# Patient Record
Sex: MALE | Race: WHITE | NOT HISPANIC OR LATINO | Employment: OTHER | URBAN - METROPOLITAN AREA
[De-identification: names, ages, dates, MRNs, and addresses within clinical notes are randomized per-mention and may not be internally consistent; named-entity substitution may affect disease eponyms.]

---

## 2021-10-14 ENCOUNTER — APPOINTMENT (OUTPATIENT)
Dept: GENERAL RADIOLOGY | Facility: HOSPITAL | Age: 61
End: 2021-10-14

## 2021-10-14 ENCOUNTER — HOSPITAL ENCOUNTER (EMERGENCY)
Facility: HOSPITAL | Age: 61
Discharge: HOME | End: 2021-10-14
Attending: EMERGENCY MEDICINE | Admitting: EMERGENCY MEDICINE

## 2021-10-14 VITALS
DIASTOLIC BLOOD PRESSURE: 81 MMHG | HEIGHT: 66 IN | RESPIRATION RATE: 16 BRPM | OXYGEN SATURATION: 97 % | SYSTOLIC BLOOD PRESSURE: 166 MMHG | HEART RATE: 74 BPM | BODY MASS INDEX: 38.19 KG/M2 | TEMPERATURE: 98.5 F | WEIGHT: 237.66 LBS

## 2021-10-14 DIAGNOSIS — I95.9 TRANSIENT HYPOTENSION: Primary | ICD-10-CM

## 2021-10-14 LAB
ALBUMIN SERPL-MCNC: 3.8 G/DL (ref 3.5–5.2)
ALBUMIN/GLOB SERPL: 1.3 G/DL
ALP SERPL-CCNC: 66 U/L (ref 39–117)
ALT SERPL W P-5'-P-CCNC: 34 U/L (ref 1–41)
ANION GAP SERPL CALCULATED.3IONS-SCNC: 11.2 MMOL/L (ref 5–15)
AST SERPL-CCNC: 21 U/L (ref 1–40)
BASOPHILS # BLD AUTO: 0.12 10*3/MM3 (ref 0–0.2)
BASOPHILS NFR BLD AUTO: 1.4 % (ref 0–1.5)
BILIRUB SERPL-MCNC: 0.3 MG/DL (ref 0–1.2)
BUN SERPL-MCNC: 24 MG/DL (ref 8–23)
BUN/CREAT SERPL: 11.8 (ref 7–25)
CALCIUM SPEC-SCNC: 9.3 MG/DL (ref 8.6–10.5)
CHLORIDE SERPL-SCNC: 105 MMOL/L (ref 98–107)
CO2 SERPL-SCNC: 23.8 MMOL/L (ref 22–29)
CREAT SERPL-MCNC: 2.04 MG/DL (ref 0.76–1.27)
DEPRECATED RDW RBC AUTO: 45 FL (ref 37–54)
EOSINOPHIL # BLD AUTO: 0.55 10*3/MM3 (ref 0–0.4)
EOSINOPHIL NFR BLD AUTO: 6.3 % (ref 0.3–6.2)
ERYTHROCYTE [DISTWIDTH] IN BLOOD BY AUTOMATED COUNT: 13.6 % (ref 12.3–15.4)
GFR SERPL CREATININE-BSD FRML MDRD: 33 ML/MIN/1.73
GLOBULIN UR ELPH-MCNC: 3 GM/DL
GLUCOSE SERPL-MCNC: 110 MG/DL (ref 65–99)
HCT VFR BLD AUTO: 38.7 % (ref 37.5–51)
HGB BLD-MCNC: 12.6 G/DL (ref 13–17.7)
IMM GRANULOCYTES # BLD AUTO: 0.02 10*3/MM3 (ref 0–0.05)
IMM GRANULOCYTES NFR BLD AUTO: 0.2 % (ref 0–0.5)
LYMPHOCYTES # BLD AUTO: 2.94 10*3/MM3 (ref 0.7–3.1)
LYMPHOCYTES NFR BLD AUTO: 33.5 % (ref 19.6–45.3)
MCH RBC QN AUTO: 29.4 PG (ref 26.6–33)
MCHC RBC AUTO-ENTMCNC: 32.6 G/DL (ref 31.5–35.7)
MCV RBC AUTO: 90.4 FL (ref 79–97)
MONOCYTES # BLD AUTO: 0.86 10*3/MM3 (ref 0.1–0.9)
MONOCYTES NFR BLD AUTO: 9.8 % (ref 5–12)
NEUTROPHILS NFR BLD AUTO: 4.28 10*3/MM3 (ref 1.7–7)
NEUTROPHILS NFR BLD AUTO: 48.8 % (ref 42.7–76)
NRBC BLD AUTO-RTO: 0 /100 WBC (ref 0–0.2)
PLATELET # BLD AUTO: 252 10*3/MM3 (ref 140–450)
PMV BLD AUTO: 10.5 FL (ref 6–12)
POTASSIUM SERPL-SCNC: 4.4 MMOL/L (ref 3.5–5.2)
PROT SERPL-MCNC: 6.8 G/DL (ref 6–8.5)
RBC # BLD AUTO: 4.28 10*6/MM3 (ref 4.14–5.8)
SODIUM SERPL-SCNC: 140 MMOL/L (ref 136–145)
TROPONIN T SERPL-MCNC: 0.03 NG/ML (ref 0–0.03)
TROPONIN T SERPL-MCNC: 0.05 NG/ML (ref 0–0.03)
WBC # BLD AUTO: 8.77 10*3/MM3 (ref 3.4–10.8)

## 2021-10-14 PROCEDURE — 93005 ELECTROCARDIOGRAM TRACING: CPT | Performed by: EMERGENCY MEDICINE

## 2021-10-14 PROCEDURE — 93010 ELECTROCARDIOGRAM REPORT: CPT | Performed by: INTERNAL MEDICINE

## 2021-10-14 PROCEDURE — 84484 ASSAY OF TROPONIN QUANT: CPT | Performed by: EMERGENCY MEDICINE

## 2021-10-14 PROCEDURE — 99283 EMERGENCY DEPT VISIT LOW MDM: CPT

## 2021-10-14 PROCEDURE — 99284 EMERGENCY DEPT VISIT MOD MDM: CPT

## 2021-10-14 PROCEDURE — 85025 COMPLETE CBC W/AUTO DIFF WBC: CPT | Performed by: EMERGENCY MEDICINE

## 2021-10-14 PROCEDURE — 36415 COLL VENOUS BLD VENIPUNCTURE: CPT

## 2021-10-14 PROCEDURE — 71045 X-RAY EXAM CHEST 1 VIEW: CPT

## 2021-10-14 PROCEDURE — 80053 COMPREHEN METABOLIC PANEL: CPT | Performed by: EMERGENCY MEDICINE

## 2021-10-14 RX ORDER — HUMAN INSULIN 100 [IU]/ML
62 INJECTION, SUSPENSION SUBCUTANEOUS
COMMUNITY

## 2021-10-14 RX ORDER — LOSARTAN POTASSIUM 50 MG/1
100 TABLET ORAL DAILY
COMMUNITY

## 2021-10-14 RX ORDER — SODIUM CHLORIDE 0.9 % (FLUSH) 0.9 %
10 SYRINGE (ML) INJECTION AS NEEDED
Status: DISCONTINUED | OUTPATIENT
Start: 2021-10-14 | End: 2021-10-15 | Stop reason: HOSPADM

## 2021-10-14 RX ORDER — NIFEDIPINE 30 MG/1
30 TABLET, EXTENDED RELEASE ORAL DAILY
COMMUNITY
End: 2022-08-12

## 2021-10-14 RX ORDER — GLIPIZIDE 10 MG/1
10 TABLET ORAL
COMMUNITY

## 2021-10-14 RX ORDER — OMEPRAZOLE 20 MG/1
20 CAPSULE, DELAYED RELEASE ORAL DAILY
COMMUNITY

## 2021-10-14 RX ORDER — GABAPENTIN 600 MG/1
1200 TABLET ORAL 2 TIMES DAILY
COMMUNITY

## 2021-10-14 RX ORDER — ERGOCALCIFEROL 1.25 MG/1
50000 CAPSULE ORAL WEEKLY
COMMUNITY
End: 2023-01-06

## 2021-10-14 RX ORDER — CHLORAL HYDRATE 500 MG
CAPSULE ORAL
COMMUNITY

## 2021-10-14 RX ORDER — MONTELUKAST SODIUM 10 MG/1
10 TABLET ORAL NIGHTLY
COMMUNITY

## 2021-10-14 RX ORDER — ATORVASTATIN CALCIUM 80 MG/1
80 TABLET, FILM COATED ORAL NIGHTLY
COMMUNITY

## 2021-10-14 RX ADMIN — SODIUM CHLORIDE 500 ML: 9 INJECTION, SOLUTION INTRAVENOUS at 18:32

## 2021-10-15 LAB — QT INTERVAL: 406 MS

## 2021-10-15 NOTE — DISCHARGE INSTRUCTIONS
Discontinue your nifedipine prescription.  Take your other blood pressure medications as prescribed.  Monitor your blood pressure twice a day at roughly the same time each day.  If you notice your blood pressures are becoming elevated again then you may restart your nifedipine.  Otherwise follow-up with your family doctor and discuss your medication regiment with them.  Return to the ER for any chest pain, shortness of breath or any other concerns that may arise.

## 2021-10-15 NOTE — ED PROVIDER NOTES
Subjective   History of Present Illness  The patient is a 61-year-old male who presents to the ER for evaluation of low blood pressure.  Patient is a he was at home he began to dizzy and lightheaded.  He checked his blood pressure and found to be 71/33.  He took several readings over.  About half hour with very similar readings each time.  Subsequently patient presents to the ER for evaluation.  Upon arrival here patient is a symptoms are pretty much resolved.  Patient reports he has had a recent change in his blood pressure regiment.  Patient had a recent carotid endarterectomy was followed by a postsurgical wound infection requiring repeat hospitalization.  He reports that while he was hospitalized his blood pressure was found to be elevated and he was started on nifedipine in addition to his other home medications of losartan.    Patient denies headache, chest pain, shortness of breath, fever, nausea, vomiting, or diarrhea.  Review of Systems   Constitutional: Negative.    HENT: Negative.    Eyes: Negative.    Respiratory: Negative.    Cardiovascular: Negative.    Gastrointestinal: Negative.    Endocrine: Negative.    Genitourinary: Negative.    Musculoskeletal: Negative.    Skin: Negative.    Allergic/Immunologic: Negative.    Neurological: Positive for light-headedness.   Hematological: Negative.    Psychiatric/Behavioral: Negative.    All other systems reviewed and are negative.      Past Medical History:   Diagnosis Date   • Diabetes mellitus (HCC)    • Hyperlipidemia    • Hypertension        No Known Allergies    Past Surgical History:   Procedure Laterality Date   • CHOLECYSTECTOMY     • HERNIA REPAIR         History reviewed. No pertinent family history.    Social History     Socioeconomic History   • Marital status:    Tobacco Use   • Smoking status: Former Smoker     Years: 25.00     Types: Cigarettes   Substance and Sexual Activity   • Alcohol use: Never   • Drug use: Never           Objective    Physical Exam  Vital signs were reviewed.  General appearance - Patient appears well-developed and well-nourished.  Patient is in no acute distress.  Head - Normocephalic, atraumatic.  Pupils - Equal, round, reactive to light.  Extraocular muscles are intact.  Conjunctive is clear  Tympanic membranes - Gray, intact without erythema or retractions.  Oral mucosa - Pink and moist without lesions or erythema.  Uvula is midline.  Neck - Supple.  Trachea was midline.  There is no palpable lymphadenopathy or thyromegaly.  There are no meningeal signs  Lungs - Clear to auscultation and percussion bilaterally.  Heart - Regular rate and rhythm without any murmurs, clicks, or gallops.  Abdomen - Soft.  Bowel sounds are present.  There is no rebound, guarding, or rigidity.  There are no palpable masses.  There are no pulsatile masses.  Back - Spine is straight and midline.  There is no CVA tenderness.  Extremities - Intact x4 with full range of motion.  There is no palpable edema.  Neurologic - Patient is awake, alert, and oriented x3.  Cranial nerves II through XII are grossly intact.  Motor and sensory functions grossly intact.  Cerebellar function was normal.  Integument - There are no rashes.  There are no petechia or purpura lesions noted.  There are no vesicular lesions noted.    Procedures     EKG performed at 1818 was interpreted by me showed normal sinus rhythm with a ventricular rate of 63 beats minute.  The intervals are 60 ms.  P waves are normal.  QRS interval is normal.  Axis is leftward -26 degrees.  There are some nonspecific ST-T wave changes identified.  QT corrected is normal at 415 ms.      ED Course         The patient was seen and evaluated.  The above history and physical examination was performed as stated.  Diagnostic data obtained.  Results reviewed.  Discussed with the patient.  Patient does report history of renal disease.  Patient on repeat questioning continues to deny any chest pain today.  I  believe this troponin is related to his renal failure.  I also believe that his hypotension is likely related to the newly prescribed nifedipine.  Patient was given 500 mL fluid and his blood pressure has returned back to normal.  Patient is asymptomatic.  The patient stable for discharge home.  I will recommend that he discontinue his nifedipine and go solely back on his losartan prescription.  The patient also agreed to call PCP tomorrow and make a short-term follow-up.  The patient also continue to monitor his blood pressures at home.                                   MDM    Final diagnoses:   Transient hypotension       ED Disposition  ED Disposition     ED Disposition Condition Comment    Discharge Stable             Follow-up with your primary doctor in 1 week.  Call for next available appointment             Medication List      No changes were made to your prescriptions during this visit.          Jun Bauman,   10/14/21 [id number]

## 2021-10-15 NOTE — ED NOTES
Bedside report received from SHLOMO Doherty. Care assumed. Patient denies pain at this time. Requests food/drink. Will check w/ MD.     Rena Callahan RN  10/14/21 [id number]

## 2021-10-15 NOTE — ED NOTES
Patient and wife given bag lunches and drinks. No further needs voiced at this time. Will continue to monitor.     Rena Callahan RN  10/14/21 [id number]

## 2022-03-24 ENCOUNTER — HOSPITAL ENCOUNTER (EMERGENCY)
Facility: HOSPITAL | Age: 62
Discharge: HOME | End: 2022-03-24
Attending: EMERGENCY MEDICINE | Admitting: EMERGENCY MEDICINE

## 2022-03-24 ENCOUNTER — APPOINTMENT (OUTPATIENT)
Dept: GENERAL RADIOLOGY | Facility: HOSPITAL | Age: 62
End: 2022-03-24

## 2022-03-24 ENCOUNTER — APPOINTMENT (OUTPATIENT)
Dept: CT IMAGING | Facility: HOSPITAL | Age: 62
End: 2022-03-24

## 2022-03-24 VITALS
TEMPERATURE: 97.6 F | WEIGHT: 230 LBS | RESPIRATION RATE: 24 BRPM | HEART RATE: 71 BPM | OXYGEN SATURATION: 99 % | BODY MASS INDEX: 36.1 KG/M2 | SYSTOLIC BLOOD PRESSURE: 161 MMHG | HEIGHT: 67 IN | DIASTOLIC BLOOD PRESSURE: 68 MMHG

## 2022-03-24 DIAGNOSIS — S20.212A CONTUSION OF LEFT CHEST WALL, INITIAL ENCOUNTER: ICD-10-CM

## 2022-03-24 DIAGNOSIS — S30.1XXA CONTUSION OF ABDOMINAL WALL, INITIAL ENCOUNTER: ICD-10-CM

## 2022-03-24 DIAGNOSIS — S22.43XA CLOSED FRACTURE OF MULTIPLE RIBS OF BOTH SIDES, INITIAL ENCOUNTER: Primary | ICD-10-CM

## 2022-03-24 LAB
ALBUMIN SERPL-MCNC: 3.9 G/DL (ref 3.5–5.2)
ALBUMIN/GLOB SERPL: 1.3 G/DL
ALP SERPL-CCNC: 73 U/L (ref 39–117)
ALT SERPL W P-5'-P-CCNC: 46 U/L (ref 1–41)
ANION GAP SERPL CALCULATED.3IONS-SCNC: 13.2 MMOL/L (ref 5–15)
AST SERPL-CCNC: 35 U/L (ref 1–40)
BASOPHILS # BLD AUTO: 0.1 10*3/MM3 (ref 0–0.2)
BASOPHILS NFR BLD AUTO: 1.4 % (ref 0–1.5)
BILIRUB SERPL-MCNC: 0.2 MG/DL (ref 0–1.2)
BUN SERPL-MCNC: 28 MG/DL (ref 8–23)
BUN/CREAT SERPL: 21.9 (ref 7–25)
CALCIUM SPEC-SCNC: 9.4 MG/DL (ref 8.6–10.5)
CHLORIDE SERPL-SCNC: 105 MMOL/L (ref 98–107)
CO2 SERPL-SCNC: 20.8 MMOL/L (ref 22–29)
CREAT SERPL-MCNC: 1.28 MG/DL (ref 0.76–1.27)
DEPRECATED RDW RBC AUTO: 45 FL (ref 37–54)
EGFRCR SERPLBLD CKD-EPI 2021: 63.3 ML/MIN/1.73
EOSINOPHIL # BLD AUTO: 0.35 10*3/MM3 (ref 0–0.4)
EOSINOPHIL NFR BLD AUTO: 4.9 % (ref 0.3–6.2)
ERYTHROCYTE [DISTWIDTH] IN BLOOD BY AUTOMATED COUNT: 14 % (ref 12.3–15.4)
GLOBULIN UR ELPH-MCNC: 3.1 GM/DL
GLUCOSE SERPL-MCNC: 250 MG/DL (ref 65–99)
HCT VFR BLD AUTO: 40.1 % (ref 37.5–51)
HGB BLD-MCNC: 13.3 G/DL (ref 13–17.7)
IMM GRANULOCYTES # BLD AUTO: 0.01 10*3/MM3 (ref 0–0.05)
IMM GRANULOCYTES NFR BLD AUTO: 0.1 % (ref 0–0.5)
LYMPHOCYTES # BLD AUTO: 2.28 10*3/MM3 (ref 0.7–3.1)
LYMPHOCYTES NFR BLD AUTO: 31.8 % (ref 19.6–45.3)
MCH RBC QN AUTO: 29.2 PG (ref 26.6–33)
MCHC RBC AUTO-ENTMCNC: 33.2 G/DL (ref 31.5–35.7)
MCV RBC AUTO: 88.1 FL (ref 79–97)
MONOCYTES # BLD AUTO: 0.56 10*3/MM3 (ref 0.1–0.9)
MONOCYTES NFR BLD AUTO: 7.8 % (ref 5–12)
NEUTROPHILS NFR BLD AUTO: 3.86 10*3/MM3 (ref 1.7–7)
NEUTROPHILS NFR BLD AUTO: 54 % (ref 42.7–76)
NRBC BLD AUTO-RTO: 0 /100 WBC (ref 0–0.2)
PLATELET # BLD AUTO: 274 10*3/MM3 (ref 140–450)
PMV BLD AUTO: 10.4 FL (ref 6–12)
POTASSIUM SERPL-SCNC: 4.5 MMOL/L (ref 3.5–5.2)
PROT SERPL-MCNC: 7 G/DL (ref 6–8.5)
RBC # BLD AUTO: 4.55 10*6/MM3 (ref 4.14–5.8)
SODIUM SERPL-SCNC: 139 MMOL/L (ref 136–145)
WBC NRBC COR # BLD: 7.16 10*3/MM3 (ref 3.4–10.8)

## 2022-03-24 PROCEDURE — 96375 TX/PRO/DX INJ NEW DRUG ADDON: CPT

## 2022-03-24 PROCEDURE — 25010000002 ONDANSETRON PER 1 MG: Performed by: EMERGENCY MEDICINE

## 2022-03-24 PROCEDURE — 74177 CT ABD & PELVIS W/CONTRAST: CPT

## 2022-03-24 PROCEDURE — 99283 EMERGENCY DEPT VISIT LOW MDM: CPT

## 2022-03-24 PROCEDURE — 80053 COMPREHEN METABOLIC PANEL: CPT | Performed by: NURSE PRACTITIONER

## 2022-03-24 PROCEDURE — 25010000002 MORPHINE PER 10 MG: Performed by: EMERGENCY MEDICINE

## 2022-03-24 PROCEDURE — 71260 CT THORAX DX C+: CPT

## 2022-03-24 PROCEDURE — 96374 THER/PROPH/DIAG INJ IV PUSH: CPT

## 2022-03-24 PROCEDURE — 85025 COMPLETE CBC W/AUTO DIFF WBC: CPT | Performed by: NURSE PRACTITIONER

## 2022-03-24 PROCEDURE — 0 IOPAMIDOL PER 1 ML: Performed by: EMERGENCY MEDICINE

## 2022-03-24 PROCEDURE — 96361 HYDRATE IV INFUSION ADD-ON: CPT

## 2022-03-24 RX ORDER — CEPHALEXIN 500 MG/1
500 CAPSULE ORAL 4 TIMES DAILY
Qty: 28 CAPSULE | Refills: 0 | Status: SHIPPED | OUTPATIENT
Start: 2022-03-24 | End: 2022-03-24 | Stop reason: ALTCHOICE

## 2022-03-24 RX ORDER — ONDANSETRON 2 MG/ML
4 INJECTION INTRAMUSCULAR; INTRAVENOUS ONCE
Status: COMPLETED | OUTPATIENT
Start: 2022-03-24 | End: 2022-03-24

## 2022-03-24 RX ORDER — OXYCODONE HYDROCHLORIDE AND ACETAMINOPHEN 5; 325 MG/1; MG/1
1 TABLET ORAL EVERY 6 HOURS PRN
Qty: 12 TABLET | Refills: 0 | Status: SHIPPED | OUTPATIENT
Start: 2022-03-24 | End: 2023-03-31

## 2022-03-24 RX ADMIN — SODIUM CHLORIDE 1000 ML: 9 INJECTION, SOLUTION INTRAVENOUS at 22:00

## 2022-03-24 RX ADMIN — ONDANSETRON 4 MG: 2 INJECTION INTRAMUSCULAR; INTRAVENOUS at 20:38

## 2022-03-24 RX ADMIN — MORPHINE SULFATE 4 MG: 4 INJECTION, SOLUTION INTRAMUSCULAR; INTRAVENOUS at 20:39

## 2022-03-24 RX ADMIN — IOPAMIDOL 100 ML: 755 INJECTION, SOLUTION INTRAVENOUS at 21:43

## 2022-03-25 NOTE — ED PROVIDER NOTES
Time: 22:38 EDT  Arrived by: private vehicle   Chief Complaint: fall  History provided by: patient  History is limited by: N/A    History of Present Illness:  Patient is a 61 y.o. year old male that presents to the emergency department with fall off porch      History provided by:  Patient  Trauma  Mechanism of injury: fall  Injury location: torso  Injury location detail: L chest  Incident location: home  Time since incident: 2 hours  Arrived directly from scene: yes     Fall:       Fall occurred: tripped (Patient was on his porch and he tripped falling into the railing and both him and the railing fell about 3 feet onto the ground)       Height of fall: 3       Impact surface: dirt       Point of impact: Left side.       Entrapped after fall: no    Protective equipment:        None       Suspicion of alcohol use: no       Suspicion of drug use: no    EMS/PTA data:       Bystander interventions: none       Ambulatory at scene: yes       Blood loss: none       Responsiveness: alert       Oriented to: person, place, situation and time       Loss of consciousness: no       Amnesic to event: no       Airway interventions: none       Breathing interventions: none       Airway condition since incident: stable       Breathing condition since incident: stable       Circulation condition since incident: stable       Mental status condition since incident: stable       Disability condition since incident: stable    Current symptoms:       Pain scale: 9/10       Pain quality: aching, throbbing and sharp       Pain timing: constant       Associated symptoms:             Reports chest pain ( Left ribs) and difficulty breathing ( Hurts to take a deep breath).             Denies abdominal pain, back pain, blindness, headache, hearing loss, loss of consciousness, nausea, neck pain, seizures and vomiting.   Fall  Associated symptoms: chest pain ( Left ribs) and difficulty breathing ( Hurts to take a deep breath)    Associated  symptoms: no abdominal pain, no back pain, no blindness, no headaches, no hearing loss, no loss of consciousness, no nausea, no neck pain, no seizures and no vomiting        Similar Symptoms Previously: no    Recently seen: no      Patient Care Team  Primary Care Provider: unknown    Past Medical History:     No Known Allergies  Past Medical History:   Diagnosis Date   • Diabetes mellitus (HCC)    • Hyperlipidemia    • Hypertension      Past Surgical History:   Procedure Laterality Date   • CHOLECYSTECTOMY     • HERNIA REPAIR       History reviewed. No pertinent family history.    Home Medications:  Prior to Admission medications    Medication Sig Start Date End Date Taking? Authorizing Provider   atorvastatin (LIPITOR) 80 MG tablet Take 80 mg by mouth Daily.    Tanvir Boateng MD   gabapentin (NEURONTIN) 600 MG tablet Take 1,200 mg by mouth 2 (Two) Times a Day.    Tanvir Boateng MD   glipizide (GLUCOTROL) 10 MG tablet Take 10 mg by mouth 2 (Two) Times a Day Before Meals.    Tanvir Boateng MD   insulin NPH (NovoLIN N ReliOn) 100 UNIT/ML injection Inject 52 Units under the skin into the appropriate area as directed 2 (Two) Times a Day Before Meals.    Tanvir Boateng MD   losartan (COZAAR) 50 MG tablet Take 100 mg by mouth Daily.    Tanvir Boateng MD   metFORMIN (GLUCOPHAGE) 1000 MG tablet Take 1,000 mg by mouth 2 (Two) Times a Day With Meals.    Tanvir Boateng MD   montelukast (SINGULAIR) 10 MG tablet Take 10 mg by mouth Every Night.    Tanvir Boateng MD   NIFEdipine XL (PROCARDIA XL) 30 MG 24 hr tablet Take 30 mg by mouth Daily.    Tanvir Boateng MD   Omega-3 Fatty Acids (fish oil) 1000 MG capsule capsule Take  by mouth Daily With Breakfast.    Tanvir Boateng MD   omeprazole (priLOSEC) 20 MG capsule Take 20 mg by mouth Daily.    Tanvir Boateng MD   vitamin D (ERGOCALCIFEROL) 1.25 MG (86662 UT) capsule capsule Take 50,000 Units by mouth 1 (One) Time  "Per Week.    Provider, Tanvir, MD        Social History:   PT  reports that he has quit smoking. His smoking use included cigarettes. He quit after 25.00 years of use. He does not have any smokeless tobacco history on file. He reports that he does not drink alcohol and does not use drugs.    Record Review:  I have reviewed the patient's records in [facility].     Review of Systems  Review of Systems   Constitutional: Negative for chills and fever.   HENT: Negative for congestion, ear pain, hearing loss and sore throat.    Eyes: Negative for blindness and pain.   Respiratory: Negative for cough, chest tightness and shortness of breath.    Cardiovascular: Positive for chest pain ( Left ribs).   Gastrointestinal: Negative for abdominal pain, diarrhea, nausea and vomiting.   Genitourinary: Negative for flank pain and hematuria.   Musculoskeletal: Negative for back pain, joint swelling and neck pain.   Skin: Negative for pallor.   Neurological: Negative for seizures, loss of consciousness and headaches.   Hematological: Negative.    Psychiatric/Behavioral: Negative.    All other systems reviewed and are negative.       Physical Exam  /68   Pulse 75   Temp 97.6 °F (36.4 °C)   Resp 24   Ht 170.2 cm (67\")   Wt 104 kg (230 lb)   SpO2 99%   BMI 36.02 kg/m²     Physical Exam  Vitals and nursing note reviewed.   Constitutional:       General: He is not in acute distress.     Appearance: Normal appearance. He is not toxic-appearing.   HENT:      Head: Normocephalic and atraumatic.      Mouth/Throat:      Mouth: Mucous membranes are moist.   Eyes:      General: No scleral icterus.  Cardiovascular:      Rate and Rhythm: Normal rate and regular rhythm.      Pulses: Normal pulses.      Heart sounds: Normal heart sounds.   Pulmonary:      Effort: Pulmonary effort is normal. No respiratory distress.      Breath sounds: Normal breath sounds.      Comments: No obvious sign of trauma to chest wall with inspection  Chest:      " "Chest wall: Tenderness ( Left anterior lower ribs) present.   Abdominal:      Palpations: Abdomen is soft.      Tenderness: There is no abdominal tenderness ( Left upper and mid abdomen mildly tender.). There is no right CVA tenderness or left CVA tenderness.      Comments: No physical sign of trauma   Musculoskeletal:         General: Normal range of motion.      Cervical back: Normal range of motion and neck supple.   Skin:     General: Skin is warm and dry.      Findings: No bruising.   Neurological:      Mental Status: He is alert and oriented to person, place, and time.   Psychiatric:         Mood and Affect: Mood normal.         Behavior: Behavior normal.          ED Course  /68   Pulse 75   Temp 97.6 °F (36.4 °C)   Resp 24   Ht 170.2 cm (67\")   Wt 104 kg (230 lb)   SpO2 99%   BMI 36.02 kg/m²   Results for orders placed or performed during the hospital encounter of 03/24/22   Comprehensive Metabolic Panel    Specimen: Blood   Result Value Ref Range    Glucose 250 (H) 65 - 99 mg/dL    BUN 28 (H) 8 - 23 mg/dL    Creatinine 1.28 (H) 0.76 - 1.27 mg/dL    Sodium 139 136 - 145 mmol/L    Potassium 4.5 3.5 - 5.2 mmol/L    Chloride 105 98 - 107 mmol/L    CO2 20.8 (L) 22.0 - 29.0 mmol/L    Calcium 9.4 8.6 - 10.5 mg/dL    Total Protein 7.0 6.0 - 8.5 g/dL    Albumin 3.90 3.50 - 5.20 g/dL    ALT (SGPT) 46 (H) 1 - 41 U/L    AST (SGOT) 35 1 - 40 U/L    Alkaline Phosphatase 73 39 - 117 U/L    Total Bilirubin 0.2 0.0 - 1.2 mg/dL    Globulin 3.1 gm/dL    A/G Ratio 1.3 g/dL    BUN/Creatinine Ratio 21.9 7.0 - 25.0    Anion Gap 13.2 5.0 - 15.0 mmol/L    eGFR 63.3 >60.0 mL/min/1.73   CBC Auto Differential    Specimen: Blood   Result Value Ref Range    WBC 7.16 3.40 - 10.80 10*3/mm3    RBC 4.55 4.14 - 5.80 10*6/mm3    Hemoglobin 13.3 13.0 - 17.7 g/dL    Hematocrit 40.1 37.5 - 51.0 %    MCV 88.1 79.0 - 97.0 fL    MCH 29.2 26.6 - 33.0 pg    MCHC 33.2 31.5 - 35.7 g/dL    RDW 14.0 12.3 - 15.4 %    RDW-SD 45.0 37.0 - 54.0 fl "    MPV 10.4 6.0 - 12.0 fL    Platelets 274 140 - 450 10*3/mm3    Neutrophil % 54.0 42.7 - 76.0 %    Lymphocyte % 31.8 19.6 - 45.3 %    Monocyte % 7.8 5.0 - 12.0 %    Eosinophil % 4.9 0.3 - 6.2 %    Basophil % 1.4 0.0 - 1.5 %    Immature Grans % 0.1 0.0 - 0.5 %    Neutrophils, Absolute 3.86 1.70 - 7.00 10*3/mm3    Lymphocytes, Absolute 2.28 0.70 - 3.10 10*3/mm3    Monocytes, Absolute 0.56 0.10 - 0.90 10*3/mm3    Eosinophils, Absolute 0.35 0.00 - 0.40 10*3/mm3    Basophils, Absolute 0.10 0.00 - 0.20 10*3/mm3    Immature Grans, Absolute 0.01 0.00 - 0.05 10*3/mm3    nRBC 0.0 0.0 - 0.2 /100 WBC     Medications   sodium chloride 0.9 % bolus 1,000 mL (1,000 mL Intravenous New Bag 3/24/22 2200)   morphine injection 4 mg (4 mg Intravenous Given 3/24/22 2039)   ondansetron (ZOFRAN) injection 4 mg (4 mg Intravenous Given 3/24/22 2038)   iopamidol (ISOVUE-370) 76 % injection 100 mL (100 mL Intravenous Given 3/24/22 2143)     CT Chest With Contrast Diagnostic    Result Date: 3/24/2022  Narrative: PROCEDURE: CT CHEST W CONTRAST DIAGNOSTIC  COMPARISON: [facility], CT, CT ABDOMEN PELVIS W CONTRAST, 3/24/2022, 21:48.  INDICATIONS: fall/injury  TECHNIQUE: After obtaining the patient's consent, 1,340 CT images were obtained with non-ionic intravenous contrast material.   PROTOCOL:   Standard CT imaging protocol performed.    RADIATION:   DLP: 3,771 mGy*cm   Automated exposure control was utilized to minimize radiation dose. CONTRAST: 100 mL Isovue 370 I.V. LABS:   eGFR: >60 mL/min/1.73m^2  FINDINGS:   CHEST CT:  The chest CT reveals acute left-sided lateral 7th, 8th, and possibly 9th rib fractures.  There may also be acute right-sided anterolateral 8th and 9th rib fractures.  These fractures are nondisplaced, extra-articular, and closed.  No other definite acute fractures are seen.  No acute abnormality of the aorta or great arteries.  Atherosclerotic changes are present, especially involving the thoracic aorta, great arteries, and  coronary arteries.  No pneumothorax is seen. No focal pulmonary contusion or infiltrate. No pleural or pericardial effusion. No mediastinal or chest wall hematoma seen. No hemothorax is identified.  There may be borderline cardiac enlargement.   ABDOMEN CT:  The abdominal CT reveals no acute abnormality of the liver, spleen, or kidneys. No pneumoperitoneum. No hemoperitoneum. No acute retroperitoneal hemorrhage is seen. No acute abnormality of the abdominal aorta.  There is fusiform aneurysmal dilatation of the abdominal aorta, measuring about 2.8 cm in maximum diameter.  Abdominal aortic atherosclerotic change is seen.  No acute fracture is seen. No sizable abdominal wall contusion is identified.  There is diffuse hepatic steatosis without hepatomegaly.  A tiny hiatal hernia is seen.  The patient has undergone cholecystectomy and ventral hernia repair.  There is diffuse colonic diverticulosis.  No acute appendicitis.  PELVIS CT:  The pelvis CT reveals no acute fracture. No intrapelvic hematoma or fluid collection. No pelvic wall contusion is identified. There are incidental pelvic phleboliths.  There are calcified seminal vesicles.  No definite acute abnormality of the urinary bladder is seen.  There may be mild prostatomegaly.  OTHER FINDINGS:  On the reformatted images, no compression fractures involving the vertebrae of the thoracolumbar spine are noted.  Degenerative changes are seen throughout the imaged spine.  In particular, there is severe facet osteoarthritis at L5-S1 with chronic anterolisthesis, estimated at about 8 mm. There is mild lateral curvature of the cervicothoracic spine with the convexity to the right, which may be fixed or positional.  Also, mild lateral curvature involves the lumbar spine with the convexity to the left, which may be fixed or positional.  Degenerative changes also involve the bilateral sacroiliac joints.  There are old healed right-sided pelvic fractures.      Impression:  There are acute bilateral anterior rib fractures, which are nondisplaced, extra-articular, and closed.  No other significant acute findings are seen in the chest, abdomen, or pelvis by enhanced CT examinations, as discussed.  There is an incidental fusiform aneurysm of the infrarenal abdominal aorta, measuring 2.8 cm in maximum diameter.  Consider close interval clinical and imaging follow-up of this finding.    LYNSEY DE LOS SANTOS JR, MD       Electronically Signed and Approved By: LYNSEY DE LOS SANTOS JR, MD on 3/24/2022 at 22:21             CT Abdomen Pelvis With Contrast    Result Date: 3/24/2022  Narrative: PROCEDURE: CT ABDOMEN PELVIS W CONTRAST  COMPARISON: [facility], CT, CT CHEST W CONTRAST DIAGNOSTIC, 3/24/2022, 21:48.  INDICATIONS: fall/injury  TECHNIQUE: After obtaining the patient's consent, 1,340 CT images were obtained with non-ionic intravenous contrast material.   PROTOCOL:   Standard CT imaging protocol performed.    RADIATION:   DLP: 3,771 mGy*cm   Automated exposure control was utilized to minimize radiation dose. CONTRAST: 100 mL Isovue 370 I.V. LABS:   eGFR: >60 mL/min/1.73m^2  FINDINGS:   CHEST CT:  The chest CT reveals acute left-sided lateral 7th, 8th, and possibly 9th rib fractures.  There may also be acute right-sided anterolateral 8th and 9th rib fractures.  These fractures are nondisplaced, extra-articular, and closed.  No other definite acute fractures are seen.  No acute abnormality of the aorta or great arteries.  Atherosclerotic changes are present, especially involving the thoracic aorta, great arteries, and coronary arteries.  No pneumothorax is seen. No focal pulmonary contusion or infiltrate. No pleural or pericardial effusion. No mediastinal or chest wall hematoma seen. No hemothorax is identified.  There may be borderline cardiac enlargement.   ABDOMEN CT:  The abdominal CT reveals no acute abnormality of the liver, spleen, or kidneys. No pneumoperitoneum. No hemoperitoneum.  No acute retroperitoneal hemorrhage is seen. No acute abnormality of the abdominal aorta.  There is fusiform aneurysmal dilatation of the abdominal aorta, measuring about 2.8 cm in maximum diameter.  Abdominal aortic atherosclerotic change is seen.  No acute fracture is seen. No sizable abdominal wall contusion is identified.  There is diffuse hepatic steatosis without hepatomegaly.  A tiny hiatal hernia is seen.  The patient has undergone cholecystectomy and ventral hernia repair.  There is diffuse colonic diverticulosis.  No acute appendicitis.  PELVIS CT:  The pelvis CT reveals no acute fracture. No intrapelvic hematoma or fluid collection. No pelvic wall contusion is identified. There are incidental pelvic phleboliths.  There are calcified seminal vesicles.  No definite acute abnormality of the urinary bladder is seen.  There may be mild prostatomegaly.  OTHER FINDINGS:  On the reformatted images, no compression fractures involving the vertebrae of the thoracolumbar spine are noted.  Degenerative changes are seen throughout the imaged spine.  In particular, there is severe facet osteoarthritis at L5-S1 with chronic anterolisthesis, estimated at about 8 mm. There is mild lateral curvature of the cervicothoracic spine with the convexity to the right, which may be fixed or positional.  Also, mild lateral curvature involves the lumbar spine with the convexity to the left, which may be fixed or positional.  Degenerative changes also involve the bilateral sacroiliac joints.  There are old healed right-sided pelvic fractures.      Impression: There are acute bilateral anterior rib fractures, which are nondisplaced, extra-articular, and closed.  No other significant acute findings are seen in the chest, abdomen, or pelvis by enhanced CT examinations, as discussed.  There is an incidental fusiform aneurysm of the infrarenal abdominal aorta, measuring 2.8 cm in maximum diameter.  No acute rupture is suggested.  Consider  close interval clinical and imaging follow-up of this finding.    LYNSEY DE LOS SANTOS JR, MD       Electronically Signed and Approved By: LYNSEY DE LOS SANTOS JR, MD on 3/24/2022 at 22:24               Medical Decision Making:                     MDM  Number of Diagnoses or Management Options  Closed fracture of multiple ribs of both sides, initial encounter  Diagnosis management comments: I have spoken with the patient. I have explained the patient´s condition, diagnoses and treatment plan based on the information available to me at this time. I have answered the patient's questions and addressed any concerns. The patient has a good  understanding of the patient´s diagnosis, condition, and treatment plan as can be expected at this point. The vital signs have been stable. The patient´s condition is stable and appropriate for discharge from the emergency department.      The patient will pursue further outpatient evaluation with the primary care physician or other designated or consulting physician as outlined in the discharge instructions. They are agreeable to this plan of care and follow-up instructions have been explained in detail. The patient has received these instructions in written format and have expressed an understanding of the discharge instructions. The patient is aware that any significant change in condition or worsening of symptoms should prompt an immediate return to this or the closest emergency department or call to 911.         Amount and/or Complexity of Data Reviewed  Clinical lab tests: reviewed and ordered  Tests in the radiology section of CPT®: reviewed and ordered  Tests in the medicine section of CPT®: ordered and reviewed  Discuss the patient with other providers: yes (Discussed this patient with Dr. Forbes who is seen and evaluated the patient as well)    Risk of Complications, Morbidity, and/or Mortality  Presenting problems: moderate  Diagnostic procedures: moderate  Management options:  low    Patient Progress  Patient progress: stable       Final diagnoses:   Closed fracture of multiple ribs of both sides, initial encounter   Contusion of abdominal wall, initial encounter   Contusion of left chest wall, initial encounter        Disposition:  ED Disposition     ED Disposition   Discharge    Condition   Stable    Comment   --              Brielle Wells, APRN  03/24/22 [id number]

## 2022-03-25 NOTE — ED PROVIDER NOTES
"Patient is 61 y.o. year old male that presents to the ED for evaluation of fall and rib pain.     Physical Exam    ED Course:    /68   Pulse 75   Temp 97.6 °F (36.4 °C)   Resp 24   Ht 170.2 cm (67\")   Wt 104 kg (230 lb)   SpO2 99%   BMI 36.02 kg/m²   Results for orders placed or performed during the hospital encounter of 03/24/22   Comprehensive Metabolic Panel    Specimen: Blood   Result Value Ref Range    Glucose 250 (H) 65 - 99 mg/dL    BUN 28 (H) 8 - 23 mg/dL    Creatinine 1.28 (H) 0.76 - 1.27 mg/dL    Sodium 139 136 - 145 mmol/L    Potassium 4.5 3.5 - 5.2 mmol/L    Chloride 105 98 - 107 mmol/L    CO2 20.8 (L) 22.0 - 29.0 mmol/L    Calcium 9.4 8.6 - 10.5 mg/dL    Total Protein 7.0 6.0 - 8.5 g/dL    Albumin 3.90 3.50 - 5.20 g/dL    ALT (SGPT) 46 (H) 1 - 41 U/L    AST (SGOT) 35 1 - 40 U/L    Alkaline Phosphatase 73 39 - 117 U/L    Total Bilirubin 0.2 0.0 - 1.2 mg/dL    Globulin 3.1 gm/dL    A/G Ratio 1.3 g/dL    BUN/Creatinine Ratio 21.9 7.0 - 25.0    Anion Gap 13.2 5.0 - 15.0 mmol/L    eGFR 63.3 >60.0 mL/min/1.73   CBC Auto Differential    Specimen: Blood   Result Value Ref Range    WBC 7.16 3.40 - 10.80 10*3/mm3    RBC 4.55 4.14 - 5.80 10*6/mm3    Hemoglobin 13.3 13.0 - 17.7 g/dL    Hematocrit 40.1 37.5 - 51.0 %    MCV 88.1 79.0 - 97.0 fL    MCH 29.2 26.6 - 33.0 pg    MCHC 33.2 31.5 - 35.7 g/dL    RDW 14.0 12.3 - 15.4 %    RDW-SD 45.0 37.0 - 54.0 fl    MPV 10.4 6.0 - 12.0 fL    Platelets 274 140 - 450 10*3/mm3    Neutrophil % 54.0 42.7 - 76.0 %    Lymphocyte % 31.8 19.6 - 45.3 %    Monocyte % 7.8 5.0 - 12.0 %    Eosinophil % 4.9 0.3 - 6.2 %    Basophil % 1.4 0.0 - 1.5 %    Immature Grans % 0.1 0.0 - 0.5 %    Neutrophils, Absolute 3.86 1.70 - 7.00 10*3/mm3    Lymphocytes, Absolute 2.28 0.70 - 3.10 10*3/mm3    Monocytes, Absolute 0.56 0.10 - 0.90 10*3/mm3    Eosinophils, Absolute 0.35 0.00 - 0.40 10*3/mm3    Basophils, Absolute 0.10 0.00 - 0.20 10*3/mm3    Immature Grans, Absolute 0.01 0.00 - 0.05 " 10*3/mm3    nRBC 0.0 0.0 - 0.2 /100 WBC     Medications   sodium chloride 0.9 % bolus 1,000 mL (1,000 mL Intravenous New Bag 3/24/22 2200)   morphine injection 4 mg (4 mg Intravenous Given 3/24/22 2039)   ondansetron (ZOFRAN) injection 4 mg (4 mg Intravenous Given 3/24/22 2038)   iopamidol (ISOVUE-370) 76 % injection 100 mL (100 mL Intravenous Given 3/24/22 2143)     CT Chest With Contrast Diagnostic    Result Date: 3/24/2022  Narrative: PROCEDURE: CT CHEST W CONTRAST DIAGNOSTIC  COMPARISON: H/[facility], CT, CT ABDOMEN PELVIS W CONTRAST, 3/24/2022, 21:48.  INDICATIONS: fall/injury  TECHNIQUE: After obtaining the patient's consent, 1,340 CT images were obtained with non-ionic intravenous contrast material.   PROTOCOL:   Standard CT imaging protocol performed.    RADIATION:   DLP: 3,771 mGy*cm   Automated exposure control was utilized to minimize radiation dose. CONTRAST: 100 mL Isovue 370 I.V. LABS:   eGFR: >60 mL/min/1.73m^2  FINDINGS:   CHEST CT:  The chest CT reveals acute left-sided lateral 7th, 8th, and possibly 9th rib fractures.  There may also be acute right-sided anterolateral 8th and 9th rib fractures.  These fractures are nondisplaced, extra-articular, and closed.  No other definite acute fractures are seen.  No acute abnormality of the aorta or great arteries.  Atherosclerotic changes are present, especially involving the thoracic aorta, great arteries, and coronary arteries.  No pneumothorax is seen. No focal pulmonary contusion or infiltrate. No pleural or pericardial effusion. No mediastinal or chest wall hematoma seen. No hemothorax is identified.  There may be borderline cardiac enlargement.   ABDOMEN CT:  The abdominal CT reveals no acute abnormality of the liver, spleen, or kidneys. No pneumoperitoneum. No hemoperitoneum. No acute retroperitoneal hemorrhage is seen. No acute abnormality of the abdominal aorta.  There is fusiform aneurysmal dilatation of the abdominal aorta, measuring about 2.8 cm in  maximum diameter.  Abdominal aortic atherosclerotic change is seen.  No acute fracture is seen. No sizable abdominal wall contusion is identified.  There is diffuse hepatic steatosis without hepatomegaly.  A tiny hiatal hernia is seen.  The patient has undergone cholecystectomy and ventral hernia repair.  There is diffuse colonic diverticulosis.  No acute appendicitis.  PELVIS CT:  The pelvis CT reveals no acute fracture. No intrapelvic hematoma or fluid collection. No pelvic wall contusion is identified. There are incidental pelvic phleboliths.  There are calcified seminal vesicles.  No definite acute abnormality of the urinary bladder is seen.  There may be mild prostatomegaly.  OTHER FINDINGS:  On the reformatted images, no compression fractures involving the vertebrae of the thoracolumbar spine are noted.  Degenerative changes are seen throughout the imaged spine.  In particular, there is severe facet osteoarthritis at L5-S1 with chronic anterolisthesis, estimated at about 8 mm. There is mild lateral curvature of the cervicothoracic spine with the convexity to the right, which may be fixed or positional.  Also, mild lateral curvature involves the lumbar spine with the convexity to the left, which may be fixed or positional.  Degenerative changes also involve the bilateral sacroiliac joints.  There are old healed right-sided pelvic fractures.      Impression: There are acute bilateral anterior rib fractures, which are nondisplaced, extra-articular, and closed.  No other significant acute findings are seen in the chest, abdomen, or pelvis by enhanced CT examinations, as discussed.  There is an incidental fusiform aneurysm of the infrarenal abdominal aorta, measuring 2.8 cm in maximum diameter.  Consider close interval clinical and imaging follow-up of this finding.    LYNSEY DE LOS SANTOS JR, MD       Electronically Signed and Approved By: LYNSEY DE LOS SANTOS JR, MD on 3/24/2022 at 22:21             CT Abdomen Pelvis With  Contrast    Result Date: 3/24/2022  Narrative: PROCEDURE: CT ABDOMEN PELVIS W CONTRAST  COMPARISON: [facility], CT, CT CHEST W CONTRAST DIAGNOSTIC, 3/24/2022, 21:48.  INDICATIONS: fall/injury  TECHNIQUE: After obtaining the patient's consent, 1,340 CT images were obtained with non-ionic intravenous contrast material.   PROTOCOL:   Standard CT imaging protocol performed.    RADIATION:   DLP: 3,771 mGy*cm   Automated exposure control was utilized to minimize radiation dose. CONTRAST: 100 mL Isovue 370 I.V. LABS:   eGFR: >60 mL/min/1.73m^2  FINDINGS:   CHEST CT:  The chest CT reveals acute left-sided lateral 7th, 8th, and possibly 9th rib fractures.  There may also be acute right-sided anterolateral 8th and 9th rib fractures.  These fractures are nondisplaced, extra-articular, and closed.  No other definite acute fractures are seen.  No acute abnormality of the aorta or great arteries.  Atherosclerotic changes are present, especially involving the thoracic aorta, great arteries, and coronary arteries.  No pneumothorax is seen. No focal pulmonary contusion or infiltrate. No pleural or pericardial effusion. No mediastinal or chest wall hematoma seen. No hemothorax is identified.  There may be borderline cardiac enlargement.   ABDOMEN CT:  The abdominal CT reveals no acute abnormality of the liver, spleen, or kidneys. No pneumoperitoneum. No hemoperitoneum. No acute retroperitoneal hemorrhage is seen. No acute abnormality of the abdominal aorta.  There is fusiform aneurysmal dilatation of the abdominal aorta, measuring about 2.8 cm in maximum diameter.  Abdominal aortic atherosclerotic change is seen.  No acute fracture is seen. No sizable abdominal wall contusion is identified.  There is diffuse hepatic steatosis without hepatomegaly.  A tiny hiatal hernia is seen.  The patient has undergone cholecystectomy and ventral hernia repair.  There is diffuse colonic diverticulosis.  No acute appendicitis.  PELVIS  CT:  The pelvis CT reveals no acute fracture. No intrapelvic hematoma or fluid collection. No pelvic wall contusion is identified. There are incidental pelvic phleboliths.  There are calcified seminal vesicles.  No definite acute abnormality of the urinary bladder is seen.  There may be mild prostatomegaly.  OTHER FINDINGS:  On the reformatted images, no compression fractures involving the vertebrae of the thoracolumbar spine are noted.  Degenerative changes are seen throughout the imaged spine.  In particular, there is severe facet osteoarthritis at L5-S1 with chronic anterolisthesis, estimated at about 8 mm. There is mild lateral curvature of the cervicothoracic spine with the convexity to the right, which may be fixed or positional.  Also, mild lateral curvature involves the lumbar spine with the convexity to the left, which may be fixed or positional.  Degenerative changes also involve the bilateral sacroiliac joints.  There are old healed right-sided pelvic fractures.      Impression: There are acute bilateral anterior rib fractures, which are nondisplaced, extra-articular, and closed.  No other significant acute findings are seen in the chest, abdomen, or pelvis by enhanced CT examinations, as discussed.  There is an incidental fusiform aneurysm of the infrarenal abdominal aorta, measuring 2.8 cm in maximum diameter.  No acute rupture is suggested.  Consider close interval clinical and imaging follow-up of this finding.    LYNSEY DE LOS SANTOS JR, MD       Electronically Signed and Approved By: LYNSEY DE LOS SANTOS JR, MD on 3/24/2022 at 22:24               MDM:    Procedures      The case was discussed between the RAD[facility] and myself. Patient  care including, but not limited to ordered imaging, medications, and lab results were reviewed. I then performed the substantive portion of the visit including all aspects of the medical decision making.        Daniel Forbes DO  22:28 EDT  03/24/22       Daniel Forbes DO  03/24/22  [id number]

## 2022-03-25 NOTE — DISCHARGE INSTRUCTIONS
Use incentive spirometer every hour while awake.    Ibuprofen for mild pain and take Norco for severe pain    Ice to affected area as desired    Follow-up with PCP for further pain management or evaluation.    Return for any new or worsening symptoms including increased chest pain, trouble breathing, or development of fever

## 2022-06-16 ENCOUNTER — OFFICE VISIT (OUTPATIENT)
Dept: ORTHOPEDIC SURGERY | Facility: CLINIC | Age: 62
End: 2022-06-16

## 2022-06-16 VITALS — OXYGEN SATURATION: 97 % | WEIGHT: 240 LBS | HEART RATE: 84 BPM | HEIGHT: 67 IN | BODY MASS INDEX: 37.67 KG/M2

## 2022-06-16 DIAGNOSIS — M25.512 LEFT SHOULDER PAIN, UNSPECIFIED CHRONICITY: Primary | ICD-10-CM

## 2022-06-16 DIAGNOSIS — M75.82 ROTATOR CUFF TENDONITIS, LEFT: ICD-10-CM

## 2022-06-16 DIAGNOSIS — S73.192A TEAR OF LEFT ACETABULAR LABRUM, INITIAL ENCOUNTER: ICD-10-CM

## 2022-06-16 DIAGNOSIS — M19.012 PRIMARY OSTEOARTHRITIS OF LEFT SHOULDER: ICD-10-CM

## 2022-06-16 PROCEDURE — 99203 OFFICE O/P NEW LOW 30 MIN: CPT | Performed by: ORTHOPAEDIC SURGERY

## 2022-06-16 NOTE — PROGRESS NOTES
"Chief Complaint  Initial Evaluation of the Left Shoulder     Subjective      Kevin Ordonez presents to [facility] for evaluation of the left shoulder. The patient reports about 1 month ago he fell off a porch. He has decreased ROM and pain to the shoulder. He has had an MRI that I reviewed with him today. He has no other complaints. He denies previous pain prior to the injury. He describes the pain as a sharp pain. He reports pain at night.     No Known Allergies     Social History     Socioeconomic History   • Marital status:    Tobacco Use   • Smoking status: Former Smoker     Years: 25.00     Types: Cigarettes   • Smokeless tobacco: Former User   Substance and Sexual Activity   • Alcohol use: Never   • Drug use: Never        Review of Systems     Objective   Vital Signs:   Pulse 84   Ht 170.2 cm (67\")   Wt 109 kg (240 lb)   SpO2 97%   BMI 37.59 kg/m²       Physical Exam  Constitutional:       Appearance: Normal appearance. The patient is well-developed and normal weight.   HENT:      Head: Normocephalic.      Right Ear: Hearing and external ear normal.      Left Ear: Hearing and external ear normal.      Nose: Nose normal.   Eyes:      Conjunctiva/sclera: Conjunctivae normal.   Cardiovascular:      Rate and Rhythm: Normal rate.   Pulmonary:      Effort: Pulmonary effort is normal.      Breath sounds: No wheezing or rales.   Abdominal:      Palpations: Abdomen is soft.      Tenderness: There is no abdominal tenderness.   Musculoskeletal:      Cervical back: Normal range of motion.   Skin:     Findings: No rash.   Neurological:      Mental Status: The patient is alert and oriented to person, place, and time.   Psychiatric:         Mood and Affect: Mood and affect normal.         Judgment: Judgment normal.       Ortho Exam      Left shoulder- Forward elevation active 90, passive 125. Abduction 90. External Rotation 40. Internal rotation 55. 4+ supraspinatus strength. 5/5 " infraspinatus  And subscapularis. Internal rotation to buttocks. Pain with cross arm adduction. Positive impingement signs.     Procedures    X-Ray Report:  Left shoulder(s) X-Ray  Indication: Evaluation of left shoulder pain  AP and Lateral view(s)  Findings: no acute fracture.  Moderate degenerative changes of the acromioclavicular joint, mild to moderate degenerative changes of the shoulder. Chronic fracture deformity to proximal humerus. 7x4 mm calcium deposit in subacromial space.   Prior studies available for comparison: no         Imaging Results (Most Recent)     Procedure Component Value Units Date/Time    XR Scapula Left [[id number]] Resulted: 06/16/22 1603     Updated: 06/16/22 1604    Narrative:      X-Ray Report:  Left shoulder(s) X-Ray  Indication: Evaluation of left shoulder pain  AP and Lateral view(s)  Findings: no acute fracture.  Moderate degenerative changes of the   acromioclavicular joint, mild to moderate degenerative changes of the   shoulder. Chronic fracture deformity to proximal humerus. 7x4 mm calcium   deposit in subacromial space.   Prior studies available for comparison: no              Result Review :       XR Scapula Left    Result Date: 6/16/2022  Narrative: X-Ray Report: Left shoulder(s) X-Ray Indication: Evaluation of left shoulder pain AP and Lateral view(s) Findings: no acute fracture.  Moderate degenerative changes of the acromioclavicular joint, mild to moderate degenerative changes of the shoulder. Chronic fracture deformity to proximal humerus. 7x4 mm calcium deposit in subacromial space. Prior studies available for comparison: no              Assessment and Plan     Diagnoses and all orders for this visit:    1. Left shoulder pain, unspecified chronicity (Primary)  -     XR Scapula Left    2. Primary osteoarthritis of left shoulder    3. Tear of left acetabular labrum, initial encounter    4. Rotator cuff tendonitis, left        Discussed the treatment plan with the patient.   Discussed the risks and benefits of a left shoulder steroid injection. The patient expressed understanding and wished to proceed. He tolerated the injection well. Order for physical therapy given today.     Call or return if worsening symptoms.    Follow Up     6 weeks      Patient was given instructions and counseling regarding his condition or for health maintenance advice. Please see specific information pulled into the AVS if appropriate.     Scribed for Chas Patterson MD by Deb Estevez.  06/16/22   15:45 EDT    I have personally performed the services described in this document as scribed by the above individual and it is both accurate and complete. Chas Patterson MD 06/19/22

## 2022-08-02 ENCOUNTER — TELEPHONE (OUTPATIENT)
Dept: ORTHOPEDIC SURGERY | Facility: CLINIC | Age: 62
End: 2022-08-02

## 2022-08-02 NOTE — TELEPHONE ENCOUNTER
Discussion/Summary   I left you a message today that we want you to have consultantions with our hematolgy/oncology doctor and our pulmonary doctor to look into changes on your CT scan.  Janneth will call.   I also spoke with your daughter (our number for you was wrong) who gave me your phone number.   Dr. JORGENSEN        Verified Results  CT CHEST WO CON [id number] 11:15AM MARGO ESCOBAR   Ordering Provider: MARGO ESCOBAR.    Reason For Study: ABNORMAL CXR/COPD,ABNORMAL CXR/COPD.     Test Name Result Flag Reference   CT CHEST WO CON (Report)     Accession #    [id number]    PROCEDURE: CT CHEST NON CONTRAST - CHEST PROTOCOL, 4/11/2018 11:13 AM    CLINICAL INDICATION: Abnormal chest x-ray. COPD.    COMPARISON: Chest x-ray on 03/19/2018. MRI abdomen on 04/19/2017. CT chest on 03/29/2017.    TECHNIQUE: Helical CT was performed of the chest (lung apices to bases) without administration   of intravenous contrast. Sagittal and coronal reconstructed images were acquired.    Automated exposure control was utilized for radiation dose reduction.    FINDINGS:    Lower neck/thyroid/chest wall/axilla: Left-sided pacemaker with leads projecting into the right   heart..    Airway: Small amount of secretions/debris in the proximal right mainstem bronchus.    Thoracic aorta and great vessels/Pulmonary arteries: Thoracic aortic atherosclerosis..    Heart and pericardium: Multivessel coronary arterial atherosclerotic calcification. The heart is   not enlarged.    Mediastinum/hilum: Evidence of left hilar/mediastinal calcified lymph nodes nodes.    Lungs/Pleura: Emphysematous changes throughout the lungs. Calcified granulomas in the left upper   lobe.    Interval development of spiculated mass in the anterior and medial right upper lobe. This   measures 2.0 x 1.9 x 1.4 cm maximal axial by craniocaudal dimension.    Osseous structures: Scattered degenerative endplate changes/spurring throughout the visualized   spine.    VISUALIZED UPPER  No sooner appts available. Patient aware.    ABDOMEN: Interval enlargement of large hypodense mass involving the left hepatic   lobe (now measuring up to 6.6 cm). Previously this measured up to 3.7 cm on the prior MRI   abdomen on 04/19/2017. Cholelithiasis. Few hypodense lesions are again seen in the right   kidney most compatible with cysts. Small hernia of [place] on the left. Splenic calcified   granulomas.    _______________________________    IMPRESSION:    1. Findings highly concerning for a metastatic or primary lung neoplasm in the anterior and   medial right upper lobe (measuring up to 2.0 cm).  2. Interval enlargement of hypodense mass in the liver concerning for an large and neoplastic or   metastatic mass (previously this measured up to 3.7 cm on the prior MRI abdomen on 04/19/2017).   Recommend contrast-enhanced MRI abdomen examination for better characterization.  3. Please note the aforementioned findings can also be better characterized by dedicated PET/CT   examination.  4. Small amount of secretions/debris in the proximal right mainstem bronchus.  5. Cholelithiasis.  6. Atherosclerosis including coronary artery atherosclerosis.  7. Evidence of remote granulomatous disease.  8. Emphysematous changes in the lungs.  9. Right renal cyst.      Accession #    [id number]    FOR PHYSICIAN USE ONLY - Please note that this report was generated using voice recognition   software. If you require clarification or feel that there has been an error in this report   please contact the [facility] Help Desk at [phone]. Thank you very much   for allowing me to participate in the care of your patient.  **** F I N A L ****    Transcribed By: RAISA   04/11/18 12:03 pm    Dictated By:      DURAN, AN MOSS    Electronically Reviewed and Approved By:      AN GARZON MD 04/11/18 12:19 pm

## 2022-08-02 NOTE — TELEPHONE ENCOUNTER
Caller: Kevin Ordonez    Relationship to patient: Self    Best call back number: [phone]    Patient is needing: AN EARLIER TIME FOR 8.4.22 APPT; PATIENT HAS APPT IN [place] AND NEEDS MORE TIME BETWEEN  PATIENT DOES NOT WANT TO CANCEL APPT IF UNABLE TO GET EARLIER TIME        UNABLE TO WARM TRANSFER

## 2022-08-04 ENCOUNTER — OFFICE VISIT (OUTPATIENT)
Dept: ORTHOPEDIC SURGERY | Facility: CLINIC | Age: 62
End: 2022-08-04

## 2022-08-04 VITALS — HEIGHT: 67 IN | HEART RATE: 56 BPM | BODY MASS INDEX: 37.67 KG/M2 | WEIGHT: 240 LBS | OXYGEN SATURATION: 93 %

## 2022-08-04 DIAGNOSIS — S73.192A TEAR OF LEFT ACETABULAR LABRUM, INITIAL ENCOUNTER: ICD-10-CM

## 2022-08-04 DIAGNOSIS — M25.512 LEFT SHOULDER PAIN, UNSPECIFIED CHRONICITY: Primary | ICD-10-CM

## 2022-08-04 DIAGNOSIS — M19.012 PRIMARY OSTEOARTHRITIS OF LEFT SHOULDER: ICD-10-CM

## 2022-08-04 DIAGNOSIS — M75.82 ROTATOR CUFF TENDONITIS, LEFT: ICD-10-CM

## 2022-08-04 PROCEDURE — 99213 OFFICE O/P EST LOW 20 MIN: CPT | Performed by: PHYSICIAN ASSISTANT

## 2022-08-04 NOTE — PROGRESS NOTES
"Chief Complaint  Follow-up and Pain of the Left Shoulder    Subjective          Kevin Ordonez is a 62 y.o. male  presents to [facility] for   History of Present Illness      Patient presents for follow-up evaluation of left shoulder pain, left shoulder AC arthritis, osteoarthritis, left shoulder tendinitis.  Patient saw Dr. Patterson on 6/16/2022 he received an injection in his shoulder which he states \"helped a lot \".  He was doing physical therapy twice a week but states that they reduced him to 1 week and he would like to continue with more visits at least twice a week because it was helping so much.  He admits to twinges of pain but states his shoulder is doing better than it was before.  [facility]  Patient recently had a lesion removed from behind his ear and is recovering from this.  This was about a week ago.      No Known Allergies     Social History     Socioeconomic History   • Marital status:    Tobacco Use   • Smoking status: Former Smoker     Years: 25.00     Types: Cigarettes   • Smokeless tobacco: Former User   Vaping Use   • Vaping Use: Never used   Substance and Sexual Activity   • Alcohol use: Never   • Drug use: Never        REVIEW OF SYSTEMS    Constitutional: Denies fevers, chills, weight loss  Cardiovascular: Denies chest pain, shortness of breath  Skin: Denies rashes, acute skin changes  Neurologic: Denies headache, loss of consciousness  MSK: Left shoulder pain      Objective   Vital Signs:   Pulse 56   Ht 170.2 cm (67\")   Wt 109 kg (240 lb)   SpO2 93%   BMI 37.59 kg/m²     Body mass index is 37.59 kg/m².    Physical Exam    Left shoulder: Nontender to palpation, no pain with range of motion, active forward elevation 150, active abduction 90, external rotation with abduction 40, internal rotation 55, 4-5 supraspinatus, 5/5 infraspinatus and subscapularis.    Procedures    Imaging Results (Most Recent)     " None           Result Review :   The following data was reviewed by: TREVOR Prater on 08/04/2022:               Assessment and Plan    Diagnoses and all orders for this visit:    1. Left shoulder pain, unspecified chronicity (Primary)  -     Ambulatory Referral to Physical Therapy Evaluate and treat (2-3x/week for 6-8 weeks)    2. Primary osteoarthritis of left shoulder  -     Ambulatory Referral to Physical Therapy Evaluate and treat (2-3x/week for 6-8 weeks)    3. Tear of left acetabular labrum, initial encounter  -     Ambulatory Referral to Physical Therapy Evaluate and treat (2-3x/week for 6-8 weeks)    4. Rotator cuff tendonitis, left  -     Ambulatory Referral to Physical Therapy Evaluate and treat (2-3x/week for 6-8 weeks)        Discussed diagnosis and treatment options with the patient he was given new order to continue therapy, we discussed starting therapy going twice a week to 3 times a week, follow-up in 6 weeks for possible injection.    Call or return if worsening symptoms.    Follow Up   Return in about 6 weeks (around 9/15/2022) for Recheck.  Patient was given instructions and counseling regarding his condition or for health maintenance advice. Please see specific information pulled into the AVS if appropriate.

## 2022-08-12 ENCOUNTER — CONSULT (OUTPATIENT)
Dept: RADIATION ONCOLOGY | Facility: HOSPITAL | Age: 62
End: 2022-08-12

## 2022-08-12 VITALS
BODY MASS INDEX: 36.98 KG/M2 | TEMPERATURE: 97.8 F | OXYGEN SATURATION: 98 % | HEART RATE: 67 BPM | WEIGHT: 236.11 LBS | RESPIRATION RATE: 16 BRPM | SYSTOLIC BLOOD PRESSURE: 135 MMHG | DIASTOLIC BLOOD PRESSURE: 64 MMHG

## 2022-08-12 DIAGNOSIS — C07 ADENOID CYSTIC CARCINOMA OF PAROTID GLAND: Primary | ICD-10-CM

## 2022-08-12 PROBLEM — E11.9 DIABETES MELLITUS: Status: ACTIVE | Noted: 2022-08-12

## 2022-08-12 PROBLEM — J45.909 ASTHMA: Status: ACTIVE | Noted: 2022-08-12

## 2022-08-12 PROBLEM — G47.33 OBSTRUCTIVE SLEEP APNEA SYNDROME: Status: ACTIVE | Noted: 2022-08-12

## 2022-08-12 PROBLEM — G62.9 NEUROPATHY: Status: ACTIVE | Noted: 2022-08-12

## 2022-08-12 PROBLEM — I10 HYPERTENSION: Status: ACTIVE | Noted: 2022-08-12

## 2022-08-12 PROBLEM — S62.356A CLOSED NONDISPLACED FRACTURE OF SHAFT OF FIFTH METACARPAL BONE OF RIGHT HAND: Status: ACTIVE | Noted: 2017-09-01

## 2022-08-12 PROBLEM — E78.5 HYPERLIPIDEMIA: Status: ACTIVE | Noted: 2022-08-12

## 2022-08-12 PROCEDURE — 99204 OFFICE O/P NEW MOD 45 MIN: CPT | Performed by: RADIOLOGY

## 2022-08-12 RX ORDER — MELATONIN
1000 DAILY
COMMUNITY

## 2022-08-12 RX ORDER — DOCUSATE SODIUM 250 MG
250 CAPSULE ORAL DAILY
COMMUNITY
End: 2023-03-31

## 2022-08-12 RX ORDER — ALBUTEROL SULFATE 2.5 MG/3ML
2.5 SOLUTION RESPIRATORY (INHALATION) EVERY 4 HOURS PRN
COMMUNITY

## 2022-08-12 RX ORDER — LORAZEPAM 0.5 MG/1
0.5 TABLET ORAL EVERY 8 HOURS PRN
Qty: 2 TABLET | Refills: 0 | Status: SHIPPED | OUTPATIENT
Start: 2022-08-12

## 2022-08-12 RX ORDER — LISINOPRIL 5 MG/1
5 TABLET ORAL DAILY
COMMUNITY
End: 2022-08-12

## 2022-08-12 RX ORDER — CHLORTHALIDONE 25 MG/1
25 TABLET ORAL DAILY
COMMUNITY
End: 2023-01-06

## 2022-08-12 NOTE — PROGRESS NOTES
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  Mr. Ordonez reports experiencing a growth at the left angle of the mandible over the course of months.  He ultimately underwent imaging on 5/20/2022 after suffering a fall during which time a left parotid mass was seen, measuring up to 1.8 cm in diameter.  He underwent FNA of this revealing a low-grade salivary neoplasm.  He was evaluated by Dr. López and ultimately underwent superficial parotidectomy on 7/22/2022.  Pathology from this procedure revealed adenoid cystic carcinoma, grade 1, completely excised with no perineural or lymphatic space invasion.  A total of 7 lymph nodes were retrieved and all were benign.  The tumor size measured 2.3 cm in greatest dimension and no extraparenchymal extension was identified.  All margins were negative for carcinoma with the closest margin being 0.4 cm.  Mr. Ordonez reports feeling well overall with no complaints other than some mild low-volume drainage from his incision site.  He denies facial weakness or taste changes.  He does have some changes in hearing from the left ear.      Subjective          Review of Systems: Review of Systems   Constitutional: Positive for fatigue. Negative for appetite change and unexpected weight change. Diaphoresis: 7/10.   HENT: Positive for ear pain (Left ear), facial swelling (Left cheek/jaw) and hearing loss ([place], ongoing). Negative for sore throat, tinnitus, trouble swallowing and voice change.         Left ear numb, painful behind ear, noted after  procedure         Eyes: Positive for visual disturbance (Wears glasses).   Respiratory: Negative for cough and shortness of breath.    Cardiovascular: Positive for palpitations (With exertion, ongoing). Negative for chest pain and leg swelling.   Gastrointestinal: Negative for constipation, diarrhea and nausea.   Genitourinary: Positive for frequency (Ongoing) and urgency (Ongoing). Negative for difficulty urinating and dysuria.   Musculoskeletal: Positive for arthralgias (Right shoulder, ongoing) and back pain (Lower back, ongoing). Negative for gait problem.   Skin: Positive for wound (To left ear, from incision.  Still having some drainage). Negative for color change and rash.   Neurological: Positive for dizziness (Often, ongoing.  Mainly with position changes). Negative for weakness and headaches.   Psychiatric/Behavioral: Positive for decreased concentration (New, not thinking clearly). Negative for confusion, sleep disturbance and suicidal ideas.       Past Medical History:   Past Medical History:   Diagnosis Date   • Cancer of parotid gland (HCC)    • Diabetes mellitus (HCC)    • Hyperlipidemia    • Hypertension        Past Surgical History:   Past Surgical History:   Procedure Laterality Date   • CAROTID ENDARTERECTOMY Left    • CHOLECYSTECTOMY     • FOOT SURGERY     • HERNIA REPAIR     • TUMOR REMOVAL Left        Family History:   Family History   Problem Relation Age of Onset   • Lung cancer Father        Social History:   Social History     Socioeconomic History   • Marital status:    Tobacco Use   • Smoking status: Former Smoker     Years: 25.00     Types: Cigarettes     Start date:      Quit date:      Years since quittin.6   • Smokeless tobacco: Former User   Vaping Use   • Vaping Use: Never used   Substance and Sexual Activity   • Alcohol use: Never   • Drug use: Never   • Sexual activity: Defer       Medications:     Current Outpatient Medications:   •  albuterol (PROVENTIL) (2.5  MG/3ML) 0.083% nebulizer solution, Take 2.5 mg by nebulization Every 4 (Four) Hours As Needed for Wheezing., Disp: , Rfl:   •  atorvastatin (LIPITOR) 80 MG tablet, Take 80 mg by mouth Daily., Disp: , Rfl:   •  chlorthalidone (HYGROTON) 25 MG tablet, Take 25 mg by mouth Daily., Disp: , Rfl:   •  cholecalciferol (VITAMIN D3) 25 MCG (1000 UT) tablet, Take 1,000 Units by mouth Daily., Disp: , Rfl:   •  docusate sodium (COLACE) 250 MG capsule, Take 250 mg by mouth Daily., Disp: , Rfl:   •  Dulaglutide 1.5 MG/0.5ML solution pen-injector, Inject  under the skin into the appropriate area as directed., Disp: , Rfl:   •  fluticasone-salmeterol (ADVAIR HFA) 230-21 MCG/ACT inhaler, Inhale 2 puffs 2 (Two) Times a Day., Disp: , Rfl:   •  gabapentin (NEURONTIN) 600 MG tablet, Take 1,200 mg by mouth 2 (Two) Times a Day., Disp: , Rfl:   •  glipizide (GLUCOTROL) 10 MG tablet, Take 10 mg by mouth 2 (Two) Times a Day Before Meals., Disp: , Rfl:   •  insulin NPH (NovoLIN N ReliOn) 100 UNIT/ML injection, Inject 52 Units under the skin into the appropriate area as directed 2 (Two) Times a Day Before Meals., Disp: , Rfl:   •  losartan (COZAAR) 50 MG tablet, Take 100 mg by mouth Daily., Disp: , Rfl:   •  metFORMIN (GLUCOPHAGE) 1000 MG tablet, Take 1,000 mg by mouth 2 (Two) Times a Day With Meals., Disp: , Rfl:   •  montelukast (SINGULAIR) 10 MG tablet, Take 10 mg by mouth Every Night., Disp: , Rfl:   •  Omega-3 Fatty Acids (fish oil) 1000 MG capsule capsule, Take  by mouth Daily With Breakfast., Disp: , Rfl:   •  omeprazole (priLOSEC) 20 MG capsule, Take 20 mg by mouth Daily., Disp: , Rfl:   •  LORazepam (Ativan) 0.5 MG tablet, Take 1 tablet by mouth Every 8 (Eight) Hours As Needed for Anxiety. Take 1 pill 30 minutes before scan, Disp: 2 tablet, Rfl: 0  •  oxyCODONE-acetaminophen (PERCOCET) 5-325 MG per tablet, Take 1 tablet by mouth Every 6 (Six) Hours As Needed for Severe Pain ., Disp: 12 tablet, Rfl: 0  •  vitamin D (ERGOCALCIFEROL) 1.25  MG (14123 UT) capsule capsule, Take 50,000 Units by mouth 1 (One) Time Per Week., Disp: , Rfl:     Allergies:   No Known Allergies    Pain: (on a scale of 0-10)   Pain Score    08/12/22 1401   PainSc:   2       Advanced Care Plan: N   Quality of Life: 100 - Full Activity    Objective     Physical Exam:   Vital Signs:   Vitals:    08/12/22 1401   BP: 135/64   Pulse: 67   Resp: 16   Temp: 97.8 °F (36.6 °C)   TempSrc: Temporal   SpO2: 98%   Weight: 107 kg (236 lb 1.8 oz)   PainSc:   2     Body mass index is 36.98 kg/m².     Physical Exam  Constitutional:       General: He is not in acute distress.     Appearance: Normal appearance. He is normal weight. He is not ill-appearing, toxic-appearing or diaphoretic.   HENT:      Head: Normocephalic.      Comments: Well-healed left preauricular incision and left neck incision with no suspicious masses or skin changes     Mouth/Throat:      Mouth: Mucous membranes are moist.      Pharynx: Oropharynx is clear.      Comments: Partially edentulous  Abdominal:      General: There is no distension.      Palpations: Abdomen is soft.   Skin:     General: Skin is warm and dry.      Coloration: Skin is not jaundiced.      Findings: No lesion.   Neurological:      General: No focal deficit present.      Mental Status: He is alert and oriented to person, place, and time. Mental status is at baseline.      Cranial Nerves: No cranial nerve deficit.      Gait: Gait normal.   Psychiatric:         Mood and Affect: Mood normal.         Behavior: Behavior normal.         Thought Content: Thought content normal.         Judgment: Judgment normal.           Pathology: I personally reviewed the pathology report from the procedure performed on 7/22/2022.  The pertinent findings are as above in HPI.  I personally reviewed the pathology report from the fine-needle aspiration of the left parotid gland.  The pertinent findings are as above in HPI.    Labs:   WBC   Date Value Ref Range Status   03/24/2022  7.16 3.40 - 10.80 10*3/mm3 Final     Hemoglobin   Date Value Ref Range Status   03/24/2022 13.3 13.0 - 17.7 g/dL Final     Hematocrit   Date Value Ref Range Status   03/24/2022 40.1 37.5 - 51.0 % Final     Platelets   Date Value Ref Range Status   03/24/2022 274 140 - 450 10*3/mm3 Final       Assessment / Plan          Assessment/Plan:   Kevin Ordonez is a 62-year-old gentleman with pT2 pN0 cM0 adenoid cystic carcinoma of the left parotid gland.  He is status post superficial parotidectomy with facial nerve dissection.  He has no clinical evidence of residual or metastatic disease.  ECOG 0    I discussed the clinical, radiographic and pathologic findings to date with Mr. Ordonez.  I explained the role of radiotherapy in the adjuvant management of adenoid cystic carcinoma status post resection.  I recommended external beam radiotherapy to the left parotid gland/postoperative bed, outlining the rationale for this approach.  Understanding the risks, benefits and alternatives to radiation, Mr. Ordonez is agreeable to my recommendation and is scheduled for CT simulation.  I have ordered a PET/CT for staging.  Mr. Ordonez will continue to follow-up with his ENT surgeon as scheduled.      Reji Fall MD  Radiation Oncology  [facility]    This document has been signed by Reji Fall MD on August 12, 2022 16:12 EDT

## 2022-08-15 ENCOUNTER — HOSPITAL ENCOUNTER (OUTPATIENT)
Dept: RADIATION ONCOLOGY | Facility: HOSPITAL | Age: 62
Setting detail: RADIATION/ONCOLOGY SERIES
End: 2022-08-15

## 2022-08-19 DIAGNOSIS — R13.10 DYSPHAGIA, UNSPECIFIED TYPE: ICD-10-CM

## 2022-08-19 DIAGNOSIS — C07 ADENOID CYSTIC CARCINOMA OF PAROTID GLAND: Primary | ICD-10-CM

## 2022-08-26 ENCOUNTER — HOSPITAL ENCOUNTER (OUTPATIENT)
Dept: RADIATION ONCOLOGY | Facility: HOSPITAL | Age: 62
Discharge: HOME | End: 2022-08-26

## 2022-08-26 DIAGNOSIS — C76.0 HEAD AND NECK CANCER: ICD-10-CM

## 2022-08-26 DIAGNOSIS — C07 MALIGNANT NEOPLASM OF PAROTID GLAND: ICD-10-CM

## 2022-08-26 DIAGNOSIS — C76.0 HEAD AND NECK CANCER: Primary | ICD-10-CM

## 2022-08-26 LAB
ALBUMIN SERPL-MCNC: 4.3 G/DL (ref 3.5–5.2)
ALBUMIN/GLOB SERPL: 1.3 G/DL
ALP SERPL-CCNC: 85 U/L (ref 39–117)
ALT SERPL W P-5'-P-CCNC: 34 U/L (ref 1–41)
ANION GAP SERPL CALCULATED.3IONS-SCNC: 9.8 MMOL/L (ref 5–15)
AST SERPL-CCNC: 30 U/L (ref 1–40)
BILIRUB SERPL-MCNC: 0.3 MG/DL (ref 0–1.2)
BUN SERPL-MCNC: 29 MG/DL (ref 8–23)
BUN/CREAT SERPL: 22.3 (ref 7–25)
CALCIUM SPEC-SCNC: 9.6 MG/DL (ref 8.6–10.5)
CHLORIDE SERPL-SCNC: 98 MMOL/L (ref 98–107)
CO2 SERPL-SCNC: 29.2 MMOL/L (ref 22–29)
CREAT SERPL-MCNC: 1.3 MG/DL (ref 0.76–1.27)
EGFRCR SERPLBLD CKD-EPI 2021: 61.7 ML/MIN/1.73
GLOBULIN UR ELPH-MCNC: 3.4 GM/DL
GLUCOSE SERPL-MCNC: 186 MG/DL (ref 65–99)
POTASSIUM SERPL-SCNC: 4.4 MMOL/L (ref 3.5–5.2)
PROT SERPL-MCNC: 7.7 G/DL (ref 6–8.5)
SODIUM SERPL-SCNC: 137 MMOL/L (ref 136–145)

## 2022-08-26 PROCEDURE — 80053 COMPREHEN METABOLIC PANEL: CPT | Performed by: RADIOLOGY

## 2022-08-26 PROCEDURE — 77263 THER RADIOLOGY TX PLNG CPLX: CPT | Performed by: RADIOLOGY

## 2022-08-26 PROCEDURE — 77334 RADIATION TREATMENT AID(S): CPT | Performed by: RADIOLOGY

## 2022-08-26 PROCEDURE — 0 IOPAMIDOL PER 1 ML: Performed by: RADIOLOGY

## 2022-08-26 RX ADMIN — IOPAMIDOL 100 ML: 755 INJECTION, SOLUTION INTRAVENOUS at 09:30

## 2022-08-30 ENCOUNTER — HOSPITAL ENCOUNTER (OUTPATIENT)
Dept: RADIATION ONCOLOGY | Facility: HOSPITAL | Age: 62
Discharge: HOME | End: 2022-08-30

## 2022-09-01 ENCOUNTER — APPOINTMENT (OUTPATIENT)
Dept: RADIATION ONCOLOGY | Facility: HOSPITAL | Age: 62
End: 2022-09-01

## 2022-09-01 ENCOUNTER — HOSPITAL ENCOUNTER (OUTPATIENT)
Dept: RADIATION ONCOLOGY | Facility: HOSPITAL | Age: 62
Setting detail: RADIATION/ONCOLOGY SERIES
End: 2022-09-01

## 2022-09-05 PROCEDURE — 77338 DESIGN MLC DEVICE FOR IMRT: CPT | Performed by: RADIOLOGY

## 2022-09-05 PROCEDURE — 77300 RADIATION THERAPY DOSE PLAN: CPT | Performed by: RADIOLOGY

## 2022-09-05 PROCEDURE — 77301 RADIOTHERAPY DOSE PLAN IMRT: CPT | Performed by: RADIOLOGY

## 2022-09-06 ENCOUNTER — HOSPITAL ENCOUNTER (OUTPATIENT)
Dept: RADIATION ONCOLOGY | Facility: HOSPITAL | Age: 62
Discharge: HOME | End: 2022-09-06

## 2022-09-06 VITALS
SYSTOLIC BLOOD PRESSURE: 156 MMHG | OXYGEN SATURATION: 99 % | TEMPERATURE: 97.5 F | RESPIRATION RATE: 16 BRPM | HEART RATE: 73 BPM | WEIGHT: 227.51 LBS | DIASTOLIC BLOOD PRESSURE: 64 MMHG | BODY MASS INDEX: 35.63 KG/M2

## 2022-09-06 DIAGNOSIS — C07 MALIGNANT NEOPLASM OF PAROTID GLAND: Primary | ICD-10-CM

## 2022-09-06 LAB
RAD ONC ARIA COURSE ID: NORMAL
RAD ONC ARIA COURSE INTENT: NORMAL
RAD ONC ARIA COURSE LAST TREATMENT DATE: NORMAL
RAD ONC ARIA COURSE START DATE: NORMAL
RAD ONC ARIA COURSE TREATMENT ELAPSED DAYS: 0
RAD ONC ARIA FIRST TREATMENT DATE: NORMAL
RAD ONC ARIA PLAN FRACTIONS TREATED TO DATE: 1
RAD ONC ARIA PLAN ID: NORMAL
RAD ONC ARIA PLAN NAME: NORMAL
RAD ONC ARIA PLAN PRESCRIBED DOSE PER FRACTION: 2 GY
RAD ONC ARIA PLAN PRIMARY REFERENCE POINT: NORMAL
RAD ONC ARIA PLAN TOTAL FRACTIONS PRESCRIBED: 30
RAD ONC ARIA PLAN TOTAL PRESCRIBED DOSE: 6000 CGY
RAD ONC ARIA REFERENCE POINT DOSAGE GIVEN TO DATE: 2 GY
RAD ONC ARIA REFERENCE POINT ID: NORMAL
RAD ONC ARIA REFERENCE POINT SESSION DOSAGE GIVEN: 2 GY

## 2022-09-06 PROCEDURE — 77014 CHG CT GUIDANCE RADIATION THERAPY FLDS PLACEMENT: CPT | Performed by: RADIOLOGY

## 2022-09-06 PROCEDURE — 77386: CPT | Performed by: RADIOLOGY

## 2022-09-06 PROCEDURE — 77427 RADIATION TX MANAGEMENT X5: CPT | Performed by: RADIOLOGY

## 2022-09-06 NOTE — PROGRESS NOTES
On Treatment Visit       Patient: Kevin Ordonez   YOB: 1960   Medical Record Number: [id number]     Date of Visit  September 6, 2022   Primary Diagnosis:Malignant neoplasm of parotid gland (HCC) [C07]  Cancer Staging: Cancer Staging  No matching staging information was found for the patient.       was seen today for an on treatment visit.  He is receiving radiation therapy to the parotid. He  has received 200 cGy in 1 fractions out of a planned dose of 6000 cGy in 30 fractions.    Today on exam the patient is tolerating radiation therapy well and has no new disease or treatment-related complaints.  Does report a small bump posterior to the left ear.                                             Review of Systems:   Review of Systems   Constitutional: Positive for fatigue. Negative for appetite change.   Respiratory: Negative for cough and shortness of breath.    Gastrointestinal: Negative for constipation, diarrhea and nausea.   Genitourinary: Negative for difficulty urinating, dysuria, frequency and urgency.   Musculoskeletal: Positive for arthralgias and back pain.   Skin: Negative for color change and rash.   Neurological: Positive for dizziness. Negative for headaches.   Psychiatric/Behavioral: Positive for sleep disturbance (wakes throughout the night).       Vitals:     Vitals:    09/06/22 1053   BP: 156/64   Pulse: 73   Resp: 16   Temp: 97.5 °F (36.4 °C)   SpO2: 99%       Weight:   Wt Readings from Last 3 Encounters:   09/06/22 103 kg (227 lb 8.2 oz)   08/12/22 107 kg (236 lb 1.8 oz)   08/04/22 109 kg (240 lb)      Pain:    Pain Score    09/06/22 1053   PainSc: 0-No pain         Physical Exam:  Gen: WD/WN; NAD  HEENT: MMM; incisions healing well; no sign of recurrence or poor wound healing  Trachea: midline  Chest: symmetric  Resp: normal respiratory effort  Extr: warm, well-perfused  Neuro: awake and alert; no aphasia or neglect    Plan: I have reviewed treatment setup notes, checked  and approved the daily guidance images.  I reviewed dose delivery, treatment parameters and deemed them appropriate. We plan to continue radiation therapy as prescribed.          Radiation Oncology    Electronically signed by Reji Fall MD 9/6/2022  11:18 EDT

## 2022-09-07 ENCOUNTER — HOSPITAL ENCOUNTER (OUTPATIENT)
Dept: RADIATION ONCOLOGY | Facility: HOSPITAL | Age: 62
Discharge: HOME | End: 2022-09-07

## 2022-09-07 LAB
RAD ONC ARIA COURSE ID: NORMAL
RAD ONC ARIA COURSE INTENT: NORMAL
RAD ONC ARIA COURSE LAST TREATMENT DATE: NORMAL
RAD ONC ARIA COURSE START DATE: NORMAL
RAD ONC ARIA COURSE TREATMENT ELAPSED DAYS: 1
RAD ONC ARIA FIRST TREATMENT DATE: NORMAL
RAD ONC ARIA PLAN FRACTIONS TREATED TO DATE: 2
RAD ONC ARIA PLAN ID: NORMAL
RAD ONC ARIA PLAN NAME: NORMAL
RAD ONC ARIA PLAN PRESCRIBED DOSE PER FRACTION: 2 GY
RAD ONC ARIA PLAN PRIMARY REFERENCE POINT: NORMAL
RAD ONC ARIA PLAN TOTAL FRACTIONS PRESCRIBED: 30
RAD ONC ARIA PLAN TOTAL PRESCRIBED DOSE: 6000 CGY
RAD ONC ARIA REFERENCE POINT DOSAGE GIVEN TO DATE: 4 GY
RAD ONC ARIA REFERENCE POINT ID: NORMAL
RAD ONC ARIA REFERENCE POINT SESSION DOSAGE GIVEN: 2 GY

## 2022-09-07 PROCEDURE — 77014 CHG CT GUIDANCE RADIATION THERAPY FLDS PLACEMENT: CPT | Performed by: RADIOLOGY

## 2022-09-07 PROCEDURE — 77386: CPT | Performed by: RADIOLOGY

## 2022-09-08 ENCOUNTER — HOSPITAL ENCOUNTER (OUTPATIENT)
Dept: RADIATION ONCOLOGY | Facility: HOSPITAL | Age: 62
Discharge: HOME | End: 2022-09-08

## 2022-09-08 ENCOUNTER — HOSPITAL ENCOUNTER (OUTPATIENT)
Dept: RADIATION ONCOLOGY | Facility: HOSPITAL | Age: 62
Setting detail: RADIATION/ONCOLOGY SERIES
Discharge: HOME | End: 2022-09-08

## 2022-09-08 VITALS — BODY MASS INDEX: 35.56 KG/M2 | WEIGHT: 227.07 LBS

## 2022-09-08 DIAGNOSIS — R13.12 OROPHARYNGEAL DYSPHAGIA: Primary | ICD-10-CM

## 2022-09-08 LAB
RAD ONC ARIA COURSE ID: NORMAL
RAD ONC ARIA COURSE INTENT: NORMAL
RAD ONC ARIA COURSE LAST TREATMENT DATE: NORMAL
RAD ONC ARIA COURSE START DATE: NORMAL
RAD ONC ARIA COURSE TREATMENT ELAPSED DAYS: 2
RAD ONC ARIA FIRST TREATMENT DATE: NORMAL
RAD ONC ARIA PLAN FRACTIONS TREATED TO DATE: 3
RAD ONC ARIA PLAN ID: NORMAL
RAD ONC ARIA PLAN NAME: NORMAL
RAD ONC ARIA PLAN PRESCRIBED DOSE PER FRACTION: 2 GY
RAD ONC ARIA PLAN PRIMARY REFERENCE POINT: NORMAL
RAD ONC ARIA PLAN TOTAL FRACTIONS PRESCRIBED: 30
RAD ONC ARIA PLAN TOTAL PRESCRIBED DOSE: 6000 CGY
RAD ONC ARIA REFERENCE POINT DOSAGE GIVEN TO DATE: 6 GY
RAD ONC ARIA REFERENCE POINT ID: NORMAL
RAD ONC ARIA REFERENCE POINT SESSION DOSAGE GIVEN: 2 GY

## 2022-09-08 PROCEDURE — 77014 CHG CT GUIDANCE RADIATION THERAPY FLDS PLACEMENT: CPT | Performed by: RADIOLOGY

## 2022-09-08 PROCEDURE — 92610 EVALUATE SWALLOWING FUNCTION: CPT | Performed by: SPEECH-LANGUAGE PATHOLOGIST

## 2022-09-08 PROCEDURE — 77386: CPT | Performed by: RADIOLOGY

## 2022-09-08 PROCEDURE — 92526 ORAL FUNCTION THERAPY: CPT | Performed by: SPEECH-LANGUAGE PATHOLOGIST

## 2022-09-08 NOTE — PROGRESS NOTES
Outpatient Nutrition Oncology Assessment    Patient Name: Kevin Ordonez  YOB: 1960  MRN: [id number]  Assessment Date: 9/8/2022    CLINICAL NUTRITION ASSESSMENT    Dx:  L parotid Ca      Type of Cancer Treatment XRT to L parotid region          Reason for Assessment  Assessment       Current Problems:   Patient Active Problem List   Diagnosis Code   • Rotator cuff tendonitis, left M75.82   • Tear of left acetabular labrum S73.192A   • Primary osteoarthritis of left shoulder M19.012   • Left shoulder pain M25.512   • Asthma J45.909   • Closed nondisplaced fracture of shaft of fifth metacarpal bone of right hand S62.356A   • Diabetes mellitus (HCC) E11.9   • Hyperlipidemia E78.5   • Hypertension I10   • Neuropathy G62.9   • Obstructive sleep apnea syndrome G47.33   • Malignant neoplasm of parotid gland (HCC) C07         Anthropometrics     Row Name 09/08/22 0817 09/08/22 0700       Anthropometrics    Weight -- 103 kg (227 lb 1.2 oz)    Weight for Calculation 103 kg (227 lb 1.2 oz) --                BMI kg/m2   Body mass index is 35.56 kg/m².    Weight Hx  Wt Readings from Last 30 Encounters:   09/08/22 0700 103 kg (227 lb 1.2 oz)   09/06/22 1053 103 kg (227 lb 8.2 oz)   08/12/22 1401 107 kg (236 lb 1.8 oz)   08/04/22 1005 109 kg (240 lb)   06/16/22 1518 109 kg (240 lb)   03/24/22 2005 104 kg (230 lb)   10/14/21 1522 108 kg (237 lb 10.5 oz)         Estimated/Assessed Needs - Anthropometrics     Row Name 09/08/22 0817 09/08/22 0700       Anthropometrics    Weight -- 103 kg (227 lb 1.2 oz)    Weight for Calculation 103 kg (227 lb 1.2 oz) --       Estimated/Assessed Needs    Additional Documentation KCAL/KG (Group);Protein Requirements (Group);Fluid Requirements (Group) --       KCAL/KG    KCAL/KG 25 Kcal/Kg (kcal) --    25 Kcal/Kg (kcal) 2575 --       Protein Requirements    Weight Used For Protein Calculations 61.4 kg (135 lb 5.8 oz) --    Est Protein Requirement Amount (gms/kg) 1.3 gm protein --     Estimated Protein Requirements (gms/day) 79.82 --       Fluid Requirements    Fluid Requirements (mL/day) 1841 --    RDA Method (mL) 1841 --                 Labs/Medications        Pertinent Labs Reviewed.       Pertinent Medications Dulaglutide, LORazepam, albuterol, atorvastatin, chlorthalidone, cholecalciferol, docusate sodium, fish oil, fluticasone-salmeterol, gabapentin, glipizide, insulin NPH, losartan, metFORMIN, montelukast, omeprazole, oxyCODONE-acetaminophen, and vitamin D     Physical Findings        Malnutrition Severity Assessment     Row Name 09/08/22 0836          Malnutrition Severity Assessment    Malnutrition Type Chronic Disease - Related Malnutrition            Insufficient Energy Intake     Insufficient Energy Intake Findings Moderate  reports possibly eating less due to increased stress     Insufficient Energy Intake  <75% of est. energy requirement for > or equal to 1 month            Unintentional Weight Loss     Unintentional Weight Loss Findings Severe  5.4% loss X 1 month     Unintentional Weight Loss  Weight loss of 5% in one month            Criteria Met (Must meet criteria for severity in at least 2 of these categories: M Wasting, Fat Loss, Fluid, Secondary Signs, Wt. Status, Intake)    Patient meets criteria for  Moderate (non-severe) Malnutrition                  Current Nutrition Orders & Evaluation of Intake       Oral Nutrition     Current PO Diet Diabetic/carb consistent diet, recommended adequate proteins, excellent fluid consumption (drinks 64 oz/day of water flavored with SF flavor packets)   Supplement      Nutrition Diagnosis        Nutrition Dx Problem 1 Moderate malnutrition related to increased nutrient needs due to catabolic disease as evidenced by physiological causes increasing nutrient needs., hypermetabolic state., patient report. and decreased PO intake.       Nutrition Intervention       RD Action Nutrition assessment  Consult provided  Written materials  given:  Nutrition During Treatment for Patients with Head, Neck, or Lung Cancer     Monitor/Evaluation       Monitor Per oncology nutrition protocol.     Comments:    Initial week of tx.  Significant wt decline noted over the past month, likely related to stress.  Pt reports to have good prior knowledge of nutrition, including following his diabetic diet and macronutrients.  Worked in the food industry for several years.  Pt does not eat red meats, however encouraged lean animal proteins and plant-based proteins.  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  Reviewed more ideas for protein foods to help with consumption.  Suggested to add healthy fats to his foods to help add kcals (avocado, peanut butter, vegetable oils).  Denies dysphagia/odynophagia, dry mouth or other nutrition-related concerns.  Can eat regular food consistency without concern.  Seeing speech therapist today.  Written materials provided (includes soft/moist food ideas, pureed food ideas, recipes, dealing with tx side effects, and high kcal/protein foods).  Most recent  (2 weeks ago).    Electronically signed by:  Katerina Fsiher RD  09/08/22 08:38 EDT

## 2022-09-08 NOTE — THERAPY EVALUATION
Outpatient Speech Language Pathology   Adult Swallow Initial Evaluation   Sadie     Patient Name: Kevin Ordonez  : 1960  MRN: [id number]  Today's Date: 2022         Visit Date: 2022   on    History  Intake  Date of Service: 22  Physician: Reji Fall MD  Next Physician Visit: ongoing  Diagnosis: Adenoid cystic carcinoma of parotid gland  Treatment Diagnosis: oropharyngeal dysphagia  Hx of Hospitalization no  Previous Function: swallow within functional limits prior to radiation therapy  Previous Therapy Services: no  Current Home Health Admission: no  Visit number: 1  HPI  Onset Date: ongoing  Age: 62  Gender: male  History of diagnosis:  Mr. 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 López.  Patient has some hearing changes in his left ear.    Patient is here today to receive a swallow evaluation to determine safety.  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 Today he's completed a total of  600cGy in 3 fractions.  Precautions: [facility]  Patient's Goals/Expectations: Determine safety of swallow.    Current Diet Level: regular solids and thin liquids    Comments: Patient has a history of smoking for 25 years and quit in .  He denies use of alcohol.  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     Patient has received UES dialation status post 15 years.        Psychosocial History    Usual Living Arrangement: live with his wife in [place], [place] on a farm.  Psychosocial History (depression/anxiety) no  Nutritional Problems: Patient is losing weight.  He started at 240 pounds when at first diagnosed and is now at 228 pounds.      Past Medical History    Pertinent Past Medical History:   Past Medical History:    Diagnosis Date   • Cancer of parotid gland (HCC)    • Diabetes mellitus (HCC)    • Hyperlipidemia    • Hypertension            History of Seizures: no      Abuse    Patient being Hurt, Hit, Scared or Neglected?  no  Caregiver Neglect: no      Pain  Pain Intensity: 0  Pain Location: n/a  Pain Scale Used: Numerical  Pain Management Techniques: n/a    Orientation    Level of Consciousness: alert and oriented times 3                CLINICAL SWALLOW EVALUATION    Objective    Current Diet Level: regular solids and thin liquids  Oral Motor Exam: Patient partially  edentulous during evaluation.  He has a partial on upper and lower edentulous. Left buccal cavity has decreased sensation.    Soft Palate: WFL  Laryngeal Function and Elevation: WFL  Vocal Quality: clear  Swallow Reflex: intact and timely  Positioning: upright, 90 degree hipflexion for all p.o. trials      P.O. Trials   Patient was clinically assessed for dysphagia with the following consistencies and results:   Patient was trialed with consistencies of nectar thickened apple juice, thin liquid of water, purée of applesauce, soft solid of Nutrigrain bar, and solid of Arik Cracker.    Nectar thick liquid by spoon: WFL  Nectar thick by cup: WFL  Thin Liquid by spoon: WFL  Thin Liquid by cup: WFL  Thin liquid by straw: WFL  Puree solid: WFL  Soft Solid: WFL  Solid: WFL  Additional Trials: No additional trials  Oral Preparatory Phase: WFL  Oral Phase: WFL  Pharyngeal Phase: WFL  Laryngeal Elevation/Coordination: WFL    Comments: Patient demonstrates a swallow that was within functional limits with no clinical signs and symptoms of aspiration during assessment.  Patient does demonstrate mild xerostomia.    Dysphagia Criteria    Patient Demonstrates: Risk of aspiration secondary to radiation to the head and neck.    Swallow Function: Patient demonstrated no overt, clinical signs and symptoms of aspiration.  Patient will benefit from 1 therapy treatment to develop  and learn a prophylactic HEP program to decrease risk of oral pharyngeal dysphagia leading to aspiration during and after radiation to the head neck.  Recommendations    Diet:   Regular solids and thin liquids  Position:  Upright 90 degree hip flexion for all p.o. intake, upright 30 minutes after p.o. intake    Environment:  Quiet environment with ample time to feed.    Compensatory Techniques:  Alternate small bites of solids with small sips of liquids as needed, use of gravies to address xerostomia as needed    Medical Intervention:  Follow-up swallow evaluation recommended at 3 to 4 weeks of radiation, and 2 weeks status post radiation.    ST Functional Communication Testing    Patient is rated on the Functional Communication Measures (FCM) for swallow at a level 7/7 with a 0% limitation.  With swallow therapy, patient is expected to maintain a Functional Communication Measure level of 7/7 with a 0% swallow limitation or highest levels of functioning during and after radiation therapy.  Goals    Problems  1. Swallowing limitation 0% FCM 7/7  LTG 1 week: 1 session : Patient will complete exercises to demonstrate understanding of HEP to promote maintaining a swallowing limitation of 0% and a swallow Functional Communication Measure of 7/7 or highest levels of functioning during radiation treatment.    STATUS: Met   STG 1a: 1 session: Patient will be educated on  proper diet, signs and symptoms of aspiration.    STATUS: Met   STG 1b: 1 session:  Patient will demonstrate understanding of a prophylactic HEP to reduce toxicities of radiation to the swallow and maintain highest levels of functioning with mod assist.    STATUS: Met  Treatment: Swallow therapy to provide a prophylactic home exercise program to maintain a swallow at highest levels of functioning during radiation therapy.    PLAN:   Frequency (times/week): 1 time  Duration: 1 day    Thank you for the referral,   Elina Dickson MS, CCC-SLP      SIGNATURE: BLANCHE Johnson    Electronically signed 2022    [place] License:  [id number]      Outpatient Speech Language Pathology   Adult Swallow Treatment Note  TOM Noel     Patient Name: Kevin Ordonez  : 1960  MRN: [id number]  Today's Date: 2022         Visit Date: 2022   Patient Active Problem List   Diagnosis   • Rotator cuff tendonitis, left   • Tear of left acetabular labrum   • Primary osteoarthritis of left shoulder   • Left shoulder pain   • Asthma   • Closed nondisplaced fracture of shaft of fifth metacarpal bone of right hand   • Diabetes mellitus (HCC)   • Hyperlipidemia   • Hypertension   • Neuropathy   • Obstructive sleep apnea syndrome   • Malignant neoplasm of parotid gland (HCC)        Visit Dx:    ICD-10-CM ICD-9-CM   1. Oropharyngeal dysphagia  R13.12 787.22       Dysphagia Treatment      SUBJECTIVE  Patient Comments:    Patient indicates mild xerostomia    Education:  POC, HEP, signs and symptoms of aspiration,   OBJECTIVE    Today's Visit Number Swallow:  2    Dysphagia Therapy    GOALS ACTIVITY PERFORMED TRIALS COMPLETED LEVEL OF CUEING REQUIRED   LTG 1 week: 1 session : Patient will complete exercises to demonstrate understanding of HEP to promote maintaining a swallowing limitation of 0% and a swallow Functional Communication Measure of 7/7 or highest levels of functioning during radiation treatment.         Goal met   STG 1a: 1 session: Patient will be educated on  proper diet, signs and symptoms of aspiration.    STATUS: Met   Education -proper diet  -Plan of care  -HEP  -Signs and symptoms of aspiration and dysphagia  -The swallow during and after radiation therapy  Moderate assist with patient given a teaching handout of all topics addressed.  Patient given ample time to ask questions.   STG 1b: 1 session:  Patient will demonstrate understanding of a prophylactic HEP to reduce toxicities of radiation to the swallow and maintain highest levels of functioning with  mod assist.       HEP  patient demonstrated understanding of the following exercises:  -Jaw opening and hold  -Lingual range of motion  -Gerri  -Yawn  -Effortful swallow  Patient to complete 5-10 repetitions 3-5 times per day  Moderate assist to demonstrate HEP with patient returning demonstration to ensure understanding.  A a handout of all topics and exercises were given to patient.             ASSESSMENT/PLAN  Need for skilled care:  Patient requires the skills of a therapist to provide moderate assist to educate and teach HEP to decrease risk of radiation acquired dysphagia reducing risk of aspiration that may lead to pneumonia.    Progress Towards Goals: Patient has met all goals and understands HEP and education topics.  Discharge at this time with follow-up evaluations 3 to 4 weeks and 2 weeks status post radiation to the head and neck.      Elina Dickson MS, CCC-SLP     SIGNATURE: BLANCHE Johnson    Electronically signed 9/8/2022    [place] License: - [id number]

## 2022-09-09 ENCOUNTER — HOSPITAL ENCOUNTER (OUTPATIENT)
Dept: RADIATION ONCOLOGY | Facility: HOSPITAL | Age: 62
Discharge: HOME | End: 2022-09-09

## 2022-09-09 LAB
RAD ONC ARIA COURSE ID: NORMAL
RAD ONC ARIA COURSE INTENT: NORMAL
RAD ONC ARIA COURSE LAST TREATMENT DATE: NORMAL
RAD ONC ARIA COURSE START DATE: NORMAL
RAD ONC ARIA COURSE TREATMENT ELAPSED DAYS: 3
RAD ONC ARIA FIRST TREATMENT DATE: NORMAL
RAD ONC ARIA PLAN FRACTIONS TREATED TO DATE: 4
RAD ONC ARIA PLAN ID: NORMAL
RAD ONC ARIA PLAN NAME: NORMAL
RAD ONC ARIA PLAN PRESCRIBED DOSE PER FRACTION: 2 GY
RAD ONC ARIA PLAN PRIMARY REFERENCE POINT: NORMAL
RAD ONC ARIA PLAN TOTAL FRACTIONS PRESCRIBED: 30
RAD ONC ARIA PLAN TOTAL PRESCRIBED DOSE: 6000 CGY
RAD ONC ARIA REFERENCE POINT DOSAGE GIVEN TO DATE: 8 GY
RAD ONC ARIA REFERENCE POINT ID: NORMAL
RAD ONC ARIA REFERENCE POINT SESSION DOSAGE GIVEN: 2 GY

## 2022-09-09 PROCEDURE — 77386: CPT | Performed by: RADIOLOGY

## 2022-09-12 ENCOUNTER — HOSPITAL ENCOUNTER (OUTPATIENT)
Dept: RADIATION ONCOLOGY | Facility: HOSPITAL | Age: 62
Discharge: HOME | End: 2022-09-12

## 2022-09-12 ENCOUNTER — DOCUMENTATION (OUTPATIENT)
Dept: RADIATION ONCOLOGY | Facility: HOSPITAL | Age: 62
End: 2022-09-12

## 2022-09-12 LAB
RAD ONC ARIA COURSE ID: NORMAL
RAD ONC ARIA COURSE INTENT: NORMAL
RAD ONC ARIA COURSE LAST TREATMENT DATE: NORMAL
RAD ONC ARIA COURSE START DATE: NORMAL
RAD ONC ARIA COURSE TREATMENT ELAPSED DAYS: 6
RAD ONC ARIA FIRST TREATMENT DATE: NORMAL
RAD ONC ARIA PLAN FRACTIONS TREATED TO DATE: 5
RAD ONC ARIA PLAN ID: NORMAL
RAD ONC ARIA PLAN NAME: NORMAL
RAD ONC ARIA PLAN PRESCRIBED DOSE PER FRACTION: 2 GY
RAD ONC ARIA PLAN PRIMARY REFERENCE POINT: NORMAL
RAD ONC ARIA PLAN TOTAL FRACTIONS PRESCRIBED: 30
RAD ONC ARIA PLAN TOTAL PRESCRIBED DOSE: 6000 CGY
RAD ONC ARIA REFERENCE POINT DOSAGE GIVEN TO DATE: 10 GY
RAD ONC ARIA REFERENCE POINT ID: NORMAL
RAD ONC ARIA REFERENCE POINT SESSION DOSAGE GIVEN: 2 GY

## 2022-09-12 PROCEDURE — 77386: CPT | Performed by: RADIOLOGY

## 2022-09-12 PROCEDURE — 77014 CHG CT GUIDANCE RADIATION THERAPY FLDS PLACEMENT: CPT | Performed by: RADIOLOGY

## 2022-09-12 PROCEDURE — 77336 RADIATION PHYSICS CONSULT: CPT | Performed by: RADIOLOGY

## 2022-09-12 NOTE — PROGRESS NOTES
Diagnosis: Malignant neoplasm of parotid gland    Reason for Referral: New radiation therapy tx start    Current Tx Plan: Underwent surgery, currently receiving adjuvant radiation therapy    Most Recent Distress Level: 3    Most Recent PHQ -2/PHQ-9 Score: 10     C-SSRS: Denies SI and screened negative     Mood: Patient presented in a pleasant mood today, however, became tearful as he expressed ambivalence regarding pursuing cancer tx and worries related to anticipated side effects from tx, possibility of needing a feeding tube, etc. Provided emotional support throughout, utilizing empathy and validating and normalizing identified emotions. Discussed opportunity for mental health services (counseling,psych) and/or cancer support services ([id number] support, support groups). Inquired if pt has been taking ativan prescription prescribed by radiation oncologist. Pt was unaware this was prescribed and plans to stop by [facility] pharmacy today to begin.     Residential status/Who lives in the home: Patient resides in home with his spouse and 15 year old daughter. Pt reports his adult daughter is currently in the process of getting a divorce and is also living with them at this time, along with their 7 and 9 year old grandchildren. Patient resides on a farm in [place] with goats, cows, etc.     Support System: Patient reports he has great support from his spouse and family.    Hobbies: Patient enjoys spending time with his young grandchildren, cooking and baking, bottle feeding the animals on their farm, has a Koi pond, etc. 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     Home Care Needs: Pt is utilizing [facility] wheelchair today. Pt reports he has a walker and wheelchair at home that he uses, however, [facility] is working on getting him a motorized scooter. No other needs identified at this time.    Insurance: VA coverage    Finances: Patient identifies no financial concerns at this time.     Transportation: Patient's spouse  provides transportation to txs. Pt is eligible for VA travel reimbursement. Provided pt with a letter today confirming his attended appointments since consult. Will provide pt with a letter each week throughout tx.    Resources/Referrals: Transportation - Letter for VA travel reimbursement; emotional support    Additional Comments: OSWs met with pt in rad onc during tx/rounding to introduce OSW role and assess for psychosocial needs. Provided pt with a letter today confirming attended radiation therapy appointments to receive VA travel reimbursement. Will plan to provide pt with an updated letter each week throughout tx. Provided pt with my business card, encouraging OSW support remains available.

## 2022-09-13 ENCOUNTER — HOSPITAL ENCOUNTER (OUTPATIENT)
Dept: RADIATION ONCOLOGY | Facility: HOSPITAL | Age: 62
Discharge: HOME | End: 2022-09-13

## 2022-09-13 VITALS
HEART RATE: 73 BPM | RESPIRATION RATE: 16 BRPM | BODY MASS INDEX: 36.15 KG/M2 | TEMPERATURE: 97.3 F | OXYGEN SATURATION: 98 % | DIASTOLIC BLOOD PRESSURE: 68 MMHG | WEIGHT: 230.82 LBS | SYSTOLIC BLOOD PRESSURE: 147 MMHG

## 2022-09-13 DIAGNOSIS — C07 MALIGNANT NEOPLASM OF PAROTID GLAND: Primary | ICD-10-CM

## 2022-09-13 LAB
RAD ONC ARIA COURSE ID: NORMAL
RAD ONC ARIA COURSE INTENT: NORMAL
RAD ONC ARIA COURSE LAST TREATMENT DATE: NORMAL
RAD ONC ARIA COURSE START DATE: NORMAL
RAD ONC ARIA COURSE TREATMENT ELAPSED DAYS: 7
RAD ONC ARIA FIRST TREATMENT DATE: NORMAL
RAD ONC ARIA PLAN FRACTIONS TREATED TO DATE: 6
RAD ONC ARIA PLAN ID: NORMAL
RAD ONC ARIA PLAN NAME: NORMAL
RAD ONC ARIA PLAN PRESCRIBED DOSE PER FRACTION: 2 GY
RAD ONC ARIA PLAN PRIMARY REFERENCE POINT: NORMAL
RAD ONC ARIA PLAN TOTAL FRACTIONS PRESCRIBED: 30
RAD ONC ARIA PLAN TOTAL PRESCRIBED DOSE: 6000 CGY
RAD ONC ARIA REFERENCE POINT DOSAGE GIVEN TO DATE: 12 GY
RAD ONC ARIA REFERENCE POINT ID: NORMAL
RAD ONC ARIA REFERENCE POINT SESSION DOSAGE GIVEN: 2 GY

## 2022-09-13 PROCEDURE — 77386: CPT | Performed by: RADIOLOGY

## 2022-09-13 PROCEDURE — 77427 RADIATION TX MANAGEMENT X5: CPT | Performed by: RADIOLOGY

## 2022-09-13 PROCEDURE — 77014 CHG CT GUIDANCE RADIATION THERAPY FLDS PLACEMENT: CPT | Performed by: RADIOLOGY

## 2022-09-13 NOTE — PROGRESS NOTES
On Treatment Visit       Patient: Kevin Ordonez   YOB: 1960   Medical Record Number: [id number]     Date of Visit  September 13, 2022   Primary Diagnosis:Malignant neoplasm of parotid gland (HCC) [C07]  Cancer Staging: Cancer Staging  No matching staging information was found for the patient.       was seen today for an on treatment visit.  He is receiving radiation therapy to the parotid. He  has received 1200 cGy in 6 fractions out of a planned dose of 6000 cGy in 30 fractions.    He reports feeling well overall but has some sensation of warmth over his left ear.  Has some new onset taste changes.                                            Review of Systems:   Review of Systems   Constitutional: Negative for appetite change and fatigue.   HENT: Negative for ear pain, hearing loss, sore throat, tinnitus and trouble swallowing.    Respiratory: Negative for cough and shortness of breath.    Gastrointestinal: Negative for constipation, diarrhea and nausea.   Genitourinary: Negative for difficulty urinating, dysuria, frequency and urgency.   Musculoskeletal: Positive for arthralgias and back pain.   Skin: Positive for color change (mild erythemia). Negative for rash.   Neurological: Positive for dizziness. Negative for headaches.   Hematological: Bruises/bleeds easily.   Psychiatric/Behavioral: Positive for sleep disturbance (wakes throughout the night).       Vitals:     Vitals:    09/13/22 0823   BP: 147/68   Pulse: 73   Resp: 16   Temp: 97.3 °F (36.3 °C)   SpO2: 98%       Weight:   Wt Readings from Last 3 Encounters:   09/13/22 105 kg (230 lb 13.2 oz)   09/08/22 103 kg (227 lb 1.2 oz)   09/06/22 103 kg (227 lb 8.2 oz)      Pain:    Pain Score    09/13/22 0823   PainSc:   1         Physical Exam:  Gen: WD/WN; NAD  HEENT: MMM; incisions healing well; no sign of recurrence or poor wound healing  Trachea: midline  Chest: symmetric  Resp: normal respiratory effort  Extr: warm,  well-perfused  Neuro: awake and alert; no aphasia or neglect    Plan: I have reviewed treatment setup notes, checked and approved the daily guidance images.  I reviewed dose delivery, treatment parameters and deemed them appropriate. We plan to continue radiation therapy as prescribed.          Radiation Oncology    Electronically signed by Reji Fall MD 9/13/2022  08:54 EDT

## 2022-09-14 ENCOUNTER — HOSPITAL ENCOUNTER (OUTPATIENT)
Dept: RADIATION ONCOLOGY | Facility: HOSPITAL | Age: 62
Discharge: HOME | End: 2022-09-14

## 2022-09-14 LAB
RAD ONC ARIA COURSE ID: NORMAL
RAD ONC ARIA COURSE INTENT: NORMAL
RAD ONC ARIA COURSE LAST TREATMENT DATE: NORMAL
RAD ONC ARIA COURSE START DATE: NORMAL
RAD ONC ARIA COURSE TREATMENT ELAPSED DAYS: 8
RAD ONC ARIA FIRST TREATMENT DATE: NORMAL
RAD ONC ARIA PLAN FRACTIONS TREATED TO DATE: 7
RAD ONC ARIA PLAN ID: NORMAL
RAD ONC ARIA PLAN NAME: NORMAL
RAD ONC ARIA PLAN PRESCRIBED DOSE PER FRACTION: 2 GY
RAD ONC ARIA PLAN PRIMARY REFERENCE POINT: NORMAL
RAD ONC ARIA PLAN TOTAL FRACTIONS PRESCRIBED: 30
RAD ONC ARIA PLAN TOTAL PRESCRIBED DOSE: 6000 CGY
RAD ONC ARIA REFERENCE POINT DOSAGE GIVEN TO DATE: 14 GY
RAD ONC ARIA REFERENCE POINT ID: NORMAL
RAD ONC ARIA REFERENCE POINT SESSION DOSAGE GIVEN: 2 GY

## 2022-09-14 PROCEDURE — 77014 CHG CT GUIDANCE RADIATION THERAPY FLDS PLACEMENT: CPT | Performed by: RADIOLOGY

## 2022-09-14 PROCEDURE — 77386: CPT | Performed by: RADIOLOGY

## 2022-09-15 ENCOUNTER — HOSPITAL ENCOUNTER (OUTPATIENT)
Dept: RADIATION ONCOLOGY | Facility: HOSPITAL | Age: 62
Discharge: HOME | End: 2022-09-15

## 2022-09-15 ENCOUNTER — OFFICE VISIT (OUTPATIENT)
Dept: ORTHOPEDIC SURGERY | Facility: CLINIC | Age: 62
End: 2022-09-15

## 2022-09-15 VITALS — HEIGHT: 67 IN | BODY MASS INDEX: 36.57 KG/M2 | HEART RATE: 64 BPM | WEIGHT: 233 LBS | OXYGEN SATURATION: 95 %

## 2022-09-15 VITALS — BODY MASS INDEX: 36.57 KG/M2 | WEIGHT: 233.47 LBS

## 2022-09-15 DIAGNOSIS — M19.012 PRIMARY OSTEOARTHRITIS OF LEFT SHOULDER: ICD-10-CM

## 2022-09-15 DIAGNOSIS — M25.512 LEFT SHOULDER PAIN, UNSPECIFIED CHRONICITY: Primary | ICD-10-CM

## 2022-09-15 LAB
RAD ONC ARIA COURSE ID: NORMAL
RAD ONC ARIA COURSE INTENT: NORMAL
RAD ONC ARIA COURSE LAST TREATMENT DATE: NORMAL
RAD ONC ARIA COURSE START DATE: NORMAL
RAD ONC ARIA COURSE TREATMENT ELAPSED DAYS: 9
RAD ONC ARIA FIRST TREATMENT DATE: NORMAL
RAD ONC ARIA PLAN FRACTIONS TREATED TO DATE: 8
RAD ONC ARIA PLAN ID: NORMAL
RAD ONC ARIA PLAN NAME: NORMAL
RAD ONC ARIA PLAN PRESCRIBED DOSE PER FRACTION: 2 GY
RAD ONC ARIA PLAN PRIMARY REFERENCE POINT: NORMAL
RAD ONC ARIA PLAN TOTAL FRACTIONS PRESCRIBED: 30
RAD ONC ARIA PLAN TOTAL PRESCRIBED DOSE: 6000 CGY
RAD ONC ARIA REFERENCE POINT DOSAGE GIVEN TO DATE: 16 GY
RAD ONC ARIA REFERENCE POINT ID: NORMAL
RAD ONC ARIA REFERENCE POINT SESSION DOSAGE GIVEN: 2 GY

## 2022-09-15 PROCEDURE — 77014 CHG CT GUIDANCE RADIATION THERAPY FLDS PLACEMENT: CPT | Performed by: RADIOLOGY

## 2022-09-15 PROCEDURE — 77386: CPT | Performed by: RADIOLOGY

## 2022-09-15 PROCEDURE — 20610 DRAIN/INJ JOINT/BURSA W/O US: CPT | Performed by: PHYSICIAN ASSISTANT

## 2022-09-15 RX ORDER — LIDOCAINE HYDROCHLORIDE 10 MG/ML
5 INJECTION, SOLUTION INFILTRATION; PERINEURAL
Status: COMPLETED | OUTPATIENT
Start: 2022-09-15 | End: 2022-09-15

## 2022-09-15 RX ORDER — TRIAMCINOLONE ACETONIDE 40 MG/ML
40 INJECTION, SUSPENSION INTRA-ARTICULAR; INTRAMUSCULAR
Status: COMPLETED | OUTPATIENT
Start: 2022-09-15 | End: 2022-09-15

## 2022-09-15 RX ADMIN — TRIAMCINOLONE ACETONIDE 40 MG: 40 INJECTION, SUSPENSION INTRA-ARTICULAR; INTRAMUSCULAR at 10:27

## 2022-09-15 RX ADMIN — LIDOCAINE HYDROCHLORIDE 5 ML: 10 INJECTION, SOLUTION INFILTRATION; PERINEURAL at 10:27

## 2022-09-15 NOTE — PROGRESS NOTES
Outpatient Nutrition Oncology Assessment    Patient Name: Kevin Ordonez  YOB: 1960  MRN: [id number]  Assessment Date: 9/15/2022    CLINICAL NUTRITION ASSESSMENT    Dx:  L parotid Ca      Type of Cancer Treatment XRT to L head/neck region          Reason for Assessment  Follow-up protocol       Current Problems:   Patient Active Problem List   Diagnosis Code   • Rotator cuff tendonitis, left M75.82   • Tear of left acetabular labrum S73.192A   • Primary osteoarthritis of left shoulder M19.012   • Left shoulder pain M25.512   • Asthma J45.909   • Closed nondisplaced fracture of shaft of fifth metacarpal bone of right hand S62.356A   • Diabetes mellitus (HCC) E11.9   • Hyperlipidemia E78.5   • Hypertension I10   • Neuropathy G62.9   • Obstructive sleep apnea syndrome G47.33   • Malignant neoplasm of parotid gland (HCC) C07         Anthropometrics     Row Name 09/15/22 0820 09/15/22 0700       Anthropometrics    Weight -- 106 kg (233 lb 7.5 oz)    Weight for Calculation 106 kg (233 lb 11 oz) --                BMI kg/m2   Body mass index is 36.57 kg/m².    Weight Hx  Wt Readings from Last 30 Encounters:   09/15/22 0700 106 kg (233 lb 7.5 oz)   09/13/22 0823 105 kg (230 lb 13.2 oz)   09/08/22 0700 103 kg (227 lb 1.2 oz)   09/06/22 1053 103 kg (227 lb 8.2 oz)   08/12/22 1401 107 kg (236 lb 1.8 oz)   08/04/22 1005 109 kg (240 lb)   06/16/22 1518 109 kg (240 lb)   03/24/22 2005 104 kg (230 lb)   10/14/21 1522 108 kg (237 lb 10.5 oz)         Estimated/Assessed Needs - Anthropometrics     Row Name 09/15/22 0820 09/15/22 0700       Anthropometrics    Weight -- 106 kg (233 lb 7.5 oz)    Weight for Calculation 106 kg (233 lb 11 oz) --       Estimated/Assessed Needs    Additional Documentation Fluid Requirements (Group);KCAL/KG (Group);Protein Requirements (Group) --       KCAL/KG    KCAL/KG 25 Kcal/Kg (kcal) --    25 Kcal/Kg (kcal) 2650 --       Protein Requirements    Weight Used For Protein Calculations 67.3 kg  "(148 lb 5.9 oz)  IBW --    Est Protein Requirement Amount (gms/kg) 1.5 gm protein --    Estimated Protein Requirements (gms/day) 100.95 --       Fluid Requirements    Fluid Requirements (mL/day) 2018 --    RDA Method (mL) 2018 --                 Labs/Medications        Pertinent Labs Reviewed.       Pertinent Medications Dulaglutide, LORazepam, albuterol, atorvastatin, chlorthalidone, cholecalciferol, docusate sodium, fish oil, fluticasone-salmeterol, gabapentin, glipizide, insulin NPH, losartan, metFORMIN, montelukast, omeprazole, oxyCODONE-acetaminophen, and vitamin D     Current Nutrition Orders & Evaluation of Intake       Oral Nutrition     Current PO Diet Diabetic/carb consistent diet (high protein, increased fluid intake), soft solids still tolerated well   Supplement Boost supplements once daily (unsure of type)     Nutrition Diagnosis        Nutrition Dx Problem 1 Increased nutrient needs related to increased nutrient needs due to catabolic disease as evidenced by physiological causes increasing nutrient needs.       Nutrition Intervention       RD Action Nutrition F/U     Monitor/Evaluation       Monitor Per oncology nutrition protocol.     Comments:    Wt gain of 4# in the past week due to drinking Boost supplements once daily.  Pt stated he started drinking these 4 days ago and usually just 1/day.  He described the type of Boost as a container w/ more volume than 8 oz, but unsure of specific name.  Encouraged pt to consume \"Plus\" versions of Boost.  Pt asked about insurance coverage for oral nutrition shakes.  Since pt is [facility], explained he has to have a community care clinic visit with a VA dietitian who will then obtain an order for these and set up deliveries for him.  For now, pt would benefit from consuming 2-3 Boost Plus or Ensure plus daily, however may only need 1 daily if eating other foods well- explained to pt.  Pt noted the Boost seemed \"gritty\" and \"sweet\", therefore provided a sample of " Ensure Complete to try, along w/ Ensure coupons (no Boost brand coupons available).  Provided pt with note reminding him to call [facility] P[facility] at the [facility] to inquire about supplement coverage & to set up the appointment as they indicate he needs to do.  Provided pt with Oncology RD business card for his future reference or if needs to provide to the VA for any reason.     Electronically signed by:  Katerina Fisher RD  09/15/22 08:22 EDT

## 2022-09-15 NOTE — PROGRESS NOTES
"Chief Complaint  Follow-up and Pain of the Left Shoulder    Subjective          Kevin Ordonez is a 62 y.o. male  presents to [facility] for   History of Present Illness      Patient presents for follow-up evaluation of left shoulder pain, left shoulder AC arthritis, tendinitis.,  He was last seen on 2022 he was having improvement of shoulder pain but at that visit he was stating the pain was returning.  At that visit he had just recently had a lesion removed from behind his left ear and was still in recovery/healing phase of this.  He states his lesion was found to be cancerous he is now receiving laser treatments.  He states that he has been attending physical therapy and it helps the day of but states that he has pain and stiffness with activities he states the injection wore off several weeks ago.  He is requesting a left shoulder steroid injection today.  He cannot have surgery discussion at this time but may consider this in the future.      No Known Allergies     Social History     Socioeconomic History   • Marital status:    Tobacco Use   • Smoking status: Former Smoker     Years: 25.00     Types: Cigarettes     Start date:      Quit date:      Years since quittin.   • Smokeless tobacco: Former User   Vaping Use   • Vaping Use: Never used   Substance and Sexual Activity   • Alcohol use: Never   • Drug use: Never   • Sexual activity: Defer        REVIEW OF SYSTEMS    Constitutional: Denies fevers, chills, weight loss  Cardiovascular: Denies chest pain, shortness of breath  Skin: Denies rashes, acute skin changes  Neurologic: Denies headache, loss of consciousness  MSK: Left shoulder pain      Objective   Vital Signs:   Pulse 64   Ht 170.2 cm (67\")   Wt 106 kg (233 lb)   SpO2 95%   BMI 36.49 kg/m²     Body mass index is 36.49 kg/m².    Physical Exam    Left shoulder: Tender to palpation of the anterior lateral shoulder, mild pain with range of motion, " active forward elevation 150, active abduction with pain 45, external rotation with abduction 40, internal rotation 55, 4 out of 5 strength.    Large Joint Arthrocentesis  Date/Time: 9/15/2022 10:27 AM  Consent given by: patient  Site marked: site marked  Timeout: Immediately prior to procedure a time out was called to verify the correct patient, procedure, equipment, support staff and site/side marked as required   Supporting Documentation  Indications: pain   Procedure Details  Location: shoulder (LEFT) -   Needle gauge: 21 G.  Medications administered: 5 mL lidocaine 1 %; 40 mg triamcinolone acetonide 40 MG/ML  Patient tolerance: patient tolerated the procedure well with no immediate complications          Imaging Results (Most Recent)     None           Result Review :   The following data was reviewed by: Ashlee Wiggins on 09/15/2022:               Assessment and Plan    Diagnoses and all orders for this visit:    1. Left shoulder pain, unspecified chronicity (Primary)    2. Primary osteoarthritis of left shoulder        Discussed diagnosis and treatment options with the patient he was advised he can receive left shoulder steroid injection today, continue physical therapy new orders written, follow-up in 3 months for recheck follow-up sooner if any new or concerning symptoms occur, patient agreed    Call or return if worsening symptoms.    Follow Up   Return in about 3 months (around 12/15/2022) for Recheck.  Patient was given instructions and counseling regarding his condition or for health maintenance advice. Please see specific information pulled into the AVS if appropriate.

## 2022-09-16 ENCOUNTER — HOSPITAL ENCOUNTER (OUTPATIENT)
Dept: RADIATION ONCOLOGY | Facility: HOSPITAL | Age: 62
Discharge: HOME | End: 2022-09-16

## 2022-09-16 LAB
RAD ONC ARIA COURSE ID: NORMAL
RAD ONC ARIA COURSE INTENT: NORMAL
RAD ONC ARIA COURSE LAST TREATMENT DATE: NORMAL
RAD ONC ARIA COURSE START DATE: NORMAL
RAD ONC ARIA COURSE TREATMENT ELAPSED DAYS: 10
RAD ONC ARIA FIRST TREATMENT DATE: NORMAL
RAD ONC ARIA PLAN FRACTIONS TREATED TO DATE: 9
RAD ONC ARIA PLAN ID: NORMAL
RAD ONC ARIA PLAN NAME: NORMAL
RAD ONC ARIA PLAN PRESCRIBED DOSE PER FRACTION: 2 GY
RAD ONC ARIA PLAN PRIMARY REFERENCE POINT: NORMAL
RAD ONC ARIA PLAN TOTAL FRACTIONS PRESCRIBED: 30
RAD ONC ARIA PLAN TOTAL PRESCRIBED DOSE: 6000 CGY
RAD ONC ARIA REFERENCE POINT DOSAGE GIVEN TO DATE: 18 GY
RAD ONC ARIA REFERENCE POINT ID: NORMAL
RAD ONC ARIA REFERENCE POINT SESSION DOSAGE GIVEN: 2 GY

## 2022-09-16 PROCEDURE — 77386: CPT | Performed by: RADIOLOGY

## 2022-09-16 PROCEDURE — 77014 CHG CT GUIDANCE RADIATION THERAPY FLDS PLACEMENT: CPT | Performed by: RADIOLOGY

## 2022-09-19 ENCOUNTER — DOCUMENTATION (OUTPATIENT)
Dept: RADIATION ONCOLOGY | Facility: HOSPITAL | Age: 62
End: 2022-09-19

## 2022-09-19 ENCOUNTER — HOSPITAL ENCOUNTER (OUTPATIENT)
Dept: RADIATION ONCOLOGY | Facility: HOSPITAL | Age: 62
Discharge: HOME | End: 2022-09-19

## 2022-09-19 LAB
RAD ONC ARIA COURSE ID: NORMAL
RAD ONC ARIA COURSE INTENT: NORMAL
RAD ONC ARIA COURSE LAST TREATMENT DATE: NORMAL
RAD ONC ARIA COURSE START DATE: NORMAL
RAD ONC ARIA COURSE TREATMENT ELAPSED DAYS: 13
RAD ONC ARIA FIRST TREATMENT DATE: NORMAL
RAD ONC ARIA PLAN FRACTIONS TREATED TO DATE: 10
RAD ONC ARIA PLAN ID: NORMAL
RAD ONC ARIA PLAN NAME: NORMAL
RAD ONC ARIA PLAN PRESCRIBED DOSE PER FRACTION: 2 GY
RAD ONC ARIA PLAN PRIMARY REFERENCE POINT: NORMAL
RAD ONC ARIA PLAN TOTAL FRACTIONS PRESCRIBED: 30
RAD ONC ARIA PLAN TOTAL PRESCRIBED DOSE: 6000 CGY
RAD ONC ARIA REFERENCE POINT DOSAGE GIVEN TO DATE: 20 GY
RAD ONC ARIA REFERENCE POINT ID: NORMAL
RAD ONC ARIA REFERENCE POINT SESSION DOSAGE GIVEN: 2 GY

## 2022-09-19 PROCEDURE — 77014 CHG CT GUIDANCE RADIATION THERAPY FLDS PLACEMENT: CPT | Performed by: RADIOLOGY

## 2022-09-19 PROCEDURE — 77386: CPT | Performed by: RADIOLOGY

## 2022-09-19 PROCEDURE — 77336 RADIATION PHYSICS CONSULT: CPT | Performed by: RADIOLOGY

## 2022-09-19 NOTE — PROGRESS NOTES
Diagnosis: Malignant neoplasm of parotid gland    Reason for Referral: [facility] travel reimbursement    Content of Visit: OSWs met with pt in rad onc during tx this morning to provide pt with a letter confirming his attended radiation treatments this past week from 9/12-9/16. Pt reports overall doing well, however, has began experiencing some mild side effects from tx. Encouraged pt to discuss this with provider tomorrow during his clinic visit. Pt v/u. Pt reports he tried Ensure samples provided by RD last week and didn't like the chocolate flavor. Pt reports he enjoys the strawberry Boost and has left a VM with VA PCP to see if VA RD can prescribe this for him throughout cancer care. Pt reports he picked up Ativan prescription from pharmacy, however, this was only for a quantity of two. Pt spoke with PCP and was prescribed an on-going prescription for Ativan. Pt reports his son and three month old grandson visited over the weekend. No other needs identified at this time. Encouraged OSW support remains available. Pt expressed gratitude.    Resources/Referrals Provided: Letter for VA travel reimbursement

## 2022-09-20 ENCOUNTER — HOSPITAL ENCOUNTER (OUTPATIENT)
Dept: RADIATION ONCOLOGY | Facility: HOSPITAL | Age: 62
Setting detail: RADIATION/ONCOLOGY SERIES
Discharge: HOME | End: 2022-09-20

## 2022-09-20 VITALS
RESPIRATION RATE: 16 BRPM | WEIGHT: 233.03 LBS | SYSTOLIC BLOOD PRESSURE: 163 MMHG | TEMPERATURE: 97.7 F | OXYGEN SATURATION: 94 % | DIASTOLIC BLOOD PRESSURE: 70 MMHG | HEART RATE: 70 BPM | BODY MASS INDEX: 36.5 KG/M2

## 2022-09-20 DIAGNOSIS — C07 MALIGNANT NEOPLASM OF PAROTID GLAND: Primary | ICD-10-CM

## 2022-09-20 LAB
RAD ONC ARIA COURSE ID: NORMAL
RAD ONC ARIA COURSE INTENT: NORMAL
RAD ONC ARIA COURSE LAST TREATMENT DATE: NORMAL
RAD ONC ARIA COURSE START DATE: NORMAL
RAD ONC ARIA COURSE TREATMENT ELAPSED DAYS: 14
RAD ONC ARIA FIRST TREATMENT DATE: NORMAL
RAD ONC ARIA PLAN FRACTIONS TREATED TO DATE: 11
RAD ONC ARIA PLAN ID: NORMAL
RAD ONC ARIA PLAN NAME: NORMAL
RAD ONC ARIA PLAN PRESCRIBED DOSE PER FRACTION: 2 GY
RAD ONC ARIA PLAN PRIMARY REFERENCE POINT: NORMAL
RAD ONC ARIA PLAN TOTAL FRACTIONS PRESCRIBED: 30
RAD ONC ARIA PLAN TOTAL PRESCRIBED DOSE: 6000 CGY
RAD ONC ARIA REFERENCE POINT DOSAGE GIVEN TO DATE: 22 GY
RAD ONC ARIA REFERENCE POINT ID: NORMAL
RAD ONC ARIA REFERENCE POINT SESSION DOSAGE GIVEN: 2 GY

## 2022-09-20 PROCEDURE — 77014 CHG CT GUIDANCE RADIATION THERAPY FLDS PLACEMENT: CPT | Performed by: RADIOLOGY

## 2022-09-20 PROCEDURE — 77386: CPT | Performed by: RADIOLOGY

## 2022-09-20 PROCEDURE — 77427 RADIATION TX MANAGEMENT X5: CPT | Performed by: RADIOLOGY

## 2022-09-20 NOTE — PROGRESS NOTES
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       was seen today for an on treatment visit.  He is receiving radiation therapy to the parotid. He  has received 2200 cGy in 11 fractions out of a planned dose of 6000 cGy in 30 fractions.    No changes since last week. Picking at skin anterior to left ear. Dysgeusia                                           Review of Systems:   Review of Systems   Constitutional: Positive for fatigue (5/10). Negative for appetite change.   HENT: Positive for trouble swallowing (Noticed an issue with dry foods.). Negative for ear pain, hearing loss, sore throat and tinnitus.         Change in taste  States inside of ear is itchy.  Dry mouth     Respiratory: Negative for cough and shortness of breath.    Gastrointestinal: Positive for diarrhea (A couple of days ago, subsided since then). Negative for constipation and nausea.   Genitourinary: Positive for frequency (Ongoing) and urgency (Ongoing). Negative for difficulty urinating and dysuria.   Musculoskeletal: Positive for arthralgias and back pain.   Skin: Positive for color change (mild erythemia). Negative for rash.        Dry skin at left ear, patient states that he picks at it and causes it to bleed.     Neurological: Positive for dizziness (Ongoing x1 year.) and headaches (A couple of mornings ago, subsided on it's own.).   Hematological: Bruises/bleeds easily.   Psychiatric/Behavioral: Positive for sleep disturbance (wakes throughout the night ).       Vitals:     Vitals:    09/20/22 0818   BP: 163/70   Pulse: 70   Resp: 16   Temp: 97.7 °F (36.5 °C)   SpO2: 94%       Weight:   Wt Readings from Last 3 Encounters:   09/20/22 106 kg (233 lb 0.4 oz)   09/15/22 106  kg (233 lb 7.5 oz)   09/15/22 106 kg (233 lb)      Pain:    Pain Score    09/20/22 0818   PainSc:   2   PainLoc: Jaw  Comment: Left Jaw         Physical Exam:  Gen: WD/WN; NAD  HEENT: MMM; mild left face skin rubor; eschar formation anterior to left tragus  Trachea: midline  Chest: symmetric  Resp: normal respiratory effort  Extr: warm, well-perfused  Neuro: awake and alert; no aphasia or neglect    Plan: I have reviewed treatment setup notes, checked and approved the daily guidance images.  I reviewed dose delivery, treatment parameters and deemed them appropriate. We plan to continue radiation therapy as prescribed.    Skin care instructions provided      Radiation Oncology    Electronically signed by Reji Fall MD 9/20/2022  08:48 EDT

## 2022-09-21 ENCOUNTER — HOSPITAL ENCOUNTER (OUTPATIENT)
Dept: RADIATION ONCOLOGY | Facility: HOSPITAL | Age: 62
Discharge: HOME | End: 2022-09-21

## 2022-09-21 LAB
RAD ONC ARIA COURSE ID: NORMAL
RAD ONC ARIA COURSE INTENT: NORMAL
RAD ONC ARIA COURSE LAST TREATMENT DATE: NORMAL
RAD ONC ARIA COURSE START DATE: NORMAL
RAD ONC ARIA COURSE TREATMENT ELAPSED DAYS: 15
RAD ONC ARIA FIRST TREATMENT DATE: NORMAL
RAD ONC ARIA PLAN FRACTIONS TREATED TO DATE: 12
RAD ONC ARIA PLAN ID: NORMAL
RAD ONC ARIA PLAN NAME: NORMAL
RAD ONC ARIA PLAN PRESCRIBED DOSE PER FRACTION: 2 GY
RAD ONC ARIA PLAN PRIMARY REFERENCE POINT: NORMAL
RAD ONC ARIA PLAN TOTAL FRACTIONS PRESCRIBED: 30
RAD ONC ARIA PLAN TOTAL PRESCRIBED DOSE: 6000 CGY
RAD ONC ARIA REFERENCE POINT DOSAGE GIVEN TO DATE: 24 GY
RAD ONC ARIA REFERENCE POINT ID: NORMAL
RAD ONC ARIA REFERENCE POINT SESSION DOSAGE GIVEN: 2 GY

## 2022-09-21 PROCEDURE — 77386: CPT | Performed by: RADIOLOGY

## 2022-09-21 PROCEDURE — 77014 CHG CT GUIDANCE RADIATION THERAPY FLDS PLACEMENT: CPT | Performed by: RADIOLOGY

## 2022-09-22 ENCOUNTER — HOSPITAL ENCOUNTER (OUTPATIENT)
Dept: RADIATION ONCOLOGY | Facility: HOSPITAL | Age: 62
Discharge: HOME | End: 2022-09-22

## 2022-09-22 ENCOUNTER — TELEPHONE (OUTPATIENT)
Dept: PHYSICAL THERAPY | Facility: CLINIC | Age: 62
End: 2022-09-22

## 2022-09-22 VITALS — WEIGHT: 230.38 LBS | BODY MASS INDEX: 36.08 KG/M2

## 2022-09-22 LAB
RAD ONC ARIA COURSE ID: NORMAL
RAD ONC ARIA COURSE INTENT: NORMAL
RAD ONC ARIA COURSE LAST TREATMENT DATE: NORMAL
RAD ONC ARIA COURSE START DATE: NORMAL
RAD ONC ARIA COURSE TREATMENT ELAPSED DAYS: 16
RAD ONC ARIA FIRST TREATMENT DATE: NORMAL
RAD ONC ARIA PLAN FRACTIONS TREATED TO DATE: 13
RAD ONC ARIA PLAN ID: NORMAL
RAD ONC ARIA PLAN NAME: NORMAL
RAD ONC ARIA PLAN PRESCRIBED DOSE PER FRACTION: 2 GY
RAD ONC ARIA PLAN PRIMARY REFERENCE POINT: NORMAL
RAD ONC ARIA PLAN TOTAL FRACTIONS PRESCRIBED: 30
RAD ONC ARIA PLAN TOTAL PRESCRIBED DOSE: 6000 CGY
RAD ONC ARIA REFERENCE POINT DOSAGE GIVEN TO DATE: 26 GY
RAD ONC ARIA REFERENCE POINT ID: NORMAL
RAD ONC ARIA REFERENCE POINT SESSION DOSAGE GIVEN: 2 GY

## 2022-09-22 PROCEDURE — 77014 CHG CT GUIDANCE RADIATION THERAPY FLDS PLACEMENT: CPT | Performed by: RADIOLOGY

## 2022-09-22 PROCEDURE — 77386: CPT | Performed by: RADIOLOGY

## 2022-09-22 NOTE — TELEPHONE ENCOUNTER
I attempted to call Mr. Ordonez regarding purchasing of a thickener for his fluids.  I was unable to reach him or his wife today.    Patient does not require a thickener for his drink at this time.  If patient is coughing on liquids he may need a swallow evaluation prior to recommending a thickener.

## 2022-09-22 NOTE — PROGRESS NOTES
9/22/22 Weight:  104.55 kg    Weight decline:  1.1% decline in the past week    Nutrition-related concerns:  Dysphagia/odynophagia, diarrhea, nausea    Consult & Recommendations:  [facility] to order/send referral for pt to receive oral nutrition shakes (covered by insurance).  Order to be sent for Glucerna shake TID, but possibly to only cover twice daily (per pt).  Pt tried Boost supplements (chocolate flavor) and Ensure Complete (chocolate as well) and experienced diarrhea and gas.  Pt does not drink milk due to preference, but when he does it is Lactaid.  Hasn't consumed regular milk for years- therefore possible intolerance to milk protein now?  Provided samples of Glucerna shake to try, in order to make a decision on what type of supplement he can tolerate/prefers best.  Pt had questions regarding thickener for his food- to ask Speech Therapist Roxana today about this.  Encouraged adequate fluids, avoiding aggravating foods/flavors, and Gas-Ex and/or Imodium AD if gas or diarrhea continue.      Nutrition Dx:  Unintentional wt loss related to decreased ability to consume adequate PO intake & GI dysfunction as evidenced by 1.1% wt loss/1 week and pt report of dysphagia/odynophagia and diarrhea.    Oncology RD will continue to monitor per protocol.

## 2022-09-23 ENCOUNTER — HOSPITAL ENCOUNTER (OUTPATIENT)
Dept: RADIATION ONCOLOGY | Facility: HOSPITAL | Age: 62
Discharge: HOME | End: 2022-09-23

## 2022-09-23 LAB
RAD ONC ARIA COURSE ID: NORMAL
RAD ONC ARIA COURSE INTENT: NORMAL
RAD ONC ARIA COURSE LAST TREATMENT DATE: NORMAL
RAD ONC ARIA COURSE START DATE: NORMAL
RAD ONC ARIA COURSE TREATMENT ELAPSED DAYS: 17
RAD ONC ARIA FIRST TREATMENT DATE: NORMAL
RAD ONC ARIA PLAN FRACTIONS TREATED TO DATE: 14
RAD ONC ARIA PLAN ID: NORMAL
RAD ONC ARIA PLAN NAME: NORMAL
RAD ONC ARIA PLAN PRESCRIBED DOSE PER FRACTION: 2 GY
RAD ONC ARIA PLAN PRIMARY REFERENCE POINT: NORMAL
RAD ONC ARIA PLAN TOTAL FRACTIONS PRESCRIBED: 30
RAD ONC ARIA PLAN TOTAL PRESCRIBED DOSE: 6000 CGY
RAD ONC ARIA REFERENCE POINT DOSAGE GIVEN TO DATE: 28 GY
RAD ONC ARIA REFERENCE POINT ID: NORMAL
RAD ONC ARIA REFERENCE POINT SESSION DOSAGE GIVEN: 2 GY

## 2022-09-23 PROCEDURE — 77386: CPT | Performed by: RADIOLOGY

## 2022-09-26 ENCOUNTER — DOCUMENTATION (OUTPATIENT)
Dept: RADIATION ONCOLOGY | Facility: HOSPITAL | Age: 62
End: 2022-09-26

## 2022-09-26 ENCOUNTER — HOSPITAL ENCOUNTER (OUTPATIENT)
Dept: RADIATION ONCOLOGY | Facility: HOSPITAL | Age: 62
Discharge: HOME | End: 2022-09-26

## 2022-09-26 LAB
RAD ONC ARIA COURSE ID: NORMAL
RAD ONC ARIA COURSE INTENT: NORMAL
RAD ONC ARIA COURSE LAST TREATMENT DATE: NORMAL
RAD ONC ARIA COURSE START DATE: NORMAL
RAD ONC ARIA COURSE TREATMENT ELAPSED DAYS: 20
RAD ONC ARIA FIRST TREATMENT DATE: NORMAL
RAD ONC ARIA PLAN FRACTIONS TREATED TO DATE: 15
RAD ONC ARIA PLAN ID: NORMAL
RAD ONC ARIA PLAN NAME: NORMAL
RAD ONC ARIA PLAN PRESCRIBED DOSE PER FRACTION: 2 GY
RAD ONC ARIA PLAN PRIMARY REFERENCE POINT: NORMAL
RAD ONC ARIA PLAN TOTAL FRACTIONS PRESCRIBED: 30
RAD ONC ARIA PLAN TOTAL PRESCRIBED DOSE: 6000 CGY
RAD ONC ARIA REFERENCE POINT DOSAGE GIVEN TO DATE: 30 GY
RAD ONC ARIA REFERENCE POINT ID: NORMAL
RAD ONC ARIA REFERENCE POINT SESSION DOSAGE GIVEN: 2 GY

## 2022-09-26 PROCEDURE — 77386: CPT | Performed by: RADIOLOGY

## 2022-09-26 PROCEDURE — 77336 RADIATION PHYSICS CONSULT: CPT | Performed by: RADIOLOGY

## 2022-09-26 PROCEDURE — 77014 CHG CT GUIDANCE RADIATION THERAPY FLDS PLACEMENT: CPT | Performed by: RADIOLOGY

## 2022-09-26 NOTE — PROGRESS NOTES
Diagnosis: Malignant neoplasm of parotid gland     Reason for Referral: [facility] travel reimbursement     Content of Visit: OSW met with pt in rad onc during tx this morning to provide pt with a letter confirming his attended radiation treatments this past week from 9/19-9/23 for [facility] travel reimbursement. Pt reports experiencing alterations to his taste and plans to speak with radiation oncologist and dietitian about these symptoms later this week. Pt reports he is scheduled to see SLP this week as well. Pt informed [facility] that the [facility] is [facility] vanilla Ensure supplements. Pt tried mixing this with peanut butter, but could not taste such. Pt plans to mix with strawberries next. No other needs identified at this time. Encouraged OSW support remains available. Pt expressed gratitude.     Resources/Referrals Provided: Letter for [facility] travel reimbursement

## 2022-09-27 ENCOUNTER — HOSPITAL ENCOUNTER (OUTPATIENT)
Dept: RADIATION ONCOLOGY | Facility: HOSPITAL | Age: 62
Discharge: HOME | End: 2022-09-27

## 2022-09-27 VITALS
DIASTOLIC BLOOD PRESSURE: 82 MMHG | OXYGEN SATURATION: 100 % | BODY MASS INDEX: 36.36 KG/M2 | WEIGHT: 232.14 LBS | HEART RATE: 64 BPM | SYSTOLIC BLOOD PRESSURE: 160 MMHG | RESPIRATION RATE: 18 BRPM | TEMPERATURE: 97.4 F

## 2022-09-27 DIAGNOSIS — C07 MALIGNANT NEOPLASM OF PAROTID GLAND: Primary | ICD-10-CM

## 2022-09-27 LAB
RAD ONC ARIA COURSE ID: NORMAL
RAD ONC ARIA COURSE INTENT: NORMAL
RAD ONC ARIA COURSE LAST TREATMENT DATE: NORMAL
RAD ONC ARIA COURSE START DATE: NORMAL
RAD ONC ARIA COURSE TREATMENT ELAPSED DAYS: 21
RAD ONC ARIA FIRST TREATMENT DATE: NORMAL
RAD ONC ARIA PLAN FRACTIONS TREATED TO DATE: 16
RAD ONC ARIA PLAN ID: NORMAL
RAD ONC ARIA PLAN NAME: NORMAL
RAD ONC ARIA PLAN PRESCRIBED DOSE PER FRACTION: 2 GY
RAD ONC ARIA PLAN PRIMARY REFERENCE POINT: NORMAL
RAD ONC ARIA PLAN TOTAL FRACTIONS PRESCRIBED: 30
RAD ONC ARIA PLAN TOTAL PRESCRIBED DOSE: 6000 CGY
RAD ONC ARIA REFERENCE POINT DOSAGE GIVEN TO DATE: 32 GY
RAD ONC ARIA REFERENCE POINT ID: NORMAL
RAD ONC ARIA REFERENCE POINT SESSION DOSAGE GIVEN: 2 GY

## 2022-09-27 PROCEDURE — 77014 CHG CT GUIDANCE RADIATION THERAPY FLDS PLACEMENT: CPT | Performed by: RADIOLOGY

## 2022-09-27 PROCEDURE — 77386: CPT | Performed by: RADIOLOGY

## 2022-09-27 PROCEDURE — 77427 RADIATION TX MANAGEMENT X5: CPT | Performed by: RADIOLOGY

## 2022-09-27 NOTE — PROGRESS NOTES
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       was seen today for an on treatment visit.  He is receiving radiation therapy to the left parotid. He  has received 3200 cGy in 16 fractions out of a planned dose of 6000 cGy in 30 fractions.     Today he reports dry mouth, poor taste, skin irritation and irritation.  Picking at skin resulting in bleeding.  Difficulty sleeping                                      Review of Systems:   Review of Systems   Constitutional: Positive for appetite change (decreased) and fatigue (8/10).   HENT: Positive for ear pain (sore in left ear), sore throat (scratchy), trouble swallowing (with dry foods) and voice change (intermittent).         Dysgeusia, xerostomia    Respiratory: Positive for cough (productive with green secretions-started 4-5 days ago) and shortness of breath (COPD).    Gastrointestinal: Positive for diarrhea (ongoing for the last week-very dark , foul smell, frequent). Negative for nausea.   Genitourinary: Positive for frequency. Negative for dysuria and urgency.   Neurological: Positive for dizziness and weakness (generalized). Negative for headaches.   Psychiatric/Behavioral: Positive for sleep disturbance (wakes throughout the night ).       Vitals:     Vitals:    09/27/22 0825   BP: 160/82   Pulse:    Resp:    Temp:    SpO2:        Weight:   Wt Readings from Last 3 Encounters:   09/27/22 105 kg (232 lb 2.3 oz)   09/22/22 104 kg (230 lb 6.1 oz)   09/20/22 106 kg (233 lb 0.4 oz)      Pain:    Pain Score    09/27/22 0821   PainSc:   6         Physical Exam:  Gen: WD/WN; NAD  HEENT: MMM  Trachea: midline  Chest: symmetric  Resp: normal respiratory effort  Extr: warm, well-perfused  Neuro: awake  and alert; no aphasia or neglect    Plan: I have reviewed treatment setup notes, checked and approved the daily guidance images.  I reviewed dose delivery, treatment parameters and deemed them appropriate. We plan to continue radiation therapy as prescribed.    Advised skin care; advised Benadryl for insomnia--may prescribe sleep aid if insomnia is refractory to diphenhydramine      Radiation Oncology    Electronically signed by Reji Fall MD 9/27/2022  09:16 EDT

## 2022-09-28 ENCOUNTER — HOSPITAL ENCOUNTER (OUTPATIENT)
Dept: RADIATION ONCOLOGY | Facility: HOSPITAL | Age: 62
Discharge: HOME | End: 2022-09-28

## 2022-09-28 LAB
RAD ONC ARIA COURSE ID: NORMAL
RAD ONC ARIA COURSE INTENT: NORMAL
RAD ONC ARIA COURSE LAST TREATMENT DATE: NORMAL
RAD ONC ARIA COURSE START DATE: NORMAL
RAD ONC ARIA COURSE TREATMENT ELAPSED DAYS: 22
RAD ONC ARIA FIRST TREATMENT DATE: NORMAL
RAD ONC ARIA PLAN FRACTIONS TREATED TO DATE: 17
RAD ONC ARIA PLAN ID: NORMAL
RAD ONC ARIA PLAN NAME: NORMAL
RAD ONC ARIA PLAN PRESCRIBED DOSE PER FRACTION: 2 GY
RAD ONC ARIA PLAN PRIMARY REFERENCE POINT: NORMAL
RAD ONC ARIA PLAN TOTAL FRACTIONS PRESCRIBED: 30
RAD ONC ARIA PLAN TOTAL PRESCRIBED DOSE: 6000 CGY
RAD ONC ARIA REFERENCE POINT DOSAGE GIVEN TO DATE: 34 GY
RAD ONC ARIA REFERENCE POINT ID: NORMAL
RAD ONC ARIA REFERENCE POINT SESSION DOSAGE GIVEN: 2 GY

## 2022-09-28 PROCEDURE — 87505 NFCT AGENT DETECTION GI: CPT | Performed by: RADIOLOGY

## 2022-09-28 PROCEDURE — 77014 CHG CT GUIDANCE RADIATION THERAPY FLDS PLACEMENT: CPT | Performed by: RADIOLOGY

## 2022-09-28 PROCEDURE — 77386: CPT | Performed by: RADIOLOGY

## 2022-09-29 ENCOUNTER — HOSPITAL ENCOUNTER (OUTPATIENT)
Dept: RADIATION ONCOLOGY | Facility: HOSPITAL | Age: 62
Discharge: HOME | End: 2022-09-29

## 2022-09-29 ENCOUNTER — HOSPITAL ENCOUNTER (OUTPATIENT)
Dept: RADIATION ONCOLOGY | Facility: HOSPITAL | Age: 62
Setting detail: RADIATION/ONCOLOGY SERIES
Discharge: HOME | End: 2022-09-29

## 2022-09-29 VITALS — WEIGHT: 232.28 LBS | BODY MASS INDEX: 36.38 KG/M2

## 2022-09-29 DIAGNOSIS — R13.12 OROPHARYNGEAL DYSPHAGIA: Primary | ICD-10-CM

## 2022-09-29 LAB
C COLI+JEJ+UPSA DNA STL QL NAA+NON-PROBE: NOT DETECTED
EC STX1+STX2 GENES STL QL NAA+NON-PROBE: NOT DETECTED
RAD ONC ARIA COURSE ID: NORMAL
RAD ONC ARIA COURSE INTENT: NORMAL
RAD ONC ARIA COURSE LAST TREATMENT DATE: NORMAL
RAD ONC ARIA COURSE START DATE: NORMAL
RAD ONC ARIA COURSE TREATMENT ELAPSED DAYS: 23
RAD ONC ARIA FIRST TREATMENT DATE: NORMAL
RAD ONC ARIA PLAN FRACTIONS TREATED TO DATE: 18
RAD ONC ARIA PLAN ID: NORMAL
RAD ONC ARIA PLAN NAME: NORMAL
RAD ONC ARIA PLAN PRESCRIBED DOSE PER FRACTION: 2 GY
RAD ONC ARIA PLAN PRIMARY REFERENCE POINT: NORMAL
RAD ONC ARIA PLAN TOTAL FRACTIONS PRESCRIBED: 30
RAD ONC ARIA PLAN TOTAL PRESCRIBED DOSE: 6000 CGY
RAD ONC ARIA REFERENCE POINT DOSAGE GIVEN TO DATE: 36 GY
RAD ONC ARIA REFERENCE POINT ID: NORMAL
RAD ONC ARIA REFERENCE POINT SESSION DOSAGE GIVEN: 2 GY
S ENT+BONG DNA STL QL NAA+NON-PROBE: DETECTED
SHIGELLA SP+EIEC IPAH ST NAA+NON-PROBE: NOT DETECTED

## 2022-09-29 PROCEDURE — 77014 CHG CT GUIDANCE RADIATION THERAPY FLDS PLACEMENT: CPT | Performed by: RADIOLOGY

## 2022-09-29 PROCEDURE — 92610 EVALUATE SWALLOWING FUNCTION: CPT | Performed by: SPEECH-LANGUAGE PATHOLOGIST

## 2022-09-29 PROCEDURE — 77386: CPT | Performed by: RADIOLOGY

## 2022-09-29 NOTE — THERAPY RE-EVALUATION
Outpatient Speech Language Pathology   Adult Swallow Initial Evaluation   Sadie     Patient Name: Kevin Ordonez                    : 1960                      MRN: [id number]  Today's Date: 2022                             Visit Date: 2022   on     History  Intake  Date of Service: 22  Physician: Reji Fall MD  Next Physician Visit: ongoing  Diagnosis: Adenoid cystic carcinoma of parotid gland  Treatment Diagnosis: oropharyngeal dysphagia  Hx of Hospitalization no  Previous Function: swallow within functional limits prior to radiation therapy  Previous Therapy Services: no  Current Home Health Admission: no  Visit number: 1  HPI  Onset Date: ongoing  Age: 62  Gender: male  History of diagnosis:  Mr. 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 López.  Patient has some hearing changes in his left ear.  Patient is here today at the midpoint of radiation and chemotherapy to receive a swallow evaluation to determine safety.  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      Patient's main complaints are  metallic taste when eating causing him to refuse many liquids and solids and xerostomia making dry solids difficult to swallow. Patient educated on using plastic spoons and glass/ceramic cooking bowls and pans. Additionally, dietician gave patient a sample of a drop to place in liquids and solids to reduce there metallic taste.  I also educated the wife on topics mentioned. Patient indicates he is completing prophylactic HEP exercises at home.     Precautions: [facility]  Patient's Goals/Expectations: Determine safety of swallow.     Current Diet Level: regular solids and thin  liquids     Comments: Patient has a history of smoking for 25 years and quit in 2000.  He denies use of alcohol.  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      Patient has received UES dialation status post 15 years.          Psychosocial History     Usual Living Arrangement: live with his wife in [place], [place] on a farm.  Psychosocial History (depression/anxiety) no  Nutritional Problems: Patient complains of difficulty eating solids and liquids secondary to taste.  He is working with dietician to maintain weight at highest levels.      Past Medical History     Pertinent Past Medical History:        Past Medical History:   Diagnosis Date   • Cancer of parotid gland (HCC)     • Diabetes mellitus (HCC)     • Hyperlipidemia     • Hypertension                 History of Seizures: no        Abuse     Patient being Hurt, Hit, Scared or Neglected?  no  Caregiver Neglect: no        Pain  Pain Intensity: 0  Pain Location: n/a  Pain Scale Used: Numerical  Pain Management Techniques: n/a     Orientation     Level of Consciousness: alert and oriented times 3                       CLINICAL SWALLOW EVALUATION     Objective     Current Diet Level: soft solids and thin liquids with use of a liquid rinse secondary to xerostomia.     Oral Motor Exam: Patient has dentures in today for evaluation.  Left buccal cavity has decreased sensation.    Soft Palate: WFL  Laryngeal Function and Elevation: WFL  Vocal Quality: clear  Swallow Reflex: intact and timely  Positioning: upright, 90 degree hipflexion for all p.o. trials        P.O. Trials   Patient was clinically assessed for dysphagia with the following consistencies and results:   Patient was trialed with consistencies of nectar thickened apple juice, thin liquid of water, purée of applesauce, soft solid of soft oatmeal cookie.       Nectar thick by cup: WFL    Thin Liquid by cup: WFL  Thin liquid by straw: WFL  Puree solid: WFL  Soft Solid: increased mastication time,  multiple swallows to clear oral cavity, laryngeal elevation to palpation, liquid wash to clear through the pharyngeal cavity.     Additional Trials: No additional trials  Oral Preparatory Phase: WFL  Oral Phase: impaired secondary to xerostomia.   Pharyngeal Phase: WFL  Laryngeal Elevation/Coordination: WFL     Comments: Patient demonstrated no overt clinical signs and symptoms of aspiration.  He demonstrates mild oropharyngeal dysphagia secondary to xerostomia and dysgeusia.     Dysphagia Criteria     Patient Demonstrates: Risk of aspiration secondary to radiation to the head and neck.     Swallow Function: Patient demonstrated no overt, clinical signs and symptoms of aspiration.        Recommendations     Diet:   Downgrade diet to moist, soft easy masticated solids   Position:  Upright 90 degree hip flexion for all p.o. intake, upright 30 minutes after p.o. intake     Environment:  Quiet environment with ample time to feed.     Compensatory Techniques:  Alternate small bites of solids with small sips of liquids as needed, use of gravies to address xerostomia as needed, effortful swallows, continue HEP     Medical Intervention:  Follow-up swallow evaluation recommended at two weeks status post radiation.     ST Functional Communication Testing    Patient is rated on the Functional Communication Measures (FCM) for swallow at a level 6/7 with a 1-19% limitation.  With swallow therapy, patient is expected to maintain a Functional Communication Measure level of 7/7 with a 0% swallow limitation or highest levels of functioning during and after radiation therapy.     Thank you for the referral,   Elina Dickson MS, CCC-SLP     ST SIGNATURE: Elina Dickson, SLP

## 2022-09-29 NOTE — PROGRESS NOTES
"Reason for Consult:  Radiation tx f/u    9/29/22 Wt:  105.36 kg, indicating 1.2 kg gain in the past week.      Nutrition-related concerns:  Taste alterations, dysphagia (with dry, crumbly foods), xerostomia    Consult/Discussion:  Pt reports taste alterations are becoming worse- foods taste like \"copper\", metallic.  He reports he is trying to eat well, but having difficulty w/ dry/crumbly foods.  Xerostomia is becoming worse.  Suggested soft/moist foods- avoiding dry, crusty, crumbly foods.  [facility] to receive Glucerna shakes, enough for pt to consume TID (helps to provide 25% of pt's kcal & 25% protein needs).  Pt has been using Listerine- not using homemade mouth rinse.      Recommendations:  Encouraged to continue Glucerna shakes TID- provided ideas for mixing in additional kcals, protein since Glucerna shakes do not provide a large amount of kcals/protein per 8 oz.  Provided pt with Metaqil sample to trial (helps w/ metallic taste).  Encouraged pt to avoid using Listerine, but to use the homemade mouth rinse instead (recipe provided again).    Nutrition Dx:  Increased nutrient needs related to physiological causes increasing nutrient needs as evidenced by hypermetabolic state (L parotid Ca; receiving XRT to head/neck region).    Oncology RD will continue to monitor per protocol.  "

## 2022-09-30 ENCOUNTER — HOSPITAL ENCOUNTER (OUTPATIENT)
Dept: RADIATION ONCOLOGY | Facility: HOSPITAL | Age: 62
Discharge: HOME | End: 2022-09-30

## 2022-09-30 ENCOUNTER — TELEPHONE (OUTPATIENT)
Dept: RADIATION ONCOLOGY | Facility: HOSPITAL | Age: 62
End: 2022-09-30

## 2022-09-30 VITALS — BODY MASS INDEX: 36.36 KG/M2 | WEIGHT: 232.14 LBS

## 2022-09-30 LAB
RAD ONC ARIA COURSE ID: NORMAL
RAD ONC ARIA COURSE INTENT: NORMAL
RAD ONC ARIA COURSE LAST TREATMENT DATE: NORMAL
RAD ONC ARIA COURSE START DATE: NORMAL
RAD ONC ARIA COURSE TREATMENT ELAPSED DAYS: 24
RAD ONC ARIA FIRST TREATMENT DATE: NORMAL
RAD ONC ARIA PLAN FRACTIONS TREATED TO DATE: 19
RAD ONC ARIA PLAN ID: NORMAL
RAD ONC ARIA PLAN NAME: NORMAL
RAD ONC ARIA PLAN PRESCRIBED DOSE PER FRACTION: 2 GY
RAD ONC ARIA PLAN PRIMARY REFERENCE POINT: NORMAL
RAD ONC ARIA PLAN TOTAL FRACTIONS PRESCRIBED: 30
RAD ONC ARIA PLAN TOTAL PRESCRIBED DOSE: 6000 CGY
RAD ONC ARIA REFERENCE POINT DOSAGE GIVEN TO DATE: 38 GY
RAD ONC ARIA REFERENCE POINT ID: NORMAL
RAD ONC ARIA REFERENCE POINT SESSION DOSAGE GIVEN: 2 GY

## 2022-09-30 PROCEDURE — 77014 CHG CT GUIDANCE RADIATION THERAPY FLDS PLACEMENT: CPT | Performed by: RADIOLOGY

## 2022-09-30 PROCEDURE — 77386: CPT | Performed by: RADIOLOGY

## 2022-09-30 RX ORDER — LEVOFLOXACIN 500 MG/1
500 TABLET, FILM COATED ORAL DAILY
Qty: 10 TABLET | Refills: 0 | Status: SHIPPED | OUTPATIENT
Start: 2022-09-30 | End: 2022-10-03

## 2022-09-30 NOTE — TELEPHONE ENCOUNTER
Spoke with patient regarding his abnormal enteric bacteria panel. New orders for Levaquin. Patient counseled on interaction between Levaquin and Glipizide. Patient states he will call PCP regarding stopping the Glipizide for the next 10 days.Patient instructed if fevers develop to see Urgent care or ED for further evaluation. Patient states understanding.

## 2022-10-03 ENCOUNTER — HOSPITAL ENCOUNTER (OUTPATIENT)
Dept: RADIATION ONCOLOGY | Facility: HOSPITAL | Age: 62
Discharge: HOME | End: 2022-10-03

## 2022-10-03 ENCOUNTER — HOSPITAL ENCOUNTER (OUTPATIENT)
Dept: RADIATION ONCOLOGY | Facility: HOSPITAL | Age: 62
Setting detail: RADIATION/ONCOLOGY SERIES
End: 2022-10-03

## 2022-10-03 ENCOUNTER — DOCUMENTATION (OUTPATIENT)
Dept: RADIATION ONCOLOGY | Facility: HOSPITAL | Age: 62
End: 2022-10-03

## 2022-10-03 LAB
RAD ONC ARIA COURSE ID: NORMAL
RAD ONC ARIA COURSE INTENT: NORMAL
RAD ONC ARIA COURSE LAST TREATMENT DATE: NORMAL
RAD ONC ARIA COURSE START DATE: NORMAL
RAD ONC ARIA COURSE TREATMENT ELAPSED DAYS: 27
RAD ONC ARIA FIRST TREATMENT DATE: NORMAL
RAD ONC ARIA PLAN FRACTIONS TREATED TO DATE: 20
RAD ONC ARIA PLAN ID: NORMAL
RAD ONC ARIA PLAN NAME: NORMAL
RAD ONC ARIA PLAN PRESCRIBED DOSE PER FRACTION: 2 GY
RAD ONC ARIA PLAN PRIMARY REFERENCE POINT: NORMAL
RAD ONC ARIA PLAN TOTAL FRACTIONS PRESCRIBED: 30
RAD ONC ARIA PLAN TOTAL PRESCRIBED DOSE: 6000 CGY
RAD ONC ARIA REFERENCE POINT DOSAGE GIVEN TO DATE: 40 GY
RAD ONC ARIA REFERENCE POINT ID: NORMAL
RAD ONC ARIA REFERENCE POINT SESSION DOSAGE GIVEN: 2 GY

## 2022-10-03 PROCEDURE — 77014 CHG CT GUIDANCE RADIATION THERAPY FLDS PLACEMENT: CPT | Performed by: RADIOLOGY

## 2022-10-03 PROCEDURE — 77386: CPT | Performed by: RADIOLOGY

## 2022-10-03 PROCEDURE — 77336 RADIATION PHYSICS CONSULT: CPT | Performed by: RADIOLOGY

## 2022-10-03 RX ORDER — LEVOFLOXACIN 500 MG/1
500 TABLET, FILM COATED ORAL DAILY
Qty: 10 TABLET | Refills: 0 | Status: SHIPPED | OUTPATIENT
Start: 2022-10-03 | End: 2022-10-13

## 2022-10-03 NOTE — PROGRESS NOTES
Diagnosis: Malignant neoplasm of parotid gland     Reason for Referral: [facility] travel reimbursement     Content of Visit: OSWs met with pt in rad onc during tx this morning to provide pt with a letter confirming his attended radiation treatments this past week from 9/26-9/30 for [facility] travel reimbursement. Pt reports mixing strawberries and banana with the Vanilla Ensure supplements and being pleased with this taste. Pt reports he has contacted salmonella and was prescribed Levaquin, however, OOP expense at retail pharmacy is $50. Pt inquiring if he can obtain this prescription through [facility], which would be free of cost. [facility]fered to contact [facility], however, also informed pt he may obtain this prescription from retail pharmacy for $10 through [employer]. Pt respectfully declined this option. [facility] and confirmed our provider can send prescription to their pharmacy to be filled due to pt being approved on the community care program. OSW relayed this to rad onc RN to help facilitating. Encouraged OSW support remains available. Pt expressed gratitude.     Resources/Referrals Provided: Letter for [facility] travel reimbursement; care coordination - medication assistance

## 2022-10-04 ENCOUNTER — HOSPITAL ENCOUNTER (OUTPATIENT)
Dept: RADIATION ONCOLOGY | Facility: HOSPITAL | Age: 62
Discharge: HOME | End: 2022-10-04

## 2022-10-04 VITALS
RESPIRATION RATE: 16 BRPM | HEART RATE: 70 BPM | WEIGHT: 227.51 LBS | DIASTOLIC BLOOD PRESSURE: 71 MMHG | SYSTOLIC BLOOD PRESSURE: 158 MMHG | OXYGEN SATURATION: 97 % | BODY MASS INDEX: 35.63 KG/M2 | TEMPERATURE: 97.4 F

## 2022-10-04 DIAGNOSIS — F51.01 PRIMARY INSOMNIA: Primary | ICD-10-CM

## 2022-10-04 DIAGNOSIS — C07 MALIGNANT NEOPLASM OF PAROTID GLAND: ICD-10-CM

## 2022-10-04 LAB
RAD ONC ARIA COURSE ID: NORMAL
RAD ONC ARIA COURSE INTENT: NORMAL
RAD ONC ARIA COURSE LAST TREATMENT DATE: NORMAL
RAD ONC ARIA COURSE START DATE: NORMAL
RAD ONC ARIA COURSE TREATMENT ELAPSED DAYS: 28
RAD ONC ARIA FIRST TREATMENT DATE: NORMAL
RAD ONC ARIA PLAN FRACTIONS TREATED TO DATE: 21
RAD ONC ARIA PLAN ID: NORMAL
RAD ONC ARIA PLAN NAME: NORMAL
RAD ONC ARIA PLAN PRESCRIBED DOSE PER FRACTION: 2 GY
RAD ONC ARIA PLAN PRIMARY REFERENCE POINT: NORMAL
RAD ONC ARIA PLAN TOTAL FRACTIONS PRESCRIBED: 30
RAD ONC ARIA PLAN TOTAL PRESCRIBED DOSE: 6000 CGY
RAD ONC ARIA REFERENCE POINT DOSAGE GIVEN TO DATE: 42 GY
RAD ONC ARIA REFERENCE POINT ID: NORMAL
RAD ONC ARIA REFERENCE POINT SESSION DOSAGE GIVEN: 2 GY

## 2022-10-04 PROCEDURE — 77014 CHG CT GUIDANCE RADIATION THERAPY FLDS PLACEMENT: CPT | Performed by: RADIOLOGY

## 2022-10-04 PROCEDURE — 77427 RADIATION TX MANAGEMENT X5: CPT | Performed by: RADIOLOGY

## 2022-10-04 PROCEDURE — 77386: CPT | Performed by: RADIOLOGY

## 2022-10-04 RX ORDER — CLONAZEPAM 0.5 MG/1
0.5 TABLET ORAL NIGHTLY PRN
Qty: 15 TABLET | Refills: 0 | OUTPATIENT
Start: 2022-10-04

## 2022-10-04 NOTE — PROGRESS NOTES
"On Treatment Visit       Patient: Kevin Ordonez   YOB: 1960   Medical Record Number: [id number]     Date of Visit  October 4, 2022   Primary Diagnosis: primary malignant neoplasm of left parotid  Cancer Staging: Cancer Staging  No matching staging information was found for the patient.       was seen today for an on treatment visit.  He is receiving radiation therapy to the left parotid. He  has received 4200 cGy in 21 fractions out of a planned dose of 6000 cGy in 30 fractions.     Insomnia not responsive to diphenhydramine.  Improvement in loose stools with Levaquin                                      Review of Systems:   Review of Systems   Constitutional: Positive for appetite change (Decreased, worse.  Typically drinking 3 glucerna/day), fatigue (7/10) and unexpected weight change (5lb weight loss since last week).   HENT: Positive for ear pain (Sorness, buring of left ear.), trouble swallowing (with dry foods) and voice change (intermittent). Negative for sore throat.         Dysgeusia, xerostomia-improved some       Respiratory: Positive for cough (productive with green secretions  ) and shortness of breath (COPD).    Gastrointestinal: Positive for diarrhea (Taking levaquin and has improved.) and nausea (Occasional, subsided on its own). Negative for constipation, rectal pain and vomiting.   Genitourinary: Positive for frequency (Ongoing) and urgency (Ongoing). Negative for difficulty urinating and dysuria.   Musculoskeletal: Positive for back pain (Lower back, ongoing). Negative for arthralgias.   Skin: Positive for color change (Mild to moderate erythema).   Neurological: Positive for dizziness (Has episodes of going \"snowblind\" this has been ongoing for 6-7 months), weakness (generalized) and headaches (Last night behind left eye, subsided on own).        States he has difficulty staying focused.   Psychiatric/Behavioral: Positive for sleep disturbance (wakes throughout the night " ).       Vitals:     Vitals:    10/04/22 0824   BP: 158/71   Pulse: 70   Resp: 16   Temp: 97.4 °F (36.3 °C)   SpO2: 97%       Weight:   Wt Readings from Last 3 Encounters:   10/04/22 103 kg (227 lb 8.2 oz)   09/30/22 105 kg (232 lb 2.3 oz)   09/29/22 105 kg (232 lb 4.4 oz)      Pain:    Pain Score    10/04/22 0824   PainSc:   2         Physical Exam:  Gen: WD/WN; NAD  HEENT: MMM; epilation of hair at left face; moist mucous membranes  Trachea: midline  Chest: symmetric  Resp: normal respiratory effort  Extr: warm, well-perfused  Neuro: awake and alert; no aphasia or neglect    Plan: I have reviewed treatment setup notes, checked and approved the daily guidance images.  I reviewed dose delivery, treatment parameters and deemed them appropriate. We plan to continue radiation therapy as prescribed.    Prescribed clonazepam 0.5mg QHS--written for [facility]      Radiation Oncology    Electronically signed by Reji Fall MD 10/4/2022  09:18 EDT

## 2022-10-05 ENCOUNTER — HOSPITAL ENCOUNTER (OUTPATIENT)
Dept: RADIATION ONCOLOGY | Facility: HOSPITAL | Age: 62
Discharge: HOME | End: 2022-10-05

## 2022-10-05 LAB
RAD ONC ARIA COURSE ID: NORMAL
RAD ONC ARIA COURSE INTENT: NORMAL
RAD ONC ARIA COURSE LAST TREATMENT DATE: NORMAL
RAD ONC ARIA COURSE START DATE: NORMAL
RAD ONC ARIA COURSE TREATMENT ELAPSED DAYS: 29
RAD ONC ARIA FIRST TREATMENT DATE: NORMAL
RAD ONC ARIA PLAN FRACTIONS TREATED TO DATE: 22
RAD ONC ARIA PLAN ID: NORMAL
RAD ONC ARIA PLAN NAME: NORMAL
RAD ONC ARIA PLAN PRESCRIBED DOSE PER FRACTION: 2 GY
RAD ONC ARIA PLAN PRIMARY REFERENCE POINT: NORMAL
RAD ONC ARIA PLAN TOTAL FRACTIONS PRESCRIBED: 30
RAD ONC ARIA PLAN TOTAL PRESCRIBED DOSE: 6000 CGY
RAD ONC ARIA REFERENCE POINT DOSAGE GIVEN TO DATE: 44 GY
RAD ONC ARIA REFERENCE POINT ID: NORMAL
RAD ONC ARIA REFERENCE POINT SESSION DOSAGE GIVEN: 2 GY

## 2022-10-05 PROCEDURE — 77386: CPT | Performed by: RADIOLOGY

## 2022-10-05 PROCEDURE — 77014 CHG CT GUIDANCE RADIATION THERAPY FLDS PLACEMENT: CPT | Performed by: RADIOLOGY

## 2022-10-06 ENCOUNTER — HOSPITAL ENCOUNTER (OUTPATIENT)
Dept: RADIATION ONCOLOGY | Facility: HOSPITAL | Age: 62
Discharge: HOME | End: 2022-10-06

## 2022-10-06 LAB
RAD ONC ARIA COURSE ID: NORMAL
RAD ONC ARIA COURSE INTENT: NORMAL
RAD ONC ARIA COURSE LAST TREATMENT DATE: NORMAL
RAD ONC ARIA COURSE START DATE: NORMAL
RAD ONC ARIA COURSE TREATMENT ELAPSED DAYS: 30
RAD ONC ARIA FIRST TREATMENT DATE: NORMAL
RAD ONC ARIA PLAN FRACTIONS TREATED TO DATE: 23
RAD ONC ARIA PLAN ID: NORMAL
RAD ONC ARIA PLAN NAME: NORMAL
RAD ONC ARIA PLAN PRESCRIBED DOSE PER FRACTION: 2 GY
RAD ONC ARIA PLAN PRIMARY REFERENCE POINT: NORMAL
RAD ONC ARIA PLAN TOTAL FRACTIONS PRESCRIBED: 30
RAD ONC ARIA PLAN TOTAL PRESCRIBED DOSE: 6000 CGY
RAD ONC ARIA REFERENCE POINT DOSAGE GIVEN TO DATE: 46 GY
RAD ONC ARIA REFERENCE POINT ID: NORMAL
RAD ONC ARIA REFERENCE POINT SESSION DOSAGE GIVEN: 2 GY

## 2022-10-06 PROCEDURE — 77386: CPT | Performed by: RADIOLOGY

## 2022-10-06 PROCEDURE — 77014 CHG CT GUIDANCE RADIATION THERAPY FLDS PLACEMENT: CPT | Performed by: RADIOLOGY

## 2022-10-07 ENCOUNTER — HOSPITAL ENCOUNTER (OUTPATIENT)
Dept: RADIATION ONCOLOGY | Facility: HOSPITAL | Age: 62
Discharge: HOME | End: 2022-10-07

## 2022-10-07 VITALS — WEIGHT: 222.44 LBS | BODY MASS INDEX: 34.84 KG/M2

## 2022-10-07 LAB
RAD ONC ARIA COURSE ID: NORMAL
RAD ONC ARIA COURSE INTENT: NORMAL
RAD ONC ARIA COURSE LAST TREATMENT DATE: NORMAL
RAD ONC ARIA COURSE START DATE: NORMAL
RAD ONC ARIA COURSE TREATMENT ELAPSED DAYS: 31
RAD ONC ARIA FIRST TREATMENT DATE: NORMAL
RAD ONC ARIA PLAN FRACTIONS TREATED TO DATE: 24
RAD ONC ARIA PLAN ID: NORMAL
RAD ONC ARIA PLAN NAME: NORMAL
RAD ONC ARIA PLAN PRESCRIBED DOSE PER FRACTION: 2 GY
RAD ONC ARIA PLAN PRIMARY REFERENCE POINT: NORMAL
RAD ONC ARIA PLAN TOTAL FRACTIONS PRESCRIBED: 30
RAD ONC ARIA PLAN TOTAL PRESCRIBED DOSE: 6000 CGY
RAD ONC ARIA REFERENCE POINT DOSAGE GIVEN TO DATE: 48 GY
RAD ONC ARIA REFERENCE POINT ID: NORMAL
RAD ONC ARIA REFERENCE POINT SESSION DOSAGE GIVEN: 2 GY

## 2022-10-07 PROCEDURE — 77386: CPT | Performed by: RADIOLOGY

## 2022-10-07 PROCEDURE — 77014 CHG CT GUIDANCE RADIATION THERAPY FLDS PLACEMENT: CPT | Performed by: RADIOLOGY

## 2022-10-07 NOTE — PROGRESS NOTES
"Reason for Consult:  Radiation tx f/u     10/7/22 Wt:  100.9 kg, indicating weight loss of 4.2% X 1 week (weight loss of about 10lbs)       Nutrition-related concerns:  Taste alterations, Anorexia, dysphagia (with dry, crumbly foods), xerostomia, diarrhea (from abx)     Consult/Discussion:  Pt reports taste alterations are becoming worse- foods taste like \"copper\", metallic.  He reports he is trying to eat well, but having difficulty w/ dry/crumbly foods.  Xerostomia is becoming worse. Pt is using MetaQil and this is helping. He reports the effects wear off after about 5-6 bites. Suggested pt consume liquids after PO intake of foods and can rinse mouth one extra time in middle of the meal. Discouraged overuse of MetaQil, but did provide suggestions of use. Also suggested soft/moist foods- avoiding dry, crusty, crumbly foods and added moisture to all foods. Per discussion, pt was prescribed Levaquin for salmonella. He is taking this now and experiencing some diarrhea. Discussed with pt MNT for increasing kcal significantly to prevent further significant weight loss. He is currently drinking Glucerna Shakes BID mixed with bananas. Provided alternative ideas for increasing kcal and protein, including adding full fat greek yogurt to shakes, half and half, etc. Also provided pt with samples of Ensure Complete vanilla to consume independent of meals. Encouraged pt to avoid chocolate flavor of supplements during times of diarrhea and to consume foods with probiotics like yogurt. Pt verbalized understanding. Encouraged small / frequent meals as well.      Recommendations:  Encouraged Glucerna shakes TID- provided ideas for mixing in additional kcals, protein since Glucerna shakes do not provide a large amount of kcals/protein per 8 oz. Provided samples of Ensure complete as well. Discussed speaking with MD regarding using a mouth lubricant such as lubricity. Recommend pt add moisture / gravies to all foods as well. " Encouraged small / frequent meals to aid anorexia.     Consider appetite stimulant unless contraindicated given UWL of 10lbs / 4.2% X 1 week.      Nutrition Dx:  Increased nutrient needs related to physiological causes increasing nutrient needs as evidenced by hypermetabolic state (L parotid Ca; receiving XRT to head/neck region).     Oncology RD will continue to monitor per protocol.

## 2022-10-10 ENCOUNTER — HOSPITAL ENCOUNTER (OUTPATIENT)
Dept: RADIATION ONCOLOGY | Facility: HOSPITAL | Age: 62
Discharge: HOME | End: 2022-10-10

## 2022-10-10 ENCOUNTER — DOCUMENTATION (OUTPATIENT)
Dept: RADIATION ONCOLOGY | Facility: HOSPITAL | Age: 62
End: 2022-10-10

## 2022-10-10 VITALS — WEIGHT: 216.3 LBS | BODY MASS INDEX: 33.88 KG/M2

## 2022-10-10 DIAGNOSIS — C07 MALIGNANT NEOPLASM OF PAROTID GLAND: Primary | ICD-10-CM

## 2022-10-10 DIAGNOSIS — C07 MALIGNANT NEOPLASM OF PAROTID GLAND: ICD-10-CM

## 2022-10-10 LAB
RAD ONC ARIA COURSE ID: NORMAL
RAD ONC ARIA COURSE INTENT: NORMAL
RAD ONC ARIA COURSE LAST TREATMENT DATE: NORMAL
RAD ONC ARIA COURSE START DATE: NORMAL
RAD ONC ARIA COURSE TREATMENT ELAPSED DAYS: 34
RAD ONC ARIA FIRST TREATMENT DATE: NORMAL
RAD ONC ARIA PLAN FRACTIONS TREATED TO DATE: 25
RAD ONC ARIA PLAN ID: NORMAL
RAD ONC ARIA PLAN NAME: NORMAL
RAD ONC ARIA PLAN PRESCRIBED DOSE PER FRACTION: 2 GY
RAD ONC ARIA PLAN PRIMARY REFERENCE POINT: NORMAL
RAD ONC ARIA PLAN TOTAL FRACTIONS PRESCRIBED: 30
RAD ONC ARIA PLAN TOTAL PRESCRIBED DOSE: 6000 CGY
RAD ONC ARIA REFERENCE POINT DOSAGE GIVEN TO DATE: 50 GY
RAD ONC ARIA REFERENCE POINT ID: NORMAL
RAD ONC ARIA REFERENCE POINT SESSION DOSAGE GIVEN: 2 GY

## 2022-10-10 PROCEDURE — 77386: CPT | Performed by: RADIOLOGY

## 2022-10-10 PROCEDURE — 77336 RADIATION PHYSICS CONSULT: CPT | Performed by: RADIOLOGY

## 2022-10-10 PROCEDURE — 77014 CHG CT GUIDANCE RADIATION THERAPY FLDS PLACEMENT: CPT | Performed by: RADIOLOGY

## 2022-10-10 RX ORDER — MIRTAZAPINE 15 MG/1
15 TABLET, FILM COATED ORAL NIGHTLY
Qty: 30 TABLET | Refills: 0 | Status: SHIPPED | OUTPATIENT
Start: 2022-10-10 | End: 2022-10-10

## 2022-10-10 RX ORDER — MIRTAZAPINE 15 MG/1
15 TABLET, FILM COATED ORAL NIGHTLY
Qty: 30 TABLET | Refills: 0 | Status: SHIPPED | OUTPATIENT
Start: 2022-10-10 | End: 2022-10-31 | Stop reason: SDUPTHER

## 2022-10-10 NOTE — PROGRESS NOTES
Diagnosis: Malignant neoplasm of parotid gland     Reason for Referral: [facility] travel reimbursement     Content of Visit: OSW met with pt in rad onc during tx this morning to provide pt with a letter confirming his attended radiation treatments this past week from 10/3/22-10/7/22 for [facility] travel reimbursement. Provided pt with emotional support throughout conversation today. Pt voiced continued difficulties with eating, taste alterations, etc. As a result, this is impacting pt's mental health and ability to fully participate in his daughter's birthday dinner. Due to pt's physical debilitation and weakness, he feels he cannot provide for his family. Pt has 5 treatments to complete after this morning. Encouraged pt that these symptoms and side effects are anticipated to be temporary and as his body begins to recover after tx and nutritional intake increases, he can anticipate to become more active and present once again. Pt requested to weigh himself this morning. Rad onc RNs assisted pt on the scale and obtained pt's weight. Radiation oncologist prescribed pt Remeron this morning to aid with appetite, which may also aid with feelings of depression. Pt has an active prescription of Ativan to aid with anxiety. Pt was seen by oncology RD last week on 10/7 and will be seen again later this week. Encouraged OSW support remains available. Pt expressed gratitude. No SI/HI reported or indicated today.     Resources/Referrals Provided: Letter for [facility] travel reimbursement, emotional support

## 2022-10-11 ENCOUNTER — HOSPITAL ENCOUNTER (OUTPATIENT)
Dept: RADIATION ONCOLOGY | Facility: HOSPITAL | Age: 62
Discharge: HOME | End: 2022-10-11

## 2022-10-11 VITALS
SYSTOLIC BLOOD PRESSURE: 144 MMHG | TEMPERATURE: 97.4 F | BODY MASS INDEX: 34.01 KG/M2 | WEIGHT: 217.15 LBS | DIASTOLIC BLOOD PRESSURE: 56 MMHG | HEART RATE: 80 BPM | RESPIRATION RATE: 16 BRPM | OXYGEN SATURATION: 97 %

## 2022-10-11 DIAGNOSIS — C07 MALIGNANT NEOPLASM OF PAROTID GLAND: Primary | ICD-10-CM

## 2022-10-11 LAB
RAD ONC ARIA COURSE ID: NORMAL
RAD ONC ARIA COURSE INTENT: NORMAL
RAD ONC ARIA COURSE LAST TREATMENT DATE: NORMAL
RAD ONC ARIA COURSE START DATE: NORMAL
RAD ONC ARIA COURSE TREATMENT ELAPSED DAYS: 35
RAD ONC ARIA FIRST TREATMENT DATE: NORMAL
RAD ONC ARIA PLAN FRACTIONS TREATED TO DATE: 26
RAD ONC ARIA PLAN ID: NORMAL
RAD ONC ARIA PLAN NAME: NORMAL
RAD ONC ARIA PLAN PRESCRIBED DOSE PER FRACTION: 2 GY
RAD ONC ARIA PLAN PRIMARY REFERENCE POINT: NORMAL
RAD ONC ARIA PLAN TOTAL FRACTIONS PRESCRIBED: 30
RAD ONC ARIA PLAN TOTAL PRESCRIBED DOSE: 6000 CGY
RAD ONC ARIA REFERENCE POINT DOSAGE GIVEN TO DATE: 52 GY
RAD ONC ARIA REFERENCE POINT ID: NORMAL
RAD ONC ARIA REFERENCE POINT SESSION DOSAGE GIVEN: 2 GY

## 2022-10-11 PROCEDURE — 77427 RADIATION TX MANAGEMENT X5: CPT | Performed by: RADIOLOGY

## 2022-10-11 PROCEDURE — 77386: CPT | Performed by: RADIOLOGY

## 2022-10-11 PROCEDURE — 77014 CHG CT GUIDANCE RADIATION THERAPY FLDS PLACEMENT: CPT | Performed by: RADIOLOGY

## 2022-10-11 NOTE — PROGRESS NOTES
"On Treatment Visit       Patient: Kevin Ordonez   YOB: 1960   Medical Record Number: [id number]     Date of Visit  October 11, 2022   Primary Diagnosis: primary malignant neoplasm of left parotid  Cancer Staging: Cancer Staging  No matching staging information was found for the patient.       was seen today for an on treatment visit.  He is receiving radiation therapy to the left parotid. He  has received 5200 cGy in 26 fractions out of a planned dose of 6000 cGy in 30 fractions.     Eating better with Remeron. Questions regarding follow-up                                      Review of Systems:   Review of Systems   Constitutional: Positive for appetite change (Decreased, worse.  Typically drinking 3 glucerna/day), fatigue (7/10) and unexpected weight change (5lb weight loss since last week).   HENT: Positive for ear pain (Sorness, buring of left ear.) and voice change (intermittent). Negative for sore throat and trouble swallowing (with dry foods).         Dysgeusia, xerostomia-improved some       Respiratory: Positive for cough (productive with occasional green secretions  ). Negative for shortness of breath.    Gastrointestinal: Positive for nausea (Occasional, subsided on its own). Negative for constipation, diarrhea, rectal pain and vomiting.   Genitourinary: Positive for frequency (Ongoing) and urgency (Ongoing). Negative for difficulty urinating and dysuria.   Musculoskeletal: Positive for back pain (Lower back, ongoing). Negative for arthralgias.   Skin: Positive for color change (Mild to moderate erythema).   Neurological: Positive for weakness (generalized). Negative for dizziness (Has episodes of going \"snowblind\" this has been ongoing for 6-7 months) and headaches (Last night behind left eye, subsided on own).        States he has difficulty staying focused.   Psychiatric/Behavioral: Positive for sleep disturbance (wakes throughout the night - improving).       Vitals:   "   Vitals:    10/11/22 0825   BP: 144/56   Pulse: 80   Resp: 16   Temp: 97.4 °F (36.3 °C)   SpO2: 97%       Weight:   Wt Readings from Last 3 Encounters:   10/11/22 98.5 kg (217 lb 2.5 oz)   10/10/22 98.1 kg (216 lb 4.8 oz)   10/07/22 101 kg (222 lb 7.1 oz)      Pain:    Pain Score    10/11/22 0825   PainSc:   3         Physical Exam:  Gen: WD/WN; NAD  HEENT: MMM; epilation of hair at left face; moist mucous membranes  Trachea: midline  Chest: symmetric  Resp: normal respiratory effort  Extr: warm, well-perfused  Neuro: awake and alert; no aphasia or neglect    Plan: I have reviewed treatment setup notes, checked and approved the daily guidance images.  I reviewed dose delivery, treatment parameters and deemed them appropriate. We plan to continue radiation therapy as prescribed.          Radiation Oncology    Electronically signed by Reji Fall MD 10/11/2022  08:49 EDT

## 2022-10-12 ENCOUNTER — HOSPITAL ENCOUNTER (OUTPATIENT)
Dept: RADIATION ONCOLOGY | Facility: HOSPITAL | Age: 62
Discharge: HOME | End: 2022-10-12

## 2022-10-12 LAB
RAD ONC ARIA COURSE ID: NORMAL
RAD ONC ARIA COURSE INTENT: NORMAL
RAD ONC ARIA COURSE LAST TREATMENT DATE: NORMAL
RAD ONC ARIA COURSE START DATE: NORMAL
RAD ONC ARIA COURSE TREATMENT ELAPSED DAYS: 36
RAD ONC ARIA FIRST TREATMENT DATE: NORMAL
RAD ONC ARIA PLAN FRACTIONS TREATED TO DATE: 27
RAD ONC ARIA PLAN ID: NORMAL
RAD ONC ARIA PLAN NAME: NORMAL
RAD ONC ARIA PLAN PRESCRIBED DOSE PER FRACTION: 2 GY
RAD ONC ARIA PLAN PRIMARY REFERENCE POINT: NORMAL
RAD ONC ARIA PLAN TOTAL FRACTIONS PRESCRIBED: 30
RAD ONC ARIA PLAN TOTAL PRESCRIBED DOSE: 6000 CGY
RAD ONC ARIA REFERENCE POINT DOSAGE GIVEN TO DATE: 54 GY
RAD ONC ARIA REFERENCE POINT ID: NORMAL
RAD ONC ARIA REFERENCE POINT SESSION DOSAGE GIVEN: 2 GY

## 2022-10-12 PROCEDURE — 77014 CHG CT GUIDANCE RADIATION THERAPY FLDS PLACEMENT: CPT | Performed by: RADIOLOGY

## 2022-10-12 PROCEDURE — 77386: CPT | Performed by: RADIOLOGY

## 2022-10-13 ENCOUNTER — HOSPITAL ENCOUNTER (OUTPATIENT)
Dept: RADIATION ONCOLOGY | Facility: HOSPITAL | Age: 62
Discharge: HOME | End: 2022-10-13

## 2022-10-13 VITALS — WEIGHT: 222.88 LBS | BODY MASS INDEX: 34.91 KG/M2

## 2022-10-13 LAB
RAD ONC ARIA COURSE ID: NORMAL
RAD ONC ARIA COURSE INTENT: NORMAL
RAD ONC ARIA COURSE LAST TREATMENT DATE: NORMAL
RAD ONC ARIA COURSE START DATE: NORMAL
RAD ONC ARIA COURSE TREATMENT ELAPSED DAYS: 37
RAD ONC ARIA FIRST TREATMENT DATE: NORMAL
RAD ONC ARIA PLAN FRACTIONS TREATED TO DATE: 28
RAD ONC ARIA PLAN ID: NORMAL
RAD ONC ARIA PLAN NAME: NORMAL
RAD ONC ARIA PLAN PRESCRIBED DOSE PER FRACTION: 2 GY
RAD ONC ARIA PLAN PRIMARY REFERENCE POINT: NORMAL
RAD ONC ARIA PLAN TOTAL FRACTIONS PRESCRIBED: 30
RAD ONC ARIA PLAN TOTAL PRESCRIBED DOSE: 6000 CGY
RAD ONC ARIA REFERENCE POINT DOSAGE GIVEN TO DATE: 56 GY
RAD ONC ARIA REFERENCE POINT ID: NORMAL
RAD ONC ARIA REFERENCE POINT SESSION DOSAGE GIVEN: 2 GY

## 2022-10-13 PROCEDURE — 77386: CPT | Performed by: RADIOLOGY

## 2022-10-13 PROCEDURE — 77014 CHG CT GUIDANCE RADIATION THERAPY FLDS PLACEMENT: CPT | Performed by: RADIOLOGY

## 2022-10-13 NOTE — PROGRESS NOTES
Reason for Consult:  Radiation tx f/u    Today's wt:  101.1 kg    Wt change:  3 kg gain in the past 3 days (total .8 kg loss/difference as compared to wt from 1 week ago)    Nutrition-related concerns:  Earlier this week pt was experiencing decreased appetite, occasional nausea, xerostomia, taste alterations (cannot taste salt, metallic taste)    Current PO intake:  Soft/moist foods (high kcal/high protein)    Current Nutrition Supplement Intake:  Glucerna shake TID ([facility])    Consult/Discussion:  Pt was given Rx for Remeron earlier this week and now sleeping better, waking up hungry, and feeling better in general.  Pt ordered Metaqil mouth rinse for metallic taste- reports improvement.  He also ordered Lubricity spray for xerostomia- reports some improvement as well.  Experimenting w/ flavors/foods and has realized he can eat/chew better on his R side for some reason.      Recommendations:  If pt loses wt further and/or nutrition-impact symptoms return or worsen, provided pt with list of additives to mix in w/ his Glucerna shakes (several ideas given).      Nutrition Dx:  Unintentional wt loss related to decreased ability to consume adequate PO intake as evidenced by previous wt decline & hypermetabolic state (L parotid Ca; XRT to head/neck region).    Will f/u per oncology nutrition protocol.

## 2022-10-14 ENCOUNTER — HOSPITAL ENCOUNTER (OUTPATIENT)
Dept: RADIATION ONCOLOGY | Facility: HOSPITAL | Age: 62
Discharge: HOME | End: 2022-10-14

## 2022-10-14 VITALS — WEIGHT: 224.7 LBS | BODY MASS INDEX: 35.19 KG/M2

## 2022-10-14 LAB
RAD ONC ARIA COURSE ID: NORMAL
RAD ONC ARIA COURSE INTENT: NORMAL
RAD ONC ARIA COURSE LAST TREATMENT DATE: NORMAL
RAD ONC ARIA COURSE START DATE: NORMAL
RAD ONC ARIA COURSE TREATMENT ELAPSED DAYS: 38
RAD ONC ARIA FIRST TREATMENT DATE: NORMAL
RAD ONC ARIA PLAN FRACTIONS TREATED TO DATE: 29
RAD ONC ARIA PLAN ID: NORMAL
RAD ONC ARIA PLAN NAME: NORMAL
RAD ONC ARIA PLAN PRESCRIBED DOSE PER FRACTION: 2 GY
RAD ONC ARIA PLAN PRIMARY REFERENCE POINT: NORMAL
RAD ONC ARIA PLAN TOTAL FRACTIONS PRESCRIBED: 30
RAD ONC ARIA PLAN TOTAL PRESCRIBED DOSE: 6000 CGY
RAD ONC ARIA REFERENCE POINT DOSAGE GIVEN TO DATE: 58 GY
RAD ONC ARIA REFERENCE POINT ID: NORMAL
RAD ONC ARIA REFERENCE POINT SESSION DOSAGE GIVEN: 2 GY

## 2022-10-14 PROCEDURE — 77386: CPT | Performed by: RADIOLOGY

## 2022-10-14 PROCEDURE — 77014 CHG CT GUIDANCE RADIATION THERAPY FLDS PLACEMENT: CPT | Performed by: RADIOLOGY

## 2022-10-17 ENCOUNTER — HOSPITAL ENCOUNTER (OUTPATIENT)
Dept: RADIATION ONCOLOGY | Facility: HOSPITAL | Age: 62
Discharge: HOME | End: 2022-10-17

## 2022-10-17 ENCOUNTER — DOCUMENTATION (OUTPATIENT)
Dept: RADIATION ONCOLOGY | Facility: HOSPITAL | Age: 62
End: 2022-10-17

## 2022-10-17 LAB
RAD ONC ARIA COURSE ID: NORMAL
RAD ONC ARIA COURSE INTENT: NORMAL
RAD ONC ARIA COURSE LAST TREATMENT DATE: NORMAL
RAD ONC ARIA COURSE START DATE: NORMAL
RAD ONC ARIA COURSE TREATMENT ELAPSED DAYS: 41
RAD ONC ARIA FIRST TREATMENT DATE: NORMAL
RAD ONC ARIA PLAN FRACTIONS TREATED TO DATE: 30
RAD ONC ARIA PLAN ID: NORMAL
RAD ONC ARIA PLAN NAME: NORMAL
RAD ONC ARIA PLAN PRESCRIBED DOSE PER FRACTION: 2 GY
RAD ONC ARIA PLAN PRIMARY REFERENCE POINT: NORMAL
RAD ONC ARIA PLAN TOTAL FRACTIONS PRESCRIBED: 30
RAD ONC ARIA PLAN TOTAL PRESCRIBED DOSE: 6000 CGY
RAD ONC ARIA REFERENCE POINT DOSAGE GIVEN TO DATE: 60 GY
RAD ONC ARIA REFERENCE POINT ID: NORMAL
RAD ONC ARIA REFERENCE POINT SESSION DOSAGE GIVEN: 2 GY

## 2022-10-17 PROCEDURE — 77386: CPT | Performed by: RADIOLOGY

## 2022-10-17 PROCEDURE — 77014 CHG CT GUIDANCE RADIATION THERAPY FLDS PLACEMENT: CPT | Performed by: RADIOLOGY

## 2022-10-17 PROCEDURE — 77336 RADIATION PHYSICS CONSULT: CPT | Performed by: RADIOLOGY

## 2022-10-17 NOTE — PROGRESS NOTES
Diagnosis: Malignant neoplasm of parotid gland     Reason for Referral: [facility] travel reimbursement     Content of Visit: OSW met with pt and spouse in rad onc during pt's last tx this morning to provide pt with a letter confirming his attended radiation treatments this past week from 10/10/22-10/17/22 for [facility] travel reimbursement. Pt presented in a very pleasant mood this morning and appeared to be in good spirits. Pt rang the victory bell today to celebrate his completion of radiation treatments. Congratulated and praised pt on this incredible achievement. Pt identifies no other needs at this time. Encouraged OSW support remains available. Pt expressed gratitude.      Resources/Referrals Provided: Letter for [facility] travel reimbursement

## 2022-10-31 ENCOUNTER — OFFICE VISIT (OUTPATIENT)
Dept: RADIATION ONCOLOGY | Facility: HOSPITAL | Age: 62
End: 2022-10-31

## 2022-10-31 VITALS
WEIGHT: 225.53 LBS | BODY MASS INDEX: 35.32 KG/M2 | SYSTOLIC BLOOD PRESSURE: 176 MMHG | TEMPERATURE: 98 F | DIASTOLIC BLOOD PRESSURE: 96 MMHG | RESPIRATION RATE: 16 BRPM | OXYGEN SATURATION: 99 % | HEART RATE: 71 BPM

## 2022-10-31 DIAGNOSIS — C07 MALIGNANT NEOPLASM OF PAROTID GLAND: Primary | ICD-10-CM

## 2022-10-31 PROCEDURE — G0463 HOSPITAL OUTPT CLINIC VISIT: HCPCS | Performed by: RADIOLOGY

## 2022-10-31 PROCEDURE — 99024 POSTOP FOLLOW-UP VISIT: CPT | Performed by: RADIOLOGY

## 2022-10-31 RX ORDER — MIRTAZAPINE 15 MG/1
15 TABLET, FILM COATED ORAL NIGHTLY
Qty: 30 TABLET | Refills: 0 | Status: SHIPPED | OUTPATIENT
Start: 2022-10-31 | End: 2023-03-31

## 2022-10-31 NOTE — PROGRESS NOTES
Follow Up Office Visit      Encounter Date: 10/31/2022   Patient Name: Kevin Ordonez  YOB: 1960   Medical Record Number: [id number]   Primary Diagnosis: Malignant neoplasm of parotid gland (HCC) [C07]      Completion Date: 10/17/2022    Chief Complaint:    Chief Complaint   Patient presents with   • Follow-up       History of Present Illness: Kevin Ordonez returns for short interval follow-up after completing external beam radiotherapy.  He reports feeling well overall with the exception of his chronic issues including low back pain, urinary frequency and urgency.  He does still have some decreased appetite that is helped with Remeron.  He reports some improvement in taste sensation and is eating more as a result.  He continues to pick at his left posterior ear and this occasionally causes bleeding.    Subjective      Review of Systems: Review of Systems   Constitutional: Negative for appetite change, fatigue and unexpected weight change.   HENT: Negative for ear pain, hearing loss, sore throat, tinnitus and trouble swallowing.    Respiratory: Negative for cough and shortness of breath.    Cardiovascular: Positive for leg swelling (Ongoing). Negative for chest pain and palpitations.   Gastrointestinal: Negative for constipation, diarrhea, nausea and vomiting.   Genitourinary: Positive for frequency (ongoing) and urgency (ongoing). Negative for difficulty urinating and dysuria.   Musculoskeletal: Positive for back pain (Lower, comes and goes, ongoing) and joint swelling (Feet, ongoing). Negative for arthralgias.   Skin: Positive for wound (Open area behind left ear, patient stated that he scrubbed the area last night in the shower.  ).   Neurological: Negative for dizziness, weakness, light-headedness and headaches.   Psychiatric/Behavioral: Negative for sleep disturbance.       The following portions of the patient's history were reviewed and updated as appropriate: allergies, current  medications, past family history, past medical history, past social history, past surgical history and problem list.    Medications:     Current Outpatient Medications:   •  mirtazapine (Remeron) 15 MG tablet, Take 1 tablet by mouth Every Night., Disp: 30 tablet, Rfl: 0  •  albuterol (PROVENTIL) (2.5 MG/3ML) 0.083% nebulizer solution, Take 2.5 mg by nebulization Every 4 (Four) Hours As Needed for Wheezing., Disp: , Rfl:   •  atorvastatin (LIPITOR) 80 MG tablet, Take 80 mg by mouth Daily., Disp: , Rfl:   •  chlorthalidone (HYGROTON) 25 MG tablet, Take 25 mg by mouth Daily., Disp: , Rfl:   •  cholecalciferol (VITAMIN D3) 25 MCG (1000 UT) tablet, Take 1,000 Units by mouth Daily., Disp: , Rfl:   •  clonazePAM (KlonoPIN) 0.5 MG tablet, Take 1 tablet by mouth At Night As Needed (insomnia)., Disp: 15 tablet, Rfl: 0  •  docusate sodium (COLACE) 250 MG capsule, Take 250 mg by mouth Daily., Disp: , Rfl:   •  Dulaglutide 1.5 MG/0.5ML solution pen-injector, Inject  under the skin into the appropriate area as directed., Disp: , Rfl:   •  fluticasone-salmeterol (ADVAIR HFA) 230-21 MCG/ACT inhaler, Inhale 2 puffs 2 (Two) Times a Day., Disp: , Rfl:   •  gabapentin (NEURONTIN) 600 MG tablet, Take 1,200 mg by mouth 2 (Two) Times a Day., Disp: , Rfl:   •  glipizide (GLUCOTROL) 10 MG tablet, Take 10 mg by mouth 2 (Two) Times a Day Before Meals., Disp: , Rfl:   •  insulin NPH (NovoLIN N ReliOn) 100 UNIT/ML injection, Inject 52 Units under the skin into the appropriate area as directed 2 (Two) Times a Day Before Meals., Disp: , Rfl:   •  LORazepam (Ativan) 0.5 MG tablet, Take 1 tablet by mouth Every 8 (Eight) Hours As Needed for Anxiety. Take 1 pill 30 minutes before scan, Disp: 2 tablet, Rfl: 0  •  LORazepam (ATIVAN) 0.5 MG tablet, Take 1 tablet by mouth every night at bedtime., Disp: 15 tablet, Rfl: 0  •  losartan (COZAAR) 50 MG tablet, Take 100 mg by mouth Daily., Disp: , Rfl:   •  metFORMIN (GLUCOPHAGE) 1000 MG tablet, Take 1,000 mg  by mouth 2 (Two) Times a Day With Meals., Disp: , Rfl:   •  montelukast (SINGULAIR) 10 MG tablet, Take 10 mg by mouth Every Night., Disp: , Rfl:   •  Omega-3 Fatty Acids (fish oil) 1000 MG capsule capsule, Take  by mouth Daily With Breakfast., Disp: , Rfl:   •  omeprazole (priLOSEC) 20 MG capsule, Take 20 mg by mouth Daily., Disp: , Rfl:   •  oxyCODONE-acetaminophen (PERCOCET) 5-325 MG per tablet, Take 1 tablet by mouth Every 6 (Six) Hours As Needed for Severe Pain ., Disp: 12 tablet, Rfl: 0  •  silver sulfadiazine (SILVADENE, SSD) 1 % cream, Apply 1 application topically to the appropriate area as directed 2 (Two) Times a Day., Disp: 50 g, Rfl: 0  •  vitamin D (ERGOCALCIFEROL) 1.25 MG (25829 UT) capsule capsule, Take 50,000 Units by mouth 1 (One) Time Per Week., Disp: , Rfl:     Allergies:   No Known Allergies    ECOG: (1) Restricted in physically strenuous activity, ambulatory and able to do work of light nature  Quality of Life: 90 - Limited Activity     Objective     Physical Exam:   Vital Signs:   Vitals:    10/31/22 1014   BP: 176/96   Pulse: 71   Resp: 16   Temp: 98 °F (36.7 °C)   TempSrc: Temporal   SpO2: 99%   Weight: 102 kg (225 lb 8.5 oz)   PainSc: 0-No pain     Body mass index is 35.32 kg/m².     Physical Exam  Constitutional:       General: He is not in acute distress.     Appearance: Normal appearance. He is not toxic-appearing.   HENT:      Head: Normocephalic and atraumatic.      Ears:      Comments: 1 cm area of moist desquamation at left posterior lobule  Pulmonary:      Effort: Pulmonary effort is normal. No respiratory distress.   Abdominal:      General: Abdomen is flat. There is no distension.   Skin:     General: Skin is warm and dry.   Neurological:      General: No focal deficit present.      Mental Status: He is alert and oriented to person, place, and time.      Cranial Nerves: No cranial nerve deficit.      Gait: Gait normal.   Psychiatric:         Mood and Affect: Mood normal.          Behavior: Behavior normal.         Judgment: Judgment normal.             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  He is status post superficial parotidectomy with facial nerve dissection.  He has no clinical evidence of residual or metastatic disease.    He has now status post external beam radiotherapy and recovering from the acute toxicities of treatment.  ECOG 0    I will see Mr. Ordonez in follow-up in 3 months with CT scan of the soft tissue neck with IV contrast.  He will continue follow-up with his doctors at [facility].  I have refilled Remeron given his decreased appetite that is persistent.  I have additionally prescribed Silvadene for the area at the left posterior lobule which exhibits persistent desquamation.  Mr. Ordonez was encouraged to contact me with any questions or concerns regarding his care.        Reji Fall MD  Radiation Oncology  [facility]    This document has been signed by Reji Fall MD on October 31, 2022 11:31 EDT

## 2022-11-03 ENCOUNTER — APPOINTMENT (OUTPATIENT)
Dept: RADIATION ONCOLOGY | Facility: HOSPITAL | Age: 62
End: 2022-11-03
Payer: OTHER GOVERNMENT

## 2022-11-03 ENCOUNTER — DOCUMENTATION (OUTPATIENT)
Dept: NUTRITION | Facility: HOSPITAL | Age: 62
End: 2022-11-03

## 2022-11-03 VITALS — WEIGHT: 222 LBS | BODY MASS INDEX: 34.77 KG/M2

## 2022-11-03 NOTE — PROGRESS NOTES
"Outpatient Nutrition Oncology Follow Up    Patient Name: Kevin Ordonez  YOB: 1960  MRN: [id number]    Recommendation(s): Increase oral supplements (Glucerna shake) to 2-3 X day    Reason for Consult:  Pt here for f/u & requested RD encounter       Today's Weight:  100.7 kg    Weight Change: 1.3% loss in <1 week    Nutrition-related concerns: Taste Alterations    Current PO intake:   Notes an improvement in being able to eat more textures and flavors that once tasted \"off\".  Taste alterations are improving.  Eating mainly soft/moist foods but per diet recall, eating adequate protein sources (variety of meats, eggs, and a lot of beans).     Current Nutrition Supplement intake:  Glucerna shake once daily (receives regular shipments & [facility]; [facility] was 1 Glucerna shake TID, but has decreased this amount lately)    Consult or Intervention:  Reviewed current side effects & PO intake.  Pt overall seems to be doing well & healing well.  Foods are starting to taste good again.  Pt adamant he doesn't want to gain wt, however stressed importance of wt maintenance at this time to prevent LBM while still healing from tx.  Pt verbalized understanding.    Nutrition Diagnosis: Unintentional weight loss related to increased nutrient needs due to catabolic disease as evidenced by physiological causes increasing nutrient needs.    Plan: Will follow up per oncology nutrition protocol              "

## 2022-11-10 ENCOUNTER — APPOINTMENT (OUTPATIENT)
Dept: RADIATION ONCOLOGY | Facility: HOSPITAL | Age: 62
End: 2022-11-10
Payer: OTHER GOVERNMENT

## 2022-11-17 ENCOUNTER — APPOINTMENT (OUTPATIENT)
Dept: RADIATION ONCOLOGY | Facility: HOSPITAL | Age: 62
End: 2022-11-17
Payer: OTHER GOVERNMENT

## 2022-12-01 ENCOUNTER — DOCUMENTATION (OUTPATIENT)
Dept: ONCOLOGY | Facility: HOSPITAL | Age: 62
End: 2022-12-01

## 2022-12-01 ENCOUNTER — HOSPITAL ENCOUNTER (OUTPATIENT)
Dept: RADIATION ONCOLOGY | Facility: HOSPITAL | Age: 62
Setting detail: RADIATION/ONCOLOGY SERIES
Discharge: HOME | End: 2022-12-01
Payer: OTHER GOVERNMENT

## 2022-12-01 DIAGNOSIS — R13.12 OROPHARYNGEAL DYSPHAGIA: Primary | ICD-10-CM

## 2022-12-01 PROCEDURE — 92610 EVALUATE SWALLOWING FUNCTION: CPT

## 2022-12-01 NOTE — PROGRESS NOTES
Adult Swallow Post Radiation Therapy   Initial Evaluation  [facility] OP Speech Therapy Radiation Oncology,   [place] [address]     Patient Name: Kevin Ordonez  : 1960  MRN: [id number]  Today's Date: 2022         Visit Date: 2022   Outpatient Dysphagia Evaluation    History  Intake  Date of Service: 2022  Physician: Reji Fall MD   Next Physician Visit: Ongoing  Diagnosis: Adenoid cystic carcinoma of parotid gland  Treatment Diagnosis: Oral pharyngeal dysphagia  Hx of Hospitalization no  Previous Function :: Modified diet of moist soft solids and thin liquids  Previous Therapy Services: Previous radiation therapy swallow evaluation in 1 treatment, mid radiation swallow evaluation  Current Home Health Admission: No  Visit number: 1  HPI  Onset Date: Ongoing  Age: 62  Gender: Male  History of diagnosis:   Mr. 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 López.    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    Patient indicates he is back to eating regular solids with moisture and thin liquids.  He complains of xerostomia and minimal pain rated a 1/10 in the left mandible near the condyle.  It can be described as a dull pain.   Patient indicates he is completing prophylactic HEP exercises at home.     Precautions: Patient is hard of hearing  Patient's Goals/Expectations: Determine safety of swallow after completion of radiation therapy    Current Diet Level: Moist, regular solids and thin liquids    Comments: Patient indicates 90% of his taste has returned with no complaints of metallic taste.  Salivary glands continue to be impaired causing xerostomia requiring patient to use  additional moisture when eating solids.    Educated patient to continue to monitor his swallow as he is a positive history for UES dilation status post 15 years ago and radiation to the head neck.  Psychosocial History    Usual Living Arrangement: Lives with his wife in [place], [place].  Psychosocial History (depression/anxiety) no  Nutritional Problems: Patient indicates he is maintaining his weight.    Past Medical History    Pertinent Past Medical History:   Past Medical History:   Diagnosis Date   • Cancer of parotid gland (HCC)    • Diabetes mellitus (HCC)    • Hyperlipidemia    • Hypertension          History of Seizures: No      Abuse    Patient being Hurt, Hit, Scared or Neglected?  No  Caregiver Neglect: No      Pain  Pain Intensity: 1  Pain Location: Left upper mandible near the condyle  Pain Scale Used: Numerical  Pain Management Techniques: Patient complaints jaw stretches but no medication is needed at this time for the pain.    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       CLINICAL SWALLOW EVALUATION    Objective    Current Diet Level: Moist, regular solids and thin liquids  Oral Motor Exam: WFL  Soft Palate: WFL  Laryngeal Function and Elevation: WFL  Vocal Quality: Clear  Swallow Reflex: Good initiation of swallow reflex  Positioning: Upright 90 degree hip flexion      P.O. Trials   Patient was clinically assessed for dysphagia with the following consistencies and results:   Patient was assessed using nectar thickened apple juice, thin water, purée of applesauce, soft solid of banana, and solid of Arik Cracker.  Nectar thick liquid by spoon: N/A  Nectar thick by cup: WFL  Thin Liquid by spoon: WFL  Thin Liquid by cup: WFL  Thin liquid by straw: WFL  Puree solid: WFL  Soft Solid: WFL  Solid: WFL  Additional Trials: N/A  Oral Preparatory Phase: WFL with  complaints of xerostomia  Oral Phase: WFL  Pharyngeal Phase: WFL  Laryngeal Elevation/Coordination: WFL    Comments: Patient demonstrates no overt clinical signs and symptoms of aspiration.  Patient demonstrates very mild oral pharyngeal dysphagia secondary to xerostomia causing patient to use compensatory strategies of additional fluid with solids only.    Dysphagia Criteria    Patient Demonstrates: Risk of aspiration, impaired salivary gland performance    Swallow Function: Patient demonstrated no overt, clinical signs and symptoms of aspiration.   Recommendations    Diet:   Moist, regular solids and thin liquids  Position:  Upright 90 degree hip flexion for all p.o. intake    Environment:  Quiet environment with ample time to feed.    Compensatory Techniques:  Alternate small bites of solids and small sips of liquids, effortful swallow, use of gravies as needed    Medical Intervention:  None indicated    ST Functional Communication Testing    Patient is rated on the Functional Communication Measures (FCM) for swallow at a level 7/7 with a 0% limitation.         PLAN:   [facility]  [facility]    Thank you for the referral,   Elina Dickson MS, CCC-SLP     SIGNATURE: BLANCHE Johnson    Electronically signed 12/1/2022    [place] License:  [id number]

## 2022-12-01 NOTE — PROGRESS NOTES
Diagnosis: Malignant neoplasm of parotid gland    Reason for Referral: [facility] travel reimbursement    Content of Visit: OSW provide pt with a letter confirming his attended appointments on 10/31/22, 11/3/22, and 12/1/22 for [facility] travel reimbursement. OSW support remains available.    Resources/Referrals Provided: Letter for [facility] travel reimbursement

## 2022-12-12 ENCOUNTER — APPOINTMENT (OUTPATIENT)
Dept: CT IMAGING | Facility: HOSPITAL | Age: 62
End: 2022-12-12

## 2022-12-12 ENCOUNTER — APPOINTMENT (OUTPATIENT)
Dept: GENERAL RADIOLOGY | Facility: HOSPITAL | Age: 62
End: 2022-12-12

## 2022-12-12 ENCOUNTER — HOSPITAL ENCOUNTER (EMERGENCY)
Facility: HOSPITAL | Age: 62
Discharge: HOME | End: 2022-12-13
Attending: EMERGENCY MEDICINE | Admitting: EMERGENCY MEDICINE

## 2022-12-12 DIAGNOSIS — I95.1 ORTHOSTATIC HYPOTENSION: Primary | ICD-10-CM

## 2022-12-12 LAB
ACETONE BLD QL: NEGATIVE
ALBUMIN SERPL-MCNC: 3.7 G/DL (ref 3.5–5.2)
ALBUMIN/GLOB SERPL: 1.2 G/DL
ALP SERPL-CCNC: 79 U/L (ref 39–117)
ALT SERPL W P-5'-P-CCNC: 26 U/L (ref 1–41)
ANION GAP SERPL CALCULATED.3IONS-SCNC: 19 MMOL/L (ref 5–15)
AST SERPL-CCNC: 26 U/L (ref 1–40)
BASOPHILS # BLD AUTO: 0.09 10*3/MM3 (ref 0–0.2)
BASOPHILS NFR BLD AUTO: 1.4 % (ref 0–1.5)
BILIRUB SERPL-MCNC: 0.3 MG/DL (ref 0–1.2)
BUN SERPL-MCNC: 33 MG/DL (ref 8–23)
BUN/CREAT SERPL: 14.2 (ref 7–25)
CALCIUM SPEC-SCNC: 9.9 MG/DL (ref 8.6–10.5)
CHLORIDE SERPL-SCNC: 95 MMOL/L (ref 98–107)
CO2 SERPL-SCNC: 17 MMOL/L (ref 22–29)
CREAT SERPL-MCNC: 2.32 MG/DL (ref 0.76–1.27)
DEPRECATED RDW RBC AUTO: 46 FL (ref 37–54)
EGFRCR SERPLBLD CKD-EPI 2021: 30.8 ML/MIN/1.73
EOSINOPHIL # BLD AUTO: 0.33 10*3/MM3 (ref 0–0.4)
EOSINOPHIL NFR BLD AUTO: 5 % (ref 0.3–6.2)
ERYTHROCYTE [DISTWIDTH] IN BLOOD BY AUTOMATED COUNT: 13.9 % (ref 12.3–15.4)
GLOBULIN UR ELPH-MCNC: 3.2 GM/DL
GLUCOSE BLDC GLUCOMTR-MCNC: 178 MG/DL (ref 70–99)
GLUCOSE BLDC GLUCOMTR-MCNC: 225 MG/DL (ref 70–99)
GLUCOSE SERPL-MCNC: 285 MG/DL (ref 65–99)
HCT VFR BLD AUTO: 48.6 % (ref 37.5–51)
HGB BLD-MCNC: 15.2 G/DL (ref 13–17.7)
HOLD SPECIMEN: NORMAL
HOLD SPECIMEN: NORMAL
IMM GRANULOCYTES # BLD AUTO: 0.03 10*3/MM3 (ref 0–0.05)
IMM GRANULOCYTES NFR BLD AUTO: 0.5 % (ref 0–0.5)
LYMPHOCYTES # BLD AUTO: 1.19 10*3/MM3 (ref 0.7–3.1)
LYMPHOCYTES NFR BLD AUTO: 18 % (ref 19.6–45.3)
MCH RBC QN AUTO: 28.5 PG (ref 26.6–33)
MCHC RBC AUTO-ENTMCNC: 31.3 G/DL (ref 31.5–35.7)
MCV RBC AUTO: 91 FL (ref 79–97)
MONOCYTES # BLD AUTO: 0.51 10*3/MM3 (ref 0.1–0.9)
MONOCYTES NFR BLD AUTO: 7.7 % (ref 5–12)
NEUTROPHILS NFR BLD AUTO: 4.45 10*3/MM3 (ref 1.7–7)
NEUTROPHILS NFR BLD AUTO: 67.4 % (ref 42.7–76)
NRBC BLD AUTO-RTO: 0 /100 WBC (ref 0–0.2)
NT-PROBNP SERPL-MCNC: 95.7 PG/ML (ref 0–900)
PLATELET # BLD AUTO: 230 10*3/MM3 (ref 140–450)
PMV BLD AUTO: 10.6 FL (ref 6–12)
POTASSIUM SERPL-SCNC: 4.4 MMOL/L (ref 3.5–5.2)
PROT SERPL-MCNC: 6.9 G/DL (ref 6–8.5)
RBC # BLD AUTO: 5.34 10*6/MM3 (ref 4.14–5.8)
RBC MORPH BLD: NORMAL
SMALL PLATELETS BLD QL SMEAR: ADEQUATE
SODIUM SERPL-SCNC: 131 MMOL/L (ref 136–145)
TROPONIN T SERPL-MCNC: 0.06 NG/ML (ref 0–0.03)
TROPONIN T SERPL-MCNC: 0.07 NG/ML (ref 0–0.03)
WBC MORPH BLD: NORMAL
WBC NRBC COR # BLD: 6.6 10*3/MM3 (ref 3.4–10.8)
WHOLE BLOOD HOLD COAG: NORMAL
WHOLE BLOOD HOLD SPECIMEN: NORMAL

## 2022-12-12 PROCEDURE — 84484 ASSAY OF TROPONIN QUANT: CPT | Performed by: EMERGENCY MEDICINE

## 2022-12-12 PROCEDURE — 84484 ASSAY OF TROPONIN QUANT: CPT

## 2022-12-12 PROCEDURE — 82009 KETONE BODYS QUAL: CPT

## 2022-12-12 PROCEDURE — 71045 X-RAY EXAM CHEST 1 VIEW: CPT

## 2022-12-12 PROCEDURE — 82962 GLUCOSE BLOOD TEST: CPT

## 2022-12-12 PROCEDURE — 93005 ELECTROCARDIOGRAM TRACING: CPT

## 2022-12-12 PROCEDURE — 85025 COMPLETE CBC W/AUTO DIFF WBC: CPT

## 2022-12-12 PROCEDURE — 99284 EMERGENCY DEPT VISIT MOD MDM: CPT

## 2022-12-12 PROCEDURE — 93010 ELECTROCARDIOGRAM REPORT: CPT | Performed by: INTERNAL MEDICINE

## 2022-12-12 PROCEDURE — 80053 COMPREHEN METABOLIC PANEL: CPT

## 2022-12-12 PROCEDURE — 36415 COLL VENOUS BLD VENIPUNCTURE: CPT

## 2022-12-12 PROCEDURE — 85007 BL SMEAR W/DIFF WBC COUNT: CPT

## 2022-12-12 PROCEDURE — 83880 ASSAY OF NATRIURETIC PEPTIDE: CPT

## 2022-12-12 PROCEDURE — 93005 ELECTROCARDIOGRAM TRACING: CPT | Performed by: EMERGENCY MEDICINE

## 2022-12-12 PROCEDURE — 70450 CT HEAD/BRAIN W/O DYE: CPT

## 2022-12-12 RX ORDER — SODIUM CHLORIDE 0.9 % (FLUSH) 0.9 %
10 SYRINGE (ML) INJECTION AS NEEDED
Status: DISCONTINUED | OUTPATIENT
Start: 2022-12-12 | End: 2022-12-13 | Stop reason: HOSPADM

## 2022-12-12 RX ADMIN — SODIUM CHLORIDE 1000 ML: 9 INJECTION, SOLUTION INTRAVENOUS at 23:57

## 2022-12-12 RX ADMIN — SODIUM CHLORIDE 1000 ML: 9 INJECTION, SOLUTION INTRAVENOUS at 21:45

## 2022-12-12 NOTE — ED PROVIDER NOTES
Time: 5:39 PM EST  Chief Complaint:   Chief Complaint   Patient presents with   • Blurred Vision   • Shortness of Breath   • Fatigue           History of Present Illness:  Patient is a 62 y.o. year old male who presents to the emergency department with with confusion, weakness, dizziness, drowsiness, shortness of breath.  Family reports that he has been dozing off and usually.  He did have radiation 6 weeks ago he says he is not sure if that is what is causing it.  He has had no vomiting or nausea.  He is diabetic.  He is prescribed multiple sedating medications.          HPI        Patient Care Team  Primary Care Provider: ProviderLeeanne Known    Past Medical History:     No Known Allergies  Past Medical History:   Diagnosis Date   • Cancer of parotid gland (HCC)    • Diabetes mellitus (HCC)    • Hyperlipidemia    • Hypertension      Past Surgical History:   Procedure Laterality Date   • CAROTID ENDARTERECTOMY Left    • CHOLECYSTECTOMY     • FOOT SURGERY     • HERNIA REPAIR     • TUMOR REMOVAL Left      Family History   Problem Relation Age of Onset   • Lung cancer Father        Home Medications:  Prior to Admission medications    Medication Sig Start Date End Date Taking? Authorizing Provider   albuterol (PROVENTIL) (2.5 MG/3ML) 0.083% nebulizer solution Take 2.5 mg by nebulization Every 4 (Four) Hours As Needed for Wheezing.    ProviderTanvir MD   atorvastatin (LIPITOR) 80 MG tablet Take 80 mg by mouth Daily.    Tanvir Boateng MD   chlorthalidone (HYGROTON) 25 MG tablet Take 25 mg by mouth Daily.    Tanvir Boateng MD   cholecalciferol (VITAMIN D3) 25 MCG (1000 UT) tablet Take 1,000 Units by mouth Daily.    Tanvir Boateng MD   clonazePAM (KlonoPIN) 0.5 MG tablet Take 1 tablet by mouth At Night As Needed (insomnia). 10/4/22   Reji Fall MD   docusate sodium (COLACE) 250 MG capsule Take 250 mg by mouth Daily.    Tanvir Boateng MD   Dulaglutide 1.5 MG/0.5ML solution  pen-injector Inject  under the skin into the appropriate area as directed.    Tanvir Boateng MD   fluticasone-salmeterol (ADVAIR HFA) 230-21 MCG/ACT inhaler Inhale 2 puffs 2 (Two) Times a Day.    Tanvir Boateng MD   gabapentin (NEURONTIN) 600 MG tablet Take 1,200 mg by mouth 2 (Two) Times a Day.    Tanvir Boateng MD   glipizide (GLUCOTROL) 10 MG tablet Take 10 mg by mouth 2 (Two) Times a Day Before Meals.    Tanvir Boateng MD   insulin NPH (NovoLIN N ReliOn) 100 UNIT/ML injection Inject 52 Units under the skin into the appropriate area as directed 2 (Two) Times a Day Before Meals.    Tanvir Boateng MD   LORazepam (Ativan) 0.5 MG tablet Take 1 tablet by mouth Every 8 (Eight) Hours As Needed for Anxiety. Take 1 pill 30 minutes before scan 8/12/22   Reji Fall MD   LORazepam (ATIVAN) 0.5 MG tablet Take 1 tablet by mouth every night at bedtime. 10/4/22   Reji Fall MD   losartan (COZAAR) 50 MG tablet Take 100 mg by mouth Daily.    Tanvir Boateng MD   metFORMIN (GLUCOPHAGE) 1000 MG tablet Take 1,000 mg by mouth 2 (Two) Times a Day With Meals.    Tanvir Boateng MD   mirtazapine (Remeron) 15 MG tablet Take 1 tablet by mouth Every Night. 10/31/22   Reji Fall MD   montelukast (SINGULAIR) 10 MG tablet Take 10 mg by mouth Every Night.    Tanvir Boateng MD   Omega-3 Fatty Acids (fish oil) 1000 MG capsule capsule Take  by mouth Daily With Breakfast.    Tanvir Boateng MD   omeprazole (priLOSEC) 20 MG capsule Take 20 mg by mouth Daily.    Tanvir Boateng MD   oxyCODONE-acetaminophen (PERCOCET) 5-325 MG per tablet Take 1 tablet by mouth Every 6 (Six) Hours As Needed for Severe Pain . 3/24/22   Daniel Forbes DO   silver sulfadiazine (SILVADENE, SSD) 1 % cream Apply 1 application topically to the appropriate area as directed 2 (Two) Times a Day. 10/31/22   eRji Fall MD   vitamin D (ERGOCALCIFEROL) 1.25 MG (96802 UT) capsule capsule  "Take 50,000 Units by mouth 1 (One) Time Per Week.    Provider, Tanvir, MD        Social History:   Social History     Tobacco Use   • Smoking status: Former     Years: 25.00     Types: Cigarettes     Start date:      Quit date:      Years since quittin.9   • Smokeless tobacco: Former   Vaping Use   • Vaping Use: Never used   Substance Use Topics   • Alcohol use: Never   • Drug use: Never         Review of Systems:  Review of Systems   Constitutional: Positive for fatigue. Negative for chills and fever.   HENT: Negative for sore throat.    Eyes: Negative for photophobia.   Respiratory: Positive for shortness of breath.    Cardiovascular: Negative for chest pain.   Gastrointestinal: Negative for abdominal pain, diarrhea, nausea and vomiting.   Genitourinary: Negative for dysuria.   Musculoskeletal: Negative for neck pain.   Skin: Negative for wound.   Neurological: Positive for weakness. Negative for headaches.   Psychiatric/Behavioral: Positive for confusion.   All other systems reviewed and are negative.       Physical Exam:  /61   Pulse 60   Temp 97.4 °F (36.3 °C) (Oral)   Resp 16   Ht 167.6 cm (66\")   Wt 99.5 kg (219 lb 5.7 oz)   SpO2 97%   BMI 35.41 kg/m²     Physical Exam  Vitals and nursing note reviewed.   Constitutional:       General: He is awake. He is not in acute distress.     Appearance: He is not ill-appearing.   HENT:      Head: Normocephalic and atraumatic.   Eyes:      Extraocular Movements: Extraocular movements intact.   Cardiovascular:      Rate and Rhythm: Normal rate and regular rhythm.   Pulmonary:      Effort: Pulmonary effort is normal. No respiratory distress.      Breath sounds: Normal breath sounds.   Abdominal:      General: Abdomen is flat.      Palpations: Abdomen is soft.      Tenderness: There is no abdominal tenderness.   Musculoskeletal:         General: Normal range of motion.      Cervical back: Normal range of motion and neck supple.   Skin:     " General: Skin is warm and dry.      Capillary Refill: Capillary refill takes less than 2 seconds.   Neurological:      General: No focal deficit present.      Mental Status: He is oriented to person, place, and time. Mental status is at baseline. He is lethargic.   Psychiatric:         Behavior: Behavior is cooperative.                Medications in the Emergency Department:  Medications   sodium chloride 0.9 % bolus 1,000 mL (0 mL Intravenous Stopped 12/12/22 2357)   sodium chloride 0.9 % bolus 1,000 mL (0 mL Intravenous Stopped 12/13/22 0137)        Labs  Lab Results (last 24 hours)     ** No results found for the last 24 hours. **           Imaging:  No Radiology Exams Resulted Within Past 24 Hours    Procedures:  Procedures    Progress                            The patient was initially evaluated in the triage area where orders were placed. The patient was later dispositioned by Anthony Serrano DO.      The patient was advised to stay for completion of workup which includes but is not limited to communication of labs and radiological results, reassessment and plan. The patient was advised that leaving prior to disposition by a provider could result in critical findings that are not communicated to the patient.     Medical Decision Making:  [facility]         The following orders were placed after triage and evaluation:  Orders Placed This Encounter   Procedures   • XR Chest 1 View   • CT Head Without Contrast   • [place] Draw   • Comprehensive Metabolic Panel   • BNP   • Troponin   • Acetone   • CBC Auto Differential   • Scan Slide   • Troponin   • Undress & Gown   • Continuous Pulse Oximetry   • Vital Signs   • POC Glucose Once   • POC Glucose Once   • POC Glucose STAT   • POC Glucose Once   • ECG 12 Lead ED Triage Standing Order; SOA   • ECG 12 Lead Chest Pain   • CBC & Differential   • Green Top (Gel)   • Lavender Top   • Gold Top - SST   • Light Blue Top     Patient's blood pressure is improved.  Patient is stable  for discharge.      Final diagnoses:   Orthostatic hypotension          Disposition:  ED Disposition     ED Disposition   Discharge    Condition   Stable    Comment   --             This medical record created using voice recognition software.           Lb Soares  12/12/22 9245       Anthony Serrano DO  12/16/22 4534

## 2022-12-13 VITALS
OXYGEN SATURATION: 97 % | HEART RATE: 60 BPM | DIASTOLIC BLOOD PRESSURE: 61 MMHG | TEMPERATURE: 97.4 F | HEIGHT: 66 IN | WEIGHT: 219.36 LBS | BODY MASS INDEX: 35.25 KG/M2 | RESPIRATION RATE: 16 BRPM | SYSTOLIC BLOOD PRESSURE: 112 MMHG

## 2022-12-13 LAB — GLUCOSE BLDC GLUCOMTR-MCNC: 156 MG/DL (ref 70–99)

## 2022-12-13 PROCEDURE — 82962 GLUCOSE BLOOD TEST: CPT

## 2022-12-13 NOTE — ED NOTES
PT JUST FINISHED 30 TREATMENTS OF RADIATION FROM [place]ELAN[place] BEHIND HIS LEFT EAR WITHIN THE LAST 5 WEEKS, STILL HAVING JAW PAIN, STATES HE HAS LOST WEIGHT SINCE THE RADIATION, HIS BLOOD PRESSURE IS LOWER THAN NORMAL, HE HAS BEEN DIZZY SINCE THIS MORNING AND HAD SOME BLURRED VISION - STATES HE FELT LIKE HE WAS LOOKING AT SNOW , HE STATES HE HAD SOME CHEST PAIN AT ONE POINT AND SOA.- THAT RESOLVED , NOW STILL HAS THE DIZZINESS AND THE BLURRED VISION

## 2022-12-13 NOTE — DISCHARGE INSTRUCTIONS
Discuss your blood pressure medications with your primary care provider at your scheduled appointment this week.

## 2022-12-15 ENCOUNTER — OFFICE VISIT (OUTPATIENT)
Dept: ORTHOPEDIC SURGERY | Facility: CLINIC | Age: 62
End: 2022-12-15

## 2022-12-15 VITALS — HEIGHT: 66 IN | BODY MASS INDEX: 35.2 KG/M2 | OXYGEN SATURATION: 100 % | WEIGHT: 219 LBS

## 2022-12-15 DIAGNOSIS — M75.82 ROTATOR CUFF TENDONITIS, LEFT: ICD-10-CM

## 2022-12-15 DIAGNOSIS — M19.012 PRIMARY OSTEOARTHRITIS OF LEFT SHOULDER: ICD-10-CM

## 2022-12-15 DIAGNOSIS — M25.512 LEFT SHOULDER PAIN, UNSPECIFIED CHRONICITY: Primary | ICD-10-CM

## 2022-12-15 PROCEDURE — 20610 DRAIN/INJ JOINT/BURSA W/O US: CPT | Performed by: PHYSICIAN ASSISTANT

## 2022-12-15 RX ORDER — LIDOCAINE HYDROCHLORIDE 10 MG/ML
5 INJECTION, SOLUTION INFILTRATION; PERINEURAL
Status: COMPLETED | OUTPATIENT
Start: 2022-12-15 | End: 2022-12-15

## 2022-12-15 RX ORDER — TRIAMCINOLONE ACETONIDE 40 MG/ML
40 INJECTION, SUSPENSION INTRA-ARTICULAR; INTRAMUSCULAR
Status: COMPLETED | OUTPATIENT
Start: 2022-12-15 | End: 2022-12-15

## 2022-12-15 RX ADMIN — LIDOCAINE HYDROCHLORIDE 5 ML: 10 INJECTION, SOLUTION INFILTRATION; PERINEURAL at 11:17

## 2022-12-15 RX ADMIN — TRIAMCINOLONE ACETONIDE 40 MG: 40 INJECTION, SUSPENSION INTRA-ARTICULAR; INTRAMUSCULAR at 11:17

## 2022-12-15 NOTE — PROGRESS NOTES
"Chief Complaint  Pain and Follow-up of the Left Shoulder    Subjective          Kevin Ordonez is a 62 y.o. male  presents to [facility] for   History of Present Illness      Patient presents for follow-up evaluation of left shoulder pain, left shoulder AC arthritis/tendinitis.  He has been seen in the past with shoulder injections which have been helping his shoulder pain.  He had an original injury where he fell off a porch sometime in early summer.  He states since last visit he finished physical therapy, they discontinued therapy due to no improvement.  Patient states he has pain with lifting his shoulder forward with elevation, he admits to stiffness and pain with activities.  He is requesting left shoulder injection.      No Known Allergies     Social History     Socioeconomic History   • Marital status:    Tobacco Use   • Smoking status: Former     Years: .00     Types: Cigarettes     Start date:      Quit date:      Years since quittin.   • Smokeless tobacco: Former   Vaping Use   • Vaping Use: Never used   Substance and Sexual Activity   • Alcohol use: Never   • Drug use: Never   • Sexual activity: Defer        REVIEW OF SYSTEMS    Constitutional: Denies fevers, chills, weight loss  Cardiovascular: Denies chest pain, shortness of breath  Skin: Denies rashes, acute skin changes  Neurologic: Denies headache, loss of consciousness  MSK: Left shoulder pain      Objective   Vital Signs:   Ht 167.6 cm (66\")   Wt 99.3 kg (219 lb)   SpO2 100%   BMI 35.35 kg/m²     Body mass index is 35.35 kg/m².    Physical Exam    Left shoulder: Tender palpation anterior lateral shoulder, pain with range of motion, active forward elevation 130, active abduction 90 with pain, external rotation with abduction 40, internal rotation 55, 4 out of 5 strength.    Large Joint Arthrocentesis  Date/Time: 12/15/2022 11:17 AM  Consent given by: patient  Site marked: site marked  Timeout: " Immediately prior to procedure a time out was called to verify the correct patient, procedure, equipment, support staff and site/side marked as required   Supporting Documentation  Indications: pain   Procedure Details  Location: shoulder (LEFT) -   Needle gauge: 21 G.  Medications administered: 5 mL lidocaine 1 %; 40 mg triamcinolone acetonide 40 MG/ML  Patient tolerance: patient tolerated the procedure well with no immediate complications          Imaging Results (Most Recent)     None           Result Review :   The following data was reviewed by: Ashlee Wiggins on 12/15/2022:               Assessment and Plan    Diagnoses and all orders for this visit:    1. Left shoulder pain, unspecified chronicity (Primary)    2. Primary osteoarthritis of left shoulder    3. Rotator cuff tendonitis, left        Discussed diagnosis and treatment options with the patient he elected to have left shoulder steroid injection today which he tolerated well follow-up in 8 weeks, may consider updated MRI if no improvement    Call or return if worsening symptoms.    Follow Up   Return in about 8 weeks (around 2/9/2023) for Recheck.  Patient was given instructions and counseling regarding his condition or for health maintenance advice. Please see specific information pulled into the AVS if appropriate.

## 2022-12-23 LAB
QT INTERVAL: 416 MS
QT INTERVAL: 432 MS

## 2023-01-06 ENCOUNTER — OFFICE VISIT (OUTPATIENT)
Dept: RADIATION ONCOLOGY | Facility: HOSPITAL | Age: 63
End: 2023-01-06
Payer: OTHER GOVERNMENT

## 2023-01-06 VITALS
TEMPERATURE: 98.4 F | BODY MASS INDEX: 35.73 KG/M2 | OXYGEN SATURATION: 97 % | SYSTOLIC BLOOD PRESSURE: 154 MMHG | HEART RATE: 87 BPM | RESPIRATION RATE: 16 BRPM | DIASTOLIC BLOOD PRESSURE: 71 MMHG | WEIGHT: 221.34 LBS

## 2023-01-06 DIAGNOSIS — C07 MALIGNANT NEOPLASM OF PAROTID GLAND: Primary | ICD-10-CM

## 2023-01-06 PROCEDURE — 99024 POSTOP FOLLOW-UP VISIT: CPT | Performed by: RADIOLOGY

## 2023-01-06 PROCEDURE — G0463 HOSPITAL OUTPT CLINIC VISIT: HCPCS | Performed by: RADIOLOGY

## 2023-01-06 RX ORDER — LORAZEPAM 0.5 MG/1
0.5 TABLET ORAL EVERY 8 HOURS PRN
Qty: 3 TABLET | Refills: 0 | Status: SHIPPED | OUTPATIENT
Start: 2023-01-06 | End: 2023-03-31

## 2023-01-06 RX ORDER — METHYLPREDNISOLONE 4 MG/1
TABLET ORAL
Qty: 21 TABLET | Refills: 0 | Status: SHIPPED | OUTPATIENT
Start: 2023-01-06 | End: 2023-03-31

## 2023-01-06 NOTE — PROGRESS NOTES
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  [facility] but reports no improvement in his left ear pain.  He reports that the pain radiates upwards to his vertex scalp.  He additionally has some right-sided jaw pain.    Subjective      Review of Systems: Review of Systems   Constitutional: Positive for fatigue (3/10). Negative for appetite change.   HENT: Positive for ear pain and hearing loss. Negative for congestion, rhinorrhea, sore throat, tinnitus and trouble swallowing.         Left sided jaw pain      Respiratory: Negative for cough and shortness of breath.    Gastrointestinal: Positive for diarrhea (occasional ). Negative for constipation and nausea.   Genitourinary: Negative for difficulty urinating, dysuria, frequency and urgency.   Skin: Negative for rash.   Neurological: Negative for dizziness and headaches.   Psychiatric/Behavioral: Negative for sleep disturbance (has woke up with ear pain once in the last week ).       The following portions of the patient's history were reviewed and updated as appropriate: allergies, current medications, past family history, past medical history, past social history, past surgical history and problem list.    Medications:     Current Outpatient Medications:   •  albuterol (PROVENTIL) (2.5 MG/3ML) 0.083% nebulizer solution, Take 2.5 mg by nebulization Every 4 (Four) Hours As Needed for Wheezing., Disp: , Rfl:   •  atorvastatin (LIPITOR) 80 MG tablet, Take 80 mg by mouth Daily., Disp: , Rfl:   •   cholecalciferol (VITAMIN D3) 25 MCG (1000 UT) tablet, Take 1,000 Units by mouth Daily., Disp: , Rfl:   •  clonazePAM (KlonoPIN) 0.5 MG tablet, Take 1 tablet by mouth At Night As Needed (insomnia)., Disp: 15 tablet, Rfl: 0  •  docusate sodium (COLACE) 250 MG capsule, Take 250 mg by mouth Daily., Disp: , Rfl:   •  Dulaglutide 1.5 MG/0.5ML solution pen-injector, Inject  under the skin into the appropriate area as directed., Disp: , Rfl:   •  fluticasone-salmeterol (ADVAIR HFA) 230-21 MCG/ACT inhaler, Inhale 2 puffs 2 (Two) Times a Day., Disp: , Rfl:   •  gabapentin (NEURONTIN) 600 MG tablet, Take 1,200 mg by mouth 2 (Two) Times a Day., Disp: , Rfl:   •  glipizide (GLUCOTROL) 10 MG tablet, Take 10 mg by mouth 2 (Two) Times a Day Before Meals., Disp: , Rfl:   •  insulin NPH (NovoLIN N ReliOn) 100 UNIT/ML injection, Inject 52 Units under the skin into the appropriate area as directed 2 (Two) Times a Day Before Meals., Disp: , Rfl:   •  LORazepam (Ativan) 0.5 MG tablet, Take 1 tablet by mouth Every 8 (Eight) Hours As Needed for Anxiety. Take 1 pill 30 minutes before scan, Disp: 2 tablet, Rfl: 0  •  losartan (COZAAR) 50 MG tablet, Take 100 mg by mouth Daily., Disp: , Rfl:   •  metFORMIN (GLUCOPHAGE) 1000 MG tablet, Take 1,000 mg by mouth 2 (Two) Times a Day With Meals., Disp: , Rfl:   •  mirtazapine (Remeron) 15 MG tablet, Take 1 tablet by mouth Every Night., Disp: 30 tablet, Rfl: 0  •  montelukast (SINGULAIR) 10 MG tablet, Take 10 mg by mouth Every Night., Disp: , Rfl:   •  Omega-3 Fatty Acids (fish oil) 1000 MG capsule capsule, Take  by mouth Daily With Breakfast., Disp: , Rfl:   •  omeprazole (priLOSEC) 20 MG capsule, Take 20 mg by mouth Daily., Disp: , Rfl:   •  oxyCODONE-acetaminophen (PERCOCET) 5-325 MG per tablet, Take 1 tablet by mouth Every 6 (Six) Hours As Needed for Severe Pain ., Disp: 12 tablet, Rfl: 0  •  silver sulfadiazine (SILVADENE, SSD) 1 % cream, Apply 1 application topically to the appropriate area as  directed 2 (Two) Times a Day., Disp: 50 g, Rfl: 0  •  methylPREDNISolone (MEDROL) 4 MG dose pack, Take as directed on package instructions., Disp: 21 tablet, Rfl: 0    Allergies:   No Known Allergies    ECOG: (0) Fully active, able to carry on all predisease performance without restriction  Quality of Life: 100 - Full Activity     Objective     Physical Exam:   Vital Signs:   Vitals:    01/06/23 1330   BP: 154/71   Pulse: 87   Resp: 16   Temp: 98.4 °F (36.9 °C)   TempSrc: Temporal   SpO2: 97%   Weight: 100 kg (221 lb 5.5 oz)   PainSc:   4   PainLoc: Head  Comment: ear head jaw     Body mass index is 35.73 kg/m².     Physical Exam  Constitutional:       General: He is not in acute distress.     Appearance: Normal appearance. He is obese. He is not ill-appearing, toxic-appearing or diaphoretic.   HENT:      Head: Normocephalic and atraumatic.      Left Ear: Tympanic membrane normal.      Ears:      Comments: Some cerumen impaction but tympanic membrane is visible  Pulmonary:      Effort: Pulmonary effort is normal. No respiratory distress.   Abdominal:      General: There is no distension.   Skin:     General: Skin is warm and dry.   Neurological:      General: No focal deficit present.      Mental Status: He is alert and oriented to person, place, and time.      Cranial Nerves: No cranial nerve deficit.      Gait: Gait normal.   Psychiatric:         Mood and Affect: Mood normal.         Behavior: Behavior normal.         Judgment: Judgment normal.             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  He is status post superficial parotidectomy with facial nerve dissection.  He has no clinical evidence of residual or metastatic disease.    He has now status post external beam radiotherapy and has no evidence of disease but may be suffering from subacute toxicities of radiotherapy including but not limited  to neuropathy. ECOG 0    I will prescribe a short course of steroids for Mr. Ordonez and order an MRI of the soft tissue neck with and without contrast.  I explained that it is quite likely that his symptoms are related to external beam radiotherapy rather than tumor recurrence.  MRI of the soft tissue neck with and without contrast will be ordered for the purpose of identifying any potential perineural recurrence of a major nerve as well as abnormalities within the base of skull.        Reji Fall MD  Radiation Oncology  [facility]    This document has been signed by Reji Fall MD on January 6, 2023 14:00 EST

## 2023-01-25 ENCOUNTER — APPOINTMENT (OUTPATIENT)
Dept: CT IMAGING | Facility: HOSPITAL | Age: 63
End: 2023-01-25

## 2023-01-30 ENCOUNTER — HOSPITAL ENCOUNTER (OUTPATIENT)
Dept: MRI IMAGING | Facility: HOSPITAL | Age: 63
Discharge: HOME | End: 2023-01-30
Admitting: RADIOLOGY
Payer: OTHER GOVERNMENT

## 2023-01-30 DIAGNOSIS — C07 MALIGNANT NEOPLASM OF PAROTID GLAND: ICD-10-CM

## 2023-01-30 LAB
CREAT BLDA-MCNC: 1 MG/DL
EGFRCR SERPLBLD CKD-EPI 2021: 84.6 ML/MIN/1.73

## 2023-01-30 PROCEDURE — 0 GADOBENATE DIMEGLUMINE 529 MG/ML SOLUTION: Performed by: RADIOLOGY

## 2023-01-30 PROCEDURE — 82565 ASSAY OF CREATININE: CPT

## 2023-01-30 PROCEDURE — 70543 MRI ORBT/FAC/NCK W/O &W/DYE: CPT

## 2023-01-30 PROCEDURE — A9577 INJ MULTIHANCE: HCPCS | Performed by: RADIOLOGY

## 2023-01-30 RX ADMIN — GADOBENATE DIMEGLUMINE 20 ML: 529 INJECTION, SOLUTION INTRAVENOUS at 15:11

## 2023-02-02 ENCOUNTER — OFFICE VISIT (OUTPATIENT)
Dept: RADIATION ONCOLOGY | Facility: HOSPITAL | Age: 63
End: 2023-02-02
Payer: OTHER GOVERNMENT

## 2023-02-02 VITALS
RESPIRATION RATE: 18 BRPM | BODY MASS INDEX: 35.97 KG/M2 | WEIGHT: 222.88 LBS | SYSTOLIC BLOOD PRESSURE: 200 MMHG | HEART RATE: 66 BPM | TEMPERATURE: 98.3 F | DIASTOLIC BLOOD PRESSURE: 83 MMHG | OXYGEN SATURATION: 100 %

## 2023-02-02 DIAGNOSIS — C07 MALIGNANT NEOPLASM OF PAROTID GLAND: Primary | ICD-10-CM

## 2023-02-02 PROCEDURE — G0463 HOSPITAL OUTPT CLINIC VISIT: HCPCS

## 2023-02-02 PROCEDURE — 99213 OFFICE O/P EST LOW 20 MIN: CPT | Performed by: RADIOLOGY

## 2023-02-02 NOTE — PROGRESS NOTES
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  This reveals no evidence of recurrence involving the primary site within the left parotid gland or any evidence for perineural spread of disease.  There were some age indeterminate congestive and/or inflammatory changes within the left mastoid air cell complex.  Mr. Ordonez reports feeling well overall with the exception of occasional sensation of left ear fullness.  He does have some persistent focal postauricular pain but there is been resolution of pain radiating to his vertex scalp.  He was recently evaluated in the emergency department at [facility] for right eye discomfort.  He was prescribed medication for shingles but has not started this medication as his pain resolved.    Subjective      Review of Systems: Review of Systems   Constitutional: Positive for fatigue (3/10). Negative for appetite change.   HENT: Positive for ear pain (left ear pain ), hearing loss (left ear) and tinnitus (left ear). Negative for trouble swallowing.    Eyes: Negative for visual disturbance.   Respiratory: Positive for cough (dry). Negative for shortness of breath.    Gastrointestinal: Negative for constipation, diarrhea and nausea.   Genitourinary: Negative for difficulty urinating, dysuria, frequency and urgency.   Musculoskeletal: Positive for arthralgias and back pain.   Skin: Positive for rash (noted on face).   Neurological: Positive for headaches (severe under right [facility]). Negative for dizziness.   Psychiatric/Behavioral: Negative for  sleep disturbance.       The following portions of the patient's history were reviewed and updated as appropriate: allergies, current medications, past family history, past medical history, past social history, past surgical history and problem list.    Medications:     Current Outpatient Medications:   •  albuterol (PROVENTIL) (2.5 MG/3ML) 0.083% nebulizer solution, Take 2.5 mg by nebulization Every 4 (Four) Hours As Needed for Wheezing., Disp: , Rfl:   •  atorvastatin (LIPITOR) 80 MG tablet, Take 80 mg by mouth Daily., Disp: , Rfl:   •  cholecalciferol (VITAMIN D3) 25 MCG (1000 UT) tablet, Take 1,000 Units by mouth Daily., Disp: , Rfl:   •  clonazePAM (KlonoPIN) 0.5 MG tablet, Take 1 tablet by mouth At Night As Needed (insomnia)., Disp: 15 tablet, Rfl: 0  •  docusate sodium (COLACE) 250 MG capsule, Take 250 mg by mouth Daily., Disp: , Rfl:   •  Dulaglutide 1.5 MG/0.5ML solution pen-injector, Inject  under the skin into the appropriate area as directed., Disp: , Rfl:   •  fluticasone-salmeterol (ADVAIR HFA) 230-21 MCG/ACT inhaler, Inhale 2 puffs 2 (Two) Times a Day., Disp: , Rfl:   •  gabapentin (NEURONTIN) 600 MG tablet, Take 1,200 mg by mouth 2 (Two) Times a Day., Disp: , Rfl:   •  glipizide (GLUCOTROL) 10 MG tablet, Take 10 mg by mouth 2 (Two) Times a Day Before Meals., Disp: , Rfl:   •  insulin NPH (NovoLIN N ReliOn) 100 UNIT/ML injection, Inject 52 Units under the skin into the appropriate area as directed 2 (Two) Times a Day Before Meals., Disp: , Rfl:   •  LORazepam (Ativan) 0.5 MG tablet, Take 1 tablet by mouth Every 8 (Eight) Hours As Needed for Anxiety. Take 1 pill 30 minutes before scan, Disp: 2 tablet, Rfl: 0  •  LORazepam (Ativan) 0.5 MG tablet, Take 1 tablet by mouth Every 8 (Eight) Hours As Needed for Anxiety (Take 30 minutes before MRI; may repeat if no improvement)., Disp: 3 tablet, Rfl: 0  •  losartan (COZAAR) 50 MG tablet, Take 50 mg by mouth Daily. Patient taking 50 MG QD, Disp: , Rfl:   •   metFORMIN (GLUCOPHAGE) 1000 MG tablet, Take 1,000 mg by mouth 2 (Two) Times a Day With Meals., Disp: , Rfl:   •  methylPREDNISolone (MEDROL) 4 MG dose pack, Take as directed on package instructions., Disp: 21 tablet, Rfl: 0  •  mirtazapine (Remeron) 15 MG tablet, Take 1 tablet by mouth Every Night., Disp: 30 tablet, Rfl: 0  •  montelukast (SINGULAIR) 10 MG tablet, Take 10 mg by mouth Every Night., Disp: , Rfl:   •  Omega-3 Fatty Acids (fish oil) 1000 MG capsule capsule, Take  by mouth Daily With Breakfast., Disp: , Rfl:   •  omeprazole (priLOSEC) 20 MG capsule, Take 20 mg by mouth Daily., Disp: , Rfl:   •  oxyCODONE-acetaminophen (PERCOCET) 5-325 MG per tablet, Take 1 tablet by mouth Every 6 (Six) Hours As Needed for Severe Pain ., Disp: 12 tablet, Rfl: 0  •  silver sulfadiazine (SILVADENE, SSD) 1 % cream, Apply 1 application topically to the appropriate area as directed 2 (Two) Times a Day., Disp: 50 g, Rfl: 0    Allergies:   No Known Allergies    ECOG: (0) Fully active, able to carry on all predisease performance without restriction  Quality of Life: 90 - Limited Activity     Objective     Physical Exam:   Vital Signs:   Vitals:    02/02/23 0947   BP: (!) 200/83   Pulse: 66   Resp: 18   Temp: 98.3 °F (36.8 °C)   SpO2: 100%   Weight: 101 kg (222 lb 14.2 oz)   PainSc:   2     Body mass index is 35.97 kg/m².     Physical Exam  Constitutional:       General: He is not in acute distress.     Appearance: Normal appearance. He is not toxic-appearing.   HENT:      Head: Normocephalic and atraumatic.   Pulmonary:      Effort: Pulmonary effort is normal. No respiratory distress.   Abdominal:      General: Abdomen is flat. There is no distension.   Skin:     General: Skin is warm and dry.   Neurological:      General: No focal deficit present.      Mental Status: He is alert and oriented to person, place, and time.      Cranial Nerves: No cranial nerve deficit.      Gait: Gait normal.   Psychiatric:         Mood and Affect:  Mood normal.         Behavior: Behavior normal.         Judgment: Judgment normal.         Radiographs: CT Head Without Contrast    Result Date: 12/12/2022    1. No acute intracranial abnormality     Kody Payton M.D.       Electronically Signed and Approved By: Kody Payton M.D. on 12/12/2022 at 18:44             MRI Neck Soft Tissue Only With & Without Contrast    Result Date: 1/31/2023    1. Expected post-treatment changes in the face/neck are seen without MRI evidence of recurrent disease at the primary site involving the left parotid gland and surrounding tissues.  2. Specifically, no convincing evidence for perineural spread of tumor although the motion artifact may mask subtle enhancement changes/abnormalities.  Also, the residual edema and enhancement within the left parotid gland itself and the surrounding soft tissues as well as the congestive and/or inflammatory changes of the left mastoid air cell complex may obscure signal and enhancement abnormalities within or about the left 7th cranial (facial) nerve.  3. No MRI evidence of abnormal cervical lymph nodes is noted.  4. There is age-indeterminate congestive and/or inflammatory change of the left mastoid air cell complex, possibly related to treatment effect.  5. There are mild-to-moderate age-indeterminate congestive and/or inflammatory changes of the imaged paranasal sinuses.  6. Continued routine imaging surveillance is recommended.  7. NI-RADS Category 1 -- No evidence of recurrence.    Please note that portions of this note were completed with a voice recognition program.  LYNSEY DE LOS SANTOS JR, MD       Electronically Signed and Approved By: LYNSEY DE LOS SANTOS JR, MD on 1/31/2023 at 5:45              XR Chest 1 View    Result Date: 12/12/2022    1. No acute cardiopulmonary disease.       Kody Payton M.D.       Electronically Signed and Approved By: Kody Payton M.D. on 12/12/2022 at 19:06              I personally reviewed the MRI soft tissue neck  from 1/31/2023.  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  He is status post superficial parotidectomy with facial nerve dissection.  He has no clinical evidence of residual or metastatic disease.    He has now status post external beam radiotherapy  and is recovering from the acute toxicities of treatment.  ECOG 0    I will see Mr. Ordonez in follow-up in 6 months with CT scan of the chest for surveillance imaging.  Surveillance with regard to his history of parotid gland involvement will be clinical.  He will follow-up with his ENT at [facility] regarding his left ear complaints.    Reji Fall MD  Radiation Oncology  [facility]    This document has been signed by Reji Fall MD on February 2, 2023 10:49 EST

## 2023-02-09 ENCOUNTER — OFFICE VISIT (OUTPATIENT)
Dept: ORTHOPEDIC SURGERY | Facility: CLINIC | Age: 63
End: 2023-02-09
Payer: OTHER GOVERNMENT

## 2023-02-09 VITALS — WEIGHT: 222 LBS | OXYGEN SATURATION: 97 % | HEART RATE: 68 BPM | HEIGHT: 66 IN | BODY MASS INDEX: 35.68 KG/M2

## 2023-02-09 DIAGNOSIS — M19.012 PRIMARY OSTEOARTHRITIS OF LEFT SHOULDER: ICD-10-CM

## 2023-02-09 DIAGNOSIS — M75.82 ROTATOR CUFF TENDONITIS, LEFT: ICD-10-CM

## 2023-02-09 DIAGNOSIS — M25.512 LEFT SHOULDER PAIN, UNSPECIFIED CHRONICITY: Primary | ICD-10-CM

## 2023-02-09 PROCEDURE — 20610 DRAIN/INJ JOINT/BURSA W/O US: CPT | Performed by: PHYSICIAN ASSISTANT

## 2023-02-09 PROCEDURE — 99213 OFFICE O/P EST LOW 20 MIN: CPT | Performed by: PHYSICIAN ASSISTANT

## 2023-02-09 RX ORDER — LIDOCAINE HYDROCHLORIDE 10 MG/ML
5 INJECTION, SOLUTION INFILTRATION; PERINEURAL
Status: COMPLETED | OUTPATIENT
Start: 2023-02-09 | End: 2023-02-09

## 2023-02-09 RX ORDER — METHYLPREDNISOLONE ACETATE 80 MG/ML
80 INJECTION, SUSPENSION INTRA-ARTICULAR; INTRALESIONAL; INTRAMUSCULAR; SOFT TISSUE
Status: COMPLETED | OUTPATIENT
Start: 2023-02-09 | End: 2023-02-09

## 2023-02-09 RX ORDER — DIAZEPAM 5 MG/1
5 TABLET ORAL ONCE
Qty: 1 TABLET | Refills: 0 | Status: SHIPPED | OUTPATIENT
Start: 2023-02-09 | End: 2023-02-09

## 2023-02-09 RX ADMIN — LIDOCAINE HYDROCHLORIDE 5 ML: 10 INJECTION, SOLUTION INFILTRATION; PERINEURAL at 10:35

## 2023-02-09 RX ADMIN — METHYLPREDNISOLONE ACETATE 80 MG: 80 INJECTION, SUSPENSION INTRA-ARTICULAR; INTRALESIONAL; INTRAMUSCULAR; SOFT TISSUE at 10:35

## 2023-02-09 NOTE — PROGRESS NOTES
"Chief Complaint  Pain and Follow-up of the Left Shoulder    Subjective          History of Present Illness      Kevin Ordonez is a 62 y.o. male  presents to [facility] for     Patient presents for follow-up evaluation of left shoulder pain, left shoulder AC arthritis/tendinitis.  He has been treating his shoulder conservatively with injections every 3 months.  His original injury occurred in early summer last year when he fell off a porch.  He states that the injections have been helping but he feels they are masking the true problem and he would like to have an MRI, we discussed this at last visit.  He admits to pain and decreased range of motion with pain in the left shoulder he points the anterior lateral posterior shoulder as his areas of pain.      No Known Allergies     Social History     Socioeconomic History   • Marital status:    Tobacco Use   • Smoking status: Former     Years: .     Types: Cigarettes     Start date:      Quit date:      Years since quittin.   • Smokeless tobacco: Former   Vaping Use   • Vaping Use: Never used   Substance and Sexual Activity   • Alcohol use: Never   • Drug use: Never   • Sexual activity: Defer        REVIEW OF SYSTEMS    Constitutional: Denies fevers, chills, weight loss  Cardiovascular: Denies chest pain, shortness of breath  Skin: Denies rashes, acute skin changes  Neurologic: Denies headache, loss of consciousness  MSK: Left shoulder pain      Objective   Vital Signs:   Pulse 68   Ht 167.6 cm (66\")   Wt 101 kg (222 lb)   SpO2 97%   BMI 35.83 kg/m²     Body mass index is 35.83 kg/m².    Physical Exam    Left shoulder: Tender palpation anterior lateral shoulder, tender AC joint, pain with range of motion, active forward elevation 130, active abduction 90 with pain, external rotation with abduction 40 internal rotation 55 4 out of 5 strength.    Large Joint Arthrocentesis  Date/Time: 2023 10:35 AM  Consent " given by: patient  Site marked: site marked  Timeout: Immediately prior to procedure a time out was called to verify the correct patient, procedure, equipment, support staff and site/side marked as required   Supporting Documentation  Indications: pain   Procedure Details  Location: shoulder (left) -   Needle gauge: 21g.  Medications administered: 5 mL lidocaine 1 %; 80 mg methylPREDNISolone acetate 80 MG/ML  Patient tolerance: patient tolerated the procedure well with no immediate complications          Imaging Results (Most Recent)     None           Result Review :   The following data was reviewed by: Zeny Candelaria MA on 02/09/2023:               Assessment and Plan    Diagnoses and all orders for this visit:    1. Left shoulder pain, unspecified chronicity (Primary)  -     MRI Shoulder Left Without Contrast; Future    2. Primary osteoarthritis of left shoulder  -     MRI Shoulder Left Without Contrast; Future    3. Rotator cuff tendonitis, left  -     MRI Shoulder Left Without Contrast; Future        Discussed diagnosis and treatment options with the patient he was advised we can give left shoulder steroid injection today and order left shoulder MRI, he elected to have the injection and tolerated well, follow-up after MRI for further review.    Call or return if worsening symptoms.    Follow Up   Return for After MRI.  Patient was given instructions and counseling regarding his condition or for health maintenance advice. Please see specific information pulled into the AVS if appropriate.

## 2023-02-15 DIAGNOSIS — M25.512 LEFT SHOULDER PAIN, UNSPECIFIED CHRONICITY: ICD-10-CM

## 2023-02-15 DIAGNOSIS — M19.012 PRIMARY OSTEOARTHRITIS OF LEFT SHOULDER: ICD-10-CM

## 2023-02-15 DIAGNOSIS — M75.82 ROTATOR CUFF TENDONITIS, LEFT: ICD-10-CM

## 2023-02-17 ENCOUNTER — PREP FOR SURGERY (OUTPATIENT)
Dept: OTHER | Facility: HOSPITAL | Age: 63
End: 2023-02-17
Payer: OTHER GOVERNMENT

## 2023-02-17 ENCOUNTER — OFFICE VISIT (OUTPATIENT)
Dept: ORTHOPEDIC SURGERY | Facility: CLINIC | Age: 63
End: 2023-02-17
Payer: OTHER GOVERNMENT

## 2023-02-17 VITALS — WEIGHT: 222.66 LBS | HEIGHT: 66 IN | OXYGEN SATURATION: 97 % | BODY MASS INDEX: 35.79 KG/M2 | HEART RATE: 71 BPM

## 2023-02-17 DIAGNOSIS — M75.82 ROTATOR CUFF TENDONITIS, LEFT: ICD-10-CM

## 2023-02-17 DIAGNOSIS — M19.012 PRIMARY OSTEOARTHRITIS OF LEFT SHOULDER: ICD-10-CM

## 2023-02-17 DIAGNOSIS — M25.512 LEFT SHOULDER PAIN, UNSPECIFIED CHRONICITY: Primary | ICD-10-CM

## 2023-02-17 DIAGNOSIS — M75.32 CALCIFIC TENDINITIS OF LEFT SHOULDER: ICD-10-CM

## 2023-02-17 DIAGNOSIS — M25.512 LEFT SHOULDER PAIN, UNSPECIFIED CHRONICITY: ICD-10-CM

## 2023-02-17 DIAGNOSIS — M75.32 CALCIFIC TENDINITIS OF LEFT SHOULDER: Primary | ICD-10-CM

## 2023-02-17 PROCEDURE — 99214 OFFICE O/P EST MOD 30 MIN: CPT | Performed by: PHYSICIAN ASSISTANT

## 2023-02-17 RX ORDER — CEFAZOLIN SODIUM 2 G/100ML
2 INJECTION, SOLUTION INTRAVENOUS ONCE
Status: CANCELLED | OUTPATIENT
Start: 2023-02-17 | End: 2023-02-17

## 2023-02-17 RX ORDER — CEFAZOLIN SODIUM IN 0.9 % NACL 3 G/100 ML
3 INTRAVENOUS SOLUTION, PIGGYBACK (ML) INTRAVENOUS ONCE
Status: CANCELLED | OUTPATIENT
Start: 2023-02-17 | End: 2023-02-17

## 2023-02-17 NOTE — PROGRESS NOTES
"Chief Complaint  Pain and Follow-up of the Left Shoulder    Subjective          History of Present Illness      Kevin Ordonez is a 62 y.o. male  presents to [facility] for     Patient presents for follow-up evaluation of left shoulder pain, left shoulder AC arthritis, tendinitis and to review left shoulder MRI.  Patient has been treating his shoulder conservatively with injections.  The injections have stopped working and at last visit he was complaining of increased pain, decreased range of motion and wanting to consider surgical intervention.  He points to the anterior lateral shoulder as his area of worst pain he states pain is worse with abduction movements and limited range of motion with abduction.      No Known Allergies     Social History     Socioeconomic History   • Marital status:    Tobacco Use   • Smoking status: Former     Years: .     Types: Cigarettes     Start date:      Quit date:      Years since quittin.1   • Smokeless tobacco: Former   Vaping Use   • Vaping Use: Never used   Substance and Sexual Activity   • Alcohol use: Never   • Drug use: Never   • Sexual activity: Defer        REVIEW OF SYSTEMS    Constitutional: Denies fevers, chills, weight loss  Cardiovascular: Denies chest pain, shortness of breath  Skin: Denies rashes, acute skin changes  Neurologic: Denies headache, loss of consciousness  MSK: Left shoulder pain      Objective   Vital Signs:   Pulse 71   Ht 167.6 cm (66\")   Wt 101 kg (222 lb 10.6 oz)   SpO2 97%   BMI 35.94 kg/m²     Body mass index is 35.94 kg/m².    Physical Exam    Left shoulder: Tender palpation anterior lateral shoulder, tender AC joint, pain with range of motion, active forward elevation 30, active abduction 90 with pain, external rotation 40 internal rotation 55, 4-5 strength.  Pain with crossarm adduction  Procedures    Imaging Results (Most Recent)     None      MRI left shoulder without contrast, " 2/14/2023, impression: 1.  Mild AC joint arthrosis.  2.  Mild supraspinatus and mild/moderate infraspinatus tendinosis without tear.  Minimal superficial posterior superior infraspinatus muscle edema suggesting mild muscle strain.  3.  Anterior superior quadrant with sublabral foramen normal variant versus tear without a paralabral cyst.  It could consider MR arthrography if clinically warranted.  4.  Healed proximal humeral fracture without evidence of osteonecrosis or significant deformity.  5.  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 Patterson with the patient             Assessment and Plan    Diagnoses and all orders for this visit:    1. Left shoulder pain, unspecified chronicity (Primary)    2. Primary osteoarthritis of left shoulder    3. Rotator cuff tendonitis, left    4. Calcific tendinitis of left shoulder        Reviewed MRI with the patient discussed diagnosis and treatment options with him Dr. Patterson also discussed treatment options with him including risk benefits procedure and recovery of left shoulder arthroscopic rotator cuff debridement versus repair, subacromial decompression, chondroplasty debridement.  Patient agreed to have surgery scheduled follow-up 2 weeks postop    Discussed surgery., Risks/benefits discussed with patient including, but not limited to: infection, bleeding, neurovascular damage, malunion, nonunion, aesthetic deformity, need for further surgery, and death., Discussed with patient the implant type being used during surgery and patient understands., Surgery pamphlet given. and Call or return if worsening symptoms.    Follow Up   Return for 2 WEEKS POST OP.  Patient was given instructions and counseling regarding his condition or for health maintenance advice. Please  see specific information pulled into the AVS if appropriate.

## 2023-03-31 RX ORDER — CHLORTHALIDONE 25 MG/1
12.5 TABLET ORAL DAILY
COMMUNITY

## 2023-03-31 RX ORDER — ASPIRIN 81 MG/1
81 TABLET, CHEWABLE ORAL DAILY
COMMUNITY

## 2023-03-31 NOTE — PRE-PROCEDURE INSTRUCTIONS
PRE-OP INSTRUCTIONS REVIEWED WITH PATIENT: FASTING/BATHING/ARRIVAL PROCEDURES.  INSTRUCTED TO TAKE A.M. DAY OF SURGERY: INHALER/NEB PRN, GABAPENTIN, NOVOLIN 31 UNITS PER ANESTHESIA PROTOCOL, OMEPRAZOLE  UNDERSTANDING VERBALIZED.

## 2023-04-02 NOTE — H&P
[facility]   HISTORY AND PHYSICAL    Patient Name: Kevin Ordonez  : 1960  MRN: [id number]  Primary Care Physician:  Ev Peck APRN  Date of admission: (Not on file)    Subjective   Subjective     Chief Complaint: Left shoulder pain    History of Present Illness: The patient is a 62-year-old male with left shoulder pain.  He has progressive and worsening left shoulder pain that has limited his activities.  The pain has failed to respond despite conservative treatment.  The patient wishes to proceed with operative treatment.    Review of Systems : Negative except for those mentioned in the history of present illness    Personal History     Past Medical History:   Diagnosis Date   • Asthma    • 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   • Diabetes mellitus    • Hyperlipidemia    • Hypertension    • Rotator cuff disorder     LEFT   • Sleep apnea        Past Surgical History:   Procedure Laterality Date   • CAROTID ENDARTERECTOMY Right    • CHOLECYSTECTOMY     • FOOT SURGERY Right     TOE DEBRIDMENT   • HERNIA REPAIR      VENTRAL HERNIA   • TUMOR REMOVAL Left     PAROTID GLAND   • VASCULAR SURGERY Right     STENT R LEG       Family History: family history includes Lung cancer in his father. Otherwise pertinent FHx was reviewed and not pertinent to current issue.    Social History:  reports that he quit smoking about 23 years ago. His smoking use included cigarettes. He started smoking about 49 years ago. He has quit using smokeless tobacco. He reports that he does not drink alcohol and does not use drugs.    Home Medications:  Dulaglutide, LORazepam, albuterol, aspirin, atorvastatin, chlorthalidone, cholecalciferol, clonazePAM, fish oil, fluticasone-salmeterol, gabapentin, glipizide, insulin NPH, losartan, metFORMIN, montelukast, omeprazole, and silver sulfadiazine    Allergies:  No Known Allergies    Objective    Objective     Vitals:        Physical Exam General: No apparent  distress, alert and oriented x3   HEENT: Normocephalic/atraumatic  Cardiovascular: Regular heart rate  Chest: Unlabored breathing  Abdomen: Soft, nontender and nondistended  Musculoskeletal: Neurovascular intact extremity.  Positive impingement signs.  Tender to palpation to the shoulder.  Reduced rotator cuff strength.  Reduced mobility.  Neurological: Grossly intact  Psychological: Mood and affect appropriate    Result Review    Result Review:  I have personally reviewed the results from the time of this admission to 4/1/2023 20:48 EDT and agree with these findings:  []  Laboratory list / accordion  []  Microbiology  [x]  Radiology  []  EKG/Telemetry   []  Cardiology/Vascular   []  Pathology  []  Old records  []  Other:  Most notable findings include: Left shoulder MRI: Osteoarthritis.  Possible labral tear.  Tendinitis.      Assessment & Plan   Assessment / Plan     Brief Patient Summary:  Kevin Ordonez is a 62 y.o. male who has left shoulder tendinitis    Active Hospital Problems:  Active Hospital Problems    Diagnosis    • Calcific tendinitis of left shoulder    • Left shoulder pain    • Primary osteoarthritis of left shoulder    • Rotator cuff tendonitis, left      Plan: I discussed treatment options with the patient.  Operative versus nonoperative treatment was discussed.  Risks and benefits of surgery were discussed.  Informed consent was obtained and he wishes to proceed with operative treatment.      DVT prophylaxis:  No DVT prophylaxis order currently exists.    CODE STATUS:       Admission Status:  I believe this patient meets outpatient status.    Chas Patterson MD

## 2023-04-03 ENCOUNTER — ANESTHESIA EVENT (OUTPATIENT)
Dept: PERIOP | Facility: HOSPITAL | Age: 63
End: 2023-04-03
Payer: OTHER GOVERNMENT

## 2023-04-03 ENCOUNTER — HOSPITAL ENCOUNTER (OUTPATIENT)
Facility: HOSPITAL | Age: 63
Setting detail: HOSPITAL OUTPATIENT SURGERY
Discharge: HOME | End: 2023-04-03
Attending: ORTHOPAEDIC SURGERY | Admitting: ORTHOPAEDIC SURGERY
Payer: OTHER GOVERNMENT

## 2023-04-03 ENCOUNTER — ANESTHESIA (OUTPATIENT)
Dept: PERIOP | Facility: HOSPITAL | Age: 63
End: 2023-04-03
Payer: OTHER GOVERNMENT

## 2023-04-03 VITALS
HEIGHT: 66 IN | WEIGHT: 231.04 LBS | RESPIRATION RATE: 16 BRPM | HEART RATE: 67 BPM | SYSTOLIC BLOOD PRESSURE: 172 MMHG | DIASTOLIC BLOOD PRESSURE: 85 MMHG | OXYGEN SATURATION: 98 % | TEMPERATURE: 97.5 F | BODY MASS INDEX: 37.13 KG/M2

## 2023-04-03 DIAGNOSIS — M75.82 ROTATOR CUFF TENDONITIS, LEFT: Primary | ICD-10-CM

## 2023-04-03 LAB
ANION GAP SERPL CALCULATED.3IONS-SCNC: 12.9 MMOL/L (ref 5–15)
BUN SERPL-MCNC: 29 MG/DL (ref 8–23)
BUN/CREAT SERPL: 29.9 (ref 7–25)
CALCIUM SPEC-SCNC: 9.7 MG/DL (ref 8.6–10.5)
CHLORIDE SERPL-SCNC: 103 MMOL/L (ref 98–107)
CO2 SERPL-SCNC: 24.1 MMOL/L (ref 22–29)
CREAT SERPL-MCNC: 0.97 MG/DL (ref 0.76–1.27)
EGFRCR SERPLBLD CKD-EPI 2021: 87.7 ML/MIN/1.73
GLUCOSE BLDC GLUCOMTR-MCNC: 75 MG/DL (ref 70–99)
GLUCOSE SERPL-MCNC: 100 MG/DL (ref 65–99)
POTASSIUM SERPL-SCNC: 4.1 MMOL/L (ref 3.5–5.2)
SODIUM SERPL-SCNC: 140 MMOL/L (ref 136–145)

## 2023-04-03 PROCEDURE — C1713 ANCHOR/SCREW BN/BN,TIS/BN: HCPCS | Performed by: ORTHOPAEDIC SURGERY

## 2023-04-03 PROCEDURE — 25010000002 KETOROLAC TROMETHAMINE PER 15 MG: Performed by: NURSE ANESTHETIST, CERTIFIED REGISTERED

## 2023-04-03 PROCEDURE — 25010000002 MIDAZOLAM PER 1MG: Performed by: ANESTHESIOLOGY

## 2023-04-03 PROCEDURE — 25010000002 DEXAMETHASONE PER 1 MG: Performed by: NURSE ANESTHETIST, CERTIFIED REGISTERED

## 2023-04-03 PROCEDURE — 82962 GLUCOSE BLOOD TEST: CPT

## 2023-04-03 PROCEDURE — 25010000002 FENTANYL CITRATE (PF) 50 MCG/ML SOLUTION: Performed by: NURSE ANESTHETIST, CERTIFIED REGISTERED

## 2023-04-03 PROCEDURE — 25010000002 PROPOFOL 10 MG/ML EMULSION: Performed by: NURSE ANESTHETIST, CERTIFIED REGISTERED

## 2023-04-03 PROCEDURE — 23430 REPAIR BICEPS TENDON: CPT | Performed by: ORTHOPAEDIC SURGERY

## 2023-04-03 PROCEDURE — 80048 BASIC METABOLIC PNL TOTAL CA: CPT | Performed by: ORTHOPAEDIC SURGERY

## 2023-04-03 PROCEDURE — 25010000002 ROPIVACAINE PER 1 MG: Performed by: ANESTHESIOLOGY

## 2023-04-03 PROCEDURE — 29822 SHO ARTHRS SRG LMTD DBRDMT: CPT | Performed by: ORTHOPAEDIC SURGERY

## 2023-04-03 PROCEDURE — 25010000002 CEFAZOLIN IN DEXTROSE 2-4 GM/100ML-% SOLUTION: Performed by: PHYSICIAN ASSISTANT

## 2023-04-03 PROCEDURE — 25010000002 ONDANSETRON PER 1 MG: Performed by: NURSE ANESTHETIST, CERTIFIED REGISTERED

## 2023-04-03 PROCEDURE — 29826 SHO ARTHRS SRG DECOMPRESSION: CPT | Performed by: ORTHOPAEDIC SURGERY

## 2023-04-03 PROCEDURE — L3670 SO ACRO/CLAV CAN WEB PRE OTS: HCPCS | Performed by: ORTHOPAEDIC SURGERY

## 2023-04-03 DEVICE — SYS IMP TENODESIS PROX: Type: IMPLANTABLE DEVICE | Site: SHOULDER | Status: FUNCTIONAL

## 2023-04-03 RX ORDER — FENTANYL CITRATE 50 UG/ML
INJECTION, SOLUTION INTRAMUSCULAR; INTRAVENOUS AS NEEDED
Status: DISCONTINUED | OUTPATIENT
Start: 2023-04-03 | End: 2023-04-03 | Stop reason: SURG

## 2023-04-03 RX ORDER — PROPOFOL 10 MG/ML
VIAL (ML) INTRAVENOUS AS NEEDED
Status: DISCONTINUED | OUTPATIENT
Start: 2023-04-03 | End: 2023-04-03 | Stop reason: SURG

## 2023-04-03 RX ORDER — SODIUM CHLORIDE, SODIUM LACTATE, POTASSIUM CHLORIDE, CALCIUM CHLORIDE 600; 310; 30; 20 MG/100ML; MG/100ML; MG/100ML; MG/100ML
9 INJECTION, SOLUTION INTRAVENOUS CONTINUOUS PRN
Status: DISCONTINUED | OUTPATIENT
Start: 2023-04-03 | End: 2023-04-03 | Stop reason: HOSPADM

## 2023-04-03 RX ORDER — MAGNESIUM HYDROXIDE 1200 MG/15ML
LIQUID ORAL AS NEEDED
Status: DISCONTINUED | OUTPATIENT
Start: 2023-04-03 | End: 2023-04-03 | Stop reason: HOSPADM

## 2023-04-03 RX ORDER — DEXAMETHASONE SODIUM PHOSPHATE 4 MG/ML
INJECTION, SOLUTION INTRA-ARTICULAR; INTRALESIONAL; INTRAMUSCULAR; INTRAVENOUS; SOFT TISSUE AS NEEDED
Status: DISCONTINUED | OUTPATIENT
Start: 2023-04-03 | End: 2023-04-03 | Stop reason: SURG

## 2023-04-03 RX ORDER — CEFAZOLIN SODIUM 2 G/100ML
2 INJECTION, SOLUTION INTRAVENOUS ONCE
Status: COMPLETED | OUTPATIENT
Start: 2023-04-03 | End: 2023-04-03

## 2023-04-03 RX ORDER — MEPERIDINE HYDROCHLORIDE 25 MG/ML
12.5 INJECTION INTRAMUSCULAR; INTRAVENOUS; SUBCUTANEOUS
Status: DISCONTINUED | OUTPATIENT
Start: 2023-04-03 | End: 2023-04-03 | Stop reason: HOSPADM

## 2023-04-03 RX ORDER — PHENYLEPHRINE HCL IN 0.9% NACL 1 MG/10 ML
SYRINGE (ML) INTRAVENOUS AS NEEDED
Status: DISCONTINUED | OUTPATIENT
Start: 2023-04-03 | End: 2023-04-03 | Stop reason: SURG

## 2023-04-03 RX ORDER — ONDANSETRON 2 MG/ML
INJECTION INTRAMUSCULAR; INTRAVENOUS AS NEEDED
Status: DISCONTINUED | OUTPATIENT
Start: 2023-04-03 | End: 2023-04-03 | Stop reason: SURG

## 2023-04-03 RX ORDER — CEFAZOLIN SODIUM IN 0.9 % NACL 3 G/100 ML
3 INTRAVENOUS SOLUTION, PIGGYBACK (ML) INTRAVENOUS ONCE
Status: DISCONTINUED | OUTPATIENT
Start: 2023-04-03 | End: 2023-04-03 | Stop reason: ALTCHOICE

## 2023-04-03 RX ORDER — PROMETHAZINE HYDROCHLORIDE 25 MG/1
25 SUPPOSITORY RECTAL ONCE AS NEEDED
Status: DISCONTINUED | OUTPATIENT
Start: 2023-04-03 | End: 2023-04-03 | Stop reason: HOSPADM

## 2023-04-03 RX ORDER — ACETAMINOPHEN 500 MG
1000 TABLET ORAL ONCE
Status: COMPLETED | OUTPATIENT
Start: 2023-04-03 | End: 2023-04-03

## 2023-04-03 RX ORDER — ROPIVACAINE HYDROCHLORIDE 5 MG/ML
INJECTION, SOLUTION EPIDURAL; INFILTRATION; PERINEURAL
Status: COMPLETED | OUTPATIENT
Start: 2023-04-03 | End: 2023-04-03

## 2023-04-03 RX ORDER — SUCCINYLCHOLINE/SOD CL,ISO/PF 100 MG/5ML
SYRINGE (ML) INTRAVENOUS AS NEEDED
Status: DISCONTINUED | OUTPATIENT
Start: 2023-04-03 | End: 2023-04-03 | Stop reason: SURG

## 2023-04-03 RX ORDER — KETOROLAC TROMETHAMINE 30 MG/ML
INJECTION, SOLUTION INTRAMUSCULAR; INTRAVENOUS AS NEEDED
Status: DISCONTINUED | OUTPATIENT
Start: 2023-04-03 | End: 2023-04-03 | Stop reason: SURG

## 2023-04-03 RX ORDER — OXYCODONE AND ACETAMINOPHEN 7.5; 325 MG/1; MG/1
1 TABLET ORAL EVERY 4 HOURS PRN
Qty: 36 TABLET | Refills: 0 | Status: SHIPPED | OUTPATIENT
Start: 2023-04-03 | End: 2023-04-17 | Stop reason: SDUPTHER

## 2023-04-03 RX ORDER — MIDAZOLAM HYDROCHLORIDE 2 MG/2ML
3 INJECTION, SOLUTION INTRAMUSCULAR; INTRAVENOUS ONCE
Status: COMPLETED | OUTPATIENT
Start: 2023-04-03 | End: 2023-04-03

## 2023-04-03 RX ORDER — PROMETHAZINE HYDROCHLORIDE 12.5 MG/1
25 TABLET ORAL ONCE AS NEEDED
Status: DISCONTINUED | OUTPATIENT
Start: 2023-04-03 | End: 2023-04-03 | Stop reason: HOSPADM

## 2023-04-03 RX ORDER — ONDANSETRON 2 MG/ML
4 INJECTION INTRAMUSCULAR; INTRAVENOUS ONCE AS NEEDED
Status: DISCONTINUED | OUTPATIENT
Start: 2023-04-03 | End: 2023-04-03 | Stop reason: HOSPADM

## 2023-04-03 RX ORDER — BUPIVACAINE HYDROCHLORIDE AND EPINEPHRINE 5; 5 MG/ML; UG/ML
INJECTION, SOLUTION EPIDURAL; INTRACAUDAL; PERINEURAL AS NEEDED
Status: DISCONTINUED | OUTPATIENT
Start: 2023-04-03 | End: 2023-04-03 | Stop reason: HOSPADM

## 2023-04-03 RX ORDER — LIDOCAINE HYDROCHLORIDE 20 MG/ML
INJECTION, SOLUTION EPIDURAL; INFILTRATION; INTRACAUDAL; PERINEURAL AS NEEDED
Status: DISCONTINUED | OUTPATIENT
Start: 2023-04-03 | End: 2023-04-03 | Stop reason: SURG

## 2023-04-03 RX ORDER — ROCURONIUM BROMIDE 10 MG/ML
INJECTION, SOLUTION INTRAVENOUS AS NEEDED
Status: DISCONTINUED | OUTPATIENT
Start: 2023-04-03 | End: 2023-04-03 | Stop reason: SURG

## 2023-04-03 RX ORDER — OXYCODONE HYDROCHLORIDE 5 MG/1
5 TABLET ORAL
Status: DISCONTINUED | OUTPATIENT
Start: 2023-04-03 | End: 2023-04-03 | Stop reason: HOSPADM

## 2023-04-03 RX ADMIN — Medication 200 MCG: at 13:14

## 2023-04-03 RX ADMIN — ROPIVACAINE HYDROCHLORIDE 25 ML: 5 INJECTION, SOLUTION EPIDURAL; INFILTRATION; PERINEURAL at 11:49

## 2023-04-03 RX ADMIN — ROCURONIUM BROMIDE 5 MG: 10 INJECTION, SOLUTION INTRAVENOUS at 12:33

## 2023-04-03 RX ADMIN — KETOROLAC TROMETHAMINE 30 MG: 30 INJECTION, SOLUTION INTRAMUSCULAR; INTRAVENOUS at 12:45

## 2023-04-03 RX ADMIN — LIDOCAINE HYDROCHLORIDE 100 MG: 20 INJECTION, SOLUTION EPIDURAL; INFILTRATION; INTRACAUDAL; PERINEURAL at 12:33

## 2023-04-03 RX ADMIN — MIDAZOLAM HYDROCHLORIDE 3 MG: 1 INJECTION, SOLUTION INTRAMUSCULAR; INTRAVENOUS at 11:43

## 2023-04-03 RX ADMIN — Medication 100 MG: at 12:33

## 2023-04-03 RX ADMIN — ONDANSETRON 4 MG: 2 INJECTION INTRAMUSCULAR; INTRAVENOUS at 12:28

## 2023-04-03 RX ADMIN — Medication 200 MCG: at 12:52

## 2023-04-03 RX ADMIN — SODIUM CHLORIDE, POTASSIUM CHLORIDE, SODIUM LACTATE AND CALCIUM CHLORIDE: 600; 310; 30; 20 INJECTION, SOLUTION INTRAVENOUS at 13:28

## 2023-04-03 RX ADMIN — PROPOFOL 180 MG: 10 INJECTION, EMULSION INTRAVENOUS at 12:33

## 2023-04-03 RX ADMIN — FENTANYL CITRATE 50 MCG: 50 INJECTION, SOLUTION INTRAMUSCULAR; INTRAVENOUS at 12:33

## 2023-04-03 RX ADMIN — ACETAMINOPHEN 1000 MG: 500 TABLET ORAL at 11:53

## 2023-04-03 RX ADMIN — ROCURONIUM BROMIDE 45 MG: 10 INJECTION, SOLUTION INTRAVENOUS at 12:43

## 2023-04-03 RX ADMIN — DEXAMETHASONE SODIUM PHOSPHATE 4 MG: 4 INJECTION, SOLUTION INTRA-ARTICULAR; INTRALESIONAL; INTRAMUSCULAR; INTRAVENOUS; SOFT TISSUE at 12:28

## 2023-04-03 RX ADMIN — Medication 200 MCG: at 13:09

## 2023-04-03 RX ADMIN — CEFAZOLIN SODIUM 2 G: 2 INJECTION, SOLUTION INTRAVENOUS at 12:27

## 2023-04-03 RX ADMIN — SUGAMMADEX 200 MG: 100 INJECTION, SOLUTION INTRAVENOUS at 13:23

## 2023-04-03 RX ADMIN — SODIUM CHLORIDE, POTASSIUM CHLORIDE, SODIUM LACTATE AND CALCIUM CHLORIDE 9 ML/HR: 600; 310; 30; 20 INJECTION, SOLUTION INTRAVENOUS at 11:53

## 2023-04-03 RX ADMIN — FENTANYL CITRATE 50 MCG: 50 INJECTION, SOLUTION INTRAMUSCULAR; INTRAVENOUS at 12:39

## 2023-04-03 NOTE — DISCHARGE INSTRUCTIONS
DISCHARGE INSTRUCTIONS  Post-Operative  Arthroscopic Shoulder Surgery      For your surgery you had:  General anesthesia (you may have a sore throat for the first 24 hours)  You received an anesthesia medication today that can cause hormonal forms of birth control to be ineffective. You should use a different form of birth control (to prevent pregnancy) for 7 days.   IV sedation.  Local anesthesia  Monitored anesthesia care  You may experience dizziness, drowsiness, or light-headedness for several hours following surgery/procedure.  Do not stay alone today or tonight.  Limit your activity for 24 hours.  Resume your diet slowly.  Follow whatever special dietary instructions you may have been given by your doctor.  You should not drive or operate machinery or drink alcohol for 24 hours or while you are taking pain medication.  You should not sign legally binding documents.  Post-Operative Day #1 is counted as the 1st day after surgery.  [x] If you had a regional block, you will not have control of your arm for up to 12 hours.  Protect the arm with a sling or follow your physician's specific instructions.  Post-Operative Day 3  Remove your dressing.  Under the dressing you will either have sutures or staples.  Change dressing once or twice daily.  Place a dry dressing or band-aids over the small incisions.  Prior to dressing change, wash your hands.  Post-Operative Day 3  You may shower.  During the shower, you may let the water hit the incision and roll down but do not scrub or place any soap on the incision.  Do not soak it.  Pat it dry and do not put any ointments on the incision unless directed by surgeon. Then replace the dry dressing.    General Instructions  [] Keep arm elevated with sling to decrease swelling and minimize throbbing pain.  The sling will provide support for your shoulder.  It is important to remove the sling 3-5 times daily to work on range-of-motion of the elbow, wrist and hand.  You will  receive a prescription for physical therapy, unless otherwise instructed by physician.  After most shoulder surgeries, it is permissible to start exercises by slowing trying to crawl your fingers up a wall until your arm is parallel to the floor.  While doing this support the affected arm at the elbow or closer to the shoulder.  You may also lean over and let the arm and hand do small or large circles or write the alphabet with your hanging arm to keep it loose.  It is normal to have some pain with these gentle exercises.  The exercises help reduce swelling and pain overall and help to avoid a stiff shoulder post-operatively.  It is okay to come out of the sling for showering or for certain activities of daily living but avoid any lifting or carrying with the affected arm until instructed by the physician especially if you have had a repair of the rotator cuff or some type of reconstructive procedure.  It is okay to come out of the sling and support the arm with a pillow if you remain sedentary.  In general, sling use is preferred following a repair or reconstruction for 4 weeks.  If you have had a SAD (sub acromial decompression), continue sling use more liberally because there was not a repair or reconstruction that could be harmed.          DISCHARGE INSTRUCTIONS  Post-Operative   Arthroscopic Shoulder Surgery      Exercise fingers for 10 minutes every hour while awake.  The physician will typically inform the family and the patient whether or not a repair or reconstruction could be harmed.  If you have had a rotator cuff repair or reconstructive procedure, do not let the arm drop down suddenly from an elevated position down to your side despite improvements made in pain and over the 3 months following repair and reconstruction.  It generally takes 8-12 weeks before the repair or reconstruction does not rely on sutures and anchors that are placed for the repair.  You are generally given a prescription for a  narcotic medication.  Take as prescribed.  You may use over-the-counter anti-inflammatory medications such as Motrin or Aleve for additional pain or fever reduction if not allergic.  If you are taking the pain medication prescribed, do not take Tylenol too unless you consult with the pharmacist. The pain medications generally have Tylenol in them and too much Tylenol can be harmful.  Sometimes it is recommended to take an anti-inflammatory in between doses of prescription pain medication if additional pain relief is needed.  Consult with your physician or your pharmacist before taking over-the-counter pain medications/anti-inflammatories.  It is not uncommon to have a fever post-operatively especially in the first 24-48 hours.  Anti-inflammatories can be used for fever reduction also.  It is normal to have some redness or irritation around skin sutures or staples, however, if you have any expanding areas of redness with a persistent fever and increasing pain notify the physician as soon as possible.  The incidence of blood clots is low following arthroscopic procedures, however, if you notice any increasing pain or swelling in your calves or legs, contact the physician as soon as possible.   It is difficult to sleep in the customary fashion following a shoulder surgery.  It is usually necessary to sleep with the shoulder above the level of the heart such as in a recliner, couch or with the head of the bed elevated.  This should slowly improve over the weeks following shoulder surgery.  If unable to urinate 6 to 8 hours after surgery or urinating frequently in small amounts, notify the physician or go to the nearest Emergency Room.  SPECIAL INSTRUCTIONS:        NOTIFY YOUR PHYSICIAN IF YOU EXPERIENCE:  Numbness of fingers.  Inability to move fingers.  Extreme coldness, paleness or blue dis-coloration of fingers.  Any pain other than from the incision, such as swelling of the arm that blocks circulation of  fingers.  Follow verbal instructions from your doctor.  Medications per physician instructions as indicated on Discharge Medication Information Sheet.  _  Missing your appointment/follow-up could lead to serious complications  If you have an emergency and cannot reach your doctor, go to an Emergency Room nearest your home.

## 2023-04-03 NOTE — ANESTHESIA PREPROCEDURE EVALUATION
Anesthesia Evaluation     Patient summary reviewed and Nursing notes reviewed   no history of anesthetic complications:  NPO Solid Status: > 8 hours  NPO Liquid Status: > 2 hours           Airway   Mallampati: III  TM distance: >3 FB  Neck ROM: full  No difficulty expected  Dental    (+) partials        Pulmonary - normal exam    breath sounds clear to auscultation  (+) a smoker Former, asthma,sleep apnea,   Cardiovascular - normal exam  Exercise tolerance: good (4-7 METS)    ECG reviewed  Rhythm: regular  Rate: normal    (+) hypertension, hyperlipidemia,       Neuro/Psych- negative ROS  GI/Hepatic/Renal/Endo    (+)   diabetes mellitus,     Musculoskeletal     Abdominal    Substance History - negative use     OB/GYN negative ob/gyn ROS         Other   arthritis,      ROS/Med Hx Other: PAT Nursing Notes unavailable.                   Anesthesia Plan    ASA 3     general with block     (Patient understands anesthesia not responsible for dental damage.)  intravenous induction     Anesthetic plan, risks, benefits, and alternatives have been provided, discussed and informed consent has been obtained with: patient.    Use of blood products discussed with patient .   Plan discussed with CRNA.        CODE STATUS:

## 2023-04-03 NOTE — ANESTHESIA POSTPROCEDURE EVALUATION
Patient: Kevin Ordonez    Procedure Summary     Date: 04/03/23 Room / Location: [facility] OR [address] [place] [address] OR [facility]    Anesthesia Start: 1227 Anesthesia Stop: 1339    Procedure: LEFT SHOULDER ARTHROSCOPIC ROTATOR CUFF DEBRIDEMENT, LABRAL DEBRIDEMENT, BICEPS TENODESIS, SUBACROMIAL DECOMPRESSION (Left: Shoulder) Diagnosis:       Calcific tendinitis of left shoulder      Primary osteoarthritis of left shoulder      Left shoulder pain, unspecified chronicity      Rotator cuff tendonitis, left      (Calcific tendinitis of left shoulder [M75.32])      (Primary osteoarthritis of left shoulder [M19.012])      (Left shoulder pain, unspecified chronicity [M25.512])      (Rotator cuff tendonitis, left [M75.82])    Surgeons: Chas Patterson MD Provider: Sophie Ribera MD    Anesthesia Type: general with block ASA Status: 3          Anesthesia Type: general with block    Vitals  Vitals Value Taken Time   /67 04/03/23 1414   Temp 36.4 °C (97.5 °F) 04/03/23 1414   Pulse 66 04/03/23 1417   Resp 16 04/03/23 1414   SpO2 95 % 04/03/23 1417   Vitals shown include unvalidated device data.        Post Anesthesia Care and Evaluation    Patient location during evaluation: bedside  Patient participation: complete - patient participated  Level of consciousness: awake  Pain management: adequate    Airway patency: patent  PONV Status: none  Cardiovascular status: acceptable and stable  Respiratory status: acceptable  Hydration status: acceptable    Comments: An Anesthesiologist personally participated in the most demanding procedures (including induction and emergence if applicable) in the anesthesia plan, monitored the course of anesthesia administration at frequent intervals and remained physically present and available for immediate diagnosis and treatment of emergencies.

## 2023-04-03 NOTE — OP NOTE
SHOULDER ARTHROSCOPY WITH ROTATOR CUFF REPAIR  Procedure Report    Patient Name:  Kevin Ordonez  YOB: 1960    Date of Surgery:  4/3/2023     Indications:  Patient has failed conservative treatment and wishes to undergo operative treatment. Risks and benefits of operative treatment including bleeding, infection, damage to neurovascular structures, continued pain and disability, need for additional procedures, among others. Informed consent was obtained and they wished to proceed.    Pre-op Diagnosis:   Calcific tendinitis of left shoulder [M75.32]  Primary osteoarthritis of left shoulder [M19.012]  Left shoulder pain, unspecified chronicity [M25.512]  Rotator cuff tendonitis, left [M75.82]       Post-Op Diagnosis Codes:     * Calcific tendinitis of left shoulder [M75.32]     * Primary osteoarthritis of left shoulder [M19.012]     * Left shoulder pain, unspecified chronicity [M25.512]     * Rotator cuff tendonitis, left [M75.82]    Procedure/CPT® Codes:      Procedure(s):  LEFT SHOULDER ARTHROSCOPIC ROTATOR CUFF DEBRIDEMENT, LABRAL DEBRIDEMENT SUBACROMIAL DECOMPRESSION, biceps tenodesis    Staff:  Surgeon(s):  Chas Patterson MD    Assistant: Govind Dietz RN    Anesthesia: General    Estimated Blood Loss: 10 mL    Implants:    Implant Name Type Inv. Item Serial No.  Lot No. LRB No. Used Action   SYS IMP TENODESIS PROX - [id number] Implant SYS IMP TENODESIS PROX  [employer] [id number] Left 1 Implanted       Specimen:          None        Findings: labral tear, rotator cuff tendinitis, subacromial impingement    Complications: none    Description of Procedure: Operative site was marked in preoperative holding area.  Interscalene nerve block was performed by anesthesia.  Patient was brought the operating room and general anesthesia was applied.  The patient was placed into the lateral decubitus position and all bony prominences were padded.  The patient was secured with a deflated  beanbag and an axillary roll was placed.  SCD boots were placed on the lower extremities and the down arm was placed in an arm board.  The operative extremity was prepped and draped in usual sterile fashion and was placed in a sterile traction arm alvarado.  Preoperative antibiotic was given and formal timeout was held.    Posterior portal was established and the arthroscope was inserted into the glenohumeral joint.  An anterior portal was established in the rotator interval and a cannula was inserted.  The glenohumeral joint was inspected and the intra-articular findings included a anterior superior labral tear.  There is a degenerative tear involving the anterior superior labrum with detachment of the biceps anchor with a type II SLAP tear.  The biceps tendon was released with a cautery device for later tenodesis.  The labrum was debrided with a motorized shaver.  There was minimal chondromalacia of the glenohumeral joint grade 1.  Additional labrum was intact.  The rotator cuff was intact from the articular side.    At this point the labrum was debrided with a motorized shaver.  The biceps tendon was released with an ablation probe for later tenodesis.  The arthroscopic camera was then removed and reinserted into the subacromial space.  A lateral portal was established and the motorized shaver and ablation probe were used to clear the subacromial space.  The rotator cuff was intact on the bursal side.  A motorized bur was used to remove a portion of the acromion.  Around 1 cm acromion was removed from anterior to posterior and lateral to medial for equal resection.  There was evidence of fraying of the undersurface of the acromion suggesting subacromial impingement.    The rotator cuff was assessed from the bursal side.  There is no tearing.  There is an area of suspected calcific tendinitis which was debrided with a motorized shaver.  There was some roughening of the supraspinatus tendon on the bursal side but no  high-grade tear.  No evidence of large calcium deposits or high-grade rotator cuff tearing.    At this point a 3 cm incision was made in the axillary region just below the pectoralis tendon.  Subcutaneous tissues and fascia were divided and the biceps tendon was identified and retrieved this evening.  A #2 fiber loop suture was used to create a running locking suture from the musculotendinous junction 25 mm proximally.  The excess tendon was removed and the sutures were placed through the biceps tendon button.  A bicortical drill pin was placed at the bicipital groove just below the pectoralis tendon.  A unicortical 5 mm drill hole was made and the bony debris was irrigated.  The biceps tendon button was placed bicortically and was flipped on the far cortex.  The sutures were pulled docking the tendon into the unicortical hole.  A suture was placed through the tendon and a knot was tied.  The tendon repair was secure.  The wound was irrigated and the subcutaneous tissues were closed with 3-0 undyed Vicryl.  The skin was closed with running 4-0 Monocryl Mastisol Steri-Strips were placed.  Local anesthetic was injected.  The portal incisions were irrigated and were closed with nylon suture.  Sterile dressings were placed.    Cold therapy and a shoulder immobilizer were placed.  Patient will from anesthesia in stable condition.  There were no complications.  All counts are correct and the patient was stable to recovery.    Assistant: Govind Dietz RN  was responsible for performing the following activities: Retraction, Suction, Irrigation and Placing Dressing and their skilled assistance was necessary for the success of this case.    Chas Patterson MD     Date: 4/3/2023  Time: 13:40 EDT

## 2023-04-03 NOTE — ANESTHESIA PROCEDURE NOTES
Peripheral Block      Patient reassessed immediately prior to procedure    Patient location during procedure: pre-op  Start time: 4/3/2023 11:43 AM  Stop time: 4/3/2023 11:50 AM  Reason for block: at surgeon's request and post-op pain management  Performed by  Anesthesiologist: Sophie Ribera MD  Preanesthetic Checklist  Completed: patient identified, IV checked, site marked, risks and benefits discussed, surgical consent, monitors and equipment checked, pre-op evaluation and timeout performed  Prep:  Pt Position: supine (HOB elevated)  Sterile barriers:cap, washed/disinfected hands, sterile barriers, gloves, mask, partial drape and alcohol skin prep  Prep: ChloraPrep  Patient monitoring: blood pressure monitoring, continuous pulse oximetry and EKG  Procedure    Sedation: yes  Performed under: local infiltration  Guidance:ultrasound guided    ULTRASOUND INTERPRETATION.  Using ultrasound guidance a 22 G gauge needle was placed in close proximity to the brachial plexus nerve, at which point, under ultrasound guidance anesthetic was injected in the area of the nerve and spread of the anesthesia was seen on ultrasound in close proximity thereto.  There were no abnormalities seen on ultrasound; a digital image was taken; and the patient tolerated the procedure with no complications. Images:still images obtained, printed/placed on chart    Laterality:left  Block Type:interscalene  Injection Technique:single-shot  Needle Type:echogenic  Needle Gauge:22 G (2in)  Resistance on Injection: none    Medications Used: ropivacaine (NAROPIN) 0.5 % injection - Injection   25 mL - 4/3/2023 11:49:00 AM      Medications  Comment:With epi 1:200,000    Post Assessment  Injection Assessment: negative aspiration for heme, no paresthesia on injection and incremental injection  Patient Tolerance:comfortable throughout block  Complications:no  Additional Notes  The block or continuous infusion is requested by the referring physician for  management of postoperative pain, or pain related to a procedure. Ultrasound guidance (deemed medically necessary). Painless injection, pt was awake and conversant during the procedure without complications. Needle and surrounding structures visualized throughout procedure. No adverse reactions or complications seen during this period. Post-procedure image showed no signs of complication, and anatomy was consistent with an uncomplicated nerve blockade.

## 2023-04-13 ENCOUNTER — TREATMENT (OUTPATIENT)
Dept: PHYSICAL THERAPY | Facility: CLINIC | Age: 63
End: 2023-04-13
Payer: OTHER GOVERNMENT

## 2023-04-13 DIAGNOSIS — Z98.890 S/P ARTHROSCOPY OF LEFT SHOULDER: Primary | ICD-10-CM

## 2023-04-13 DIAGNOSIS — R29.898 WEAKNESS OF LEFT UPPER EXTREMITY: ICD-10-CM

## 2023-04-13 DIAGNOSIS — M25.512 ACUTE PAIN OF LEFT SHOULDER: ICD-10-CM

## 2023-04-13 DIAGNOSIS — M25.612 SHOULDER STIFFNESS, LEFT: ICD-10-CM

## 2023-04-13 NOTE — PROGRESS NOTES
Physical Therapy Initial Evaluation and Plan of Care     [address] [place] [id number]    Patient: Kevin Ordonez   : 1960  Diagnosis/ICD-10 Code:  S/P arthroscopy of left shoulder [Z98.890]  Referring practitioner: Chas Patterson MD  Date of Initial Visit: 2023  Today's Date: 2023  Patient seen for 1 sessions           Subjective Questionnaire: QuickDASH: 55 = 100% limitation      Subjective: s/p Left Shoulder scope with biceps tenodesis, RTC and labral debridement, and SAD 4/3/23. Presents in the sling today. He has been doing pendulums at home. Admits to trying to lift something already and is a little more sore today, but otherwise his pain has been well controlled to this time. Pt is right handed.     Pt occupation: retired     Current Pain 4/10  Best Pain: 2/10  Worst Pain: 7/10  Quality of pain: aching     Post treatment pain: 4/10     Aggravating Factors: lifting his arm  Easing Factors: ice, medications     Past Medical Hx: cancer, DM    Patient Goals: be able to use his arm normally.      Objective          Observations     Additional Shoulder Observation Details  Incisions covered with bandages except axillary incision, healing well, no redness surrounding.  Presents in sling, no abduction pillow.       Cervical/Thoracic Screen   Cervical range of motion within normal limits with the following exceptions: Limited right cervical lateral flexion with left upper trap tightness    Passive Range of Motion   Left Shoulder   Flexion: 110 degrees with pain  Abduction: Left shoulder passive abduction: scaption 90. with pain  External rotation 45°: 20 degrees with pain  Internal rotation 45°: 60 degrees     Strength/Myotome Testing     Additional Strength Details  NT with orthopedic precautions.      See Exercise, Manual, and Modality Logs for complete treatment.     Assessment & Plan     Assessment  Impairments: abnormal muscle firing, abnormal or restricted ROM, activity  intolerance, impaired physical strength and pain with function  Functional Limitations: carrying objects, lifting, sleeping, reaching behind back and reaching overhead  Assessment details: The patient presents to physical therapy s/p Left Shoulder scope with biceps tenodesis, RTC and labral debridement, and SAD 4/3/23. Will follow biceps tenodesis protocol. The patient presents with associated shoulder weakness, stiffness, and functional deficits (QuickDASH). The patient would benefit from skilled PT intervention to address the above mentioned functional limitations.     Prognosis: good    Goals  Plan Goals: SHOULDER  PROBLEMS:     1. The patient has limited ROM of the left shoulder.    LTG 1: 12 weeks:  The patient will demonstrate 150 degrees of shoulder flexion, 140 degrees of shoulder abduction, shoulder external rotation to C7, and shoulder internal rotation to L1 to allow the patient to reach into upper kitchen cabinets and manipulate clothing behind the back with greater ease.    STATUS:  New   STG 1a: 6 weeks:  The patient will demonstrate 120 degrees of  shoulder flexion, 110 degrees of shoulder abduction, 70 degrees of shoulder external rotation, and 80 degrees of shoulder internal rotation.    STATUS:  New   TREATMENT: Manual therapy, therapeutic exercise, home exercise instruction, and modalities as needed to include: electrical stimulation, ultrasound, moist heat, and ice.    2. The patient has limited strength of the left shoulder.   LTG 2: 12 weeks:  The patient will demonstrate 4+ /5 strength for shoulder flexion, abduction, external rotation, and internal rotation in order to demonstrate improved shoulder stability.    STATUS:  New   STG 2a: 6 weeks:  The patient will demonstrate 4- /5 strength for shoulder flexion, abduction, external rotation, and internal rotation.    STATUS:  New   STG2b:  6 weeks:  The patient will be independent with home exercises.     STATUS:  New   TREATMENT: Manual  therapy, therapeutic exercise, home exercise instruction, and modalities as needed to include: electrical stimulation, ultrasound, moist heat, and ice.     3. The patient complains of pain to the left shoulder.   LTG 3: 12 weeks:  The patient will report a pain rating of 2 /10 or better in order to improve sleep quality and tolerance to performance of activities of daily living.    STATUS:  New   STG 3a: 6 weeks:  The patient will report a pain rating of 5 /10 or better.     STATUS:  New   TREATMENT: Manual therapy, therapeutic exercise, home exercise instruction, and modalities as needed to include: electrical stimulation, ultrasound, moist heat, and ice.    4. Carrying, Moving, and Handling Objects Functional Limitation     LTG 4: 12 weeks:  The patient will demonstrate 1-19% limitation by achieving a score of 12-19 on the QuickDASH.    STATUS:  New   STG 4a: 6 weeks:  The patient will demonstrate 20-39 % limitation by achieving a score of 20-27 on the QuickDASH.      STATUS:  New   TREATMENT:  Manual therapy, therapeutic exercise, home exercise instruction, and modalities as needed to include: moist heat, electrical stimulation, and ultrasound.       Plan  Therapy options: will be seen for skilled therapy services  Planned modality interventions: TENS, cryotherapy, thermotherapy (hydrocollator packs) and dry needling  Planned therapy interventions: functional ROM exercises, home exercise program, joint mobilization, manual therapy, soft tissue mobilization, stretching and strengthening  Frequency: 3x week  Duration in weeks: 12  Treatment plan discussed with: patient        Visit Diagnoses:    ICD-10-CM ICD-9-CM   1. S/P arthroscopy of left shoulder  Z98.890 V45.89   2. Acute pain of left shoulder  M25.512 719.41   3. Shoulder stiffness, left  M25.612 719.51   4. Weakness of left upper extremity  R29.898 729.89       History # of Personal Factors and/or Comorbidities: MODERATE (1-2)  Examination of Body  System(s): # of elements: MODERATE (3)  Clinical Presentation: STABLE   Clinical Decision Making: LOW       Timed:         Manual Therapy:    15     mins  43233;     Therapeutic Exercise:    10     mins  87124;     Neuromuscular Lorenzo:    0    mins  35009;    Therapeutic Activity:     0     mins  47197;     Gait Trainin     mins  80766;     Ultrasound:     0     mins  31543;    Ionto                               0    mins   63603  Self Care                       0     mins   58161        Un-Timed:  Electrical Stimulation:    0     mins  17597 ( );  Dry Needling     0     mins self-pay  Canalith Repos    0     mins 66440  Traction     0     mins 28279  Low Eval     20     Mins  74067  Mod Eval     0     Mins  95502  High Eval                       0     Mins  98727  Re-Eval                           0    mins  52305    Timed Treatment:   25   mins   Total Treatment:     45   mins    PT SIGNATURE: Frankie Sow PT     Electronically signed 2023    [place] License: PT - [id number]     Initial Certification  Certification Period: 2023 thru 2023  I certify that the therapy services are furnished while this patient is under my care.  The services outlined above are required by this patient, and will be reviewed every 90 days.     PHYSICIAN: Chas Patterson MD   NPI: [id number]                                        DATE:     Please sign and return via fax to [phone]. Thank you, [facility].

## 2023-04-17 ENCOUNTER — TELEPHONE (OUTPATIENT)
Dept: ORTHOPEDIC SURGERY | Facility: CLINIC | Age: 63
End: 2023-04-17
Payer: OTHER GOVERNMENT

## 2023-04-17 ENCOUNTER — OFFICE VISIT (OUTPATIENT)
Dept: ORTHOPEDIC SURGERY | Facility: CLINIC | Age: 63
End: 2023-04-17
Payer: OTHER GOVERNMENT

## 2023-04-17 VITALS — WEIGHT: 231.48 LBS | HEIGHT: 66 IN | HEART RATE: 67 BPM | BODY MASS INDEX: 37.2 KG/M2 | OXYGEN SATURATION: 96 %

## 2023-04-17 DIAGNOSIS — M75.82 ROTATOR CUFF TENDONITIS, LEFT: ICD-10-CM

## 2023-04-17 DIAGNOSIS — Z47.89 AFTERCARE FOLLOWING SURGERY OF THE MUSCULOSKELETAL SYSTEM: Primary | ICD-10-CM

## 2023-04-17 PROCEDURE — 99024 POSTOP FOLLOW-UP VISIT: CPT | Performed by: PHYSICIAN ASSISTANT

## 2023-04-17 RX ORDER — OXYCODONE AND ACETAMINOPHEN 7.5; 325 MG/1; MG/1
1 TABLET ORAL EVERY 6 HOURS PRN
Qty: 36 TABLET | Refills: 0 | Status: SHIPPED | OUTPATIENT
Start: 2023-04-17

## 2023-04-17 RX ORDER — OXYCODONE AND ACETAMINOPHEN 7.5; 325 MG/1; MG/1
1 TABLET ORAL EVERY 6 HOURS PRN
Qty: 36 TABLET | Refills: 0 | Status: SHIPPED | OUTPATIENT
Start: 2023-04-17 | End: 2023-04-17 | Stop reason: SDUPTHER

## 2023-04-17 NOTE — PROGRESS NOTES
"Chief Complaint  Pain and Follow-up of the Left Shoulder and Suture / Staple Removal    Subjective          History of Present Illness      Kevin Ordonez is a 62 y.o. male  presents to [facility] for     Patient presents for 2-week postop evaluation left shoulder arthroscopic rotator cuff debridement, labral debridement, subacromial decompression, bicep tenodesis, 4/3/2023.  He states he has been attending therapy at [facility].  He presents with his sling and has been compliant with sling use.  He did not know he could call for pain medicine refill he is requesting this he states it helps with his pain especially at night.  He also states that ice machine which he has received has been a very beneficial treatment option.  He states the incisions have been healing well denies redness swelling drainage.  He had sutures removed today and Steri-Strips placed.      No Known Allergies     Social History     Socioeconomic History   • Marital status:    Tobacco Use   • Smoking status: Former     Years: 25.00     Types: Cigarettes     Start date:      Quit date:      Years since quittin.3   • Smokeless tobacco: Former   Vaping Use   • Vaping Use: Never used   Substance and Sexual Activity   • Alcohol use: Never   • Drug use: Never   • Sexual activity: Defer        REVIEW OF SYSTEMS    Constitutional: Denies fevers, chills, weight loss  Cardiovascular: Denies chest pain, shortness of breath  Skin: Denies rashes, acute skin changes  Neurologic: Denies headache, loss of consciousness  MSK: Left shoulder pain      Objective   Vital Signs:   Pulse 67   Ht 167.6 cm (66\")   Wt 105 kg (231 lb 7.7 oz)   SpO2 96%   BMI 37.36 kg/m²     Body mass index is 37.36 kg/m².    Physical Exam    Left shoulder: Incisions are healing well, sutures removed and Steri-Strips were placed.  No erythema, no drainage, no signs of infection.  Range of motion appropriate/limited, elbow wrist " hand range of motion intact/appropriate, neurovascular intact    Procedures    Imaging Results (Most Recent)     None           Result Review :   The following data was reviewed by: TREVOR Prater on 04/17/2023:               Assessment and Plan    Diagnoses and all orders for this visit:    1. Aftercare following surgery of left shoulder arthroscopic rotator cuff debridement, labral debridement, subacromial decompression, bicep tenodesis, 4/3/2023 (Primary)        Reviewed operative images, operative report with the patient, discussed diagnosis and treatment options with him he was advised to continue sling use for another 2 weeks.  He was given pain medication refill.  He is advised to continue plan for physical therapy to work on strength/range of motion.  If any new or concerning symptoms occur he was advised to call us right away.  Sutures/staples removed in office today. Steri-strips applied. Discussed incision care/hygiene. May shower, but do not submerge in water until fully healed.  Advised patient that if any concerning symptoms regarding incision appearance occur that they should call us right away, patient expressed understanding.  Follow-up in 4 weeks    Call or return if worsening symptoms.    Follow Up   Return in about 4 weeks (around 5/15/2023) for Recheck.  Patient was given instructions and counseling regarding his condition or for health maintenance advice. Please see specific information pulled into the AVS if appropriate.

## 2023-04-17 NOTE — TELEPHONE ENCOUNTER
PATIENT CALLED AND STATES [facility] OUT OF [facility]---WANTS MEDS SENT TO [facility]  MEDS CANCELED AT [facility]

## 2023-04-18 ENCOUNTER — TREATMENT (OUTPATIENT)
Dept: PHYSICAL THERAPY | Facility: CLINIC | Age: 63
End: 2023-04-18
Payer: OTHER GOVERNMENT

## 2023-04-18 DIAGNOSIS — Z98.890 S/P ARTHROSCOPY OF LEFT SHOULDER: Primary | ICD-10-CM

## 2023-04-18 DIAGNOSIS — M25.612 SHOULDER STIFFNESS, LEFT: ICD-10-CM

## 2023-04-18 DIAGNOSIS — M25.512 ACUTE PAIN OF LEFT SHOULDER: ICD-10-CM

## 2023-04-18 DIAGNOSIS — R29.898 WEAKNESS OF LEFT UPPER EXTREMITY: ICD-10-CM

## 2023-04-18 NOTE — PROGRESS NOTES
"                           Outpatient Physical Therapy  [address], [place] [address]                            Physical Therapy Daily Treatment Note    Patient: Kevin Ordonez   : 1960  Diagnosis/ICD-10 Code:  S/P arthroscopy of left shoulder [Z98.890]  Referring practitioner: Chas Patterson MD  Date of Initial Visit: Type: THERAPY  Noted: 2023  Today's Date: 2023  Patient seen for 2 sessions             Subjective   Kevin Ordonez reports: shoulder \"pulsating.\" States he was in [employer] and it started to \"jump\" on him and throb. Reports doing his HEP and wearing his sling if he is standing, states taking it off some while sitting in chair. States still taking pain medications. Pt states he fell out of his chair on  and on his shoulder.     Pain: 6/10 pain, currently    Objective   Pt had some increased discomfort with manual. Pt had pain on inside of elbow during elbow ROM.   Noted tenderness on top of shoulder.     See Exercise, Manual, and Modality Logs for complete treatment.     Assessment/Plan  Kevin still experiencing increased pain in shoulder. Pt had increased discomfort with manual. Pt would benefit from skilled PT to address Range of Motion  and Strength deficits with surgical protocol, pain management and any concerns with ADLs.     Progress per Plan of Care         Timed:  Manual Therapy:    20     mins  32578;  Therapeutic Exercise:    10     mins  91312;     Neuromuscular Lorenzo:        mins  52565;    Therapeutic Activity:          mins  99900;     Gait Training:           mins  13647;    Aquatic Therapy:          mins  03124;       Untimed:  Electrical Stimulation:         mins  84363 ( );  Mechanical Traction:         mins  82293;       Timed Treatment:   30   mins   Total Treatment:     30   mins      Electronically signed:   Layne Simons PTA Student    Supervised by:  Verena Sow PTA  Physical Therapist Assistant  [facility] [place] License #: [id number]  "

## 2023-04-25 ENCOUNTER — TREATMENT (OUTPATIENT)
Dept: PHYSICAL THERAPY | Facility: CLINIC | Age: 63
End: 2023-04-25
Payer: OTHER GOVERNMENT

## 2023-04-25 DIAGNOSIS — Z98.890 S/P ARTHROSCOPY OF LEFT SHOULDER: Primary | ICD-10-CM

## 2023-04-25 DIAGNOSIS — R29.898 WEAKNESS OF LEFT UPPER EXTREMITY: ICD-10-CM

## 2023-04-25 DIAGNOSIS — M25.512 ACUTE PAIN OF LEFT SHOULDER: ICD-10-CM

## 2023-04-25 DIAGNOSIS — M25.612 SHOULDER STIFFNESS, LEFT: ICD-10-CM

## 2023-04-25 NOTE — PROGRESS NOTES
"                           Outpatient Physical Therapy  [address] , [place] [address]                            Physical Therapy Daily Treatment Note    Patient: Kevin Ordonez   : 1960  Diagnosis/ICD-10 Code:  S/P arthroscopy of left shoulder [Z98.890]  Referring practitioner: Chas Patterson MD  Date of Initial Visit: Type: THERAPY  Noted: 2023  Today's Date: 2023  Patient seen for 3 sessions             Subjective   Kevin Ordonez reports: shoulder feeling okay, took pain meds before coming to therapy. States he fell over the weekend, tried to protect the shoulder and hurt the knee.    Pain: 0/10 pain,  currently    Objective   C/O some pain (\"catching\") with coming down with the pulleys and PROM flexion and abduction.    See Exercise, Manual, and Modality Logs for complete treatment.     Assessment/Plan  Kevin progressing as evident by decreased overall shoulder pain. Pt tolerated exercises and manual well, some mild complaints of discomfort. Pt would benefit from skilled PT to address Range of Motion  and Strength deficits with surgical protocol, pain management and any concerns with ADLs.     Progress per Plan of Care         Timed:  Manual Therapy:    20     mins  76130;  Therapeutic Exercise:    10     mins  06535;     Neuromuscular Lorenzo:        mins  25352;    Therapeutic Activity:          mins  64388;     Gait Training:           mins  59487;    Aquatic Therapy:          mins  85720;       Untimed:  Electrical Stimulation:         mins  83406 ( );  Mechanical Traction:         mins  16279;       Timed Treatment:   30   mins   Total Treatment:     30   mins      Electronically signed:   Layne Simons PTA Student    Supervised by:  Verena Sow PTA  Physical Therapist Assistant  [facility] License #: [id number]  "

## 2023-04-27 DIAGNOSIS — M75.82 ROTATOR CUFF TENDONITIS, LEFT: ICD-10-CM

## 2023-04-27 RX ORDER — OXYCODONE AND ACETAMINOPHEN 7.5; 325 MG/1; MG/1
1 TABLET ORAL EVERY 6 HOURS PRN
Qty: 42 TABLET | Refills: 0 | Status: SHIPPED | OUTPATIENT
Start: 2023-04-27 | End: 2023-04-28 | Stop reason: SDUPTHER

## 2023-04-27 NOTE — TELEPHONE ENCOUNTER
Caller: ENOCH BETTS    Relationship: SELF    Best call back number: [phone]    Requested Prescriptions: OXYCODONE-ACETAMINOPHEN  Requested Prescriptions      No prescriptions requested or ordered in this encounter        [facility] sent[facility]: [address]     Last office visit with prescribing clinician: 4/17/2023   Last telemedicine visit with prescribing clinician: Visit date not found   Next office visit with prescribing clinician: 5/16/2023     Additional details provided by patient: NEED TO SEND ELECTRONICALLY TO [facility]: [phone] PHONE    Does the patient have less than a 3 day supply:  [] Yes  [x] No    Would you like a call back once the refill request has been completed: [x] Yes [] No    If the office needs to give you a call back, can they leave a voicemail: [x] Yes [] No    Elsa Diaz   04/27/23 15:14 EDT

## 2023-04-28 DIAGNOSIS — M75.82 ROTATOR CUFF TENDONITIS, LEFT: ICD-10-CM

## 2023-04-28 RX ORDER — OXYCODONE AND ACETAMINOPHEN 7.5; 325 MG/1; MG/1
1 TABLET ORAL EVERY 6 HOURS PRN
Qty: 42 TABLET | Refills: 0 | Status: SHIPPED | OUTPATIENT
Start: 2023-04-28 | End: 2023-04-28 | Stop reason: SDUPTHER

## 2023-04-28 RX ORDER — OXYCODONE AND ACETAMINOPHEN 7.5; 325 MG/1; MG/1
1 TABLET ORAL EVERY 6 HOURS PRN
Qty: 42 TABLET | Refills: 0 | Status: SHIPPED | OUTPATIENT
Start: 2023-04-28

## 2023-04-28 NOTE — TELEPHONE ENCOUNTER
Refill on pain medicine, per jessica [facility]. Informed patient of this while he was here.

## 2023-05-02 ENCOUNTER — TREATMENT (OUTPATIENT)
Dept: PHYSICAL THERAPY | Facility: CLINIC | Age: 63
End: 2023-05-02
Payer: OTHER GOVERNMENT

## 2023-05-02 DIAGNOSIS — M25.512 ACUTE PAIN OF LEFT SHOULDER: ICD-10-CM

## 2023-05-02 DIAGNOSIS — Z98.890 S/P ARTHROSCOPY OF LEFT SHOULDER: Primary | ICD-10-CM

## 2023-05-02 DIAGNOSIS — M25.612 SHOULDER STIFFNESS, LEFT: ICD-10-CM

## 2023-05-02 DIAGNOSIS — R29.898 WEAKNESS OF LEFT UPPER EXTREMITY: ICD-10-CM

## 2023-05-02 NOTE — PROGRESS NOTES
"                           Outpatient Physical Therapy  [address], [place] [address]                            Physical Therapy Daily Treatment Note    Patient: Kevin Ordonez   : 1960  Diagnosis/ICD-10 Code:  S/P arthroscopy of left shoulder [Z98.890]  Referring practitioner: Chas Patterson MD  Date of Initial Visit: Type: THERAPY  Noted: 2023  Today's Date: 2023  Patient seen for 4 sessions             Subjective   Kevin Ordonez reports: shoulder has been hurting. States he has been doing his exercises, and \"climbing\" the wall in the shower. Discussed sling protocol with pt, and pt demonstrated understanding. States he has been using that arm more with things anyways without the sling. Reports no soreness after PT.     Pain: 3/10 pain, currently    Objective   Pt had some pain with AAROM flexion with cane and chest press.  Pt had some pain with PROM flexion, but had relief with some distraction.     See Exercise, Manual, and Modality Logs for complete treatment.     Assessment/Plan  Kevin progressing as evident by decreased overall shoulder pain, although still has some pain and soreness. Pt tolerated exercises and manual well, some c/o pain with some exercises. . Pt would benefit from skilled PT to address Range of Motion  and Strength deficits with surgical protocol, pain management and any concerns with ADLs.     Progress per Plan of Care         Timed:  Manual Therapy:    12     mins  78287;  Therapeutic Exercise:    18     mins  23941;     Neuromuscular Lorenzo:        mins  74917;    Therapeutic Activity:          mins  49869;     Gait Training:           mins  70904;    Aquatic Therapy:          mins  85529;       Untimed:  Electrical Stimulation:         mins  42292 ( );  Mechanical Traction:         mins  00161;       Timed Treatment:   30   mins   Total Treatment:     30   mins      Electronically signed:   Layne Simons, SHE[employer] Student    Supervised by:  Verena Sow, " SHELBI  Physical Therapist Assistant  [facility] License #: [id number]

## 2023-05-04 ENCOUNTER — TREATMENT (OUTPATIENT)
Dept: PHYSICAL THERAPY | Facility: CLINIC | Age: 63
End: 2023-05-04
Payer: OTHER GOVERNMENT

## 2023-05-04 DIAGNOSIS — M25.512 ACUTE PAIN OF LEFT SHOULDER: ICD-10-CM

## 2023-05-04 DIAGNOSIS — Z98.890 S/P ARTHROSCOPY OF LEFT SHOULDER: Primary | ICD-10-CM

## 2023-05-04 DIAGNOSIS — M25.612 SHOULDER STIFFNESS, LEFT: ICD-10-CM

## 2023-05-04 DIAGNOSIS — R29.898 WEAKNESS OF LEFT UPPER EXTREMITY: ICD-10-CM

## 2023-05-04 NOTE — PROGRESS NOTES
Outpatient Physical Therapy  [address] [facility], [place] [address]                            Physical Therapy Daily Treatment Note    Patient: Kevin Ordonez   : 1960  Diagnosis/ICD-10 Code:  S/P arthroscopy of left shoulder [Z98.890]  Referring practitioner: Chas Patterson MD  Date of Initial Visit: Type: THERAPY  Noted: 2023  Today's Date: 2023  Patient seen for 5 sessions             Subjective   Kevin Ordonez reports: shoulder is sore today, has been putting up a fence. Reports soreness after last TX in the front of the shoulder, but put on ice and it helped. States he wasn't wearing the sling anymore.      Pain: 2/10 pain, currently.     Objective   Pt had some increased pain with PROM abduction in the back of the shoulder, and pain with PROM flexion in the front of the shoulder at end range.     See Exercise, Manual, and Modality Logs for complete treatment.     Assessment/Plan  Kevin progressing as evident by decreased overall shoulder pain, although still has some pain and soreness with certain movements. Pt tolerated exercises and manual well, some c/o pain with some exercises. Pt would benefit from skilled PT to address Range of Motion  and Strength deficits with surgical protocol, pain management and any concerns with ADLs.     Progress per Plan of Care         Timed:  Manual Therapy:    10     mins  65302;  Therapeutic Exercise:    20     mins  23764;     Neuromuscular Lorenzo:        mins  00440;    Therapeutic Activity:          mins  26189;     Gait Training:           mins  62315;    Aquatic Therapy:          mins  93189;       Untimed:  Electrical Stimulation:         mins  65387 ( );  Mechanical Traction:         mins  75067;       Timed Treatment:   30   mins   Total Treatment:     30   mins      Electronically signed:   Layne Simons PTA Student    Supervised by:  Verena Sow PTA  Physical Therapist Assistant  [place] [employer] License #:  [id number]

## 2023-05-09 ENCOUNTER — TREATMENT (OUTPATIENT)
Dept: PHYSICAL THERAPY | Facility: CLINIC | Age: 63
End: 2023-05-09
Payer: OTHER GOVERNMENT

## 2023-05-09 DIAGNOSIS — Z98.890 S/P ARTHROSCOPY OF LEFT SHOULDER: Primary | ICD-10-CM

## 2023-05-09 DIAGNOSIS — M25.612 SHOULDER STIFFNESS, LEFT: ICD-10-CM

## 2023-05-09 DIAGNOSIS — R29.898 WEAKNESS OF LEFT UPPER EXTREMITY: ICD-10-CM

## 2023-05-09 DIAGNOSIS — M25.512 ACUTE PAIN OF LEFT SHOULDER: ICD-10-CM

## 2023-05-09 NOTE — PROGRESS NOTES
Physical Therapy Daily Treatment Note  [address] [place] [address]    Patient: Kevin Ordonez   : 1960  Diagnosis/ICD-10 Code:  S/P arthroscopy of left shoulder [Z98.890]  Referring practitioner: Chas Patterson MD  Date of Initial Visit: Type: THERAPY  Noted: 2023  Today's Date: 2023  Patient seen for 6 sessions           Subjective : Patient reports he is doing well overall, having some pain in the front of the shoulder at times, but he is able to do more and more. He can now raise his arm overhead, still having trouble going out to the side.    Objective   See Exercise, Manual, and Modality Logs for complete treatment.       Assessment/Plan : Pt tolerated today's session well. He is doing very well with ROM, tolerating strengthening progression well. Pt would benefit from continued skilled therapy to address deficits. Progress per plan of care.             Timed:  Manual Therapy:    10     mins  87654;  Therapeutic Exercise:    18     mins  24673;     Neuromuscular Lorenzo:    0    mins  16039;    Therapeutic Activity:     10     mins  82647;     Gait Trainin     mins  35365;     Ultrasound:     0     mins  52377;    Aquatic Therapy    0     mins  69544;    Untimed:  Electrical Stimulation:    0     mins  98936 ( );  Dry Needling     0     mins self-pay;  Mechanical Traction    0     mins  23062  Moist Heat     0     mins  No charge  Canalith Repos              0     mins 64220    Timed Treatment:   38   mins   Total Treatment:     38   mins    Frankie Sow PT  Physical Therapist    Electronically signed 2023    [place] License: PT - [id number]

## 2023-05-11 ENCOUNTER — TREATMENT (OUTPATIENT)
Dept: PHYSICAL THERAPY | Facility: CLINIC | Age: 63
End: 2023-05-11
Payer: OTHER GOVERNMENT

## 2023-05-11 DIAGNOSIS — R29.898 WEAKNESS OF LEFT UPPER EXTREMITY: ICD-10-CM

## 2023-05-11 DIAGNOSIS — M25.512 ACUTE PAIN OF LEFT SHOULDER: ICD-10-CM

## 2023-05-11 DIAGNOSIS — Z98.890 S/P ARTHROSCOPY OF LEFT SHOULDER: Primary | ICD-10-CM

## 2023-05-11 DIAGNOSIS — M25.612 SHOULDER STIFFNESS, LEFT: ICD-10-CM

## 2023-05-11 NOTE — PROGRESS NOTES
Progress Assessment  [address] [place] [id number]        Patient: Kevin Ordonez   : 1960  Diagnosis/ICD-10 Code:  S/P arthroscopy of left shoulder [Z98.890]  Referring practitioner: Chas Patterson MD  Date of Initial Visit: Type: THERAPY  Noted: 2023  Today's Date: 2023  Patient seen for 7 sessions      Subjective:     Subjective Questionnaire: QuickDASH: 35 = 40-59% limitation  Clinical Progress: improved  Home Program Compliance: Yes  Treatment has included: therapeutic exercise, manual therapy and therapeutic activity    Subjective : Kevin Ordonez reports: he has been doing very well, admits to doing more at home than what he should. He is more sore in the bicep today close to the elbow from doing the exercises. He thinks its more due to the repetition performed here in the clinic where at home he just lifts something one time.     Current Pain 5/10    Objective      Active Range of Motion   Left Shoulder   Flexion: 115 degrees  Abduction: Left shoulder passive abduction: 90 degrees  External rotation: occiput  Internal rotation: L4     Passive Range of Motion   Left Shoulder   Flexion: 140 degrees  Abduction: Left shoulder passive abduction: 100 degrees  External rotation 45°: 50 degrees  Internal rotation 45°: 75 degrees      Strength/Myotome Testing   Left Shoulder   Flexion: 4-  Abduction: 4-  External rotation: 4-  Internal rotation: 4-    Assessment & Plan     Assessment  Impairments: abnormal muscle firing, abnormal or restricted ROM, activity intolerance, impaired physical strength and pain with function  Functional Limitations: carrying objects, lifting, sleeping, reaching behind back and reaching overhead  Assessment details: The patient presents to physical therapy s/p Left Shoulder scope with biceps tenodesis, RTC and labral debridement, and SAD 4/3/23. He has progressed very well thus far, today he is reporting more distal biceps soreness, this improved with manual  therapy. The patient presents with associated shoulder weakness, stiffness, and functional deficits (QuickDASH). The patient would benefit from skilled PT intervention to address the above mentioned functional limitations.     Prognosis: good    Goals  Plan Goals: SHOULDER  PROBLEMS:     1. The patient has limited ROM of the left shoulder.    LTG 1: 12 weeks:  The patient will demonstrate 150 degrees of shoulder flexion, 140 degrees of shoulder abduction, shoulder external rotation to C7, and shoulder internal rotation to L1 to allow the patient to reach into upper kitchen cabinets and manipulate clothing behind the back with greater ease.    STATUS:  Not met, progressing   STG 1a: 6 weeks:  The patient will demonstrate 120 degrees of  shoulder flexion, 110 degrees of shoulder abduction, 70 degrees of shoulder external rotation, and 80 degrees of shoulder internal rotation.    STATUS:  Not met, progressing   TREATMENT: Manual therapy, therapeutic exercise, home exercise instruction, and modalities as needed to include: electrical stimulation, ultrasound, moist heat, and ice.    2. The patient has limited strength of the left shoulder.   LTG 2: 12 weeks:  The patient will demonstrate 4+ /5 strength for shoulder flexion, abduction, external rotation, and internal rotation in order to demonstrate improved shoulder stability.    STATUS:  Not met, progressing   STG 2a: 6 weeks:  The patient will demonstrate 4- /5 strength for shoulder flexion, abduction, external rotation, and internal rotation.    STATUS:  MET   STG2b:  6 weeks:  The patient will be independent with home exercises.     STATUS:  Met, ongoing   TREATMENT: Manual therapy, therapeutic exercise, home exercise instruction, and modalities as needed to include: electrical stimulation, ultrasound, moist heat, and ice.     3. The patient complains of pain to the left shoulder.   LTG 3: 12 weeks:  The patient will report a pain rating of 2 /10 or better in order  to improve sleep quality and tolerance to performance of activities of daily living.    STATUS:  Not met, progressing   STG 3a: 6 weeks:  The patient will report a pain rating of 5 /10 or better.     STATUS:  Not met, progressing   TREATMENT: Manual therapy, therapeutic exercise, home exercise instruction, and modalities as needed to include: electrical stimulation, ultrasound, moist heat, and ice.    4. Carrying, Moving, and Handling Objects Functional Limitation     LTG 4: 12 weeks:  The patient will demonstrate 1-19% limitation by achieving a score of 12-19 on the QuickDASH.    STATUS:  Not met, progressing   STG 4a: 6 weeks:  The patient will demonstrate 20-39 % limitation by achieving a score of 20-27 on the QuickDASH.      STATUS:  Not met, progressing   TREATMENT:  Manual therapy, therapeutic exercise, home exercise instruction, and modalities as needed to include: moist heat, electrical stimulation, and ultrasound.       Plan  Therapy options: will be seen for skilled therapy services  Planned modality interventions: TENS, cryotherapy, thermotherapy (hydrocollator packs) and dry needling  Planned therapy interventions: functional ROM exercises, home exercise program, joint mobilization, manual therapy, soft tissue mobilization, stretching and strengthening  Frequency: 3x week  Duration in weeks: 12  Treatment plan discussed with: patient      Progress toward previous goals: Partially Met    See Exercise, Manual, and Modality Logs for complete treatment.         Recommendations: Continue as planned  Timeframe: 2 months  Prognosis to achieve goals: good    PT Signature: Frankie Sow PT    Electronically signed 5/11/2023    [place] License: PT - [id number]     Based upon review of the patient's progress and continued therapy plan, it is my medical opinion that Kevin Ordonez should continue physical therapy treatment at [facility] PHYSICAL THERAPY  [address] [facility] [place]  [address]  [phone].      Timed:         Manual Therapy:    10     mins  13111;     Therapeutic Exercise:    20     mins  54497;     Neuromuscular Lorenzo:    0    mins  69932;    Therapeutic Activity:     0     mins  33406;     Gait Trainin     mins  19183;     Ultrasound:     0     mins  10402;    Self Care                       0     mins   38192  Aquatic                          0     mins 48754          Timed Treatment:   30   mins   Total Treatment:     30   mins      I certify that the therapy services are furnished while this patient is under my care.  The services outlined above are required by this patient, and will be reviewed every 90 days.

## 2023-05-15 ENCOUNTER — TREATMENT (OUTPATIENT)
Dept: PHYSICAL THERAPY | Facility: CLINIC | Age: 63
End: 2023-05-15
Payer: OTHER GOVERNMENT

## 2023-05-15 DIAGNOSIS — Z98.890 S/P ARTHROSCOPY OF LEFT SHOULDER: Primary | ICD-10-CM

## 2023-05-15 DIAGNOSIS — M25.512 ACUTE PAIN OF LEFT SHOULDER: ICD-10-CM

## 2023-05-15 DIAGNOSIS — R29.898 WEAKNESS OF LEFT UPPER EXTREMITY: ICD-10-CM

## 2023-05-15 DIAGNOSIS — M25.612 SHOULDER STIFFNESS, LEFT: ICD-10-CM

## 2023-05-15 PROCEDURE — 97110 THERAPEUTIC EXERCISES: CPT | Performed by: PHYSICAL THERAPIST

## 2023-05-15 PROCEDURE — 97140 MANUAL THERAPY 1/> REGIONS: CPT | Performed by: PHYSICAL THERAPIST

## 2023-05-15 NOTE — PROGRESS NOTES
Outpatient Physical Therapy  [address] [facility], [place] [address]                            Physical Therapy Daily Treatment Note    Patient: Kevin Ordonez   : 1960  Diagnosis/ICD-10 Code:  S/P arthroscopy of left shoulder [Z98.890]  Referring practitioner: Chas Patterson MD  Date of Initial Visit: Type: THERAPY  Noted: 2023  Today's Date: 5/15/2023  Patient seen for 8 sessions           Subjective   Kevin Ordonez reports: that he's had extra pain the last week in his arm, especially right below the elbow. States that he did more exercises last PT session and feels like that really hurt him. Reports that he has an appt with MD tomorrow.    Pain: 5/10 pain, currently below elbow    Objective   Increased discomfort with Soft Tissue Massage to L biceps.   Increased discomfort with PROM Shoulder Flexion to about 90deg.    See Exercise, Manual, and Modality Logs for complete treatment.     Assessment/Plan  Kevin still experiencing increased pain in biceps. Pt had some increased discomfort with shoulder flexion 90deg. Pt would benefit from skilled PT to address Range of Motion  and Strength deficits with surgical protocol, pain management and any concerns with ADLs.       Progress per Plan of Care         Timed:  Manual Therapy:    15     mins  99453;  Therapeutic Exercise:    15     mins  66365;     Neuromuscular Lorenzo:        mins  88647;    Therapeutic Activity:          mins  36225;     Gait Training:           mins  48067;    Aquatic Therapy:          mins  37941;       Untimed:  Electrical Stimulation:         mins  35147 ( );  Mechanical Traction:         mins  49561;       Timed Treatment:   30   mins   Total Treatment:     30   mins      Electronically signed:     Verena Sow PTA  Physical Therapist Assistant  [facility] License #: [id number]

## 2023-05-16 ENCOUNTER — OFFICE VISIT (OUTPATIENT)
Dept: ORTHOPEDIC SURGERY | Facility: CLINIC | Age: 63
End: 2023-05-16
Payer: OTHER GOVERNMENT

## 2023-05-16 VITALS — OXYGEN SATURATION: 95 % | HEIGHT: 66 IN | HEART RATE: 67 BPM | WEIGHT: 224 LBS | BODY MASS INDEX: 36 KG/M2

## 2023-05-16 DIAGNOSIS — Z47.89 AFTERCARE FOLLOWING SURGERY OF THE MUSCULOSKELETAL SYSTEM: Primary | ICD-10-CM

## 2023-05-16 RX ORDER — HYDROCODONE BITARTRATE AND ACETAMINOPHEN 7.5; 325 MG/1; MG/1
1 TABLET ORAL EVERY 6 HOURS PRN
Qty: 28 TABLET | Refills: 0 | Status: SHIPPED | OUTPATIENT
Start: 2023-05-16

## 2023-05-16 NOTE — PROGRESS NOTES
Chief Complaint  Follow-up and Pain of the Left Shoulder    Subjective          History of Present Illness      Kevin Ordonez is a 62 y.o. male  presents to [facility] for     Patient presents for follow-up evaluation of left shoulder arthroscopic rotator cuff debridement, labral debridement, subacromial decompression, bicep tenodesis, 4/3/2023.  Patient states he has been attending therapy he states he is still taking pain medication he would like to take something less strong but still states he needs a pain medication to help him especially with therapy and at night.  He continues to use the ice machine that he has.  He states that he had 2 sessions ago with his therapy visits his therapist was very busy and put the patient with a trainee and he states that the training worked him too hard, did not allow him to rest between exercises and was very rough with his session he states this caused him increased pain and he had a setback regarding his pain tolerance and his range of motion due to the pain caused by the therapy session.  He states his next session was a little less rigorous with a different therapist and he states that this has helped calm his shoulder down it is feeling some better.  He states he does not feel like there is something reinjured due to the rough therapy session.      No Known Allergies     Social History     Socioeconomic History   • Marital status:    Tobacco Use   • Smoking status: Former     Years: 25.00     Types: Cigarettes     Start date:      Quit date:      Years since quittin.3   • Smokeless tobacco: Former   Vaping Use   • Vaping Use: Never used   Substance and Sexual Activity   • Alcohol use: Never   • Drug use: Never   • Sexual activity: Defer        REVIEW OF SYSTEMS    Constitutional: Denies fevers, chills, weight loss  Cardiovascular: Denies chest pain, shortness of breath  Skin: Denies rashes, acute skin changes  Neurologic:  "Denies headache, loss of consciousness  MSK: Left shoulder pain      Objective   Vital Signs:   Pulse 67   Ht 167.6 cm (66\")   Wt 102 kg (224 lb)   SpO2 95%   BMI 36.15 kg/m²     Body mass index is 36.15 kg/m².    Physical Exam    Left shoulder: Well-healed incisions, no erythema ecchymosis swelling or signs of infection, active forward elevation 160, active abduction 90, external rotation with abduction 75, internal rotation to his buttock, strength appropriate, neurovascular intact    Procedures    Imaging Results (Most Recent)     None           Result Review :   The following data was reviewed by: TREVOR Prater on 05/16/2023:               Assessment and Plan    Diagnoses and all orders for this visit:    1. Aftercare following surgery of left shoulder arthroscopic rotator cuff debridement, labral debridement, subacromial decompression, bicep tenodesis, 4/3/2023 (Primary)  -     Ambulatory Referral to Physical Therapy Evaluate and treat (2-3x/week for 6-8 weeks)        Discussed diagnosis and treatment options with the patient, he was given new order to continue therapy he was given a refill of pain medication he was taken down to the Norco 7.5 mg.  We discussed continuing activity and strength and range of motion as tolerated under therapy guidance follow-up in 6 weeks.  Follow-up sooner if any new or concerning symptoms occur, patient agreed    Call or return if worsening symptoms.    Follow Up   Return in about 6 weeks (around 6/27/2023) for Recheck.  Patient was given instructions and counseling regarding his condition or for health maintenance advice. Please see specific information pulled into the AVS if appropriate.       "

## 2023-05-17 ENCOUNTER — TREATMENT (OUTPATIENT)
Dept: PHYSICAL THERAPY | Facility: CLINIC | Age: 63
End: 2023-05-17
Payer: OTHER GOVERNMENT

## 2023-05-17 DIAGNOSIS — R29.898 WEAKNESS OF LEFT UPPER EXTREMITY: ICD-10-CM

## 2023-05-17 DIAGNOSIS — Z98.890 S/P ARTHROSCOPY OF LEFT SHOULDER: Primary | ICD-10-CM

## 2023-05-17 DIAGNOSIS — M25.512 ACUTE PAIN OF LEFT SHOULDER: ICD-10-CM

## 2023-05-17 DIAGNOSIS — M25.612 SHOULDER STIFFNESS, LEFT: ICD-10-CM

## 2023-05-17 NOTE — PROGRESS NOTES
Physical Therapy Daily Treatment Note  [address] [place] [address]    Patient: Kevin Ordonez   : 1960  Diagnosis/ICD-10 Code:  S/P arthroscopy of left shoulder [Z98.890]  Referring practitioner: Chas Patterson MD  Date of Initial Visit: Type: THERAPY  Noted: 2023  Today's Date: 2023  Patient seen for 9 sessions           Subjective : Patient reports his bicep is feeling better, he will be starting a new pain medication that the physician prescribed last visit.     Objective   See Exercise, Manual, and Modality Logs for complete treatment.       Assessment/Plan : Pt tolerated today's session well. Reduced resistance exercises today as he is still sore, but will slowly add these back into his program. His ROM is doing very well. Pt would benefit from continued skilled therapy to address deficits. Progress per plan of care.             Timed:  Manual Therapy:    15     mins  80478;  Therapeutic Exercise:    10     mins  97303;     Neuromuscular Lorenzo:    0    mins  34366;    Therapeutic Activity:     0     mins  27977;     Gait Trainin     mins  38876;     Ultrasound:     0     mins  82824;    Aquatic Therapy    0     mins  02604;    Untimed:  Electrical Stimulation:    0     mins  06194 ( );  Dry Needling     0     mins self-pay;  Mechanical Traction    0     mins  65100  Moist Heat     0     mins  No charge  Canalith Repos              0     mins 00017    Timed Treatment:   25   mins   Total Treatment:     25   mins    Frankie Sow PT  Physical Therapist    Electronically signed 2023    [place] License: PT - [id number]

## 2023-05-23 ENCOUNTER — TREATMENT (OUTPATIENT)
Dept: PHYSICAL THERAPY | Facility: CLINIC | Age: 63
End: 2023-05-23
Payer: OTHER GOVERNMENT

## 2023-05-23 DIAGNOSIS — Z98.890 S/P ARTHROSCOPY OF LEFT SHOULDER: Primary | ICD-10-CM

## 2023-05-23 DIAGNOSIS — M25.512 ACUTE PAIN OF LEFT SHOULDER: ICD-10-CM

## 2023-05-23 DIAGNOSIS — M25.612 SHOULDER STIFFNESS, LEFT: ICD-10-CM

## 2023-05-23 DIAGNOSIS — R29.898 WEAKNESS OF LEFT UPPER EXTREMITY: ICD-10-CM

## 2023-05-23 NOTE — PROGRESS NOTES
Outpatient Physical Therapy  [address] [facility], [place] [address]                            Physical Therapy Daily Treatment Note    Patient: Kevin Ordonez   : 1960  Diagnosis/ICD-10 Code:  S/P arthroscopy of left shoulder [Z98.890]  Referring practitioner: Chas Patterson MD  Date of Initial Visit: Type: THERAPY  Noted: 2023  Today's Date: 2023  Patient seen for 10 sessions           Subjective   Kevin Ordonez reports: overall the biceps is better, he helped his wife in the garden yesterday so his shoulder is a little more sore.    Objective   Decreased active Range of Motion Shoulder Abduction.    See Exercise, Manual, and Modality Logs for complete treatment.     Assessment/Plan  Kevin progressing as evident by decreased overall shoulder pain, although sore after last PT session. Pt tolerated exercises and manual well, decreased active ROM shoulder abduction. Pt would benefit from skilled PT to address Range of Motion  and Strength deficits with surgical protocol, pain management and any concerns with ADLs.       Progress per Plan of Care         Timed:  Manual Therapy:    10     mins  74421;  Therapeutic Exercise:    20     mins  38207;     Neuromuscular Lorenzo:        mins  41313;    Therapeutic Activity:          mins  65077;     Gait Training:           mins  71724;    Aquatic Therapy:          mins  21947;       Untimed:  Electrical Stimulation:         mins  68191 ( );  Mechanical Traction:         mins  14173;       Timed Treatment:   30   mins   Total Treatment:     30   mins      Electronically signed:     Verena Sow PTA  Physical Therapist Assistant  [facility] License #: [id number]

## 2023-05-25 ENCOUNTER — TREATMENT (OUTPATIENT)
Dept: PHYSICAL THERAPY | Facility: CLINIC | Age: 63
End: 2023-05-25
Payer: OTHER GOVERNMENT

## 2023-05-25 DIAGNOSIS — M25.512 ACUTE PAIN OF LEFT SHOULDER: ICD-10-CM

## 2023-05-25 DIAGNOSIS — Z98.890 S/P ARTHROSCOPY OF LEFT SHOULDER: Primary | ICD-10-CM

## 2023-05-25 DIAGNOSIS — M25.612 SHOULDER STIFFNESS, LEFT: ICD-10-CM

## 2023-05-25 DIAGNOSIS — R29.898 WEAKNESS OF LEFT UPPER EXTREMITY: ICD-10-CM

## 2023-05-25 NOTE — PROGRESS NOTES
Physical Therapy Daily Treatment Note  [address] [place] [address]    Patient: Kevin Ordonez   : 1960  Diagnosis/ICD-10 Code:  S/P arthroscopy of left shoulder [Z98.890]  Referring practitioner: Chas Patterson MD  Date of Initial Visit: Type: THERAPY  Noted: 2023  Today's Date: 2023  Patient seen for 11 sessions           Subjective : Patient reports his bicep is doing better overall, having more pain in the actual shoulder area. He is wanting to progress more to reaching out to the side away from his body.    Objective   See Exercise, Manual, and Modality Logs for complete treatment.       Assessment/Plan : Pt tolerated today's session well, continuing to progress strengthening and ROM, he has nearly full ROM overhead with flexion and abduction with mild limitations at end range. He tolerated exercises with fatigue and mild pain. Pt would benefit from continued skilled therapy to address deficits. Progress per plan of care.             Timed:  Manual Therapy:    12     mins  02110;  Therapeutic Exercise:    18     mins  59987;     Neuromuscular Lorenzo:    0    mins  31559;    Therapeutic Activity:     0     mins  86518;     Gait Trainin     mins  38009;     Ultrasound:     0     mins  09356;    Aquatic Therapy    0     mins  39438;        Timed Treatment:   30   mins   Total Treatment:     30   mins    Frankie Sow PT  Physical Therapist    Electronically signed 2023    [place] License: PT - [id number]

## 2023-05-30 ENCOUNTER — TREATMENT (OUTPATIENT)
Dept: PHYSICAL THERAPY | Facility: CLINIC | Age: 63
End: 2023-05-30

## 2023-05-30 DIAGNOSIS — R29.898 WEAKNESS OF LEFT UPPER EXTREMITY: ICD-10-CM

## 2023-05-30 DIAGNOSIS — M25.512 ACUTE PAIN OF LEFT SHOULDER: ICD-10-CM

## 2023-05-30 DIAGNOSIS — M25.612 SHOULDER STIFFNESS, LEFT: ICD-10-CM

## 2023-05-30 DIAGNOSIS — Z98.890 S/P ARTHROSCOPY OF LEFT SHOULDER: Primary | ICD-10-CM

## 2023-05-30 NOTE — PROGRESS NOTES
Physical Therapy Daily Treatment Note  [place] [address] [place] [address]    Patient: Kevin Ordonez   : 1960  Referring practitioner: Chas Patterson MD  Date of Initial Visit: Type: THERAPY  Noted: 2023  Today's Date: 2023  Patient seen for 12 sessions           Subjective  Kevin Ordonez reports: discomfort at 5/10 at start of care. Reduced as he progressed with session.       Objective   Added UT and levator stretch to his HEP. Kevin noted that performing attention to posture and scapular retraction aided his control of L shoulder range of motion with less pain.     See Exercise, Manual, and Modality Logs for complete treatment.       Assessment/Plan  Continued need for skilled care to address remaining deficits in AROM, strength and functional ability to achieve best possible outcome after L shoulder surgery.    Visit Diagnoses:    ICD-10-CM ICD-9-CM   1. S/P arthroscopy of left shoulder  Z98.890 V45.89   2. Acute pain of left shoulder  M25.512 719.41   3. Shoulder stiffness, left  M25.612 719.51   4. Weakness of left upper extremity  R29.898 729.89       Progress per Plan of Care and Progress strengthening /stabilization /functional activity           Timed:  Manual Therapy:    10     mins  83364;  Therapeutic Exercise:    14     mins  22593;     Neuromuscular Lorenzo:        mins  00625;    Therapeutic Activity:     14     mins  22202;     Gait Training:           mins  60109;     Ultrasound:          mins  84721;    Electrical Stimulation:         mins  83380 ( );  Aquatics  __   mins   06962    Untimed:  Electrical Stimulation:         mins  23068 ( );  Mechanical Traction:         mins  78416;     Timed Treatment:   38   mins   Total Treatment:     38   mins    Electronically Signed:  Marika Antunez PTA  Physical Therapist Assistant    [place] PTA license [id number]

## 2023-06-06 ENCOUNTER — TREATMENT (OUTPATIENT)
Dept: PHYSICAL THERAPY | Facility: CLINIC | Age: 63
End: 2023-06-06
Payer: OTHER GOVERNMENT

## 2023-06-06 DIAGNOSIS — R29.898 WEAKNESS OF LEFT UPPER EXTREMITY: ICD-10-CM

## 2023-06-06 DIAGNOSIS — M25.612 SHOULDER STIFFNESS, LEFT: ICD-10-CM

## 2023-06-06 DIAGNOSIS — M25.512 ACUTE PAIN OF LEFT SHOULDER: ICD-10-CM

## 2023-06-06 DIAGNOSIS — Z98.890 S/P ARTHROSCOPY OF LEFT SHOULDER: Primary | ICD-10-CM

## 2023-06-06 NOTE — PROGRESS NOTES
Physical Therapy Daily Treatment Note  [address] [place] [address]    Patient: Kevin Ordonez   : 1960  Diagnosis/ICD-10 Code:  S/P arthroscopy of left shoulder [Z98.890]  Referring practitioner: Chas Patterson MD  Date of Initial Visit: Type: THERAPY  Noted: 2023  Today's Date: 2023  Patient seen for 13 sessions           Subjective : Patient reports he is doing better, still having trouble reaching out away from his body. Pain 2/10.    Objective   See Exercise, Manual, and Modality Logs for complete treatment.       Assessment/Plan : Pt tolerated today's session well. Continued with progression of strengthening and ROM, his overhead motion improved with capsule mobilizations. Pt would benefit from continued skilled therapy to address deficits. Progress per plan of care.             Timed:  Manual Therapy:    10     mins  66085;  Therapeutic Exercise:    20     mins  61394;     Neuromuscular Lorenzo:    0    mins  31591;    Therapeutic Activity:     0     mins  32204;     Gait Trainin     mins  43592;     Ultrasound:     0     mins  48501;    Aquatic Therapy    0     mins  05192;        Timed Treatment:   30   mins   Total Treatment:     30   mins    Frankie Sow PT  Physical Therapist    Electronically signed 2023    [place] License: PT - [id number]

## 2023-06-08 ENCOUNTER — TREATMENT (OUTPATIENT)
Dept: PHYSICAL THERAPY | Facility: CLINIC | Age: 63
End: 2023-06-08
Payer: OTHER GOVERNMENT

## 2023-06-08 DIAGNOSIS — M25.512 ACUTE PAIN OF LEFT SHOULDER: ICD-10-CM

## 2023-06-08 DIAGNOSIS — M25.612 SHOULDER STIFFNESS, LEFT: ICD-10-CM

## 2023-06-08 DIAGNOSIS — Z98.890 S/P ARTHROSCOPY OF LEFT SHOULDER: Primary | ICD-10-CM

## 2023-06-08 DIAGNOSIS — R29.898 WEAKNESS OF LEFT UPPER EXTREMITY: ICD-10-CM

## 2023-06-08 NOTE — PROGRESS NOTES
Physical Therapy Daily Treatment Note  [place] [address] [place] [address]    Patient: Kevin Ordonez   : 1960  Referring practitioner: hCas Patterson MD  Date of Initial Visit: Type: THERAPY  Noted: 2023  Today's Date: 2023  Patient seen for 14 sessions           Subjective  Kevin Ordonez reports: he tried to lift a 10# weight yesterday and realized that it was too much. Kevin asked that appt 23 be cancelled with his wife's pending surgery in account.     Kevin stated he is improving every day.        Objective   See Exercise, Manual, and Modality Logs for complete treatment.       Assessment/Plan  Further advancement in task difficulty today. Kevin demonstrating evident improvements in gross strength and motion. He was able to perform essentially equal AROM after manual work.Kevin's next session will be his progress note/reassessement.All progress made and remaining deficits will be outlined at that time.     Visit Diagnoses:    ICD-10-CM ICD-9-CM   1. S/P arthroscopy of left shoulder  Z98.890 V45.89   2. Acute pain of left shoulder  M25.512 719.41   3. Shoulder stiffness, left  M25.612 719.51   4. Weakness of left upper extremity  R29.898 729.89       Progress per Plan of Care and Progress strengthening /stabilization /functional activity           Timed:  Manual Therapy:    12     mins  15332;  Therapeutic Exercise:    8     mins  05555;     Neuromuscular Lorenzo:        mins  32697;    Therapeutic Activity:     10     mins  36596;     Gait Training:           mins  86508;     Ultrasound:          mins  72589;    Electrical Stimulation:         mins  45055 ( );  Aquatics  __   mins   47546    Untimed:  Electrical Stimulation:         mins  13475 ( );  Mechanical Traction:         mins  70979;     Timed Treatment:   30   mins   Total Treatment:     30   mins    Electronically Signed:  Marika Antunez PTA  Physical Therapist Assistant    [place] license  [id number]

## 2023-07-27 ENCOUNTER — HOSPITAL ENCOUNTER (OUTPATIENT)
Dept: CT IMAGING | Facility: HOSPITAL | Age: 63
Discharge: HOME | End: 2023-07-27
Admitting: RADIOLOGY
Payer: OTHER GOVERNMENT

## 2023-07-27 DIAGNOSIS — C07 MALIGNANT NEOPLASM OF PAROTID GLAND: ICD-10-CM

## 2023-07-27 PROCEDURE — 71250 CT THORAX DX C-: CPT

## 2023-07-31 ENCOUNTER — OFFICE VISIT (OUTPATIENT)
Dept: RADIATION ONCOLOGY | Facility: HOSPITAL | Age: 63
End: 2023-07-31
Payer: OTHER GOVERNMENT

## 2023-07-31 VITALS
TEMPERATURE: 97.7 F | DIASTOLIC BLOOD PRESSURE: 66 MMHG | SYSTOLIC BLOOD PRESSURE: 147 MMHG | WEIGHT: 210.98 LBS | BODY MASS INDEX: 34.05 KG/M2 | HEART RATE: 58 BPM | OXYGEN SATURATION: 96 % | RESPIRATION RATE: 16 BRPM

## 2023-07-31 DIAGNOSIS — C07 ADENOID CYSTIC CARCINOMA OF PAROTID GLAND: Primary | ICD-10-CM

## 2023-07-31 PROCEDURE — G0463 HOSPITAL OUTPT CLINIC VISIT: HCPCS | Performed by: RADIOLOGY

## 2023-08-22 ENCOUNTER — OFFICE VISIT (OUTPATIENT)
Dept: ORTHOPEDIC SURGERY | Facility: CLINIC | Age: 63
End: 2023-08-22
Payer: OTHER GOVERNMENT

## 2023-08-22 VITALS
WEIGHT: 210.98 LBS | DIASTOLIC BLOOD PRESSURE: 75 MMHG | BODY MASS INDEX: 33.91 KG/M2 | HEIGHT: 66 IN | SYSTOLIC BLOOD PRESSURE: 148 MMHG | OXYGEN SATURATION: 97 % | HEART RATE: 66 BPM

## 2023-08-22 DIAGNOSIS — Z47.89 AFTERCARE FOLLOWING SURGERY OF THE MUSCULOSKELETAL SYSTEM: Primary | ICD-10-CM

## 2023-08-22 RX ORDER — OFLOXACIN 3 MG/ML
SOLUTION/ DROPS OPHTHALMIC
COMMUNITY
Start: 2023-08-02

## 2023-08-22 NOTE — PROGRESS NOTES
"Chief Complaint  Follow-up of the Left Shoulder    Subjective          History of Present Illness      Kevin Ordonez is a 63 y.o. male  presents to [facility] for     Patient presents for follow-up evaluation of left shoulder arthroscopic rotator cuff debridement, labral debridement, subacromial decompression, bicep tenodesis, 4/3/2023.  Patient states since his last visit his wife had some severe medical issues and she spent 3 weeks off-and-on in the hospital.  He had to stop physical therapy but he states that he has had full recovery he states he is \"100% \".  He states he is riding his tractor again and all that entails with climbing in and out of the tractor, using a gearshift.  He states his only problem is if he has the change of weather may cause him some pain in the shoulder.  Otherwise no new complaints he is very happy with his recovery      No Known Allergies     Social History     Socioeconomic History    Marital status:    Tobacco Use    Smoking status: Former     Years: 25.00     Types: Cigarettes     Start date:      Quit date:      Years since quittin.    Smokeless tobacco: Former   Vaping Use    Vaping Use: Never used   Substance and Sexual Activity    Alcohol use: Never    Drug use: Never    Sexual activity: Defer        REVIEW OF SYSTEMS    Constitutional: Denies fevers, chills, weight loss  Cardiovascular: Denies chest pain, shortness of breath  Skin: Denies rashes, acute skin changes  Neurologic: Denies headache, loss of consciousness  MSK: Left shoulder pain      Objective   Vital Signs:   /75   Pulse 66   Ht 167.6 cm (66\")   Wt 95.7 kg (210 lb 15.7 oz)   SpO2 97%   BMI 34.05 kg/mý     Body mass index is 34.05 kg/mý.    Physical Exam    Left shoulder: Well-healed incisions, no erythema, no ecchymosis or swelling, no signs of infection, nontender to palpation, no pain with range of motion, 5 out of 5 strength, active forward elevation " 180, active abduction 120 external rotation with abduction 85 internal rotation T10 neurovascular intact    Procedures    Imaging Results (Most Recent)       None             Result Review :   The following data was reviewed by: TREVOR Prater on 08/22/2023:               Assessment and Plan    Diagnoses and all orders for this visit:    1. Aftercare following surgery of left shoulder arthroscopic rotator cuff debridement, labral debridement, subacromial decompression, bicep tenodesis, 4/3/2023 (Primary)        Discussed diagnosis and treatment options with the patient he was advised to continue activity as tolerated, if any new or concerning symptoms occur call us right away otherwise follow-up as needed    Call or return if worsening symptoms.    Follow Up   Return if symptoms worsen or fail to improve.  Patient was given instructions and counseling regarding his condition or for health maintenance advice. Please see specific information pulled into the AVS if appropriate.

## 2023-09-12 DIAGNOSIS — C16.2 MALIGNANT NEOPLASM OF BODY OF STOMACH: Primary | ICD-10-CM

## 2023-09-14 PROBLEM — C16.0 MALIGNANT NEOPLASM OF CARDIA OF STOMACH: Status: ACTIVE | Noted: 2023-09-14

## 2023-09-15 ENCOUNTER — CONSULT (OUTPATIENT)
Dept: ONCOLOGY | Facility: HOSPITAL | Age: 63
End: 2023-09-15
Payer: OTHER GOVERNMENT

## 2023-09-15 ENCOUNTER — HOSPITAL ENCOUNTER (OUTPATIENT)
Dept: ONCOLOGY | Facility: HOSPITAL | Age: 63
Discharge: HOME | End: 2023-09-15
Payer: OTHER GOVERNMENT

## 2023-09-15 VITALS
HEIGHT: 66 IN | OXYGEN SATURATION: 99 % | SYSTOLIC BLOOD PRESSURE: 140 MMHG | TEMPERATURE: 98.1 F | DIASTOLIC BLOOD PRESSURE: 58 MMHG | RESPIRATION RATE: 18 BRPM | BODY MASS INDEX: 31.53 KG/M2 | HEART RATE: 72 BPM | WEIGHT: 196.21 LBS

## 2023-09-15 DIAGNOSIS — Z45.2 PICC (PERIPHERALLY INSERTED CENTRAL CATHETER) IN PLACE: ICD-10-CM

## 2023-09-15 DIAGNOSIS — Z45.2 PICC (PERIPHERALLY INSERTED CENTRAL CATHETER) IN PLACE: Primary | ICD-10-CM

## 2023-09-15 DIAGNOSIS — C16.0 MALIGNANT NEOPLASM OF CARDIA OF STOMACH: ICD-10-CM

## 2023-09-15 DIAGNOSIS — C16.0 MALIGNANT NEOPLASM OF CARDIA OF STOMACH: Primary | ICD-10-CM

## 2023-09-15 LAB
ALBUMIN SERPL-MCNC: 3.2 G/DL (ref 3.5–5.2)
ALBUMIN/GLOB SERPL: 0.9 G/DL
ALP SERPL-CCNC: 368 U/L (ref 39–117)
ALT SERPL W P-5'-P-CCNC: 58 U/L (ref 1–41)
ANION GAP SERPL CALCULATED.3IONS-SCNC: 11.4 MMOL/L (ref 5–15)
AST SERPL-CCNC: 44 U/L (ref 1–40)
BASOPHILS # BLD AUTO: 0.04 10*3/MM3 (ref 0–0.2)
BASOPHILS NFR BLD AUTO: 0.5 % (ref 0–1.5)
BILIRUB SERPL-MCNC: 0.4 MG/DL (ref 0–1.2)
BUN SERPL-MCNC: 24 MG/DL (ref 8–23)
BUN/CREAT SERPL: 21.1 (ref 7–25)
CALCIUM SPEC-SCNC: 9.2 MG/DL (ref 8.6–10.5)
CHLORIDE SERPL-SCNC: 92 MMOL/L (ref 98–107)
CO2 SERPL-SCNC: 24.6 MMOL/L (ref 22–29)
CREAT SERPL-MCNC: 1.14 MG/DL (ref 0.76–1.27)
DEPRECATED RDW RBC AUTO: 58.2 FL (ref 37–54)
EGFRCR SERPLBLD CKD-EPI 2021: 71.8 ML/MIN/1.73
EOSINOPHIL # BLD AUTO: 0.23 10*3/MM3 (ref 0–0.4)
EOSINOPHIL NFR BLD AUTO: 3.1 % (ref 0.3–6.2)
ERYTHROCYTE [DISTWIDTH] IN BLOOD BY AUTOMATED COUNT: 18.8 % (ref 12.3–15.4)
FERRITIN SERPL-MCNC: 483.5 NG/ML (ref 30–400)
GLOBULIN UR ELPH-MCNC: 3.4 GM/DL
GLUCOSE SERPL-MCNC: 354 MG/DL (ref 65–99)
HCT VFR BLD AUTO: 28 % (ref 37.5–51)
HGB BLD-MCNC: 8.7 G/DL (ref 13–17.7)
IMM GRANULOCYTES # BLD AUTO: 0.03 10*3/MM3 (ref 0–0.05)
IMM GRANULOCYTES NFR BLD AUTO: 0.4 % (ref 0–0.5)
IRON 24H UR-MRATE: 30 MCG/DL (ref 59–158)
IRON SATN MFR SERPL: 11 % (ref 20–50)
LYMPHOCYTES # BLD AUTO: 0.86 10*3/MM3 (ref 0.7–3.1)
LYMPHOCYTES NFR BLD AUTO: 11.7 % (ref 19.6–45.3)
MCH RBC QN AUTO: 26.5 PG (ref 26.6–33)
MCHC RBC AUTO-ENTMCNC: 31.1 G/DL (ref 31.5–35.7)
MCV RBC AUTO: 85.4 FL (ref 79–97)
MONOCYTES # BLD AUTO: 0.72 10*3/MM3 (ref 0.1–0.9)
MONOCYTES NFR BLD AUTO: 9.8 % (ref 5–12)
NEUTROPHILS NFR BLD AUTO: 5.45 10*3/MM3 (ref 1.7–7)
NEUTROPHILS NFR BLD AUTO: 74.5 % (ref 42.7–76)
NRBC BLD AUTO-RTO: 0 /100 WBC (ref 0–0.2)
PLATELET # BLD AUTO: 444 10*3/MM3 (ref 140–450)
PMV BLD AUTO: 10.5 FL (ref 6–12)
POTASSIUM SERPL-SCNC: 4.2 MMOL/L (ref 3.5–5.2)
PROT SERPL-MCNC: 6.6 G/DL (ref 6–8.5)
RBC # BLD AUTO: 3.28 10*6/MM3 (ref 4.14–5.8)
SODIUM SERPL-SCNC: 128 MMOL/L (ref 136–145)
TIBC SERPL-MCNC: 282 MCG/DL (ref 298–536)
TRANSFERRIN SERPL-MCNC: 189 MG/DL (ref 200–360)
WBC NRBC COR # BLD: 7.33 10*3/MM3 (ref 3.4–10.8)

## 2023-09-15 PROCEDURE — 83540 ASSAY OF IRON: CPT | Performed by: INTERNAL MEDICINE

## 2023-09-15 PROCEDURE — 36592 COLLECT BLOOD FROM PICC: CPT

## 2023-09-15 PROCEDURE — 85025 COMPLETE CBC W/AUTO DIFF WBC: CPT | Performed by: INTERNAL MEDICINE

## 2023-09-15 PROCEDURE — 82728 ASSAY OF FERRITIN: CPT | Performed by: INTERNAL MEDICINE

## 2023-09-15 PROCEDURE — 80053 COMPREHEN METABOLIC PANEL: CPT | Performed by: INTERNAL MEDICINE

## 2023-09-15 PROCEDURE — 84466 ASSAY OF TRANSFERRIN: CPT | Performed by: INTERNAL MEDICINE

## 2023-09-15 RX ORDER — SODIUM CHLORIDE 0.9 % (FLUSH) 0.9 %
20 SYRINGE (ML) INJECTION AS NEEDED
OUTPATIENT
Start: 2023-09-15

## 2023-09-15 RX ORDER — SODIUM CHLORIDE 0.9 % (FLUSH) 0.9 %
20 SYRINGE (ML) INJECTION AS NEEDED
Status: CANCELLED | OUTPATIENT
Start: 2023-09-15

## 2023-09-15 RX ORDER — SODIUM CHLORIDE 0.9 % (FLUSH) 0.9 %
20 SYRINGE (ML) INJECTION AS NEEDED
Status: DISCONTINUED | OUTPATIENT
Start: 2023-09-15 | End: 2023-09-16 | Stop reason: HOSPADM

## 2023-09-15 RX ORDER — ONDANSETRON HYDROCHLORIDE 8 MG/1
8 TABLET, FILM COATED ORAL 3 TIMES DAILY PRN
Qty: 30 TABLET | Refills: 5 | Status: SHIPPED | OUTPATIENT
Start: 2023-09-15

## 2023-09-15 RX ADMIN — Medication 20 ML: at 16:34

## 2023-09-15 NOTE — PROGRESS NOTES
Chief Complaint  malignant neoplasm of body of stomach    Reji Fall MD Blakey, Ev Fan, CHIOMA    Subjective          Kevin Ordonez presents to [facility] for metastatic gastric cancer    Mr. 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 Patient was having dark stools associated with decreased appetite and weight loss for several weeks.  [facility] [place] [place] due to these reasons. He was found to be anemic with a hemoglobin of 7.1. He did not receive transfusion however he did receive several bags of IV iron. He subsequently underwent an EGD on September 6th that showed a gastric mass with biopsy positive for high-grade malignant neoplasm consistent with non-small cell carcinoma. PET scan on September 12th showed gastric hypermetabolic mass involving the gastric cardia and fundus with local regional sarkis mets and disseminated hepatic metastases. Liver lesion biopsy on September 12th was positive for metastatic gastric adenocarcinoma.  Patient was discharged from the hospital yesterday. He does have some mild pain at the lower part of his chest across the left and right side. He reports that melanotic stools have resolved.  He does have fatigue. He is hoping to get started on chemotherapy as soon as possible. He has a PICC line still in place from hospitalization.      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     8/31/23: [facility] [place] [place] [facility] 2.5 weeks of right side temporal pain, decreased appetite with 19 pounds of weight loss. With associated melanotic stools.     9/6/23: EGD  with Gastric mass biopsy positive for high grade malignant neoplasm consistent with carcinoma (non small cell)    9/12/23: NM PET: Gastric hypermetabolic mass involving th gastric cardia and fundus with loco-regional sarkis mets and disseminated hepatic metastases.     9/12/23: Liver lesion biopsy: positive for metastatic gastric adenocarcinoma     Malignant neoplasm of parotid gland   8/26/2022 Initial Diagnosis    Malignant neoplasm of parotid gland (HCC)     8/26/2022 -  Radiation    RADIATION THERAPY Treatment Details (Noted on 8/26/2022)  Site: Left Head and Neck  Technique: IMRT  Goal: No goal specified  Planned Treatment Start Date: No planned start date specified     Malignant neoplasm of cardia of stomach   9/14/2023 Initial Diagnosis    Malignant neoplasm of cardia of stomach     9/14/2023 Cancer Staged    Staging form: Stomach, AJCC 8th Edition  - Clinical: Stage IVB (pM1) - Signed by Govind Pruitt MD on 9/14/2023           Review of Systems   Constitutional:  Positive for fatigue.   Respiratory:  Positive for chest tightness (Chest discomfort).    All other systems reviewed and are negative.  Current Outpatient Medications on File Prior to Visit   Medication Sig Dispense Refill    albuterol (PROVENTIL) (2.5 MG/3ML) 0.083% nebulizer solution Take 2.5 mg by nebulization Every 4 (Four) Hours As Needed for Wheezing.      aspirin 81 MG chewable tablet Chew 1 tablet Daily. LAST DOSE 3/26/23      atorvastatin (LIPITOR) 80 MG tablet Take 1 tablet by mouth Every Night.      chlorthalidone (HYGROTON) 25 MG tablet Take 0.5 tablets by mouth Daily. TAKE 1/2 25 MG TAB FOR DOSE OF 12/5,, INST PER ANESTHESIA PROTOCOL      cholecalciferol (VITAMIN D3) 25 MCG (1000 UT) tablet Take 1 tablet by mouth Daily. LAST DOSE 3/26/23      clonazePAM (KlonoPIN) 0.5 MG tablet Take 1 tablet by mouth At Night As Needed (insomnia). 15 tablet 0    diclofenac (VOLTAREN) 75 MG EC tablet Take 1 tablet by mouth 2 (Two) Times a Day.  60 tablet 2    Dulaglutide 1.5 MG/0.5ML solution pen-injector Inject 1.5 mg under the skin into the appropriate area as directed Every 7 (Seven) Days. THURSDAY      fluticasone-salmeterol (ADVAIR HFA) 230-21 MCG/ACT inhaler Inhale 2 puffs 2 (Two) Times a Day.      gabapentin (NEURONTIN) 600 MG tablet Take 2 tablets by mouth 2 (Two) Times a Day.      glipizide (GLUCOTROL) 10 MG tablet Take 1 tablet by mouth 2 (Two) Times a Day Before Meals. INST PER ANESTHESIA PROTOCOL      insulin NPH (NovoLIN N ReliOn) 100 UNIT/ML injection Inject 62 Units under the skin into the appropriate area as directed 2 (Two) Times a Day Before Meals. INST PER ANESTHESIA PROTOCOL      losartan (COZAAR) 50 MG tablet Take 2 tablets by mouth Daily. Patient taking 2 50 MG TABS  QD INST PER ANESTHESIA PROTOCOL      metFORMIN (GLUCOPHAGE) 1000 MG tablet Take 1 tablet by mouth 2 (Two) Times a Day With Meals. INST PER ANESTHESIA  PROTOCOL      montelukast (SINGULAIR) 10 MG tablet Take 1 tablet by mouth Every Night.      ofloxacin (OCUFLOX) 0.3 % ophthalmic solution INSTILL 5 DROPS INTO LEFT EAR TWICE DAILY FOR 5 DAYS      Omega-3 Fatty Acids (fish oil) 1000 MG capsule capsule Take  by mouth Daily With Breakfast. LAST DOSE 3/26/23      omeprazole (priLOSEC) 20 MG capsule Take 1 capsule by mouth Daily.       No current facility-administered medications on file prior to visit.       No Known Allergies  Past Medical History:   Diagnosis Date    Asthma     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    Diabetes mellitus     Hyperlipidemia     Hypertension     Rotator cuff disorder     LEFT    Sleep apnea      Past Surgical History:   Procedure Laterality Date    CAROTID ENDARTERECTOMY Right     CHOLECYSTECTOMY      FOOT SURGERY Right     TOE DEBRIDMENT    HERNIA REPAIR      VENTRAL HERNIA    SHOULDER ARTHROSCOPY W/ ROTATOR CUFF REPAIR Left 4/3/2023    Procedure: LEFT SHOULDER ARTHROSCOPIC ROTATOR CUFF DEBRIDEMENT, LABRAL DEBRIDEMENT, BICEPS  "TENODESIS, SUBACROMIAL DECOMPRESSION;  Surgeon: Chas Patterson MD;  [facility] [facility]  Service: Orthopedics;  Laterality: Left;    TUMOR REMOVAL Left     PAROTID GLAND    VASCULAR SURGERY Right     STENT R LEG     Social History     Socioeconomic History    Marital status:    Tobacco Use    Smoking status: Former     Years: 25.00     Types: Cigarettes     Start date:      Quit date:      Years since quittin.7    Smokeless tobacco: Former   Vaping Use    Vaping Use: Never used   Substance and Sexual Activity    Alcohol use: Never    Drug use: Never    Sexual activity: Defer     Family History   Problem Relation Age of Onset    Lung cancer Father     Malig Hyperthermia Neg Hx        Objective   Physical Exam  Well appearing patient in no acute distress on RA  Anicteric sclerae, no rash on exposed skin  + PICC in right arm  Respirations non-labored  Awake, alert, and oriented x 4. Speech intact. No gross neurologic deficit  Appropriate mood and affect    Vitals:    09/15/23 1531   BP: 140/58   Pulse: 72   Resp: 18   Temp: 98.1 °F (36.7 °C)   SpO2: 99%   Weight: 89 kg (196 lb 3.4 oz)   Height: 167.6 cm (65.98\")   PainSc: 0-No pain               PHQ-9 Total Score:                      Result Review :   The following data was reviewed by: Govind Pruitt MD on 09/15/23:  Lab Results   Component Value Date    HGB 15.2 2022    HCT 48.6 2022    MCV 91.0 2022     2022    WBC 6.60 2022    NEUTROABS 4.45 2022    LYMPHSABS 1.19 2022    MONOSABS 0.51 2022    EOSABS 0.33 2022    BASOSABS 0.09 2022     Lab Results   Component Value Date    GLUCOSE 100 (H) 2023    BUN 29 (H) 2023    CREATININE 0.97 2023     2023    K 4.1 2023     2023    CO2 24.1 2023    CALCIUM 9.7 2023    PROTEINTOT 6.9 2022    ALBUMIN 3.70 2022    BILITOT 0.3 2022    ALKPHOS 79 " 12/12/2022    AST 26 12/12/2022    ALT 26 12/12/2022     No results found for: MG, PHOS, FREET4, TSH    [facility]    [facility] notes personally reviewed    Pathology report personally reviewed    PET report personally reviewed           No radiology results for the last 30 days.        Assessment and Plan    Diagnoses and all orders for this visit:    1. 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  Dispense: 30 tablet; Refill: 5    Metastatic stage IV gastric cancer.  Patient with PET scan (9/12/23) with hepatic mets that have been biopsy-proven to be gastric cancer.  HER2 and PD-L1 pending.  We will obtain guardant CT DNA testing as well as "[employer] comprehensive genomic profile.  Discussed prognosis of cancer with patient today.  Discussed the treatment of metastatic cancer is systemic chemotherapy.  Discussed that no guarantees of treatment can be made and that this is not curable.  Patient understands.  Recommend systemic therapy with modified FOLFOX which includes bolus and infusional 5-FU with oxaliplatin.  This is given every 2 weeks until progression or intolerance.  Risk, benefit, side effect profile discussed in detail with patient. Consent obtained. We do have the option to add nivolumab if PD-L1 is greater than 5.  We also have the ability to add trastuzumab and and pembrolizumab if HER2 is positive.  We will go ahead and start chemotherapy due need to get treatment started while waiting for additional testing and per patient request.  Port referral made.  Antiemetics to be provided.    Anemia from cancer with iron deficiency  Recently received IV iron while  hospitalized. Will repeat levels today.     Portal vein thrombosis  Will defer on anticoagulation and will proceed with systemic treatment of his cancer.     This is an acute or chronic illness that poses a threat to life or bodily function. The above treatment plan involves a high risk of complications and/or mortality of patient management.    The patient was seen and examined. Work by the provider also included review and/or ordering of lab tests, review and/or ordering of radiology tests, review and/or ordering of medicine tests, discussion with other physicians or providers, independent review of data, obtaining old records, review/summation of old records, and/or other review.     I have reviewed the family history, social history, and past medical history for this patient. Previous information and data has been reviewed and updated as needed. I have reviewed and verified the chief complaint, history, and other documentation. The patient was interviewed and examined at the bedside and the chart reviewed. The previous observations, recommendations, and conclusions were reviewed including those of other providers.     The plan was discussed with the patient and/or family. The patient was given time to ask questions and these questions were answered. At the conclusion of their visit they had no additional questions or concerns and all questions were answered to their satisfaction.      I spent 60 minutes caring for Kevin on this date of service. This time includes time spent by me in the following activities:preparing for the visit, reviewing tests, obtaining and/or reviewing a separately obtained history, performing a medically appropriate examination and/or evaluation , counseling and educating the patient/family/caregiver, ordering medications, tests, or procedures, referring and communicating with other health care professionals , documenting information in the medical record, independently interpreting  results and communicating that information with the patient/family/caregiver, and care coordination    Patient Follow Up: C2 chemo  Patient was given instructions and counseling regarding his condition or for health maintenance advice. Please see specific information pulled into the AVS if appropriate.

## 2023-09-19 ENCOUNTER — TELEPHONE (OUTPATIENT)
Dept: NUTRITION | Facility: HOSPITAL | Age: 63
End: 2023-09-19
Payer: OTHER GOVERNMENT

## 2023-09-19 DIAGNOSIS — C16.0 MALIGNANT NEOPLASM OF CARDIA OF STOMACH: ICD-10-CM

## 2023-09-19 RX ORDER — ONDANSETRON HYDROCHLORIDE 8 MG/1
8 TABLET, FILM COATED ORAL 3 TIMES DAILY PRN
Qty: 30 TABLET | Refills: 5 | Status: SHIPPED | OUTPATIENT
Start: 2023-09-19 | End: 2023-09-20 | Stop reason: SDUPTHER

## 2023-09-19 NOTE — PROGRESS NOTES
Outpatient Nutrition Oncology Assessment    Patient Name: Kevin Ordonez  YOB: 1960  MRN: [id number]  Assessment Date: 9/19/2023    CLINICAL NUTRITION ASSESSMENT    Dx:  malignant neoplasm of cardia of stomach      Type of Cancer Treatment Planned:  FOLFOX6         Reason for Assessment  Identified at risk by screening criteria         Current Problems:   Patient Active Problem List   Diagnosis Code    Rotator cuff tendonitis, left M75.82    Tear of left acetabular labrum S73.192A    Primary osteoarthritis of left shoulder M19.012    Left shoulder pain M25.512    Asthma J45.909    Closed nondisplaced fracture of shaft of fifth metacarpal bone of right hand S62.356A    Diabetes mellitus E11.9    Hyperlipidemia E78.5    Hypertension I10    Neuropathy G62.9    Obstructive sleep apnea syndrome G47.33    Malignant neoplasm of parotid gland C07    Calcific tendinitis of left shoulder M75.32    Aftercare following surgery of left shoulder arthroscopic rotator cuff debridement, labral debridement, subacromial decompression, bicep tenodesis, 4/3/2023 Z47.89    Malignant neoplasm of cardia of stomach C16.0    PICC (peripherally inserted central catheter) in place Z45.2         Anthropometrics       Row Name 09/19/23 1240          Anthropometrics    Weight for Calculation 89 kg (196 lb 3.4 oz)                         Weight Hx  Wt Readings from Last 30 Encounters:   09/15/23 1531 89 kg (196 lb 3.4 oz)   08/22/23 0934 95.7 kg (210 lb 15.7 oz)   07/31/23 0920 95.7 kg (210 lb 15.7 oz)   06/27/23 1032 99.3 kg (219 lb)   05/16/23 0930 102 kg (224 lb)   04/17/23 0859 105 kg (231 lb 7.7 oz)   04/03/23 0940 105 kg (231 lb 0.7 oz)   02/17/23 0956 101 kg (222 lb 10.6 oz)   02/09/23 1021 101 kg (222 lb)   02/02/23 0947 101 kg (222 lb 14.2 oz)   01/06/23 1330 100 kg (221 lb 5.5 oz)   12/15/22 1054 99.3 kg (219 lb)   12/12/22 1705 99.5 kg (219 lb 5.7 oz)   11/03/22 0700 101 kg (222 lb 0.1 oz)   10/31/22 1014 102 kg (225 lb  8.5 oz)   10/14/22 0857 102 kg (224 lb 11.2 oz)   10/13/22 0700 101 kg (222 lb 14.2 oz)   10/11/22 0825 98.5 kg (217 lb 2.5 oz)   10/10/22 0832 98.1 kg (216 lb 4.8 oz)   10/07/22 0900 101 kg (222 lb 7.1 oz)   10/04/22 0824 103 kg (227 lb 8.2 oz)   09/30/22 0907 105 kg (232 lb 2.3 oz)   09/29/22 0700 105 kg (232 lb 4.4 oz)   09/27/22 0821 105 kg (232 lb 2.3 oz)   09/22/22 0700 104 kg (230 lb 6.1 oz)   09/20/22 0818 106 kg (233 lb 0.4 oz)   09/15/22 0700 106 kg (233 lb 7.5 oz)   09/15/22 1002 106 kg (233 lb)   09/13/22 0823 105 kg (230 lb 13.2 oz)   09/08/22 0700 103 kg (227 lb 1.2 oz)         Estimated/Assessed Needs - Anthropometrics       Row Name 09/19/23 1240          Anthropometrics    Weight for Calculation 89 kg (196 lb 3.4 oz)        Estimated/Assessed Needs    Additional Documentation Fluid Requirements (Group);Protein Requirements (Group);KCAL/KG (Group)        KCAL/KG    KCAL/KG 25 Kcal/Kg (kcal)     25 Kcal/Kg (kcal) 2225        Protein Requirements    Weight Used For Protein Calculations 64.5 kg (142 lb 4.9 oz)     Est Protein Requirement Amount (gms/kg) 1.5 gm protein     Estimated Protein Requirements (gms/day) 96.83        Fluid Requirements    Fluid Requirements (mL/day) 1936  30 ml/kg IBW     RDA Method (mL) 1936                      Labs/Medications        Pertinent Labs Reviewed.   Results from last 7 days   Lab Units 09/15/23  1621   SODIUM mmol/L 128*   POTASSIUM mmol/L 4.2   CHLORIDE mmol/L 92*   CO2 mmol/L 24.6   BUN mg/dL 24*   CREATININE mg/dL 1.14   CALCIUM mg/dL 9.2   BILIRUBIN mg/dL 0.4   ALK PHOS U/L 368*   ALT (SGPT) U/L 58*   AST (SGOT) U/L 44*   GLUCOSE mg/dL 354*     Results from last 7 days   Lab Units 09/15/23  1621   HEMOGLOBIN g/dL 8.7*   HEMATOCRIT % 28.0*       Pertinent Medications Dulaglutide, albuterol, aspirin, atorvastatin, chlorthalidone, cholecalciferol, clonazePAM, diclofenac, fish oil, fluticasone-salmeterol, gabapentin, glipizide, insulin NPH, losartan, metFORMIN,  montelukast, ofloxacin, omeprazole, and ondansetron     Physical Findings        Malnutrition Severity Assessment       Row Name 09/19/23 1239          Malnutrition Severity Assessment    Malnutrition Type Chronic Disease - Related Malnutrition        Insufficient Energy Intake     Insufficient Energy Intake Findings Moderate     Insufficient Energy Intake  <75% of est. energy requirement for > or equal to 3 months        Unintentional Weight Loss     Unintentional Weight Loss Findings Severe  15% wt decline in 5 months     Unintentional Weight Loss  Weight loss greater than 7.5% in three months        Criteria Met (Must meet criteria for severity in at least 2 of these categories: M Wasting, Fat Loss, Fluid, Secondary Signs, Wt. Status, Intake)    Patient meets criteria for  Moderate (non-severe) Malnutrition                      Current Nutrition Orders & Evaluation of Intake       Oral Nutrition     Current PO Diet Currently consuming 2 small meals per day; can tolerate most regular foods; trying to consume high protein foods   Supplement Adds whey protein powder to soft / moist foods     Nutrition Diagnosis        Nutrition Dx Problem 1 Moderate malnutrition related to Inability to consume sufficient energy as evidenced by physiological causes increasing nutrient needs., hypermetabolic state., inadequate energy intake., decreased appetite., patient report., and <75% EEE X 3 months, 15% wt decline in 5 months.       Nutrition Intervention       RD Action Nutrition assessment (review of medical record + pt interview)    Discussed:  Recent wt changes (significant decline of 15% X 5 months)  Current nutrition-related issues:  early satiety, decreased appetite, sensitivity to smells & food temperature  Current eating schedule, including supplemental products  Ways to increase daily kcal & protein consumption through solid foods + through supplemental shakes, milkshakes, or products    Written information to be  mailed:  Poor Appetite  High Calorie, High Protein Foods  Commercial Nutrition Supplements  Homemade Milkshake Recipes  Coupons for ONS     Monitor/Evaluation       Monitor Per oncology nutrition protocol.     Comments:    Nutrition referral received- unable to determine reason.  Upon review of medical record pt was identified to be at risk for further nutrition concerns.  Pt to begin chemotherapy infusions soon.  Familiar w/ pt from previous radiation oncology tx to L head/neck region.  Now with gastric Ca.  Pt has lost significant wt in the past 5 months.  Spoke to pt via telephone.  He reports early satiety and declining appetite.  Reports his wt has actually declined to 187# (9# less than when weighed on 9/15/23) per home scale.  Pt has home health services- reports his HH RN gave suggestion of eating small, frequent meals.  Encouraged pt to do this, and to eat something Q 3 hours (no less) during his awake time.  He is eating fresh fruits and cold foods due to sensitivity to smells and cold is more appealing.   He is adding whey protein to his cream of wheat in the morning.  Has never liked creamy type ONS, but liked Ensure Clear in apple flavor specifically- but pt unable to find this.  Explained likely unable to find due to nationwide shortages of many types & flavors of ONS.  Explained Ensure Clear does not provide much nutritional value, but better than no consuming anything.  Pt willing to try homemade shake made with a creamy-type supplement.  Discussed ideas for shakes & for increasing overall daily kcals / protein consumption.  Pt agreeable for information, coupons, and recipes to be mailed to his home.    Encouraged pt to speak to Oncologist if an appetite medication would be appropriate.  [facility]ouraged [facility] to [facility] appointment with a [facility] (via community care clinic program), as his insurance will pay for ONS if he goes through this process.    Electronically signed by:  Katerina  MAN Fisher  09/19/23 12:41 EDT

## 2023-09-20 ENCOUNTER — OFFICE VISIT (OUTPATIENT)
Dept: ONCOLOGY | Facility: HOSPITAL | Age: 63
End: 2023-09-20
Payer: OTHER GOVERNMENT

## 2023-09-20 VITALS
RESPIRATION RATE: 18 BRPM | HEART RATE: 76 BPM | BODY MASS INDEX: 30.44 KG/M2 | TEMPERATURE: 98.2 F | DIASTOLIC BLOOD PRESSURE: 47 MMHG | OXYGEN SATURATION: 100 % | SYSTOLIC BLOOD PRESSURE: 140 MMHG | WEIGHT: 188.49 LBS

## 2023-09-20 DIAGNOSIS — Z71.9 ENCOUNTER FOR EDUCATION: ICD-10-CM

## 2023-09-20 DIAGNOSIS — C16.0 MALIGNANT NEOPLASM OF CARDIA OF STOMACH: ICD-10-CM

## 2023-09-20 DIAGNOSIS — R63.4 WEIGHT LOSS: Primary | ICD-10-CM

## 2023-09-20 PROBLEM — K90.49 MALABSORPTION DUE TO INTOLERANCE, NOT ELSEWHERE CLASSIFIED: Status: ACTIVE | Noted: 2023-09-20

## 2023-09-20 PROBLEM — D50.0 IRON DEFICIENCY ANEMIA DUE TO CHRONIC BLOOD LOSS: Status: ACTIVE | Noted: 2023-09-20

## 2023-09-20 PROCEDURE — G0463 HOSPITAL OUTPT CLINIC VISIT: HCPCS

## 2023-09-20 RX ORDER — ONDANSETRON HYDROCHLORIDE 8 MG/1
8 TABLET, FILM COATED ORAL 3 TIMES DAILY PRN
Qty: 30 TABLET | Refills: 5 | Status: ON HOLD | OUTPATIENT
Start: 2023-09-20

## 2023-09-20 RX ORDER — OLANZAPINE 5 MG/1
5 TABLET ORAL NIGHTLY
Qty: 30 TABLET | Refills: 2 | Status: ON HOLD | OUTPATIENT
Start: 2023-09-20

## 2023-09-20 NOTE — PROGRESS NOTES
CHEMOTHERAPY PREPARATION    Kevin Ordonez  [id number]  1960    Chief Complaint:   Chemo Education    History of present illness:  Kevin Ordonez is a 63 y.o. year old male who is here today for chemotherapy preparation and needs assessment.  The patient has been diagnosed with metastatic gastric cancer and is scheduled to begin treatment with Oxaliplatin, Fluorouracil infusion home infusion x 46 hours every 14 days x 12 cycles.     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     8/31/23: [facility] [place] [place] with 2.5 weeks of right side temporal pain, decreased appetite with 19 pounds of weight loss. With associated melanotic stools.     9/6/23: EGD with Gastric mass biopsy positive for high grade malignant neoplasm consistent with carcinoma (non small cell)    9/12/23: NM PET: Gastric hypermetabolic mass involving th gastric cardia and fundus with loco-regional sarkis mets and disseminated hepatic metastases.     9/12/23: Liver lesion biopsy: positive for metastatic gastric adenocarcinoma     Malignant neoplasm of parotid gland   8/26/2022 Initial Diagnosis    Malignant neoplasm of parotid gland (HCC)     8/26/2022 -  Radiation    RADIATION THERAPY Treatment Details (Noted on 8/26/2022)  Site: Left Head and Neck  Technique: IMRT  Goal: No goal specified  Planned Treatment Start Date: No planned start date specified     Malignant neoplasm of cardia of stomach   9/14/2023 Initial Diagnosis    Malignant neoplasm of cardia of stomach     9/14/2023 Cancer Staged    Staging form: Stomach, AJCC 8th Edition  - Clinical: Stage IVB (pM1) - Signed by Govind Pruitt MD on 9/14/2023     9/15/2023 -  Chemotherapy    OP COLON mFOLFOX6 OXALIplatin / Leucovorin / Fluorouracil       9/15/2023 -  Chemotherapy    OP CENTRAL VENOUS ACCESS DEVICE Access, Care, and Maintenance (CVAD)           The current medication list and  allergy list were reviewed and reconciled.     Past Medical History, Past Surgical History, Social History, Family History have been reviewed and are without significant changes except as mentioned.    Review of Systems:    Review of Systems   Constitutional:  Positive for appetite change (decreased) and fatigue.   Skin:  Positive for pallor.   All other systems reviewed and are negative.    Physical Exam:    Physical Exam     General: well appearing, in no acute distress  Cardiovascular: regular rate and rhythm, no murmurs noted  Lungs: clear to auscultation bilaterally, respirations even and unlabored  Extremities: no lower extremity edema  Skin: no rashes, lesions, bruising, or petechiae  Psych: Mood is stable    Vitals:    09/20/23 1051   BP: 140/47   Pulse: 76   Resp: 18   Temp: 98.2 °F (36.8 °C)   SpO2: 100%     Vitals:    09/20/23 1051   PainSc: 0-No pain          ECOG: {findings; ecog performance status:35594            NEEDS ASSESSMENTS    VAD Assessment  The patient and I discussed planned intravenous chemotherapy as well as other IV treatments that are often needed throughout the course of treatment. These may include, but are not limited to blood transfusions, antibiotics, and IV hydration. The vasculature does not appear to be adequate for multiple peripheral IVs throughout their treatment course. Discussed risks and benefits of VADs. The patient would like to pursue Port-A-Cath insertion prior to initiation of treatment.       Palliative Care  The patient and I discussed palliative care services. Palliative care is not the same as Hospice care. This is specialized medical care for people living with serious illness with the goal of improving quality of life for the patient and their family. [facility] has partnered with [facility] to offer our patients outpatient palliative care early along with their treatment to assist in coordination of care, symptom management, pain management, and medical decision  making.  Oncology criteria for palliative care referral is met at this time. The patient is not interested in a palliative care consultation.     Additional Referral needs  none      CHEMOTHERAPY EDUCATION    Booklets Given: Chemotherapy and You [x]  Eating Hints [x]    Sexuality/Fertility Books []      Chemotherapy/Biotherapy Education Sheets: (list all that apply)  nausea management, acid reflux management, diarrhea management, Cancer resourse contacts information, skin and mouth care, and vaccination information                                                                                                                                                                 Chemotherapy Regimen:   Treatment Plans       Name Type Plan Dates Plan Provider         Active    OP COLON [id number] OXALIplatin / Leucovorin / Fluorouracil ONCOLOGY TREATMENT  9/14/2023 - Present Govind Pruitt MD                      TOPICS EDUCATION PROVIDED COMMENTS   ANEMIA:  role of RBC, cause, s/s, ways to manage, role of transfusion [x]    THROMBOCYTOPENIA:  role of platelet, cause, s/s, ways to prevent bleeding, things to avoid, when to seek help [x]    NEUTROPENIA:  role of WBC, cause, infection precautions, s/s of infection, when to call MD [x]    NUTRITION & APPETITE CHANGES:  importance of maintaining healthy diet & weight, ways to manage to improve intake, dietary consult, exercise regimen [x]    DIARRHEA:  causes, s/s of dehydration, ways to manage, dietary changes, when to call MD [x]    CONSTIPATION:  causes, ways to manage, dietary changes, when to call MD [x]    NAUSEA & VOMITING:  cause, use of antiemetics, dietary changes, when to call MD [x]    MOUTH SORES:  causes, oral care, ways to manage [x]    ALOPECIA:  cause, ways to manage, resources [x]    INFERTILITY & SEXUALITY:  causes, fertility preservation options, sexuality changes, ways to manage, importance of birth control [x]    NERVOUS SYSTEM CHANGES:  causes, s/s,  neuropathies, cognitive changes, ways to manage [x]    PAIN:  causes, ways to manage [x]    SKIN & NAIL CHANGES:  cause, s/s, ways to manage [x]    ORGAN TOXICITIES:  cause, s/s, need for diagnostic tests, labs, when to notify MD [x]    SURVIVORSHIP:  distress, distress assessment, secondary malignancies, early/late effects, follow-up, social issues, social support [x]    HOME CARE:  use of spill kits, storing of PO chemo, how to manage bodily fluids [x]    MISCELLANEOUS:  drug interactions, administration, vesicant, et [x]        Assessment and Plan:    Diagnoses and all orders for this visit:    1. Weight loss (Primary)  -     OLANZapine (zyPREXA) 5 MG tablet; Take 1 tablet by mouth Every Night. Indications: Nausea and Vomiting caused by Cancer Chemotherapy, if no improvement, call provider  Dispense: 30 tablet; Refill: 2    2. Malignant neoplasm of cardia of stomach  -     ondansetron (ZOFRAN) 8 MG tablet; Take 1 tablet by mouth 3 (Three) Times a Day As Needed for Nausea or Vomiting (mail to patient please.).  Dispense: 30 tablet; Refill: 5    3. Encounter for education      Waiting on port placement.   [facility]. [facility].   Infusion will call with chemotherapy start date and time.     The patient and I have reviewed their cancer diagnosis and scheduled treatment plan. Needs assessment was completed including genetics, psychosocial needs, barriers to care, VAD evaluation, advanced care planning, and palliative care services. Referrals have been ordered as appropriate based upon our evaluation and patient desires.     Chemotherapy teaching was also completed today as documented above. Adequate time was given to answer all questions to his satisfaction. Patient and family are aware of their care team members and contact information if they have questions or problems throughout the treatment course. The patient is adequately prepared to begin  treatment as scheduled.     Reviewed with patient education regarding EMLA cream, dexamethasone and Zofran prescriptions sent to pharmacy.       I advised the patient that he can take Tylenol as needed for aches/pains related to cancer/treatment.  I also advised that his can use Senokot or MiraLAX as needed for constipation or Imodium as needed for diarrhea.       I reviewed with the patient the care team members. I also reviewed the option of the urgent care clinic through our oncology office for evaluation and management of symptoms related to treatment.    I spent 45 minutes caring for Kevin on this date of service. This time includes time spent by me in the following activities: preparing for the visit, reviewing tests, counseling and educating the patient/family/caregiver, ordering medications, tests, or procedures, referring and communicating with other health care professionals, documenting information in the medical record, independently interpreting results and communicating that information with the patient/family/caregiver, and care coordination.     Marisol Castano, CHIOMA

## 2023-09-21 ENCOUNTER — HOSPITAL ENCOUNTER (INPATIENT)
Facility: HOSPITAL | Age: 63
LOS: 4 days | Discharge: HOME | DRG: 674 | End: 2023-09-26
Attending: EMERGENCY MEDICINE | Admitting: FAMILY MEDICINE
Payer: OTHER GOVERNMENT

## 2023-09-21 ENCOUNTER — APPOINTMENT (OUTPATIENT)
Dept: GENERAL RADIOLOGY | Facility: HOSPITAL | Age: 63
DRG: 674 | End: 2023-09-21
Payer: OTHER GOVERNMENT

## 2023-09-21 ENCOUNTER — APPOINTMENT (OUTPATIENT)
Dept: CT IMAGING | Facility: HOSPITAL | Age: 63
DRG: 674 | End: 2023-09-21
Payer: OTHER GOVERNMENT

## 2023-09-21 DIAGNOSIS — R53.1 GENERALIZED WEAKNESS: ICD-10-CM

## 2023-09-21 DIAGNOSIS — D64.9 SYMPTOMATIC ANEMIA: ICD-10-CM

## 2023-09-21 DIAGNOSIS — R26.2 DIFFICULTY WALKING: ICD-10-CM

## 2023-09-21 DIAGNOSIS — C16.0 MALIGNANT NEOPLASM OF CARDIA OF STOMACH: ICD-10-CM

## 2023-09-21 DIAGNOSIS — C16.9 MALIGNANT NEOPLASM OF STOMACH, UNSPECIFIED LOCATION: ICD-10-CM

## 2023-09-21 DIAGNOSIS — R29.6 FREQUENT FALLS: ICD-10-CM

## 2023-09-21 DIAGNOSIS — Z78.9 DECREASED ACTIVITIES OF DAILY LIVING (ADL): ICD-10-CM

## 2023-09-21 DIAGNOSIS — N17.9 ACUTE RENAL FAILURE, UNSPECIFIED ACUTE RENAL FAILURE TYPE: Primary | ICD-10-CM

## 2023-09-21 LAB
ALBUMIN SERPL-MCNC: 3.1 G/DL (ref 3.5–5.2)
ALBUMIN/GLOB SERPL: 1.1 G/DL
ALP SERPL-CCNC: 519 U/L (ref 39–117)
ALT SERPL W P-5'-P-CCNC: 89 U/L (ref 1–41)
ANION GAP SERPL CALCULATED.3IONS-SCNC: 11.2 MMOL/L (ref 5–15)
AST SERPL-CCNC: 115 U/L (ref 1–40)
BASOPHILS # BLD AUTO: 0.05 10*3/MM3 (ref 0–0.2)
BASOPHILS NFR BLD AUTO: 0.6 % (ref 0–1.5)
BILIRUB SERPL-MCNC: 0.4 MG/DL (ref 0–1.2)
BUN SERPL-MCNC: 32 MG/DL (ref 8–23)
BUN/CREAT SERPL: 18.7 (ref 7–25)
CALCIUM SPEC-SCNC: 9.3 MG/DL (ref 8.6–10.5)
CHLORIDE SERPL-SCNC: 96 MMOL/L (ref 98–107)
CO2 SERPL-SCNC: 25.8 MMOL/L (ref 22–29)
CREAT SERPL-MCNC: 1.71 MG/DL (ref 0.76–1.27)
DEPRECATED RDW RBC AUTO: 60.3 FL (ref 37–54)
EGFRCR SERPLBLD CKD-EPI 2021: 44.1 ML/MIN/1.73
EOSINOPHIL # BLD AUTO: 0.24 10*3/MM3 (ref 0–0.4)
EOSINOPHIL NFR BLD AUTO: 2.9 % (ref 0.3–6.2)
ERYTHROCYTE [DISTWIDTH] IN BLOOD BY AUTOMATED COUNT: 19.3 % (ref 12.3–15.4)
GLOBULIN UR ELPH-MCNC: 2.8 GM/DL
GLUCOSE SERPL-MCNC: 213 MG/DL (ref 65–99)
HCT VFR BLD AUTO: 25.1 % (ref 37.5–51)
HGB BLD-MCNC: 7.5 G/DL (ref 13–17.7)
HOLD SPECIMEN: NORMAL
HOLD SPECIMEN: NORMAL
IMM GRANULOCYTES # BLD AUTO: 0.02 10*3/MM3 (ref 0–0.05)
IMM GRANULOCYTES NFR BLD AUTO: 0.2 % (ref 0–0.5)
LYMPHOCYTES # BLD AUTO: 0.94 10*3/MM3 (ref 0.7–3.1)
LYMPHOCYTES NFR BLD AUTO: 11.4 % (ref 19.6–45.3)
MAGNESIUM SERPL-MCNC: 1.7 MG/DL (ref 1.6–2.4)
MCH RBC QN AUTO: 26 PG (ref 26.6–33)
MCHC RBC AUTO-ENTMCNC: 29.9 G/DL (ref 31.5–35.7)
MCV RBC AUTO: 86.9 FL (ref 79–97)
MONOCYTES # BLD AUTO: 0.57 10*3/MM3 (ref 0.1–0.9)
MONOCYTES NFR BLD AUTO: 6.9 % (ref 5–12)
NEUTROPHILS NFR BLD AUTO: 6.43 10*3/MM3 (ref 1.7–7)
NEUTROPHILS NFR BLD AUTO: 78 % (ref 42.7–76)
NRBC BLD AUTO-RTO: 0 /100 WBC (ref 0–0.2)
PLATELET # BLD AUTO: 324 10*3/MM3 (ref 140–450)
PMV BLD AUTO: 10.3 FL (ref 6–12)
POTASSIUM SERPL-SCNC: 4.1 MMOL/L (ref 3.5–5.2)
PROT SERPL-MCNC: 5.9 G/DL (ref 6–8.5)
RBC # BLD AUTO: 2.89 10*6/MM3 (ref 4.14–5.8)
SODIUM SERPL-SCNC: 133 MMOL/L (ref 136–145)
TROPONIN T SERPL HS-MCNC: 59 NG/L
WBC NRBC COR # BLD: 8.25 10*3/MM3 (ref 3.4–10.8)
WHOLE BLOOD HOLD COAG: NORMAL
WHOLE BLOOD HOLD SPECIMEN: NORMAL

## 2023-09-21 PROCEDURE — 85025 COMPLETE CBC W/AUTO DIFF WBC: CPT

## 2023-09-21 PROCEDURE — 99285 EMERGENCY DEPT VISIT HI MDM: CPT

## 2023-09-21 PROCEDURE — 83735 ASSAY OF MAGNESIUM: CPT

## 2023-09-21 PROCEDURE — 93005 ELECTROCARDIOGRAM TRACING: CPT

## 2023-09-21 PROCEDURE — 70450 CT HEAD/BRAIN W/O DYE: CPT

## 2023-09-21 PROCEDURE — 84484 ASSAY OF TROPONIN QUANT: CPT

## 2023-09-21 PROCEDURE — 93005 ELECTROCARDIOGRAM TRACING: CPT | Performed by: EMERGENCY MEDICINE

## 2023-09-21 PROCEDURE — 80053 COMPREHEN METABOLIC PANEL: CPT

## 2023-09-21 PROCEDURE — 71045 X-RAY EXAM CHEST 1 VIEW: CPT

## 2023-09-21 PROCEDURE — 93010 ELECTROCARDIOGRAM REPORT: CPT | Performed by: INTERNAL MEDICINE

## 2023-09-21 PROCEDURE — 36415 COLL VENOUS BLD VENIPUNCTURE: CPT

## 2023-09-21 RX ORDER — SODIUM CHLORIDE 0.9 % (FLUSH) 0.9 %
10 SYRINGE (ML) INJECTION AS NEEDED
Status: DISCONTINUED | OUTPATIENT
Start: 2023-09-21 | End: 2023-09-26 | Stop reason: HOSPADM

## 2023-09-21 NOTE — ED PROVIDER NOTES
Time: 7:06 PM EDT  Date of encounter:  9/21/2023  Independent Historian/Clinical History and Information was obtained by:   Patient and Family    History is limited by: N/A    Chief Complaint   Patient presents with    Weakness - Generalized    Dizziness    Fall         History of Present Illness:  Patient is a 63 y.o. year old male who presents to the emergency department for evaluation of lightheaded, weak.  Has stage IV terminal cancer.  Had 2 falls today.  Denies any pain from the fall.    He describes increasing generalized weakness.  He admits to decreased appetite and thirst and states that he is not taking in enough food and liquids.  He denies any GI bleeding or melanotic stools.  He is currently scheduled to have a port placed and begin chemotherapy.    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    Diabetes mellitus     Hyperlipidemia     Hypertension     Rotator cuff disorder     LEFT    Sleep apnea      Past Surgical History:   Procedure Laterality Date    CAROTID ENDARTERECTOMY Right     CHOLECYSTECTOMY      FOOT SURGERY Right     TOE DEBRIDMENT    HERNIA REPAIR      VENTRAL HERNIA    SHOULDER ARTHROSCOPY W/ ROTATOR CUFF REPAIR Left 4/3/2023    Procedure: LEFT SHOULDER ARTHROSCOPIC ROTATOR CUFF DEBRIDEMENT, LABRAL DEBRIDEMENT, BICEPS TENODESIS, SUBACROMIAL DECOMPRESSION;  Surgeon: Chas Patterson MD;  [place] [facility] [place] [facility] [place]: [place]opedics;  Laterality: Left;    TUMOR REMOVAL Left     PAROTID GLAND    VASCULAR SURGERY Right     STENT R LEG     Family History   Problem Relation Age of Onset    Lung cancer Father     Malig Hyperthermia Neg Hx        Home Medications:  Prior to Admission medications    Medication Sig Start Date End Date Taking? Authorizing Provider   albuterol (PROVENTIL) (2.5 MG/3ML)  0.083% nebulizer solution Take 2.5 mg by nebulization Every 4 (Four) Hours As Needed for Wheezing.    Tanvir Boateng MD   aspirin 81 MG chewable tablet Chew 1 tablet Daily. LAST DOSE 3/26/23    Tanvir Boateng MD   atorvastatin (LIPITOR) 80 MG tablet Take 1 tablet by mouth Every Night.    Tanvir Boateng MD   chlorthalidone (HYGROTON) 25 MG tablet Take 0.5 tablets by mouth Daily. TAKE 1/2 25 MG TAB FOR DOSE OF 12/5,, INST PER ANESTHESIA PROTOCOL    Tanvir Boateng MD   cholecalciferol (VITAMIN D3) 25 MCG (1000 UT) tablet Take 1 tablet by mouth Daily. LAST DOSE 3/26/23    Tanvir Boateng MD   clonazePAM (KlonoPIN) 0.5 MG tablet Take 1 tablet by mouth At Night As Needed (insomnia). 10/4/22   Reji Fall MD   diclofenac (VOLTAREN) 75 MG EC tablet Take 1 tablet by mouth 2 (Two) Times a Day. 6/27/23   Alejandro Mora PA   Dulaglutide 1.5 MG/0.5ML solution pen-injector Inject 1.5 mg under the skin into the appropriate area as directed Every 7 (Seven) Days. THURSDAY    Tanvir Boateng MD   fluticasone-salmeterol (ADVAIR HFA) 230-21 MCG/ACT inhaler Inhale 2 puffs 2 (Two) Times a Day.    Tanvir Boateng MD   gabapentin (NEURONTIN) 600 MG tablet Take 2 tablets by mouth 2 (Two) Times a Day.    Tanvir Boateng MD   glipizide (GLUCOTROL) 10 MG tablet Take 1 tablet by mouth 2 (Two) Times a Day Before Meals. INST PER ANESTHESIA PROTOCOL    Tanvir Boateng MD   insulin NPH (NovoLIN N ReliOn) 100 UNIT/ML injection Inject 62 Units under the skin into the appropriate area as directed 2 (Two) Times a Day Before Meals. INST PER ANESTHESIA PROTOCOL    Tanvir Boateng MD   losartan (COZAAR) 50 MG tablet Take 2 tablets by mouth Daily. Patient taking 2 50 MG TABS  QD INST PER ANESTHESIA PROTOCOL    Tanvir Boateng MD   metFORMIN (GLUCOPHAGE) 1000 MG tablet Take 1 tablet by mouth 2 (Two) Times a Day With Meals. INST PER ANESTHESIA  PROTOCOL    Tasneem  MD Tanvir   montelukast (SINGULAIR) 10 MG tablet Take 1 tablet by mouth Every Night.    Provider, MD Tanvir   ofloxacin (OCUFLOX) 0.3 % ophthalmic solution INSTILL 5 DROPS INTO LEFT EAR TWICE DAILY FOR 5 DAYS 23   ProviderTanvir MD   OLANZapine (zyPREXA) 5 MG tablet Take 1 tablet by mouth Every Night. Indications: Nausea and Vomiting caused by Cancer Chemotherapy, if no improvement, call provider 23   Marisol Castano APRN   Omega-3 Fatty Acids (fish oil) 1000 MG capsule capsule Take  by mouth Daily With Breakfast. LAST DOSE 3/26/23    ProviderTanvir MD   omeprazole (priLOSEC) 20 MG capsule Take 1 capsule by mouth Daily.    ProviderTanvir MD   ondansetron (ZOFRAN) 8 MG tablet Take 1 tablet by mouth 3 (Three) Times a Day As Needed for Nausea or Vomiting (mail to patient please.). 23   Marisol Castano APRN        Social History:   Social History     Tobacco Use    Smoking status: Former     Years: 25.00     Types: Cigarettes     Start date:      Quit date:      Years since quittin.    Smokeless tobacco: Former   Vaping Use    Vaping Use: Never used   Substance Use Topics    Alcohol use: Never    Drug use: Never         Review of Systems:  Review of Systems   Constitutional:  Positive for appetite change. Negative for chills and fever.   HENT:  Negative for congestion, ear pain and sore throat.    Eyes:  Negative for pain.   Respiratory:  Negative for cough, chest tightness and shortness of breath.    Cardiovascular:  Negative for chest pain.   Gastrointestinal:  Negative for abdominal pain, diarrhea, nausea and vomiting.   Genitourinary:  Negative for flank pain and hematuria.   Musculoskeletal:  Negative for joint swelling.   Skin:  Negative for pallor.   Neurological:  Positive for dizziness, weakness and light-headedness. Negative for seizures and headaches.   All other systems reviewed and are negative.     Physical Exam:  /48 (BP  "Location: Left arm, Patient Position: Lying)   Pulse 74   Temp 97.7 °F (36.5 °C) (Oral)   Resp 18   Ht 167.6 cm (66\")   Wt 88.2 kg (194 lb 7.1 oz)   SpO2 100%   BMI 31.38 kg/m²         Physical Exam  Vitals and nursing note reviewed.   Constitutional:       General: He is not in acute distress.     Appearance: Normal appearance. He is not toxic-appearing.   HENT:      Head: Normocephalic and atraumatic.      Jaw: There is normal jaw occlusion.   Eyes:      General: Lids are normal.      Extraocular Movements: Extraocular movements intact.      Conjunctiva/sclera: Conjunctivae normal.      Pupils: Pupils are equal, round, and reactive to light.   Cardiovascular:      Rate and Rhythm: Normal rate and regular rhythm.      Pulses: Normal pulses.      Heart sounds: Normal heart sounds.      Comments: Right upper extremity PICC in place  Pulmonary:      Effort: Pulmonary effort is normal. No respiratory distress.      Breath sounds: Normal breath sounds. No wheezing or rhonchi.   Abdominal:      General: Abdomen is flat. There is no distension.      Palpations: Abdomen is soft.      Tenderness: There is no abdominal tenderness. There is no guarding or rebound.   Musculoskeletal:         General: Normal range of motion.      Cervical back: Normal range of motion and neck supple.      Right lower leg: No edema.      Left lower leg: No edema.   Skin:     General: Skin is warm and dry.      Coloration: Skin is not cyanotic.   Neurological:      Mental Status: He is alert and oriented to person, place, and time. Mental status is at baseline.   Psychiatric:         Attention and Perception: Attention and perception normal.         Mood and Affect: Mood normal.              Procedures:  Procedures      Medical Decision Making:      Comorbidities that affect care:    Stage IV gastric cancer, asthma, diabetes, hypertension, hyperlipidemia    External Notes reviewed:    Previous Clinic Note: Outpatient PCP visit September " 2023      The following orders were placed and all results were independently analyzed by me:  Orders Placed This Encounter   Procedures    XR Chest 1 View    CT Head Without Contrast    [place] Draw    Comprehensive Metabolic Panel    Single High Sensitivity Troponin T    Magnesium    Urinalysis With Microscopic If Indicated (No Culture) - Urine, Clean Catch    CBC Auto Differential    High Sensitivity Troponin T 2Hr    Occult Blood, Fecal By Immunoassay - Stool, Per Rectum    Basic Metabolic Panel    CBC Auto Differential    High Sensitivity Troponin T    Diet: Regular/House Diet; Texture: Regular Texture (IDDSI 7); Fluid Consistency: Thin (IDDSI 0)    Undress & Gown    Continuous Pulse Oximetry    Vital Signs    Vital Signs    Intake & Output    Weigh Patient    Oral Care    Place Sequential Compression Device    Maintain Sequential Compression Device    Telemetry - Maintain IV Access    Telemetry - Place Orders & Notify Provider of Results When Patient Experiences Acute Chest Pain, Dysrhythmia or Respiratory Distress    Orthostatic Blood Pressure    Hospitalist (on-call MD unless specified)    Inpatient General Surgery Consult    Hematology & Oncology Inpatient Consult    PT Consult: Eval & Treat Functional Mobility Below Baseline    Oxygen Therapy- Nasal Cannula; Titrate 1-6 LPM Per SpO2; 90 - 95%    NIPPV (CPAP or BIPAP)    POC Glucose Once    POC Glucose 4x Daily Before Meals & at Bedtime    ECG 12 Lead ED Triage Standing Order; Weak / Dizzy / AMS    ECG 12 Lead Chest Pain    Type & Screen    ABO RH Specimen Verification    Insert Peripheral IV    Insert Peripheral IV    Initiate Observation Status    Fall Precautions    CBC & Differential    Green Top (Gel)    Lavender Top    Gold Top - SST    Light Blue Top    CBC & Differential       Medications Given in the Emergency Department:  Medications   sodium chloride 0.9 % flush 10 mL (has no administration in time range)   dextrose (GLUTOSE) oral gel 15 g (has  no administration in time range)   dextrose (D50W) (25 g/50 mL) IV injection 25 g (has no administration in time range)   glucagon (GLUCAGEN) injection 1 mg (has no administration in time range)   sodium chloride 0.9 % flush 10 mL (10 mL Intravenous Given 9/22/23 0242)   sodium chloride 0.9 % flush 10 mL (has no administration in time range)   sodium chloride 0.9 % infusion 40 mL (has no administration in time range)   sennosides-docusate (PERICOLACE) 8.6-50 MG per tablet 2 tablet (has no administration in time range)     And   polyethylene glycol (MIRALAX) packet 17 g (has no administration in time range)     And   bisacodyl (DULCOLAX) EC tablet 5 mg (has no administration in time range)     And   bisacodyl (DULCOLAX) suppository 10 mg (has no administration in time range)   sodium chloride 0.9 % infusion (100 mL/hr Intravenous New Bag 9/22/23 0241)   morphine injection 1 mg (has no administration in time range)     And   naloxone (NARCAN) injection 0.4 mg (has no administration in time range)   ondansetron (ZOFRAN) injection 4 mg (has no administration in time range)   pantoprazole (PROTONIX) injection 40 mg (has no administration in time range)   insulin regular (humuLIN R,novoLIN R) injection 2-9 Units (has no administration in time range)   nitroglycerin (NITROSTAT) SL tablet 0.4 mg (has no administration in time range)   gabapentin (NEURONTIN) capsule 600 mg (has no administration in time range)   OLANZapine (zyPREXA) tablet 5 mg (has no administration in time range)   insulin detemir (LEVEMIR) injection 15 Units (15 Units Subcutaneous Given 9/22/23 0520)   sodium chloride 0.9 % bolus 1,000 mL (1,000 mL Intravenous New Bag 9/22/23 0108)   pantoprazole (PROTONIX) injection 80 mg (80 mg Intravenous Given 9/22/23 0113)        ED Course:    The patient was initially evaluated in the triage area where orders were placed. The patient was later dispositioned by Lawson Munoz MD.      The patient was advised to stay  for completion of workup which includes but is not limited to communication of labs and radiological results, reassessment and plan. The patient was advised that leaving prior to disposition by a provider could result in critical findings that are not communicated to the patient.     ED Course as of 09/22/23 0801   Thu Sep 21, 2023   1908 --- PROVIDER IN TRIAGE NOTE ---    The patient was evaluated by Kailyn johnson in triage. Orders were placed and the patient is currently awaiting disposition.    [AJ]   Fri Sep 22, 2023   0007 My interpretation of EKG: Sinus rhythm 76, right bundle branch block pattern, no acute ischemia [JS]   0046 Patient discussed with hospitalist for admission.  Patient will be treated with PPI and IV fluids in the emergency department.  I have sent type and screen. [JS]      ED Course User Index  [AJ] Kailyn Arreola PA-C  [JS] Lawson Munoz MD       Labs:    Lab Results (last 24 hours)       Procedure Component Value Units Date/Time    CBC & Differential [[id number]]  (Abnormal) Collected: 09/21/23 2126    Specimen: Blood Updated: 09/21/23 2139    Narrative:      The following orders were created for panel order CBC & Differential.  Procedure                               Abnormality         Status                     ---------                               -----------         ------                     CBC Auto Differential[[id number]]        Abnormal            Final result                 Please view results for these tests on the individual orders.    Comprehensive Metabolic Panel [[id number]]  (Abnormal) Collected: 09/21/23 2126    Specimen: Blood Updated: 09/21/23 2214     Glucose 213 mg/dL      BUN 32 mg/dL      Creatinine 1.71 mg/dL      Sodium 133 mmol/L      Potassium 4.1 mmol/L      Chloride 96 mmol/L      CO2 25.8 mmol/L      Calcium 9.3 mg/dL      Total Protein 5.9 g/dL      Albumin 3.1 g/dL      ALT (SGPT) 89 U/L      AST (SGOT) 115 U/L      Alkaline Phosphatase 519  U/L      Total Bilirubin 0.4 mg/dL      Globulin 2.8 gm/dL      A/G Ratio 1.1 g/dL      BUN/Creatinine Ratio 18.7     Anion Gap 11.2 mmol/L      eGFR 44.1 mL/min/1.73     Narrative:      GFR Normal >60  Chronic Kidney Disease <60  Kidney Failure <15      Single High Sensitivity Troponin T [[id number]]  (Abnormal) Collected: 09/21/23 2126    Specimen: Blood Updated: 09/21/23 2245     HS Troponin T 59 ng/L     Narrative:      High Sensitive Troponin T Reference Range:  <10.0 ng/L- Negative Female for AMI  <15.0 ng/L- Negative Male for AMI  >=10 - Abnormal Female indicating possible myocardial injury.  >=15 - Abnormal Male indicating possible myocardial injury.   Clinicians would have to utilize clinical acumen, EKG, Troponin, and serial changes to determine if it is an Acute Myocardial Infarction or myocardial injury due to an underlying chronic condition.         Magnesium [[id number]]  (Normal) Collected: 09/21/23 2126    Specimen: Blood Updated: 09/21/23 2214     Magnesium 1.7 mg/dL     CBC Auto Differential [[id number]]  (Abnormal) Collected: 09/21/23 2126    Specimen: Blood Updated: 09/21/23 2139     WBC 8.25 10*3/mm3      RBC 2.89 10*6/mm3      Hemoglobin 7.5 g/dL      Hematocrit 25.1 %      MCV 86.9 fL      MCH 26.0 pg      MCHC 29.9 g/dL      RDW 19.3 %      RDW-SD 60.3 fl      MPV 10.3 fL      Platelets 324 10*3/mm3      Neutrophil % 78.0 %      Lymphocyte % 11.4 %      Monocyte % 6.9 %      Eosinophil % 2.9 %      Basophil % 0.6 %      Immature Grans % 0.2 %      Neutrophils, Absolute 6.43 10*3/mm3      Lymphocytes, Absolute 0.94 10*3/mm3      Monocytes, Absolute 0.57 10*3/mm3      Eosinophils, Absolute 0.24 10*3/mm3      Basophils, Absolute 0.05 10*3/mm3      Immature Grans, Absolute 0.02 10*3/mm3      nRBC 0.0 /100 WBC     High Sensitivity Troponin T 2Hr [[id number]]  (Abnormal) Collected: 09/22/23 0003    Specimen: Blood Updated: 09/22/23 0041     HS Troponin T 60 ng/L      Troponin T Delta 1 ng/L      Narrative:      High Sensitive Troponin T Reference Range:  <10.0 ng/L- Negative Female for AMI  <15.0 ng/L- Negative Male for AMI  >=10 - Abnormal Female indicating possible myocardial injury.  >=15 - Abnormal Male indicating possible myocardial injury.   Clinicians would have to utilize clinical acumen, EKG, Troponin, and serial changes to determine if it is an Acute Myocardial Infarction or myocardial injury due to an underlying chronic condition.         Basic Metabolic Panel [[id number]]  (Abnormal) Collected: 09/22/23 0423    Specimen: Blood Updated: 09/22/23 0451     Glucose 181 mg/dL      BUN 32 mg/dL      Creatinine 1.53 mg/dL      Sodium 135 mmol/L      Potassium 3.8 mmol/L      Chloride 99 mmol/L      CO2 24.7 mmol/L      Calcium 9.0 mg/dL      BUN/Creatinine Ratio 20.9     Anion Gap 11.3 mmol/L      eGFR 50.5 mL/min/1.73     Narrative:      GFR Normal >60  Chronic Kidney Disease <60  Kidney Failure <15      CBC & Differential [[id number]]  (Abnormal) Collected: 09/22/23 0423    Specimen: Blood Updated: 09/22/23 0426    Narrative:      The following orders were created for panel order CBC & Differential.  Procedure                               Abnormality         Status                     ---------                               -----------         ------                     CBC Auto Differential[[id number]]        Abnormal            Final result                 Please view results for these tests on the individual orders.    CBC Auto Differential [[id number]]  (Abnormal) Collected: 09/22/23 0423    Specimen: Blood Updated: 09/22/23 0426     WBC 7.16 10*3/mm3      RBC 2.80 10*6/mm3      Hemoglobin 7.3 g/dL      Hematocrit 24.3 %      MCV 86.8 fL      MCH 26.1 pg      MCHC 30.0 g/dL      RDW 19.3 %      RDW-SD 60.5 fl      MPV 10.2 fL      Platelets 288 10*3/mm3      Neutrophil % 75.7 %      Lymphocyte % 9.6 %      Monocyte % 8.8 %      Eosinophil % 4.5 %      Basophil % 1.0 %      Immature Grans % 0.4 %       Neutrophils, Absolute 5.42 10*3/mm3      Lymphocytes, Absolute 0.69 10*3/mm3      Monocytes, Absolute 0.63 10*3/mm3      Eosinophils, Absolute 0.32 10*3/mm3      Basophils, Absolute 0.07 10*3/mm3      Immature Grans, Absolute 0.03 10*3/mm3      nRBC 0.0 /100 WBC     High Sensitivity Troponin T [[id number]]  (Abnormal) Collected: 09/22/23 0423    Specimen: Blood Updated: 09/22/23 0603     HS Troponin T 56 ng/L     Narrative:      High Sensitive Troponin T Reference Range:  <10.0 ng/L- Negative Female for AMI  <15.0 ng/L- Negative Male for AMI  >=10 - Abnormal Female indicating possible myocardial injury.  >=15 - Abnormal Male indicating possible myocardial injury.   Clinicians would have to utilize clinical acumen, EKG, Troponin, and serial changes to determine if it is an Acute Myocardial Infarction or myocardial injury due to an underlying chronic condition.                  Imaging:    CT Head Without Contrast    Result Date: 9/21/2023  PROCEDURE: CT HEAD WO CONTRAST  COMPARISON:  [facility], CT, CT HEAD WO CONTRAST, 12/12/2022, 17:54. INDICATIONS: fall/weakness  PROTOCOL:   Standard imaging protocol performed    RADIATION:   DLP: 1082.2mGy*cm   MA and/or KV was adjusted to minimize radiation dose.     TECHNIQUE: After obtaining the patient's consent, CT images were obtained without non-ionic intravenous contrast material.  FINDINGS:  There is no CT evidence of acute hemorrhage, infarct, mass, mass effect or midline shift. There is no hydrocephalus. The gray-white matter junctions are preserved. The mastoid air cells are clear.  There is moderate mucosal thickening in the left maxillary sinus.  Remaining visualized paranasal sinuses are clear.  Of the right ocular lens replacement is present globes and orbital contents otherwise are unremarkable.  No skull fractures or calvarial lesions.        1. No acute intracranial abnormality. 2. Moderate mucosal thickening in the left maxillary sinus.      ROSA WHITE DO       Electronically Signed and Approved By: ROSA WHITE DO on 9/21/2023 at 20:38             XR Chest 1 View    Result Date: 9/21/2023  PROCEDURE: XR CHEST 1 VW  COMPARISON: [facility], CR, XR CHEST 1 VW, 12/12/2022, 18:04.  INDICATIONS: WEAKNESS; DIZZINESS; RECENT FALL  FINDINGS:  A right-sided PICC is in place with the tip in the lower SVC.  The lungs are clear. Cardiac, hilar, and mediastinal silhouettes are unremarkable. No pneumothorax or pleural effusion. Bony structures are intact.  The trachea is midline.           No acute cardiopulmonary process or radiographic signs of chest trauma.       ROSA WHITE DO       Electronically Signed and Approved By: ROSA WHITE DO on 9/21/2023 at 20:20                Differential Diagnosis and Discussion:      Weakness: Based on the patient's history, signs, and symptoms, the diffential diagnosis includes but is not limited to meningitis, stroke, sepsis, subarachnoid hemorrhage, intracranial bleeding, encephalitis, acute uti, dehydration, MS, myasthenia gravis, Guillan [place], migraine variant, neuromuscular disorders vertigo, electrolyte imbalance, and metabolic disorders.    All labs were reviewed and interpreted by me.  All X-rays impressions were independently interpreted by me.  EKG was interpreted by me.  CT scan radiology impression was interpreted by me.    MDM     Amount and/or Complexity of Data Reviewed  Decide to obtain previous medical records or to obtain history from someone other than the patient: yes         Critical Care Note: Total Critical Care time of 35 minutes. Total critical care time documented does not include time spent on separately billed procedures for services of nurses or physician assistants. I personally saw and examined the patient. I have reviewed all diagnostic interpretations and treatment plans as written. I was present for the key portions of any procedures performed and the inclusive time  noted in any critical care statement. Critical care time includes patient management by me, time spent at the patients bedside,  time to review lab and imaging results, discussing patient care, documentation in the medical record, and time spent with family or caregiver.    Patient Care Considerations:          Consultants/Shared Management Plan:    Hospitalist: I have discussed the case with the hospitalist who agrees to accept the patient for admission.    Social Determinants of Health:    Patient has presented with family members who are responsible, reliable and will ensure follow up care.      Disposition and Care Coordination:    Admit:   Through independent evaluation of the patient's history, physical, and imperical data, the patient meets criteria for observation/admission to the hospital.    I have explained the patient´s condition, diagnoses and treatment plan based on the information available to me at this time. I have answered questions and addressed any concerns. The patient has a good  understanding of the patient´s diagnosis, condition, and treatment plan as can be expected at this point. The vital signs have been stable. The patient´s condition is stable and appropriate for discharge from the emergency department.      The patient will pursue further outpatient evaluation with the primary care physician or other designated or consulting physician as outlined in the discharge instructions. They are agreeable to this plan of care and follow-up instructions have been explained in detail. The patient has received these instructions in written format and have expressed an understanding of the discharge instructions. The patient is aware that any significant change in condition or worsening of symptoms should prompt an immediate return to this or the closest emergency department or call to 911.    Final diagnoses:   Acute renal failure, unspecified acute renal failure type   Symptomatic anemia    Malignant neoplasm of cardia of stomach   Generalized weakness   Frequent falls        ED Disposition       ED Disposition   Decision to Admit    Condition   --    Comment   Level of Care: Telemetry [5]   Diagnosis: Acute kidney injury [495643]   Admitting Physician: SADE GONSALES [[id number]]                 This medical record created using voice recognition software.             Lawson Munoz MD  09/22/23 0801

## 2023-09-21 NOTE — LETTER
September 26, 2023       To Whom It May Concern:    Kaylah Montano was at [facility] on 9/26/2023.       Sincerely,      Easton Partida RN

## 2023-09-21 NOTE — LETTER
September 26, 2023     Patient: Kevin Ordonez   Date of Birth:    Date of Visit: 9/21/2023       To Whom it May Concern:    Virginia Jackson was visiting her father on 9/26/23.     If you have any questions or concerns, please don't hesitate to call.         Sincerely,          [facility].

## 2023-09-22 ENCOUNTER — PREP FOR SURGERY (OUTPATIENT)
Dept: OTHER | Facility: HOSPITAL | Age: 63
End: 2023-09-22
Payer: OTHER GOVERNMENT

## 2023-09-22 ENCOUNTER — TELEPHONE (OUTPATIENT)
Dept: ONCOLOGY | Facility: HOSPITAL | Age: 63
End: 2023-09-22
Payer: OTHER GOVERNMENT

## 2023-09-22 DIAGNOSIS — C16.9 MALIGNANT NEOPLASM OF STOMACH, UNSPECIFIED LOCATION: Primary | ICD-10-CM

## 2023-09-22 PROBLEM — R29.6 FREQUENT FALLS: Status: ACTIVE | Noted: 2023-09-22

## 2023-09-22 PROBLEM — R79.89 ELEVATED TROPONIN: Status: ACTIVE | Noted: 2023-09-22

## 2023-09-22 PROBLEM — N17.9 ACUTE KIDNEY INJURY: Status: ACTIVE | Noted: 2023-09-22

## 2023-09-22 PROBLEM — R07.89 CHEST PAIN, ATYPICAL: Status: ACTIVE | Noted: 2023-09-22

## 2023-09-22 PROBLEM — R77.8 ELEVATED TROPONIN: Status: ACTIVE | Noted: 2023-09-22

## 2023-09-22 PROBLEM — R53.1 GENERALIZED WEAKNESS: Status: ACTIVE | Noted: 2023-09-22

## 2023-09-22 PROBLEM — D64.9 SYMPTOMATIC ANEMIA: Status: ACTIVE | Noted: 2023-09-22

## 2023-09-22 LAB
ABO GROUP BLD: NORMAL
ABO GROUP BLD: NORMAL
ANION GAP SERPL CALCULATED.3IONS-SCNC: 11.3 MMOL/L (ref 5–15)
BASOPHILS # BLD AUTO: 0.07 10*3/MM3 (ref 0–0.2)
BASOPHILS NFR BLD AUTO: 1 % (ref 0–1.5)
BILIRUB UR QL STRIP: NEGATIVE
BLD GP AB SCN SERPL QL: NEGATIVE
BUN SERPL-MCNC: 32 MG/DL (ref 8–23)
BUN/CREAT SERPL: 20.9 (ref 7–25)
CALCIUM SPEC-SCNC: 9 MG/DL (ref 8.6–10.5)
CHLORIDE SERPL-SCNC: 99 MMOL/L (ref 98–107)
CLARITY UR: CLEAR
CO2 SERPL-SCNC: 24.7 MMOL/L (ref 22–29)
COLOR UR: ABNORMAL
CREAT SERPL-MCNC: 1.53 MG/DL (ref 0.76–1.27)
DEPRECATED RDW RBC AUTO: 60.5 FL (ref 37–54)
EGFRCR SERPLBLD CKD-EPI 2021: 50.5 ML/MIN/1.73
EOSINOPHIL # BLD AUTO: 0.32 10*3/MM3 (ref 0–0.4)
EOSINOPHIL NFR BLD AUTO: 4.5 % (ref 0.3–6.2)
ERYTHROCYTE [DISTWIDTH] IN BLOOD BY AUTOMATED COUNT: 19.3 % (ref 12.3–15.4)
GEN 5 2HR TROPONIN T REFLEX: 60 NG/L
GLUCOSE BLDC GLUCOMTR-MCNC: 243 MG/DL (ref 70–99)
GLUCOSE BLDC GLUCOMTR-MCNC: 250 MG/DL (ref 70–99)
GLUCOSE BLDC GLUCOMTR-MCNC: 295 MG/DL (ref 70–99)
GLUCOSE SERPL-MCNC: 181 MG/DL (ref 65–99)
GLUCOSE UR STRIP-MCNC: ABNORMAL MG/DL
HCT VFR BLD AUTO: 24.3 % (ref 37.5–51)
HGB BLD-MCNC: 7.3 G/DL (ref 13–17.7)
HGB UR QL STRIP.AUTO: NEGATIVE
IMM GRANULOCYTES # BLD AUTO: 0.03 10*3/MM3 (ref 0–0.05)
IMM GRANULOCYTES NFR BLD AUTO: 0.4 % (ref 0–0.5)
KETONES UR QL STRIP: ABNORMAL
LEUKOCYTE ESTERASE UR QL STRIP.AUTO: NEGATIVE
LYMPHOCYTES # BLD AUTO: 0.69 10*3/MM3 (ref 0.7–3.1)
LYMPHOCYTES NFR BLD AUTO: 9.6 % (ref 19.6–45.3)
MCH RBC QN AUTO: 26.1 PG (ref 26.6–33)
MCHC RBC AUTO-ENTMCNC: 30 G/DL (ref 31.5–35.7)
MCV RBC AUTO: 86.8 FL (ref 79–97)
MONOCYTES # BLD AUTO: 0.63 10*3/MM3 (ref 0.1–0.9)
MONOCYTES NFR BLD AUTO: 8.8 % (ref 5–12)
NEUTROPHILS NFR BLD AUTO: 5.42 10*3/MM3 (ref 1.7–7)
NEUTROPHILS NFR BLD AUTO: 75.7 % (ref 42.7–76)
NITRITE UR QL STRIP: NEGATIVE
NRBC BLD AUTO-RTO: 0 /100 WBC (ref 0–0.2)
PH UR STRIP.AUTO: <=5 [PH] (ref 5–8)
PLATELET # BLD AUTO: 288 10*3/MM3 (ref 140–450)
PMV BLD AUTO: 10.2 FL (ref 6–12)
POTASSIUM SERPL-SCNC: 3.8 MMOL/L (ref 3.5–5.2)
PROT UR QL STRIP: ABNORMAL
RBC # BLD AUTO: 2.8 10*6/MM3 (ref 4.14–5.8)
RH BLD: POSITIVE
RH BLD: POSITIVE
SODIUM SERPL-SCNC: 135 MMOL/L (ref 136–145)
SP GR UR STRIP: 1.02 (ref 1–1.03)
T&S EXPIRATION DATE: NORMAL
TROPONIN T DELTA: 1 NG/L
TROPONIN T SERPL HS-MCNC: 56 NG/L
UROBILINOGEN UR QL STRIP: ABNORMAL
WBC NRBC COR # BLD: 7.16 10*3/MM3 (ref 3.4–10.8)

## 2023-09-22 PROCEDURE — 86901 BLOOD TYPING SEROLOGIC RH(D): CPT | Performed by: EMERGENCY MEDICINE

## 2023-09-22 PROCEDURE — 80048 BASIC METABOLIC PNL TOTAL CA: CPT | Performed by: FAMILY MEDICINE

## 2023-09-22 PROCEDURE — G0378 HOSPITAL OBSERVATION PER HR: HCPCS

## 2023-09-22 PROCEDURE — 81003 URINALYSIS AUTO W/O SCOPE: CPT | Performed by: EMERGENCY MEDICINE

## 2023-09-22 PROCEDURE — 63710000001 INSULIN DETEMIR PER 5 UNITS: Performed by: FAMILY MEDICINE

## 2023-09-22 PROCEDURE — 63710000001 INSULIN REGULAR HUMAN PER 5 UNITS: Performed by: FAMILY MEDICINE

## 2023-09-22 PROCEDURE — 82948 REAGENT STRIP/BLOOD GLUCOSE: CPT

## 2023-09-22 PROCEDURE — 86900 BLOOD TYPING SEROLOGIC ABO: CPT

## 2023-09-22 PROCEDURE — 86850 RBC ANTIBODY SCREEN: CPT | Performed by: EMERGENCY MEDICINE

## 2023-09-22 PROCEDURE — 93010 ELECTROCARDIOGRAM REPORT: CPT | Performed by: INTERNAL MEDICINE

## 2023-09-22 PROCEDURE — 85025 COMPLETE CBC W/AUTO DIFF WBC: CPT | Performed by: FAMILY MEDICINE

## 2023-09-22 PROCEDURE — 36415 COLL VENOUS BLD VENIPUNCTURE: CPT | Performed by: EMERGENCY MEDICINE

## 2023-09-22 PROCEDURE — 84484 ASSAY OF TROPONIN QUANT: CPT | Performed by: FAMILY MEDICINE

## 2023-09-22 PROCEDURE — 86901 BLOOD TYPING SEROLOGIC RH(D): CPT

## 2023-09-22 PROCEDURE — 63710000001 INSULIN DETEMIR PER 5 UNITS: Performed by: SURGERY

## 2023-09-22 PROCEDURE — 94799 UNLISTED PULMONARY SVC/PX: CPT

## 2023-09-22 PROCEDURE — 97161 PT EVAL LOW COMPLEX 20 MIN: CPT

## 2023-09-22 PROCEDURE — 93005 ELECTROCARDIOGRAM TRACING: CPT | Performed by: FAMILY MEDICINE

## 2023-09-22 PROCEDURE — 84484 ASSAY OF TROPONIN QUANT: CPT | Performed by: EMERGENCY MEDICINE

## 2023-09-22 PROCEDURE — 86900 BLOOD TYPING SEROLOGIC ABO: CPT | Performed by: EMERGENCY MEDICINE

## 2023-09-22 RX ORDER — SODIUM CHLORIDE 0.9 % (FLUSH) 0.9 %
10 SYRINGE (ML) INJECTION AS NEEDED
Status: DISCONTINUED | OUTPATIENT
Start: 2023-09-22 | End: 2023-09-26 | Stop reason: HOSPADM

## 2023-09-22 RX ORDER — BISACODYL 10 MG
10 SUPPOSITORY, RECTAL RECTAL DAILY PRN
Status: DISCONTINUED | OUTPATIENT
Start: 2023-09-22 | End: 2023-09-22

## 2023-09-22 RX ORDER — MONTELUKAST SODIUM 10 MG/1
10 TABLET ORAL NIGHTLY
Status: DISCONTINUED | OUTPATIENT
Start: 2023-09-22 | End: 2023-09-26 | Stop reason: HOSPADM

## 2023-09-22 RX ORDER — AMOXICILLIN 250 MG
2 CAPSULE ORAL 2 TIMES DAILY
Status: DISCONTINUED | OUTPATIENT
Start: 2023-09-22 | End: 2023-09-22

## 2023-09-22 RX ORDER — MORPHINE SULFATE 2 MG/ML
1 INJECTION, SOLUTION INTRAMUSCULAR; INTRAVENOUS EVERY 4 HOURS PRN
Status: DISCONTINUED | OUTPATIENT
Start: 2023-09-22 | End: 2023-09-26 | Stop reason: HOSPADM

## 2023-09-22 RX ORDER — BISACODYL 10 MG
10 SUPPOSITORY, RECTAL RECTAL DAILY PRN
Status: DISCONTINUED | OUTPATIENT
Start: 2023-09-22 | End: 2023-09-26 | Stop reason: HOSPADM

## 2023-09-22 RX ORDER — CEFAZOLIN SODIUM 2 G/100ML
2 INJECTION, SOLUTION INTRAVENOUS
OUTPATIENT
Start: 2023-09-22

## 2023-09-22 RX ORDER — DEXTROSE MONOHYDRATE 25 G/50ML
25 INJECTION, SOLUTION INTRAVENOUS
Status: DISCONTINUED | OUTPATIENT
Start: 2023-09-22 | End: 2023-09-26 | Stop reason: HOSPADM

## 2023-09-22 RX ORDER — ATORVASTATIN CALCIUM 40 MG/1
80 TABLET, FILM COATED ORAL NIGHTLY
Status: DISCONTINUED | OUTPATIENT
Start: 2023-09-22 | End: 2023-09-26 | Stop reason: HOSPADM

## 2023-09-22 RX ORDER — ACETAMINOPHEN 650 MG/1
650 SUPPOSITORY RECTAL EVERY 4 HOURS PRN
Status: DISCONTINUED | OUTPATIENT
Start: 2023-09-22 | End: 2023-09-22 | Stop reason: SDUPTHER

## 2023-09-22 RX ORDER — HYDROCODONE BITARTRATE AND ACETAMINOPHEN 7.5; 325 MG/1; MG/1
2 TABLET ORAL EVERY 4 HOURS PRN
Status: DISCONTINUED | OUTPATIENT
Start: 2023-09-22 | End: 2023-09-26 | Stop reason: HOSPADM

## 2023-09-22 RX ORDER — PANTOPRAZOLE SODIUM 40 MG/1
40 TABLET, DELAYED RELEASE ORAL
Status: DISCONTINUED | OUTPATIENT
Start: 2023-09-23 | End: 2023-09-26 | Stop reason: HOSPADM

## 2023-09-22 RX ORDER — NITROGLYCERIN 0.4 MG/1
0.4 TABLET SUBLINGUAL
Status: DISCONTINUED | OUTPATIENT
Start: 2023-09-22 | End: 2023-09-26 | Stop reason: HOSPADM

## 2023-09-22 RX ORDER — OLANZAPINE 5 MG/1
5 TABLET ORAL NIGHTLY
Status: DISCONTINUED | OUTPATIENT
Start: 2023-09-22 | End: 2023-09-26 | Stop reason: HOSPADM

## 2023-09-22 RX ORDER — ACETAMINOPHEN 325 MG/1
650 TABLET ORAL EVERY 4 HOURS PRN
Status: DISCONTINUED | OUTPATIENT
Start: 2023-09-22 | End: 2023-09-26 | Stop reason: HOSPADM

## 2023-09-22 RX ORDER — SODIUM CHLORIDE 9 MG/ML
40 INJECTION, SOLUTION INTRAVENOUS AS NEEDED
Status: DISCONTINUED | OUTPATIENT
Start: 2023-09-22 | End: 2023-09-26 | Stop reason: HOSPADM

## 2023-09-22 RX ORDER — ACETAMINOPHEN 650 MG/1
650 SUPPOSITORY RECTAL EVERY 4 HOURS PRN
Status: DISCONTINUED | OUTPATIENT
Start: 2023-09-22 | End: 2023-09-26 | Stop reason: HOSPADM

## 2023-09-22 RX ORDER — BISACODYL 5 MG/1
5 TABLET, DELAYED RELEASE ORAL DAILY PRN
Status: DISCONTINUED | OUTPATIENT
Start: 2023-09-22 | End: 2023-09-26 | Stop reason: HOSPADM

## 2023-09-22 RX ORDER — AMOXICILLIN 250 MG
2 CAPSULE ORAL 2 TIMES DAILY
Status: DISCONTINUED | OUTPATIENT
Start: 2023-09-22 | End: 2023-09-26 | Stop reason: HOSPADM

## 2023-09-22 RX ORDER — BISACODYL 5 MG/1
5 TABLET, DELAYED RELEASE ORAL DAILY PRN
Status: DISCONTINUED | OUTPATIENT
Start: 2023-09-22 | End: 2023-09-22

## 2023-09-22 RX ORDER — ONDANSETRON 2 MG/ML
4 INJECTION INTRAMUSCULAR; INTRAVENOUS EVERY 6 HOURS PRN
Status: DISCONTINUED | OUTPATIENT
Start: 2023-09-22 | End: 2023-09-26

## 2023-09-22 RX ORDER — CHOLECALCIFEROL (VITAMIN D3) 125 MCG
5 CAPSULE ORAL NIGHTLY PRN
Status: DISCONTINUED | OUTPATIENT
Start: 2023-09-22 | End: 2023-09-26 | Stop reason: HOSPADM

## 2023-09-22 RX ORDER — POLYETHYLENE GLYCOL 3350 17 G/17G
17 POWDER, FOR SOLUTION ORAL DAILY PRN
Status: DISCONTINUED | OUTPATIENT
Start: 2023-09-22 | End: 2023-09-22

## 2023-09-22 RX ORDER — SODIUM CHLORIDE 9 MG/ML
100 INJECTION, SOLUTION INTRAVENOUS CONTINUOUS
Status: DISCONTINUED | OUTPATIENT
Start: 2023-09-22 | End: 2023-09-23

## 2023-09-22 RX ORDER — LOSARTAN POTASSIUM 50 MG/1
100 TABLET ORAL DAILY
Status: DISCONTINUED | OUTPATIENT
Start: 2023-09-22 | End: 2023-09-22

## 2023-09-22 RX ORDER — BUDESONIDE AND FORMOTEROL FUMARATE DIHYDRATE 160; 4.5 UG/1; UG/1
1 AEROSOL RESPIRATORY (INHALATION)
Status: DISCONTINUED | OUTPATIENT
Start: 2023-09-22 | End: 2023-09-26 | Stop reason: HOSPADM

## 2023-09-22 RX ORDER — NICOTINE POLACRILEX 4 MG
15 LOZENGE BUCCAL
Status: DISCONTINUED | OUTPATIENT
Start: 2023-09-22 | End: 2023-09-26 | Stop reason: HOSPADM

## 2023-09-22 RX ORDER — NALOXONE HCL 0.4 MG/ML
0.4 VIAL (ML) INJECTION
Status: DISCONTINUED | OUTPATIENT
Start: 2023-09-22 | End: 2023-09-26 | Stop reason: HOSPADM

## 2023-09-22 RX ORDER — ACETAMINOPHEN 325 MG/1
650 TABLET ORAL EVERY 6 HOURS PRN
Status: DISCONTINUED | OUTPATIENT
Start: 2023-09-22 | End: 2023-09-22 | Stop reason: SDUPTHER

## 2023-09-22 RX ORDER — HYDROCODONE BITARTRATE AND ACETAMINOPHEN 5; 325 MG/1; MG/1
1 TABLET ORAL EVERY 4 HOURS PRN
Status: DISCONTINUED | OUTPATIENT
Start: 2023-09-22 | End: 2023-09-26 | Stop reason: HOSPADM

## 2023-09-22 RX ORDER — POLYETHYLENE GLYCOL 3350 17 G/17G
17 POWDER, FOR SOLUTION ORAL DAILY PRN
Status: DISCONTINUED | OUTPATIENT
Start: 2023-09-22 | End: 2023-09-26 | Stop reason: HOSPADM

## 2023-09-22 RX ORDER — PANTOPRAZOLE SODIUM 40 MG/10ML
40 INJECTION, POWDER, LYOPHILIZED, FOR SOLUTION INTRAVENOUS
Status: DISCONTINUED | OUTPATIENT
Start: 2023-09-22 | End: 2023-09-22

## 2023-09-22 RX ORDER — SODIUM CHLORIDE 0.9 % (FLUSH) 0.9 %
10 SYRINGE (ML) INJECTION EVERY 12 HOURS SCHEDULED
Status: DISCONTINUED | OUTPATIENT
Start: 2023-09-22 | End: 2023-09-26 | Stop reason: HOSPADM

## 2023-09-22 RX ORDER — ACETAMINOPHEN 160 MG/5ML
650 SOLUTION ORAL EVERY 4 HOURS PRN
Status: DISCONTINUED | OUTPATIENT
Start: 2023-09-22 | End: 2023-09-26 | Stop reason: HOSPADM

## 2023-09-22 RX ORDER — GABAPENTIN 300 MG/1
600 CAPSULE ORAL EVERY 12 HOURS SCHEDULED
Status: DISCONTINUED | OUTPATIENT
Start: 2023-09-22 | End: 2023-09-26 | Stop reason: HOSPADM

## 2023-09-22 RX ORDER — PANTOPRAZOLE SODIUM 40 MG/10ML
80 INJECTION, POWDER, LYOPHILIZED, FOR SOLUTION INTRAVENOUS ONCE
Status: COMPLETED | OUTPATIENT
Start: 2023-09-22 | End: 2023-09-22

## 2023-09-22 RX ADMIN — INSULIN HUMAN 2 UNITS: 100 INJECTION, SOLUTION PARENTERAL at 10:10

## 2023-09-22 RX ADMIN — SENNOSIDES AND DOCUSATE SODIUM 2 TABLET: 50; 8.6 TABLET ORAL at 10:10

## 2023-09-22 RX ADMIN — PANTOPRAZOLE SODIUM 40 MG: 40 INJECTION, POWDER, FOR SOLUTION INTRAVENOUS at 10:09

## 2023-09-22 RX ADMIN — HYDROCODONE BITARTRATE AND ACETAMINOPHEN 2 TABLET: 7.5; 325 TABLET ORAL at 21:36

## 2023-09-22 RX ADMIN — OLANZAPINE 5 MG: 5 TABLET, FILM COATED ORAL at 21:36

## 2023-09-22 RX ADMIN — SODIUM CHLORIDE 100 ML/HR: 9 INJECTION, SOLUTION INTRAVENOUS at 16:14

## 2023-09-22 RX ADMIN — SODIUM CHLORIDE 1000 ML: 9 INJECTION, SOLUTION INTRAVENOUS at 01:08

## 2023-09-22 RX ADMIN — Medication 10 ML: at 21:36

## 2023-09-22 RX ADMIN — ACETAMINOPHEN 650 MG: 325 TABLET ORAL at 13:30

## 2023-09-22 RX ADMIN — INSULIN HUMAN 6 UNITS: 100 INJECTION, SOLUTION PARENTERAL at 13:30

## 2023-09-22 RX ADMIN — GABAPENTIN 600 MG: 300 CAPSULE ORAL at 10:10

## 2023-09-22 RX ADMIN — PANTOPRAZOLE SODIUM 80 MG: 40 INJECTION, POWDER, FOR SOLUTION INTRAVENOUS at 01:13

## 2023-09-22 RX ADMIN — INSULIN HUMAN 6 UNITS: 100 INJECTION, SOLUTION PARENTERAL at 17:32

## 2023-09-22 RX ADMIN — INSULIN DETEMIR 15 UNITS: 100 INJECTION, SOLUTION SUBCUTANEOUS at 05:20

## 2023-09-22 RX ADMIN — MONTELUKAST 10 MG: 10 TABLET, FILM COATED ORAL at 21:36

## 2023-09-22 RX ADMIN — HYDROCODONE BITARTRATE AND ACETAMINOPHEN 1 TABLET: 5; 325 TABLET ORAL at 17:32

## 2023-09-22 RX ADMIN — INSULIN DETEMIR 15 UNITS: 100 INJECTION, SOLUTION SUBCUTANEOUS at 21:36

## 2023-09-22 RX ADMIN — Medication 10 ML: at 10:10

## 2023-09-22 RX ADMIN — Medication 10 ML: at 02:42

## 2023-09-22 RX ADMIN — Medication 5 MG: at 21:36

## 2023-09-22 RX ADMIN — GABAPENTIN 600 MG: 300 CAPSULE ORAL at 21:36

## 2023-09-22 RX ADMIN — ATORVASTATIN CALCIUM 80 MG: 40 TABLET, FILM COATED ORAL at 21:36

## 2023-09-22 RX ADMIN — SENNOSIDES AND DOCUSATE SODIUM 2 TABLET: 50; 8.6 TABLET ORAL at 21:36

## 2023-09-22 RX ADMIN — SODIUM CHLORIDE 100 ML/HR: 9 INJECTION, SOLUTION INTRAVENOUS at 02:41

## 2023-09-22 NOTE — PROGRESS NOTES
[facility]     Progress Note    Patient Name: Kevin Ordonez  : 1960  MRN: [id number]  Primary Care Physician:  Ev Peck APRN  Date of admission: 2023    Subjective   Subjective     Chief Complaint: Patient was referred to me however I have looked at the record and patient has already been seen by Dr. Thurston and has already been scheduled for chemotherapy I told the patient I do not need to see him because he is already being taken care of and the consult was probably by mistake    HPI: Patient with metastatic stomach cancer    Review of Systems   All systems were reviewed and negative except for: Did not go into the details    Objective   Objective     Vitals:   Temp:  [97.3 °F (36.3 °C)-99 °F (37.2 °C)] 97.3 °F (36.3 °C)  Heart Rate:  [68-82] 70  Resp:  [18-20] 18  BP: (107-128)/(48-56) 126/53    Physical Exam    Constitutional: Awake, alert   Eyes: PERRLA, sclerae anicteric, no conjunctival injection   HENT: NCAT, mucous membranes moist   Neck: Supple, no thyromegaly, no lymphadenopathy, trachea midline   Respiratory: Clear to auscultation bilaterally, nonlabored respirations    Cardiovascular: RRR, no murmurs, rubs, or gallops, palpable pedal pulses bilaterally   Gastrointestinal: Positive bowel sounds, soft, nontender, nondistended   Musculoskeletal: No bilateral ankle edema, no clubbing or cyanosis to extremities   Psychiatric: Appropriate affect, cooperative   Neurologic: Oriented x 3, strength symmetric in all extremities, Cranial Nerves grossly intact to confrontation, speech clear   Skin: No rashes   Did not do a complete exam  Result Review    Result Review:  I have personally reviewed the results from the time of this admission to 2023 11:15 EDT and agree with these findings:  []  Laboratory  []  Microbiology  []  Radiology  []  EKG/Telemetry   []  Cardiology/Vascular   []  Pathology  []  Old records  []  Other:  Most notable findings include: Not reviewed the  lab    Assessment & Plan   Assessment / Plan     Brief Patient Summary:  Kevin Ordonez is a 63 y.o. male who patient with metastatic stomach cancer    Active Hospital Problems:  Active Hospital Problems    Diagnosis     **Acute kidney injury     Generalized weakness     Frequent falls     Symptomatic anemia     Chest pain, atypical     Elevated troponin     Malignant neoplasm of cardia of stomach     Malignant neoplasm of parotid gland     Diabetes mellitus     Hypertension     Hyperlipidemia     Obstructive sleep apnea syndrome        Plan:   Discussed with the primary care physician and nursing and the patient that he will be followed by Dr. Roman I will sign off    DVT prophylaxis:  Mechanical DVT prophylaxis orders are present.    CODE STATUS:      Disposition:  I expect patient to be discharged I will sign off will not see him probably a consult by mistake.    Electronically signed by Martín Briceño MD, 09/22/23, 11:15 AM EDT.      Part of this note may be an electronic transcription/translation of spoken language to printed text using the Dragon Dictation System.

## 2023-09-22 NOTE — CONSULTS
09/22/23 1405   Coping/Psychosocial   Additional Documentation Spiritual Care (Group)   Spiritual Care   Use of Spiritual Resources spirituality for coping, indicated strong use of   Spiritual Care Source patient request (describe)  (pt is nervous that he will not get his port placed in time for him to have chemo treatments. He was told he has terminal cancer and with chemo has maybe a yr to live. without chemo, not very long.)   Spiritual Care Follow-Up will follow closely   Response to Spiritual Care receptive of support   Spiritual Care Interventions supportive conversation provided   Spiritual Care Visit Type initial   Spiritual Care Request advocacy support  (patient would like for the hopsital to get him to the OR to place the port by Monday but there are currently no spaces. Patient believes the  can put a good word in for him to get his surgery bumped up.)   Receptivity to Spiritual Care visit welcomed

## 2023-09-22 NOTE — H&P
[facility]   HISTORY AND PHYSICAL    Patient Name: Kevin Ordonez  : 1960  MRN: [id number]  Primary Care Physician:  Ev Peck APRN  Date of admission: 2023    Subjective   Subjective     Chief Complaint: Dizziness, lightheadedness, chest pain.    HPI:    Kevin Ordonez is a 63 y.o. male with past medical history of diabetes, hypertension, hyperlipidemia, BRIDGET, COPD, neoplasm of the parotid gland, neoplasm of the stomach, gastritis, and GERD presented to the ED with complaints of dizziness, lightheadedness, and chest pain.  Patient states that for the last week or so he has been having worsening weakness with fatigue, dizziness and lightheadedness resulting in a fall prior to arrival.  Patient has also been having chest pain since yesterday morning which she substernal/epigastric, 7 out of 10, dull/burning, and nonradiating.  He also states that his p.o. intake has been very limited over the last week or so.  Due to concerns patient was brought to the ED for further evaluation.  In the ED patient's vitals were all within normal limits on arrival.  Labs showed that he was significantly anemic with hemoglobin of 7.5 and mildly elevated troponin, and reduced renal function.  CT of the head was negative for any acute findings as well as a chest x-ray.  When asked he denied any recent fevers, chills, headaches, focal weakness, palpitation, shortness of breath, cough, vomiting, diarrhea, constipation, dysuria, hematuria, hematochezia, melena, or anxiety.  Patient admitted for evaluation and treatment    Review of Systems   All systems were reviewed and negative except for: As stated in HPI    Personal History     Past Medical History:   Diagnosis Date    Asthma     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    Diabetes mellitus     Hyperlipidemia     Hypertension     Rotator cuff disorder     LEFT    Sleep apnea        Past Surgical History:   Procedure Laterality Date    CAROTID  ENDARTERECTOMY Right     CHOLECYSTECTOMY      FOOT SURGERY Right     TOE DEBRIDMENT    HERNIA REPAIR      VENTRAL HERNIA    SHOULDER ARTHROSCOPY W/ ROTATOR CUFF REPAIR Left 4/3/2023    Procedure: LEFT SHOULDER ARTHROSCOPIC ROTATOR CUFF DEBRIDEMENT, LABRAL DEBRIDEMENT, BICEPS TENODESIS, SUBACROMIAL DECOMPRESSION;  Surgeon: Chas Patterson MD;  Location: [facility];  Service: Orthopedics;  Laterality: Left;    TUMOR REMOVAL Left     PAROTID GLAND    VASCULAR SURGERY Right     STENT R LEG       Family History: family history includes Lung cancer in his father. Otherwise pertinent FHx was reviewed and not pertinent to current issue.    Social History:  reports that he quit smoking about 23 years ago. His smoking use included cigarettes. He started smoking about 49 years ago. He has quit using smokeless tobacco. He reports that he does not drink alcohol and does not use drugs.    Home Medications:  Dulaglutide, OLANZapine, albuterol, aspirin, atorvastatin, chlorthalidone, cholecalciferol, clonazePAM, diclofenac, fish oil, fluticasone-salmeterol, gabapentin, glipizide, insulin NPH, losartan, metFORMIN, montelukast, ofloxacin, omeprazole, and ondansetron      Allergies:  No Known Allergies    Objective   Objective     Vitals:   Temp:  [97.7 °F (36.5 °C)-99 °F (37.2 °C)] 97.7 °F (36.5 °C)  Heart Rate:  [68-82] 74  Resp:  [18-20] 18  BP: (107-128)/(48-56) 128/48  Physical Exam    Constitutional: Awake, alert   Eyes: PERRLA, sclerae anicteric, no conjunctival injection   HENT: NCAT, mucous membranes moist   Neck: Supple, no thyromegaly, no lymphadenopathy, trachea midline   Respiratory: Clear to auscultation bilaterally, nonlabored respirations    Cardiovascular: RRR, no murmurs, rubs, or gallops, palpable pedal pulses bilaterally   Gastrointestinal: Abdominal tenderness, positive bowel sounds, soft,  nondistended   Musculoskeletal: No bilateral ankle edema, no clubbing or cyanosis to extremities   Psychiatric:  Appropriate affect, cooperative   Neurologic: Oriented x 3, strength symmetric in all extremities, Cranial Nerves grossly intact to confrontation, speech clear   Skin: No rashes     Result Review    Result Review:  I have personally reviewed the results from the time of this admission to 9/22/2023 04:23 EDT and agree with these findings:  [x]  Laboratory list / accordion  []  Microbiology  [x]  Radiology  [x]  EKG/Telemetry   []  Cardiology/Vascular   []  Pathology  []  Old records  []  Other:  Most notable findings include: Anemia, JOSE, elevated troponin.  EKG unremarkable, CT head negative      Assessment & Plan   Assessment / Plan     Brief Patient Summary:  Kevin Ordonez is a 63 y.o. male who with past medical history of diabetes, hypertension, hyperlipidemia, BRIDGET, COPD, neoplasm of the parotid gland, neoplasm of the stomach, gastritis, and GERD presented to the ED with complaints of dizziness, lightheadedness, and chest pain.      Active Hospital Problems:  Active Hospital Problems    Diagnosis     **Acute kidney injury     Generalized weakness     Frequent falls     Symptomatic anemia     Chest pain, atypical     Elevated troponin     Malignant neoplasm of cardia of stomach     Malignant neoplasm of parotid gland     Diabetes mellitus     Hypertension     Hyperlipidemia     Obstructive sleep apnea syndrome      Plan:     JOSE  -Admit to telemetry  -Patient admits to poor p.o. intake  -Likely secondary to dehydration  -IVF  -Avoid nephrotoxic drugs  -Follow labs  -Consult nephrology if warranted  -We will follow    Generalized weakness/dizziness  -Patient with poor p.o. intake  -Fall prior to arrival  -Anemic  -Metastatic gastric and parotid gland cancer  -Orthostatic vital signs  -IVF  -Fall precautions  -PT  -Elevated troponins  -Basic cardiac work-up  -We will follow    Chest pains  -Telemetry  -Initial troponin mildly elevated, possibly secondary to demand. patient anemic and dehydrated  -Does complain of  chest pain but likely secondary to reflux  -Will trend troponins  -Heparin drip if warranted  -Chest x-ray reviewed  -EKG  -Further work-up performed  -Cardiology consult if warranted    Metastatic cancer  -Patient to start Chemo  -Scheduled to have port placed in the morning  -We will consult GEN surge  -We will consult oncology  -Nutrition consulted if warranted    Anemia  -Patient denies bleeding, melena, or hematochezia  -Anemia panel  -Transfuse as needed  -Hematology as above    Diabetes  -Insulin sliding scale  -Levemir at bedtime  -Titrate as needed    HTN  -Currently well controlled  -PRN BP meds  -Resume home meds when available  -Titrate if needed    COPD  -DuoNebs PRN    BRIDGET  -BiPAP PRN    DVT ppx      DVT prophylaxis:  Mechanical DVT prophylaxis orders are present.    CODE STATUS: Full code     Admission Status:  I believe this patient meets inpatient status.      Electronically signed by Hunter Catalan MD, 09/22/23, 4:23 AM EDT.

## 2023-09-22 NOTE — PLAN OF CARE
Goal Outcome Evaluation:       I discussed the port placement with Dr. Copeland.  Dr. Copeland is trying to get OR time on Monday but right now none is available.  If possible, he will get the patient in on Monday.  Otherwise, on Tuesday as planned even if the patient is inpatient then.        Electronically signed by Edgar Serrano DO, 09/22/23, 12:47 PM EDT.

## 2023-09-22 NOTE — TELEPHONE ENCOUNTER
The pt called and states that he is currently in-pt at [facility] and does not know when he will be released. The pt states that he should be having his port placed on Tuesday next week. The pt is asking to be called back by Dr Pruitt or Rocío KEENAN to discuss starting Chemo TX asap. The pt is hoping to start TX Wednesday next week.

## 2023-09-22 NOTE — ED NOTES
Asked Pt about urine sample. Pt stated he is unable to pee and will probably need fluids before he can do so.

## 2023-09-22 NOTE — TELEPHONE ENCOUNTER
Spoke with patient and advised him that Dr. Pruitt plans to proceed with chemo once he is discharged.  Informed him that we may not be able to start next week. Due to the fact that his is going to  be a home infusion and we can  not start those on Thursday or Fridays.  We are working to see if we will be allowed to start next Wednesday,  which is Dr. Pruitt's admin day and he does not work. So we don't typically infuse chemo on a providers patients when they are not in the building, incase of emergencies. Patient states that he is worsening and needs to get started soon, informed him that I completely understand, and I will see what we can do. He states the hospital doctor told him they would try to get it done this weekend while he was there. He was not sure, informed that I would watch his chart.  But the worse case scenario  he would start Monday Oct 2. I will keep him informed, patient verbalized understanding.

## 2023-09-22 NOTE — TELEPHONE ENCOUNTER
We can still plan to start chemo next week as long as he is discharged and has his port. Rocío can you let him know this?

## 2023-09-22 NOTE — PLAN OF CARE
Goal Outcome Evaluation:  Plan of Care Reviewed With: patient, spouse        Progress: no change  Outcome Evaluation: Patient presents with deficits in balance, endurance, transfers, and ambulation. Patient will benefit from skilled PT services to address these mobility deficits and decrease risk of falls.      Anticipated Discharge Disposition (PT): home with home health

## 2023-09-22 NOTE — THERAPY EVALUATION
Acute Care - Physical Therapy Initial Evaluation  TOM Noel     Patient Name: Kevin Ordonez  : 1960  MRN: [id number]  Today's Date: 2023      Visit Dx:     ICD-10-CM ICD-9-CM   1. Acute renal failure, unspecified acute renal failure type  N17.9 584.9   2. Symptomatic anemia  D64.9 285.9   3. Malignant neoplasm of cardia of stomach  C16.0 151.0   4. Generalized weakness  R53.1 780.79   5. Frequent falls  R29.6 V15.88   6. Difficulty walking  R26.2 719.7     Patient Active Problem List   Diagnosis    Rotator cuff tendonitis, left    Tear of left acetabular labrum    Primary osteoarthritis of left shoulder    Left shoulder pain    Asthma    Closed nondisplaced fracture of shaft of fifth metacarpal bone of right hand    Diabetes mellitus    Hyperlipidemia    Hypertension    Neuropathy    Obstructive sleep apnea syndrome    Malignant neoplasm of parotid gland    Calcific tendinitis of left shoulder    Aftercare following surgery of left shoulder arthroscopic rotator cuff debridement, labral debridement, subacromial decompression, bicep tenodesis, 4/3/2023    Malignant neoplasm of cardia of stomach    PICC (peripherally inserted central catheter) in place    Iron deficiency anemia due to chronic blood loss    Malabsorption due to intolerance, not elsewhere classified    Acute kidney injury    Generalized weakness    Frequent falls    Symptomatic anemia    Chest pain, atypical    Elevated troponin     Past Medical History:   Diagnosis Date    Asthma     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    Diabetes mellitus     Hyperlipidemia     Hypertension     Rotator cuff disorder     LEFT    Sleep apnea      Past Surgical History:   Procedure Laterality Date    CAROTID ENDARTERECTOMY Right     CHOLECYSTECTOMY      FOOT SURGERY Right     TOE DEBRIDMENT    HERNIA REPAIR      VENTRAL HERNIA    SHOULDER ARTHROSCOPY W/ ROTATOR CUFF REPAIR Left 4/3/2023    Procedure: LEFT SHOULDER ARTHROSCOPIC ROTATOR CUFF  DEBRIDEMENT, LABRAL DEBRIDEMENT, BICEPS TENODESIS, SUBACROMIAL DECOMPRESSION;  Surgeon: Chas Patterson MD;  Location: [facility] OR [facility];  Service: Orthopedics;  Laterality: Left;    TUMOR REMOVAL Left     PAROTID GLAND    VASCULAR SURGERY Right     STENT R LEG     PT Assessment (last 12 hours)       PT Evaluation and Treatment       Row Name 09/22/23 1300          Physical Therapy Time and Intention    Document Type evaluation  -AV     Mode of Treatment individual therapy;physical therapy  -AV       Row Name 09/22/23 1300          General Information    Patient Profile Reviewed yes  -AV     Prior Level of Function --  Primarily (I) with ADLs. Ambulated without an assistive device but has STC, RW, w/c if needed. No home O2. 2 falls prior to admission  -AV     Equipment Currently Used at Home none  Has STC, RW, w/c if needed  -AV     Existing Precautions/Restrictions fall  -AV       Row Name 09/22/23 1300          Living Environment    Current Living Arrangements home  -AV     People in Home spouse  -AV       Row Name 09/22/23 1300          Pain    Pretreatment Pain Rating 0/10 - no pain  -AV     Posttreatment Pain Rating 0/10 - no pain  -AV       Row Name 09/22/23 1300          Range of Motion (ROM)    Range of Motion bilateral lower extremities;ROM is WFL  -AV       [facility] Name 09/22/23 1300          Strength (Manual Muscle Testing)    Strength (Manual Muscle Testing) bilateral lower extremities;strength is WFL  -AV       [facility] Name 09/22/23 1300          Bed Mobility    Bed Mobility supine-sit;sit-supine  -AV     Supine-Sit [place] (Bed Mobility) standby assist  -AV     Sit-Supine [place] (Bed Mobility) standby assist  -AV       Row Name 09/22/23 1300          Transfers    Transfers sit-stand transfer;stand-sit transfer  -AV       Row Name 09/22/23 1300          Sit-Stand Transfer    Sit-Stand [place] (Transfers) contact guard  -AV       Row Name 09/22/23 1300          Stand-Sit Transfer    Stand-Sit  [place] (Transfers) contact guard  -AV       Row Name 09/22/23 1300          Gait/Stairs (Locomotion)    Gait/Stairs Locomotion gait/ambulation independence;gait/ambulation assistive device;distance ambulated  -AV     [place] Level (Gait) contact guard;minimum assist (75% patient effort)  -AV     Assistive Device (Gait) --  HHA  -AV     Distance in Feet (Gait) Patient completed marching in place, sidestepping right. Dizziness noted in standing. Ambulation away from bed deferred for safety.  -AV       Row Name 09/22/23 1300          Safety Issues, Functional Mobility    Impairments Affecting Function (Mobility) balance;endurance/activity tolerance  -AV       Row Name 09/22/23 1300          Balance    Balance Assessment standing dynamic balance  -AV     Dynamic Standing Balance minimal assist  -AV     Position/Device Used, Standing Balance supported  -AV       Row Name 09/22/23 1300          Plan of Care Review    Plan of Care Reviewed With patient;spouse  -AV     Progress no change  -AV     Outcome Evaluation Patient presents with deficits in balance, endurance, transfers, and ambulation. Patient will benefit from skilled PT services to address these mobility deficits and decrease risk of falls.  -AV       Row Name 09/22/23 1300          Therapy Assessment/Plan (PT)    Rehab Potential (PT) good, to achieve stated therapy goals  -AV     Criteria for Skilled Interventions Met (PT) yes;meets criteria  -AV     Therapy Frequency (PT) daily  -AV     Predicted Duration of Therapy Intervention (PT) 10 days  -AV     Problem List (PT) problems related to;balance;mobility  -AV     Activity Limitations Related to Problem List (PT) unable to transfer safely;unable to ambulate safely  -AV       Row Name 09/22/23 1300          PT Evaluation Complexity    History, PT Evaluation Complexity 1-2 personal factors and/or comorbidities  -AV     Examination of Body Systems (PT Eval Complexity) total of 4 or more elements  -AV      Clinical Presentation (PT Evaluation Complexity) stable  -AV     Clinical Decision Making (PT Evaluation Complexity) low complexity  -AV     Overall Complexity (PT Evaluation Complexity) low complexity  -AV       Row Name 09/22/23 1300          Therapy Plan Review/Discharge Plan (PT)    Therapy Plan Review (PT) evaluation/treatment results reviewed;patient  -AV       Row Name 09/22/23 1300          Physical Therapy Goals    Transfer Goal Selection (PT) transfer, PT goal 1  -AV     Gait Training Goal Selection (PT) gait training, PT goal 1  -AV       Row Name 09/22/23 1300          Transfer Goal 1 (PT)    Activity/Assistive Device (Transfer Goal 1, PT) transfers, all;walker, rolling  -AV     [place] Level/Cues Needed (Transfer Goal 1, PT) modified independence  -AV     Time Frame (Transfer Goal 1, PT) 10 days  -AV       Row Name 09/22/23 1300          Gait Training Goal 1 (PT)    Activity/Assistive Device (Gait Training Goal 1, PT) gait (walking locomotion);assistive device use;walker, rolling  -AV     [place] Level (Gait Training Goal 1, PT) modified independence  -AV     Distance (Gait Training Goal 1, PT) 200  -AV     Time Frame (Gait Training Goal 1, PT) 10 days  -AV               User Key  (r) = Recorded By, (t) = Taken By, (c) = Cosigned By      Initials Name Provider Type    Bi Staples, PT Physical Therapist                    Physical Therapy Education       Title: PT OT SLP Therapies (In Progress)       Topic: Physical Therapy (In Progress)       Point: Mobility training (Done)       Learning Progress Summary             Patient Acceptance, E,TB, VU by AV at 9/22/2023 1406                         Point: Home exercise program (Not Started)       Learner Progress:  Not documented in this visit.              Point: Body mechanics (Done)       Learning Progress Summary             Patient Acceptance, E,TB, VU by AV at 9/22/2023 1406                         Point: Precautions (Done)        Learning Progress Summary             Patient Acceptance, E,TB, VU by AV at 9/22/2023 1406                                         User Key       Initials Effective Dates Name Provider Type Discipline     06/11/21 -  Bi Ledezma, PT Physical Therapist PT                  PT Recommendation and Plan  Anticipated Discharge Disposition (PT): home with home health  Planned Therapy Interventions (PT): balance training, bed mobility training, gait training, home exercise program, neuromuscular re-education, strengthening, transfer training  Therapy Frequency (PT): daily  Plan of Care Reviewed With: patient, spouse  Progress: no change  Outcome Evaluation: Patient presents with deficits in balance, endurance, transfers, and ambulation. Patient will benefit from skilled PT services to address these mobility deficits and decrease risk of falls.   Outcome Measures       Row Name 09/22/23 1400             How much help from another person do you currently need...    Turning from your back to your side while in flat bed without using bedrails? 4  -AV      Moving from lying on back to sitting on the side of a flat bed without bedrails? 4  -AV      Moving to and from a bed to a chair (including a wheelchair)? 3  -AV      Standing up from a chair using your arms (e.g., wheelchair, bedside chair)? 3  -AV      Climbing 3-5 steps with a railing? 3  -AV      To walk in hospital room? 3  -AV      AM-PAC 6 Clicks Score (PT) 20  -AV         Functional Assessment    Outcome Measure Options AM-PAC 6 Clicks Basic Mobility (PT)  -AV                User Key  (r) = Recorded By, (t) = Taken By, (c) = Cosigned By      Initials Name Provider Type    AV Bi Ledezma, PT Physical Therapist                     Time Calculation:    PT Charges       Row Name 09/22/23 1404             Time Calculation    PT Received On 09/22/23  -AV      PT Goal Re-Cert Due Date 10/01/23  -AV         Untimed Charges    PT Eval/Re-eval Minutes 25  -AV          Total Minutes    Untimed Charges Total Minutes 25  -AV       Total Minutes 25  -AV                User Key  (r) = Recorded By, (t) = Taken By, (c) = Cosigned By      Initials Name Provider Type    AV Bi Ledezma, PT Physical Therapist                  Therapy Charges for Today       Code Description Service Date Service Provider Modifiers Qty    [id number] HC PT EVAL LOW COMPLEXITY 2 9/22/2023 Bi Ledezma, PT GP 1            PT G-Codes  Outcome Measure Options: AM-PAC 6 Clicks Basic Mobility (PT)  AM-PAC 6 Clicks Score (PT): 20    Bi Ledezma, PT  9/22/2023

## 2023-09-23 LAB
ANION GAP SERPL CALCULATED.3IONS-SCNC: 8.7 MMOL/L (ref 5–15)
BASOPHILS # BLD AUTO: 0.05 10*3/MM3 (ref 0–0.2)
BASOPHILS NFR BLD AUTO: 0.5 % (ref 0–1.5)
BUN SERPL-MCNC: 33 MG/DL (ref 8–23)
BUN/CREAT SERPL: 26.4 (ref 7–25)
CALCIUM SPEC-SCNC: 8.6 MG/DL (ref 8.6–10.5)
CHLORIDE SERPL-SCNC: 103 MMOL/L (ref 98–107)
CO2 SERPL-SCNC: 25.3 MMOL/L (ref 22–29)
CREAT SERPL-MCNC: 1.25 MG/DL (ref 0.76–1.27)
DEPRECATED RDW RBC AUTO: 59.8 FL (ref 37–54)
EGFRCR SERPLBLD CKD-EPI 2021: 64.3 ML/MIN/1.73
EOSINOPHIL # BLD AUTO: 0.28 10*3/MM3 (ref 0–0.4)
EOSINOPHIL NFR BLD AUTO: 2.9 % (ref 0.3–6.2)
ERYTHROCYTE [DISTWIDTH] IN BLOOD BY AUTOMATED COUNT: 19.2 % (ref 12.3–15.4)
GLUCOSE BLDC GLUCOMTR-MCNC: 135 MG/DL (ref 70–99)
GLUCOSE BLDC GLUCOMTR-MCNC: 195 MG/DL (ref 70–99)
GLUCOSE BLDC GLUCOMTR-MCNC: 265 MG/DL (ref 70–99)
GLUCOSE BLDC GLUCOMTR-MCNC: 275 MG/DL (ref 70–99)
GLUCOSE BLDC GLUCOMTR-MCNC: 279 MG/DL (ref 70–99)
GLUCOSE SERPL-MCNC: 136 MG/DL (ref 65–99)
HCT VFR BLD AUTO: 23.3 % (ref 37.5–51)
HCT VFR BLD AUTO: 27.9 % (ref 37.5–51)
HGB BLD-MCNC: 6.8 G/DL (ref 13–17.7)
HGB BLD-MCNC: 8.6 G/DL (ref 13–17.7)
IMM GRANULOCYTES # BLD AUTO: 0.06 10*3/MM3 (ref 0–0.05)
IMM GRANULOCYTES NFR BLD AUTO: 0.6 % (ref 0–0.5)
LYMPHOCYTES # BLD AUTO: 0.93 10*3/MM3 (ref 0.7–3.1)
LYMPHOCYTES NFR BLD AUTO: 9.5 % (ref 19.6–45.3)
MCH RBC QN AUTO: 24.9 PG (ref 26.6–33)
MCHC RBC AUTO-ENTMCNC: 29.2 G/DL (ref 31.5–35.7)
MCV RBC AUTO: 85.3 FL (ref 79–97)
MONOCYTES # BLD AUTO: 0.98 10*3/MM3 (ref 0.1–0.9)
MONOCYTES NFR BLD AUTO: 10 % (ref 5–12)
NEUTROPHILS NFR BLD AUTO: 7.52 10*3/MM3 (ref 1.7–7)
NEUTROPHILS NFR BLD AUTO: 76.5 % (ref 42.7–76)
NRBC BLD AUTO-RTO: 0 /100 WBC (ref 0–0.2)
PLATELET # BLD AUTO: 266 10*3/MM3 (ref 140–450)
PMV BLD AUTO: 10.4 FL (ref 6–12)
POTASSIUM SERPL-SCNC: 4.3 MMOL/L (ref 3.5–5.2)
RBC # BLD AUTO: 2.73 10*6/MM3 (ref 4.14–5.8)
SODIUM SERPL-SCNC: 137 MMOL/L (ref 136–145)
WBC NRBC COR # BLD: 9.82 10*3/MM3 (ref 3.4–10.8)

## 2023-09-23 PROCEDURE — 85025 COMPLETE CBC W/AUTO DIFF WBC: CPT | Performed by: FAMILY MEDICINE

## 2023-09-23 PROCEDURE — P9016 RBC LEUKOCYTES REDUCED: HCPCS

## 2023-09-23 PROCEDURE — 63710000001 INSULIN DETEMIR PER 5 UNITS: Performed by: SURGERY

## 2023-09-23 PROCEDURE — 99254 IP/OBS CNSLTJ NEW/EST MOD 60: CPT | Performed by: INTERNAL MEDICINE

## 2023-09-23 PROCEDURE — 85018 HEMOGLOBIN: CPT | Performed by: HOSPITALIST

## 2023-09-23 PROCEDURE — 86900 BLOOD TYPING SEROLOGIC ABO: CPT

## 2023-09-23 PROCEDURE — 85014 HEMATOCRIT: CPT | Performed by: HOSPITALIST

## 2023-09-23 PROCEDURE — 63710000001 INSULIN DETEMIR PER 5 UNITS: Performed by: FAMILY MEDICINE

## 2023-09-23 PROCEDURE — G0378 HOSPITAL OBSERVATION PER HR: HCPCS

## 2023-09-23 PROCEDURE — 86923 COMPATIBILITY TEST ELECTRIC: CPT

## 2023-09-23 PROCEDURE — 80048 BASIC METABOLIC PNL TOTAL CA: CPT | Performed by: FAMILY MEDICINE

## 2023-09-23 PROCEDURE — 82948 REAGENT STRIP/BLOOD GLUCOSE: CPT

## 2023-09-23 PROCEDURE — 63710000001 INSULIN REGULAR HUMAN PER 5 UNITS: Performed by: FAMILY MEDICINE

## 2023-09-23 PROCEDURE — 36430 TRANSFUSION BLD/BLD COMPNT: CPT

## 2023-09-23 RX ADMIN — ATORVASTATIN CALCIUM 80 MG: 40 TABLET, FILM COATED ORAL at 22:27

## 2023-09-23 RX ADMIN — MONTELUKAST 10 MG: 10 TABLET, FILM COATED ORAL at 22:27

## 2023-09-23 RX ADMIN — Medication 10 ML: at 09:15

## 2023-09-23 RX ADMIN — INSULIN HUMAN 6 UNITS: 100 INJECTION, SOLUTION PARENTERAL at 17:56

## 2023-09-23 RX ADMIN — INSULIN HUMAN 6 UNITS: 100 INJECTION, SOLUTION PARENTERAL at 13:20

## 2023-09-23 RX ADMIN — INSULIN HUMAN 2 UNITS: 100 INJECTION, SOLUTION PARENTERAL at 02:58

## 2023-09-23 RX ADMIN — Medication 10 ML: at 22:28

## 2023-09-23 RX ADMIN — SENNOSIDES AND DOCUSATE SODIUM 2 TABLET: 50; 8.6 TABLET ORAL at 09:15

## 2023-09-23 RX ADMIN — OLANZAPINE 5 MG: 5 TABLET, FILM COATED ORAL at 22:27

## 2023-09-23 RX ADMIN — ACETAMINOPHEN 650 MG: 325 TABLET ORAL at 22:27

## 2023-09-23 RX ADMIN — INSULIN DETEMIR 15 UNITS: 100 INJECTION, SOLUTION SUBCUTANEOUS at 22:27

## 2023-09-23 RX ADMIN — SODIUM CHLORIDE 100 ML/HR: 9 INJECTION, SOLUTION INTRAVENOUS at 02:59

## 2023-09-23 RX ADMIN — GABAPENTIN 600 MG: 300 CAPSULE ORAL at 09:15

## 2023-09-23 RX ADMIN — PANTOPRAZOLE SODIUM 40 MG: 40 TABLET, DELAYED RELEASE ORAL at 05:54

## 2023-09-23 RX ADMIN — GABAPENTIN 600 MG: 300 CAPSULE ORAL at 22:27

## 2023-09-23 NOTE — CONSULTS
[facility]   Hematology/Oncology  Consult Note    Patient Name: Kevin Ordonez  : 1960  MRN: [id number]  Primary Care Physician:  Ev Peck APRN  Referring Physician: No ref. provider found  Date of admission: 2023    Subjective   Subjective     Reason for Consult/ Chief Complaint: weakness    HPI:  Kevin Ordonez is a 63 y.o. male with metastatic gastric cancer complicated by bleeding with mets to the liver, hypertension, hyperlipidemia, BRIDGET, COPD, neoplasm of parotid gland, GERD who presented to the emergency room on  due to weakness and lightheadedness.  Patient was seen by myself in clinic on September 15 with plans for port placement and then followed by chemotherapy treatment.  Patient reports that in the last week he has been having worsening weakness as well as lightheadedness with standing.  He did have a fall prior to presenting.  He has not been eating or drinking very well.  Patient was advised by myself to present to the ER.  Labs on admission showed hemoglobin 7.5 and JOSE.  He denied fevers or chills.  At time of my interview today patient is receiving blood transfusion.  Hemoglobin 6.8 this morning.  He does feel slightly better and feels less weak.  He is worried about not getting chemotherapy on time which will result in his cancer.  Continue to progress and him to continue to feel poorly.  Patient has plans for port placement either Monday or Tuesday by Dr. Copeland.  He was started on IV fluids on admission.  He is creatinine has down trended from 1.7 to 1.25 today.    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     23: [facility] [place] [place] with 2.5 weeks of right side temporal pain, decreased appetite with 19 pounds of weight loss. With associated melanotic stools.     23: EGD with Gastric mass biopsy positive for high grade malignant neoplasm consistent with carcinoma (non small  cell)    9/12/23: NM PET: Gastric hypermetabolic mass involving th gastric cardia and fundus with loco-regional sarkis mets and disseminated hepatic metastases.     9/12/23: Liver lesion biopsy: positive for metastatic gastric adenocarcinoma     Malignant neoplasm of parotid gland   8/26/2022 Initial Diagnosis    Malignant neoplasm of parotid gland (HCC)     8/26/2022 -  Radiation    RADIATION THERAPY Treatment Details (Noted on 8/26/2022)  Site: Left Head and Neck  Technique: IMRT  Goal: No goal specified  Planned Treatment Start Date: No planned start date specified     Malignant neoplasm of cardia of stomach   9/14/2023 Initial Diagnosis    Malignant neoplasm of cardia of stomach     9/14/2023 Cancer Staged    Staging form: Stomach, AJCC 8th Edition  - Clinical: Stage IVB (pM1) - Signed by Govind Pruitt MD on 9/14/2023     9/15/2023 -  Chemotherapy    OP COLON mFOLFOX6 OXALIplatin / Leucovorin / Fluorouracil       9/15/2023 -  Chemotherapy    OP CENTRAL VENOUS ACCESS DEVICE Access, Care, and Maintenance (CVAD)             Review of Systems   All systems were reviewed and negative except for as mentioned above.     Personal History     Past Medical History:   Diagnosis Date    Asthma     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    Diabetes mellitus     Hyperlipidemia     Hypertension     Rotator cuff disorder     LEFT    Sleep apnea        Past Surgical History:   Procedure Laterality Date    CAROTID ENDARTERECTOMY Right     CHOLECYSTECTOMY      FOOT SURGERY Right     TOE DEBRIDMENT    HERNIA REPAIR      VENTRAL HERNIA    SHOULDER ARTHROSCOPY W/ ROTATOR CUFF REPAIR Left 4/3/2023    Procedure: LEFT SHOULDER ARTHROSCOPIC ROTATOR CUFF DEBRIDEMENT, LABRAL DEBRIDEMENT, BICEPS TENODESIS, SUBACROMIAL DECOMPRESSION;  Surgeon: Chas Patterson MD;  Location: [facility] OR [facility];  Service: Orthopedics;  Laterality: Left;    TUMOR REMOVAL Left     PAROTID GLAND    VASCULAR SURGERY Right     STENT R LEG        Family History: family history includes Lung cancer in his father. Otherwise pertinent FHx was reviewed and not pertinent to current issue.    Social History:  reports that he quit smoking about 23 years ago. His smoking use included cigarettes. He started smoking about 49 years ago. He has quit using smokeless tobacco. He reports that he does not drink alcohol and does not use drugs.    Home Medications:  Dulaglutide, OLANZapine, atorvastatin, chlorthalidone, cholecalciferol, fish oil, fluticasone-salmeterol, gabapentin, glipizide, insulin NPH, losartan, metFORMIN, montelukast, omeprazole, and ondansetron    Allergies:  No Known Allergies    Objective    Objective     Vitals:   Temp:  [98.1 °F (36.7 °C)-99.1 °F (37.3 °C)] 98.1 °F (36.7 °C)  Heart Rate:  [] 89  Resp:  [18-20] 18  BP: ()/(35-64) 127/52    Physical Exam:   Constitutional: Awake, alert   Eyes: PERRLA, pale sclera, no conjunctival injection   HENT: NCAT, mucous membranes moist   Respiratory: Clear to auscultation bilaterally, nonlabored respirations    Cardiovascular: RRR, no murmurs, no edema in the extremities   Gastrointestinal: Positive bowel sounds, soft, nontender, nondistended   Musculoskeletal: Normal strength and tone, no joint swelling   Psychiatric: Appropriate affect, cooperative   Neurologic: Awake, alert, speech clear   Skin: Normal tone, no rashes    Result Review    Result Review:  I have personally reviewed the results from the time of this admission to 9/23/2023 11:40 EDT and agree with these findings:  [x]  Laboratory  []  Microbiology  [x]  Radiology  [x]  EKG/Telemetry   [x]  Cardiology/Vascular   [x]  Pathology  [x]  Old records  []  Other:  Most notable findings include: JOSE with improvement, significant anemia, clear CXR per my read, elevated blood sugar, BUN elevated, H and P personally reviewed      Assessment & Plan   Assessment / Plan     Brief Patient Summary:  Kevin Ordonez is a 63 y.o. male with past  medical history of metastatic gastric cancer with mets to the liver complicated by GI blood loss and anemia who presented with weakness and JOSE.    Active Hospital Problems:  Active Hospital Problems    Diagnosis     **Acute kidney injury     Generalized weakness     Frequent falls     Symptomatic anemia     Chest pain, atypical     Elevated troponin     Malignant neoplasm of cardia of stomach     Malignant neoplasm of parotid gland     Diabetes mellitus     Hypertension     Hyperlipidemia     Obstructive sleep apnea syndrome        Plan:   Metastatic gastric cancer with mets to liver  Recently diagnosed.  Patient has been consented for FOLFOX.  We are awaiting comprehensive genomic profiling to consider addition of trastuzumab and/or immunotherapy.  We do want to start the FOLFOX as soon as possible due to his symptomatic disease and patient anxiety/preference.  Patient does need a port placed due to the 5-FU IV component of his therapy.  Patient has plans to get the port either Monday or Tuesday.  We can then proceed with outpatient chemotherapy on Wednesday.  We do have the option of proceeding either Thursday or Friday and the unhook from the infusional 5-FU could take place at the hospital on Saturday or Sunday if needed for extenuating circumstances.  Patient reassured that we will make every effort to get him started on chemotherapy treatment next week, preferably Wednesday.  Discussed that we need to get him feeling better with transfusions and fluids while admitted so that he can tolerate the chemotherapy better.  Patient admits understanding of plan and is agreeable to proceed as planned.    Acute blood loss anemia  Symptomatic. Agree with transfusion as needed.  We will keep a threshold with hgb of 7.0 g/dL.  Patient has received recent IV iron at outside institution.  This can take a couple months to take full effect.  In the meantime supportingly transfusions as appropriate.  This is likely from his  known gastric cancer.     Electronically signed by Govind Pruitt MD, 09/23/23, 11:40 AM EDT.

## 2023-09-23 NOTE — PLAN OF CARE
Problem: Adult Inpatient Plan of Care  Goal: Plan of Care Review  Outcome: Ongoing, Progressing  Flowsheets (Taken 9/23/2023 1932)  Progress: improving  Plan of Care Reviewed With: patient  Outcome Evaluation: Patient alert and oriented throughout shift. Patient is on room air. No complaints of pain or discomfort at this time. 1U of blood tranfused, no reaction noted. Patient feeling anxious, thoughts and feelings acknowledged, patient calmed down. Continuing with plan of care.   Goal Outcome Evaluation:  Plan of Care Reviewed With: patient        Progress: improving  Outcome Evaluation: Patient alert and oriented throughout shift. Patient is on room air. No complaints of pain or discomfort at this time. 1U of blood tranfused, no reaction noted. Patient feeling anxious, thoughts and feelings acknowledged, patient calmed down. Continuing with plan of care.

## 2023-09-23 NOTE — PROGRESS NOTES
[facility]   Hospitalist Progress Note  Date: 2023  Patient Name: Kevin Ordonez  : 1960  MRN: [id number]  Date of admission: 2023  Room/Bed: 410/1      Subjective Dizziness, lightheadedness, chest pain.   Subjective     Chief Complaint: Dizziness, lightheadedness, chest pain.    Summary: Kevin Ordonez is a 63 y.o. male with past medical history of diabetes, hypertension, hyperlipidemia, BRIDGET, COPD, neoplasm of the parotid gland, neoplasm of the stomach, gastritis, and GERD presented to the ED with complaints of dizziness, lightheadedness, and chest pain.  Patient states that for the last week or so he has been having worsening weakness with fatigue, dizziness and lightheadedness resulting in a fall prior to arrival.  Patient has also been having chest pain since yesterday morning which she substernal/epigastric, 7 out of 10, dull/burning, and nonradiating.  He also states that his p.o. intake has been very limited over the last week or so.  Due to concerns patient was brought to the ED for further evaluation.  In the ED patient's vitals were all within normal limits on arrival.  Labs showed that he was significantly anemic with hemoglobin of 7.5 and mildly elevated troponin, and reduced renal function.  CT of the head was negative for any acute findings as well as a chest x-ray.  When asked he denied any recent fevers, chills, headaches, focal weakness, palpitation, shortness of breath, cough, vomiting, diarrhea, constipation, dysuria, hematuria, hematochezia, melena, or anxiety.  Patient admitted for evaluation and treatment     Interval Followup: Patient hemoglobin low.  Will transfuse one unit of blood.  Patient feels very fatigued today.    Review of Systems    All systems reviewed and negative except for what is outlined above.      Objective   Objective     Vitals:   Temp:  [97.9 °F (36.6 °C)-99.1 °F (37.3 °C)] 98.2 °F (36.8 °C)  Heart Rate:  [] 95  Resp:  [18-20] 20  BP:  ()/(35-64) 127/53    Physical Exam   General: Awake, alert, NAD  HENT: NCAT, MMM  Eyes: pupils equal, no scleral icterus  Cardiovascular: RRR, no murmurs   Pulmonary: CTA bilaterally; no wheezes; no conversational dyspnea  Gastrointestinal: S/ND/NT, +BS  Musculoskeletal: No gross deformities  Skin: No jaundice, no rash on exposed skin appreciated  Neuro: CN II through XII grossly intact; speech clear; no tremor  Psych: Mood and affect appropriate  : No Lehman catheter; no suprapubic tenderness    Result Review    Result Review:  I have personally reviewed these results:  [x]  Laboratory      Lab 09/23/23  0543 09/22/23  0423 09/21/23  2126   WBC 9.82 7.16 8.25   HEMOGLOBIN 6.8* 7.3* 7.5*   HEMATOCRIT 23.3* 24.3* 25.1*   PLATELETS 266 288 324   NEUTROS ABS 7.52* 5.42 6.43   IMMATURE GRANS (ABS) 0.06* 0.03 0.02   LYMPHS ABS 0.93 0.69* 0.94   MONOS ABS 0.98* 0.63 0.57   EOS ABS 0.28 0.32 0.24   MCV 85.3 86.8 86.9         Lab 09/23/23  0543 09/22/23 0423 09/21/23 2126   SODIUM 137 135* 133*   POTASSIUM 4.3 3.8 4.1   CHLORIDE 103 99 96*   CO2 25.3 24.7 25.8   ANION GAP 8.7 11.3 11.2   BUN 33* 32* 32*   CREATININE 1.25 1.53* 1.71*   EGFR 64.3 50.5* 44.1*   GLUCOSE 136* 181* 213*   CALCIUM 8.6 9.0 9.3   MAGNESIUM  --   --  1.7         Lab 09/21/23 2126   TOTAL PROTEIN 5.9*   ALBUMIN 3.1*   GLOBULIN 2.8   ALT (SGPT) 89*   AST (SGOT) 115*   BILIRUBIN 0.4   ALK PHOS 519*         Lab 09/22/23  0423 09/22/23  0003 09/21/23 2126   HSTROP T 56* 60* 59*             Lab 09/22/23  0036   ABO TYPING A   RH TYPING Positive   ANTIBODY SCREEN Negative         Brief Urine Lab Results  (Last result in the past 365 days)        Color   Clarity   Blood   Leuk Est   Nitrite   Protein   CREAT   Urine HCG        09/22/23 1028 Dark Yellow   Clear   Negative   Negative   Negative   Trace                 [x]  Microbiology   Microbiology Results (last 10 days)       ** No results found for the last 240 hours. **          [x]   Radiology  CT Head Without Contrast    Result Date: 9/21/2023   1. No acute intracranial abnormality. 2. Moderate mucosal thickening in the left maxillary sinus.     ROSA WHITE DO       Electronically Signed and Approved By: ROSA WHITE DO on 9/21/2023 at 20:38             XR Chest 1 View    Result Date: 9/21/2023   No acute cardiopulmonary process or radiographic signs of chest trauma.       ROSA WHITE DO       Electronically Signed and Approved By: ROSA WHITE DO on 9/21/2023 at 20:20            [x]  EKG/Telemetry   [x]  Cardiology/Vascular   []  Pathology  [x]  Old records  []  Other:    Assessment & Plan   Assessment / Plan   #1 JOSE resolved  -Continue IV hydration.    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 Copeland.  Dr. Pruitt can proceed with outpatient chemotherapy on Wednesday.  With the option of proceeding on Thursday or Friday in with infusional 5-FU could take place in the hospital on Saturday and Sunday if needed.  -Patient is very anxious but feels much more reassured.  -Continue gabapentin for pain and as needed meds ordered.    #3 anemia  -Patient has received IV fluids so dilution can be a factor on his chronic anemia.  His hemoglobin is less than 7 today.  We will transfuse 1 unit of blood.  Patient has received recent IV iron at outside institution.    #4 insulin-dependent diabetes continue basal and insulin sliding scale    #5 hyperlipidemia continue Lipitor    #6 COPD continue Symbicort    #7 GERD continue PPI     Discussed with RN.    DVT prophylaxis:  Mechanical DVT prophylaxis orders are present.    CODE STATUS:    Full code    Electronically signed by Edgar Serrano DO, 09/23/23, 9:53 AM EDT.

## 2023-09-23 NOTE — PLAN OF CARE
No acute changes overnight. VSS. Continue IV fluids. Pt is pending procedure for port placement for chemotherapy, possibly will be done on Monday if there is an available time in OR, if not will be done Tuesday.       0628 received critical lab result. Hgb 6.8, notified md @ 0629, awaiting response

## 2023-09-24 ENCOUNTER — APPOINTMENT (OUTPATIENT)
Dept: GENERAL RADIOLOGY | Facility: HOSPITAL | Age: 63
DRG: 674 | End: 2023-09-24
Payer: OTHER GOVERNMENT

## 2023-09-24 PROBLEM — E86.0 DEHYDRATION: Status: ACTIVE | Noted: 2023-09-24

## 2023-09-24 LAB
ANION GAP SERPL CALCULATED.3IONS-SCNC: 8.8 MMOL/L (ref 5–15)
BASOPHILS # BLD AUTO: 0.05 10*3/MM3 (ref 0–0.2)
BASOPHILS NFR BLD AUTO: 0.4 % (ref 0–1.5)
BH BB BLOOD EXPIRATION DATE: NORMAL
BH BB BLOOD TYPE BARCODE: 6200
BH BB DISPENSE STATUS: NORMAL
BH BB PRODUCT CODE: NORMAL
BH BB UNIT NUMBER: NORMAL
BUN SERPL-MCNC: 27 MG/DL (ref 8–23)
BUN/CREAT SERPL: 30.7 (ref 7–25)
CALCIUM SPEC-SCNC: 7 MG/DL (ref 8.6–10.5)
CHLORIDE SERPL-SCNC: 107 MMOL/L (ref 98–107)
CO2 SERPL-SCNC: 19.2 MMOL/L (ref 22–29)
CREAT SERPL-MCNC: 0.88 MG/DL (ref 0.76–1.27)
CROSSMATCH INTERPRETATION: NORMAL
DEPRECATED RDW RBC AUTO: 61.8 FL (ref 37–54)
EGFRCR SERPLBLD CKD-EPI 2021: 96 ML/MIN/1.73
EOSINOPHIL # BLD AUTO: 0.3 10*3/MM3 (ref 0–0.4)
EOSINOPHIL NFR BLD AUTO: 2.7 % (ref 0.3–6.2)
ERYTHROCYTE [DISTWIDTH] IN BLOOD BY AUTOMATED COUNT: 19.8 % (ref 12.3–15.4)
GLUCOSE BLDC GLUCOMTR-MCNC: 185 MG/DL (ref 70–99)
GLUCOSE BLDC GLUCOMTR-MCNC: 209 MG/DL (ref 70–99)
GLUCOSE BLDC GLUCOMTR-MCNC: 275 MG/DL (ref 70–99)
GLUCOSE BLDC GLUCOMTR-MCNC: 328 MG/DL (ref 70–99)
GLUCOSE SERPL-MCNC: 170 MG/DL (ref 65–99)
HCT VFR BLD AUTO: 26.1 % (ref 37.5–51)
HEMOCCULT STL QL IA: POSITIVE
HGB BLD-MCNC: 8 G/DL (ref 13–17.7)
IMM GRANULOCYTES # BLD AUTO: 0.06 10*3/MM3 (ref 0–0.05)
IMM GRANULOCYTES NFR BLD AUTO: 0.5 % (ref 0–0.5)
LYMPHOCYTES # BLD AUTO: 0.71 10*3/MM3 (ref 0.7–3.1)
LYMPHOCYTES NFR BLD AUTO: 6.4 % (ref 19.6–45.3)
MCH RBC QN AUTO: 26.3 PG (ref 26.6–33)
MCHC RBC AUTO-ENTMCNC: 30.7 G/DL (ref 31.5–35.7)
MCV RBC AUTO: 85.9 FL (ref 79–97)
MONOCYTES # BLD AUTO: 1.06 10*3/MM3 (ref 0.1–0.9)
MONOCYTES NFR BLD AUTO: 9.5 % (ref 5–12)
NEUTROPHILS NFR BLD AUTO: 8.95 10*3/MM3 (ref 1.7–7)
NEUTROPHILS NFR BLD AUTO: 80.5 % (ref 42.7–76)
NRBC BLD AUTO-RTO: 0 /100 WBC (ref 0–0.2)
PLATELET # BLD AUTO: 302 10*3/MM3 (ref 140–450)
PMV BLD AUTO: 10.5 FL (ref 6–12)
POTASSIUM SERPL-SCNC: 3.5 MMOL/L (ref 3.5–5.2)
PROCALCITONIN SERPL-MCNC: 1.02 NG/ML (ref 0–0.25)
RBC # BLD AUTO: 3.04 10*6/MM3 (ref 4.14–5.8)
SODIUM SERPL-SCNC: 135 MMOL/L (ref 136–145)
UNIT  ABO: NORMAL
UNIT  RH: NORMAL
WBC NRBC COR # BLD: 11.13 10*3/MM3 (ref 3.4–10.8)

## 2023-09-24 PROCEDURE — 84145 PROCALCITONIN (PCT): CPT | Performed by: PHYSICIAN ASSISTANT

## 2023-09-24 PROCEDURE — 85025 COMPLETE CBC W/AUTO DIFF WBC: CPT | Performed by: FAMILY MEDICINE

## 2023-09-24 PROCEDURE — 63710000001 INSULIN REGULAR HUMAN PER 5 UNITS: Performed by: PHYSICIAN ASSISTANT

## 2023-09-24 PROCEDURE — 82948 REAGENT STRIP/BLOOD GLUCOSE: CPT

## 2023-09-24 PROCEDURE — 82274 ASSAY TEST FOR BLOOD FECAL: CPT | Performed by: EMERGENCY MEDICINE

## 2023-09-24 PROCEDURE — 80048 BASIC METABOLIC PNL TOTAL CA: CPT | Performed by: FAMILY MEDICINE

## 2023-09-24 PROCEDURE — 63710000001 INSULIN REGULAR HUMAN PER 5 UNITS: Performed by: FAMILY MEDICINE

## 2023-09-24 PROCEDURE — 97116 GAIT TRAINING THERAPY: CPT

## 2023-09-24 PROCEDURE — 97530 THERAPEUTIC ACTIVITIES: CPT

## 2023-09-24 PROCEDURE — 87040 BLOOD CULTURE FOR BACTERIA: CPT | Performed by: PHYSICIAN ASSISTANT

## 2023-09-24 PROCEDURE — 63710000001 INSULIN DETEMIR PER 5 UNITS: Performed by: FAMILY MEDICINE

## 2023-09-24 PROCEDURE — 94799 UNLISTED PULMONARY SVC/PX: CPT

## 2023-09-24 PROCEDURE — 71045 X-RAY EXAM CHEST 1 VIEW: CPT

## 2023-09-24 RX ORDER — HYDROXYZINE PAMOATE 25 MG/1
25 CAPSULE ORAL 3 TIMES DAILY PRN
Status: DISCONTINUED | OUTPATIENT
Start: 2023-09-24 | End: 2023-09-26 | Stop reason: HOSPADM

## 2023-09-24 RX ORDER — ALUMINA, MAGNESIA, AND SIMETHICONE 2400; 2400; 240 MG/30ML; MG/30ML; MG/30ML
15 SUSPENSION ORAL EVERY 6 HOURS PRN
Status: DISCONTINUED | OUTPATIENT
Start: 2023-09-24 | End: 2023-09-26 | Stop reason: HOSPADM

## 2023-09-24 RX ADMIN — SENNOSIDES AND DOCUSATE SODIUM 2 TABLET: 50; 8.6 TABLET ORAL at 08:53

## 2023-09-24 RX ADMIN — ACETAMINOPHEN 650 MG: 325 TABLET ORAL at 19:57

## 2023-09-24 RX ADMIN — OLANZAPINE 5 MG: 5 TABLET, FILM COATED ORAL at 21:01

## 2023-09-24 RX ADMIN — ATORVASTATIN CALCIUM 80 MG: 40 TABLET, FILM COATED ORAL at 21:01

## 2023-09-24 RX ADMIN — Medication 10 ML: at 21:10

## 2023-09-24 RX ADMIN — INSULIN HUMAN 4 UNITS: 100 INJECTION, SOLUTION PARENTERAL at 12:13

## 2023-09-24 RX ADMIN — INSULIN HUMAN 2 UNITS: 100 INJECTION, SOLUTION PARENTERAL at 07:39

## 2023-09-24 RX ADMIN — Medication 10 ML: at 08:53

## 2023-09-24 RX ADMIN — INSULIN HUMAN 7 UNITS: 100 INJECTION, SOLUTION PARENTERAL at 21:09

## 2023-09-24 RX ADMIN — PANTOPRAZOLE SODIUM 40 MG: 40 TABLET, DELAYED RELEASE ORAL at 06:17

## 2023-09-24 RX ADMIN — GABAPENTIN 600 MG: 300 CAPSULE ORAL at 21:01

## 2023-09-24 RX ADMIN — HYDROCODONE BITARTRATE AND ACETAMINOPHEN 1 TABLET: 5; 325 TABLET ORAL at 21:09

## 2023-09-24 RX ADMIN — INSULIN DETEMIR 15 UNITS: 100 INJECTION, SOLUTION SUBCUTANEOUS at 21:09

## 2023-09-24 RX ADMIN — INSULIN HUMAN 6 UNITS: 100 INJECTION, SOLUTION PARENTERAL at 18:34

## 2023-09-24 RX ADMIN — MONTELUKAST 10 MG: 10 TABLET, FILM COATED ORAL at 21:01

## 2023-09-24 RX ADMIN — GABAPENTIN 600 MG: 300 CAPSULE ORAL at 08:53

## 2023-09-24 NOTE — THERAPY TREATMENT NOTE
Acute Care - Physical Therapy Progress Note  T[facility]     Patient Name: Kevin Ordonez  : 1960  MRN: [id number]  Today's Date: 2023      Visit Dx:     ICD-10-CM ICD-9-CM   1. Acute renal failure, unspecified acute renal failure type  N17.9 584.9   2. Symptomatic anemia  D64.9 285.9   3. Malignant neoplasm of cardia of stomach  C16.0 151.0   4. Generalized weakness  R53.1 780.79   5. Frequent falls  R29.6 V15.88   6. Difficulty walking  R26.2 719.7     Patient Active Problem List   Diagnosis    Rotator cuff tendonitis, left    Tear of left acetabular labrum    Primary osteoarthritis of left shoulder    Left shoulder pain    Asthma    Closed nondisplaced fracture of shaft of fifth metacarpal bone of right hand    Diabetes mellitus    Hyperlipidemia    Hypertension    Neuropathy    Obstructive sleep apnea syndrome    Malignant neoplasm of parotid gland    Calcific tendinitis of left shoulder    Aftercare following surgery of left shoulder arthroscopic rotator cuff debridement, labral debridement, subacromial decompression, bicep tenodesis, 4/3/2023    Malignant neoplasm of cardia of stomach    PICC (peripherally inserted central catheter) in place    Iron deficiency anemia due to chronic blood loss    Malabsorption due to intolerance, not elsewhere classified    Acute kidney injury    Generalized weakness    Frequent falls    Symptomatic anemia    Chest pain, atypical    Elevated troponin    Dehydration     Past Medical History:   Diagnosis Date    Asthma     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    Diabetes mellitus     Hyperlipidemia     Hypertension     Rotator cuff disorder     LEFT    Sleep apnea      Past Surgical History:   Procedure Laterality Date    CAROTID ENDARTERECTOMY Right     CHOLECYSTECTOMY      FOOT SURGERY Right     TOE DEBRIDMENT    HERNIA REPAIR      VENTRAL HERNIA    SHOULDER ARTHROSCOPY W/ ROTATOR CUFF REPAIR Left 4/3/2023    Procedure: LEFT SHOULDER ARTHROSCOPIC  ROTATOR CUFF DEBRIDEMENT, LABRAL DEBRIDEMENT, BICEPS TENODESIS, SUBACROMIAL DECOMPRESSION;  Surgeon: Chas Patterson MD;  Location: [facility] ED OR [facility];  Service: Orthopedics;  Laterality: Left;    TUMOR REMOVAL Left     PAROTID GLAND    VASCULAR SURGERY Right     STENT R LEG     PT Assessment (last 12 hours)       PT Evaluation and Treatment       Row Name 09/24/23 1600          Physical Therapy Time and Intention    Subjective Information no complaints  -CS     Document Type therapy note (daily note)  -CS     Mode of Treatment individual therapy;physical therapy  -CS     Patient Effort good  -CS     Symptoms Noted During/After Treatment fatigue  -CS       Row Name 09/24/23 1600          Bed Mobility    Supine-Sit Garrett (Bed Mobility) contact guard  -CS     Sit-Supine Garrett (Bed Mobility) minimum assist (75% patient effort);1 person assist  -CS     Assistive Device (Bed Mobility) bed rails  -CS       Row Name 09/24/23 1600          Sit-Stand Transfer    Sit-Stand Garrett (Transfers) verbal cues;contact guard;1 person assist  -CS     Assistive Device (Sit-Stand Transfers) walker, front-wheeled  -CS       Row Name 09/24/23 1600          Stand-Sit Transfer    Stand-Sit Garrett (Transfers) verbal cues;contact guard;1 person assist  -CS     Assistive Device (Stand-Sit Transfers) walker, front-wheeled  -CS       Row Name 09/24/23 1600          Gait/Stairs (Locomotion)    Garrett Level (Gait) verbal cues;contact guard;1 person assist  -CS     Assistive Device (Gait) walker, front-wheeled  -CS     Distance in Feet (Gait) 175' + 75'  -CS     Pattern (Gait) 4-point;step-through  -CS     Deviations/Abnormal Patterns (Gait) gait speed decreased;stride length decreased  -CS     Bilateral Gait Deviations forward flexed posture  -CS       Row Name 09/24/23 1600          Positioning and Restraints    Pre-Treatment Position in bed  -CS     Post Treatment Position bed  -CS     In Bed fowlers;call light  within reach;encouraged to call for assist;with family/caregiver  -CS       Row Name 09/24/23 1600          Progress Summary (PT)    Progress Toward Functional Goals (PT) progress toward functional goals is good  -CS     Daily Progress Summary (PT) PTA spoke with pt and spouse this date and both report that they would like for pt. to go to inpatient rehab for further strengthening before returning home.  Pt. 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  -CS               User Key  (r) = Recorded By, (t) = Taken By, (c) = Cosigned By      Initials Name Provider Type    CS Jesus Littlejohn PTA Physical Therapist Assistant                    Physical Therapy Education       Title: PT OT SLP Therapies (In Progress)       Topic: Physical Therapy (In Progress)       Point: Mobility training (Done)       Learning Progress Summary             Patient Acceptance, E,TB, VU by AV at 9/22/2023 1406                         Point: Home exercise program (Not Started)       Learner Progress:  Not documented in this visit.              Point: Body mechanics (Done)       Learning Progress Summary             Patient Acceptance, E,TB, VU by AV at 9/22/2023 1406                         Point: Precautions (Done)       Learning Progress Summary             Patient Acceptance, E,TB, VU by AV at 9/22/2023 1406                                         User Key       Initials Effective Dates Name Provider Type Discipline     06/11/21 -  Bi Ledezma, PT Physical Therapist PT                  PT Recommendation and Plan  Anticipated Discharge Disposition (PT): home with assist, inpatient rehabilitation facility (home vs rehab)  Progress Summary (PT)  Progress Toward Functional Goals (PT): progress toward functional goals is good  Daily Progress Summary (PT): PTA spoke with pt and spouse this date and both report that they would like for pt. to go to  inpatient rehab for further strengthening before returning home.  Pt. 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   Outcome Measures       Row Name 09/24/23 1600 09/22/23 1400          How much help from another person do you currently need...    Turning from your back to your side while in flat bed without using bedrails? 3  -CS 4  -AV     Moving from lying on back to sitting on the side of a flat bed without bedrails? 3  -CS 4  -AV     Moving to and from a bed to a chair (including a wheelchair)? 3  -CS 3  -AV     Standing up from a chair using your arms (e.g., wheelchair, bedside chair)? 3  -CS 3  -AV     Climbing 3-5 steps with a railing? 2  -CS 3  -AV     To walk in hospital room? 3  -CS 3  -AV     AM-PAC 6 Clicks Score (PT) 17  -CS 20  -AV        Functional Assessment    Outcome Measure Options AM-PAC 6 Clicks Basic Mobility (PT)  -CS AM-PAC 6 Clicks Basic Mobility (PT)  -AV               User Key  (r) = Recorded By, (t) = Taken By, (c) = Cosigned By      Initials Name Provider Type    AV Bi Ledezma, PT Physical Therapist    CS Jesus Littlejohn PTA Physical Therapist Assistant                     Time Calculation:    PT Charges       Row Name 09/24/23 1615             Time Calculation    Start Time 1504  -CS      PT Received On 09/24/23  -CS         Timed Charges    61650 - Gait Training Minutes  14  -CS      03964 - PT Therapeutic Activity Minutes 12  -CS         Total Minutes    Timed Charges Total Minutes 26  -CS       Total Minutes 26  -CS                User Key  (r) = Recorded By, (t) = Taken By, (c) = Cosigned By      Initials Name Provider Type    CS Jesus Littlejohn PTA Physical Therapist Assistant                  Therapy Charges for Today       Code Description Service Date Service Provider Modifiers Qty    48121264614 HC GAIT TRAINING EA 15 MIN 9/24/2023 Jesus Littlejohn PTA GP 1    10710233450 HC PT  THERAPEUTIC ACT EA 15 MIN 9/24/2023 Jesus Littlejohn, PTA GP 1            PT G-Codes  Outcome Measure Options: AM-PAC 6 Clicks Basic Mobility (PT)  AM-PAC 6 Clicks Score (PT): 17    Jesus Littlejohn PTA  9/24/2023

## 2023-09-24 NOTE — PROGRESS NOTES
[facility]   Hospitalist Progress Note  Date: 2023  Patient Name: Kevin Ordonez  : 1960  MRN: [id number]  Date of admission: 2023  Room/Bed: 410/1      Subjective Dizziness, lightheadedness, chest pain.   Subjective     Chief Complaint: Dizziness, lightheadedness, chest pain.    Summary: Kevin Ordonez is a 63 y.o. male with past medical history of diabetes, hypertension, hyperlipidemia, BRIDGET, COPD, neoplasm of the parotid gland, neoplasm of the stomach, gastritis, and GERD presented to the ED with complaints of dizziness, lightheadedness, and chest pain.  Patient states that for the last week or so he has been having worsening weakness with fatigue, dizziness and lightheadedness resulting in a fall prior to arrival.  Patient has also been having chest pain since yesterday morning which she substernal/epigastric, 7 out of 10, dull/burning, and nonradiating.  He also states that his p.o. intake has been very limited over the last week or so.  Due to concerns patient was brought to the ED for further evaluation.  In the ED patient's vitals were all within normal limits on arrival.  Labs showed that he was significantly anemic with hemoglobin of 7.5 and mildly elevated troponin, and reduced renal function.  CT of the head was negative for any acute findings as well as a chest x-ray.  When asked he denied any recent fevers, chills, headaches, focal weakness, palpitation, shortness of breath, cough, vomiting, diarrhea, constipation, dysuria, hematuria, hematochezia, melena, or anxiety.  Patient admitted for evaluation and treatment     Interval Followup: Patient's hemoglobin stable.  Still very weak.      Review of Systems   Constitutional:  Positive for fatigue.   HENT: Negative.     Eyes: Negative.    Respiratory: Negative.     Cardiovascular: Negative.    Gastrointestinal: Negative.    Genitourinary: Negative.    Musculoskeletal:  Positive for arthralgias.   Skin: Negative.     Allergic/Immunologic: Negative.    Neurological: Negative.    Hematological: Negative.    Psychiatric/Behavioral: Negative.       All systems reviewed and negative except for what is outlined above.      Objective   Objective     Vitals:   Temp:  [97.3 °F (36.3 °C)-102.2 °F (39 °C)] 98.2 °F (36.8 °C)  Heart Rate:  [88-99] 90  Resp:  [18-20] 20  BP: (122-154)/(49-67) 154/57    Physical Exam   General: Awake, alert, NAD  HENT: NCAT, MMM  Eyes: pupils equal, no scleral icterus  Cardiovascular: RRR, no murmurs   Pulmonary: CTA bilaterally; no wheezes; no conversational dyspnea  Gastrointestinal: S/ND/NT, +BS  Musculoskeletal: No gross deformities  Skin: No jaundice, no rash on exposed skin appreciated  Neuro: CN II through XII grossly intact; speech clear; no tremor  Psych: Mood and affect appropriate  : No Lehman catheter; no suprapubic tenderness    Result Review    Result Review:  I have personally reviewed these results:  [x]  Laboratory      Lab 09/24/23 0621 09/23/23 1713 09/23/23  0543 09/22/23 0423   WBC 11.13*  --  9.82 7.16   HEMOGLOBIN 8.0* 8.6* 6.8* 7.3*   HEMATOCRIT 26.1* 27.9* 23.3* 24.3*   PLATELETS 302  --  266 288   NEUTROS ABS 8.95*  --  7.52* 5.42   IMMATURE GRANS (ABS) 0.06*  --  0.06* 0.03   LYMPHS ABS 0.71  --  0.93 0.69*   MONOS ABS 1.06*  --  0.98* 0.63   EOS ABS 0.30  --  0.28 0.32   MCV 85.9  --  85.3 86.8           Lab 09/24/23  0621 09/23/23  0543 09/22/23  0423 09/21/23 2126   SODIUM 135* 137 135* 133*   POTASSIUM 3.5 4.3 3.8 4.1   CHLORIDE 107 103 99 96*   CO2 19.2* 25.3 24.7 25.8   ANION GAP 8.8 8.7 11.3 11.2   BUN 27* 33* 32* 32*   CREATININE 0.88 1.25 1.53* 1.71*   EGFR 96.0 64.3 50.5* 44.1*   GLUCOSE 170* 136* 181* 213*   CALCIUM 7.0* 8.6 9.0 9.3   MAGNESIUM  --   --   --  1.7           Lab 09/21/23 2126   TOTAL PROTEIN 5.9*   ALBUMIN 3.1*   GLOBULIN 2.8   ALT (SGPT) 89*   AST (SGOT) 115*   BILIRUBIN 0.4   ALK PHOS 519*           Lab 09/22/23  0423 09/22/23  0003 09/21/23 2126    HSTROP T 56* 60* 59*               Lab 09/22/23  0036   ABO TYPING A   RH TYPING Positive   ANTIBODY SCREEN Negative           Brief Urine Lab Results  (Last result in the past 365 days)        Color   Clarity   Blood   Leuk Est   Nitrite   Protein   CREAT   Urine HCG        09/22/23 1028 Dark Yellow   Clear   Negative   Negative   Negative   Trace                 [x]  Microbiology   Microbiology Results (last 10 days)       ** No results found for the last 240 hours. **          [x]  Radiology  CT Head Without Contrast    Result Date: 9/21/2023   1. No acute intracranial abnormality. 2. Moderate mucosal thickening in the left maxillary sinus.     ROSA WHITE DO       Electronically Signed and Approved By: ROSA WHITE DO on 9/21/2023 at 20:38             XR Chest 1 View    Result Date: 9/21/2023   No acute cardiopulmonary process or radiographic signs of chest trauma.       ROSA WHITE DO       Electronically Signed and Approved By: ROSA WHITE DO on 9/21/2023 at 20:20            [x]  EKG/Telemetry   [x]  Cardiology/Vascular   []  Pathology  [x]  Old records  []  Other:    Assessment & Plan   Assessment / Plan   #1 JOSE resolved  -stop IV hydration.    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 Copeland.  Dr. Pruitt can proceed with outpatient chemotherapy on Wednesday.  With the option of proceeding on Thursday or Friday in with infusional 5-FU could take place in the hospital on Saturday and Sunday if needed.  -Patient is very anxious but feels much more reassured.  -Continue gabapentin for pain and as needed meds ordered.    #3 anemia  -Patient has received IV fluids so dilution can be a factor on his chronic anemia.  His hemoglobin is less than 7 today.  We will transfuse 1 unit of blood.  Patient has received recent IV iron at outside institution.    #4 insulin-dependent diabetes  continue basal and insulin sliding scale    #5 hyperlipidemia continue Lipitor    #6 COPD continue Symbicort    #7 GERD continue PPI    Dispo planning: will recommend possible rehab to optimize physical strength.      Discussed with RN.    DVT prophylaxis:  Mechanical DVT prophylaxis orders are present.    CODE STATUS:   Level Of Support Discussed With: Patient  Code Status (Patient has no pulse and is not breathing): CPR (Attempt to Resuscitate)  Medical Interventions (Patient has pulse or is breathing): Full Support    Electronically signed by Edgar Serrano DO, 09/24/23, 9:10 AM EDT.

## 2023-09-24 NOTE — PLAN OF CARE
Goal Outcome Evaluation:  Plan of Care Reviewed With: patient        Progress: improving       VSS this shift with no significant changes to report. Will continue with POC.

## 2023-09-25 ENCOUNTER — ANESTHESIA EVENT (OUTPATIENT)
Dept: PERIOP | Facility: HOSPITAL | Age: 63
DRG: 674 | End: 2023-09-25
Payer: OTHER GOVERNMENT

## 2023-09-25 LAB
ANION GAP SERPL CALCULATED.3IONS-SCNC: 10.4 MMOL/L (ref 5–15)
BACTERIA UR QL AUTO: ABNORMAL /HPF
BASOPHILS # BLD AUTO: 0.04 10*3/MM3 (ref 0–0.2)
BASOPHILS NFR BLD AUTO: 0.5 % (ref 0–1.5)
BILIRUB UR QL STRIP: NEGATIVE
BUN SERPL-MCNC: 27 MG/DL (ref 8–23)
BUN/CREAT SERPL: 26.5 (ref 7–25)
CALCIUM SPEC-SCNC: 8.6 MG/DL (ref 8.6–10.5)
CHLORIDE SERPL-SCNC: 99 MMOL/L (ref 98–107)
CLARITY UR: CLEAR
CO2 SERPL-SCNC: 23.6 MMOL/L (ref 22–29)
COLOR UR: YELLOW
CREAT SERPL-MCNC: 1.02 MG/DL (ref 0.76–1.27)
DEPRECATED RDW RBC AUTO: 62.2 FL (ref 37–54)
EGFRCR SERPLBLD CKD-EPI 2021: 82.1 ML/MIN/1.73
EOSINOPHIL # BLD AUTO: 0.3 10*3/MM3 (ref 0–0.4)
EOSINOPHIL NFR BLD AUTO: 3.4 % (ref 0.3–6.2)
ERYTHROCYTE [DISTWIDTH] IN BLOOD BY AUTOMATED COUNT: 20.1 % (ref 12.3–15.4)
GLUCOSE BLDC GLUCOMTR-MCNC: 131 MG/DL (ref 70–99)
GLUCOSE BLDC GLUCOMTR-MCNC: 217 MG/DL (ref 70–99)
GLUCOSE BLDC GLUCOMTR-MCNC: 219 MG/DL (ref 70–99)
GLUCOSE BLDC GLUCOMTR-MCNC: 244 MG/DL (ref 70–99)
GLUCOSE SERPL-MCNC: 192 MG/DL (ref 65–99)
GLUCOSE UR STRIP-MCNC: ABNORMAL MG/DL
HCT VFR BLD AUTO: 26.3 % (ref 37.5–51)
HGB BLD-MCNC: 8 G/DL (ref 13–17.7)
HGB UR QL STRIP.AUTO: NEGATIVE
HYALINE CASTS UR QL AUTO: ABNORMAL /LPF
IMM GRANULOCYTES # BLD AUTO: 0.05 10*3/MM3 (ref 0–0.05)
IMM GRANULOCYTES NFR BLD AUTO: 0.6 % (ref 0–0.5)
KETONES UR QL STRIP: NEGATIVE
LEUKOCYTE ESTERASE UR QL STRIP.AUTO: NEGATIVE
LYMPHOCYTES # BLD AUTO: 0.57 10*3/MM3 (ref 0.7–3.1)
LYMPHOCYTES NFR BLD AUTO: 6.4 % (ref 19.6–45.3)
MCH RBC QN AUTO: 26 PG (ref 26.6–33)
MCHC RBC AUTO-ENTMCNC: 30.4 G/DL (ref 31.5–35.7)
MCV RBC AUTO: 85.4 FL (ref 79–97)
MONOCYTES # BLD AUTO: 0.97 10*3/MM3 (ref 0.1–0.9)
MONOCYTES NFR BLD AUTO: 11 % (ref 5–12)
NEUTROPHILS NFR BLD AUTO: 6.91 10*3/MM3 (ref 1.7–7)
NEUTROPHILS NFR BLD AUTO: 78.1 % (ref 42.7–76)
NITRITE UR QL STRIP: NEGATIVE
NRBC BLD AUTO-RTO: 0 /100 WBC (ref 0–0.2)
PH UR STRIP.AUTO: <=5 [PH] (ref 5–8)
PLATELET # BLD AUTO: 317 10*3/MM3 (ref 140–450)
PMV BLD AUTO: 10.5 FL (ref 6–12)
POTASSIUM SERPL-SCNC: 4.1 MMOL/L (ref 3.5–5.2)
PROT UR QL STRIP: ABNORMAL
RBC # BLD AUTO: 3.08 10*6/MM3 (ref 4.14–5.8)
RBC # UR STRIP: ABNORMAL /HPF
REF LAB TEST METHOD: ABNORMAL
SODIUM SERPL-SCNC: 133 MMOL/L (ref 136–145)
SP GR UR STRIP: 1.02 (ref 1–1.03)
SQUAMOUS #/AREA URNS HPF: ABNORMAL /HPF
UROBILINOGEN UR QL STRIP: ABNORMAL
WBC # UR STRIP: ABNORMAL /HPF
WBC NRBC COR # BLD: 8.84 10*3/MM3 (ref 3.4–10.8)

## 2023-09-25 PROCEDURE — 63710000001 INSULIN DETEMIR PER 5 UNITS: Performed by: FAMILY MEDICINE

## 2023-09-25 PROCEDURE — 81001 URINALYSIS AUTO W/SCOPE: CPT | Performed by: PHYSICIAN ASSISTANT

## 2023-09-25 PROCEDURE — 85025 COMPLETE CBC W/AUTO DIFF WBC: CPT | Performed by: HOSPITALIST

## 2023-09-25 PROCEDURE — 97165 OT EVAL LOW COMPLEX 30 MIN: CPT

## 2023-09-25 PROCEDURE — 97116 GAIT TRAINING THERAPY: CPT

## 2023-09-25 PROCEDURE — 80048 BASIC METABOLIC PNL TOTAL CA: CPT | Performed by: HOSPITALIST

## 2023-09-25 PROCEDURE — 63710000001 INSULIN REGULAR HUMAN PER 5 UNITS: Performed by: PHYSICIAN ASSISTANT

## 2023-09-25 PROCEDURE — 82948 REAGENT STRIP/BLOOD GLUCOSE: CPT

## 2023-09-25 PROCEDURE — 97110 THERAPEUTIC EXERCISES: CPT

## 2023-09-25 RX ADMIN — GABAPENTIN 600 MG: 300 CAPSULE ORAL at 08:00

## 2023-09-25 RX ADMIN — INSULIN DETEMIR 15 UNITS: 100 INJECTION, SOLUTION SUBCUTANEOUS at 20:08

## 2023-09-25 RX ADMIN — HYDROCODONE BITARTRATE AND ACETAMINOPHEN 1 TABLET: 5; 325 TABLET ORAL at 20:07

## 2023-09-25 RX ADMIN — ACETAMINOPHEN 650 MG: 325 TABLET ORAL at 17:11

## 2023-09-25 RX ADMIN — MONTELUKAST 10 MG: 10 TABLET, FILM COATED ORAL at 20:07

## 2023-09-25 RX ADMIN — OLANZAPINE 5 MG: 5 TABLET, FILM COATED ORAL at 20:07

## 2023-09-25 RX ADMIN — Medication 10 ML: at 20:07

## 2023-09-25 RX ADMIN — INSULIN HUMAN 3 UNITS: 100 INJECTION, SOLUTION PARENTERAL at 17:09

## 2023-09-25 RX ADMIN — HYDROCODONE BITARTRATE AND ACETAMINOPHEN 1 TABLET: 5; 325 TABLET ORAL at 04:19

## 2023-09-25 RX ADMIN — PANTOPRAZOLE SODIUM 40 MG: 40 TABLET, DELAYED RELEASE ORAL at 04:19

## 2023-09-25 RX ADMIN — INSULIN HUMAN 4 UNITS: 100 INJECTION, SOLUTION PARENTERAL at 20:08

## 2023-09-25 RX ADMIN — INSULIN HUMAN 3 UNITS: 100 INJECTION, SOLUTION PARENTERAL at 11:57

## 2023-09-25 RX ADMIN — ATORVASTATIN CALCIUM 80 MG: 40 TABLET, FILM COATED ORAL at 20:07

## 2023-09-25 RX ADMIN — Medication 10 ML: at 08:00

## 2023-09-25 RX ADMIN — GABAPENTIN 600 MG: 300 CAPSULE ORAL at 20:07

## 2023-09-25 NOTE — THERAPY EVALUATION
Patient Name: Kevin Ordonez  : 1960    MRN: [id number]                              Today's Date: 2023       Admit Date: 2023    Visit Dx:     ICD-10-CM ICD-9-CM   1. Acute renal failure, unspecified acute renal failure type  N17.9 584.9   2. Symptomatic anemia  D64.9 285.9   3. Malignant neoplasm of cardia of stomach  C16.0 151.0   4. Generalized weakness  R53.1 780.79   5. Frequent falls  R29.6 V15.88   6. Difficulty walking  R26.2 719.7   7. Decreased activities of daily living (ADL)  Z78.9 V49.89     Patient Active Problem List   Diagnosis    Rotator cuff tendonitis, left    Tear of left acetabular labrum    Primary osteoarthritis of left shoulder    Left shoulder pain    Asthma    Closed nondisplaced fracture of shaft of fifth metacarpal bone of right hand    Diabetes mellitus    Hyperlipidemia    Hypertension    Neuropathy    Obstructive sleep apnea syndrome    Malignant neoplasm of parotid gland    Calcific tendinitis of left shoulder    Aftercare following surgery of left shoulder arthroscopic rotator cuff debridement, labral debridement, subacromial decompression, bicep tenodesis, 4/3/2023    Malignant neoplasm of cardia of stomach    PICC (peripherally inserted central catheter) in place    Iron deficiency anemia due to chronic blood loss    Malabsorption due to intolerance, not elsewhere classified    Acute kidney injury    Generalized weakness    Frequent falls    Symptomatic anemia    Chest pain, atypical    Elevated troponin    Dehydration     Past Medical History:   Diagnosis Date    Asthma     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    Diabetes mellitus     Hyperlipidemia     Hypertension     Rotator cuff disorder     LEFT    Sleep apnea      Past Surgical History:   Procedure Laterality Date    CAROTID ENDARTERECTOMY Right     CHOLECYSTECTOMY      FOOT SURGERY Right     TOE DEBRIDMENT    HERNIA REPAIR      VENTRAL HERNIA    SHOULDER ARTHROSCOPY W/ ROTATOR CUFF  REPAIR Left 4/3/2023    Procedure: LEFT SHOULDER ARTHROSCOPIC ROTATOR CUFF DEBRIDEMENT, LABRAL DEBRIDEMENT, BICEPS TENODESIS, SUBACROMIAL DECOMPRESSION;  Surgeon: Chas Patterson MD;  Location: [facility] [place];  Service: Orthopedics;  Laterality: Left;    TUMOR REMOVAL Left     PAROTID GLAND    VASCULAR SURGERY Right     STENT R LEG      General Information       Row Name 09/25/23 1459          OT Time and Intention    Document Type evaluation  -     Mode of Treatment individual therapy;occupational therapy  -       Row Name 09/25/23 1459          General Information    Patient Profile Reviewed yes  -LF     Prior Level of Function --  Typically (I) with ADLs, ambulated w/o a device but has RW/STC if needed, has a walk-in shower with built in seat/grab bars/handheld shower head, elevated commode, stands to groom, drives, and no home O2.  -     Existing Precautions/Restrictions fall  -     Barriers to Rehab none identified  -       Row Name 09/25/23 1459          Occupational Profile    Reason for Services/Referral (Occupational Profile) Patient is a 63 year old male admitted to [facility] for dizziness, lightheadedness, and chest pain on September 21st, 2023. Occupational therapy consulted due to recent decline in ADLs/functional transfers. No previous occupational therapy services for current condition.  -       Row Name 09/25/23 1459          Living Environment    People in Home spouse  -       Row Name 09/25/23 1459          Cognition    Orientation Status (Cognition) oriented x 4  -       Row Name 09/25/23 1459          Safety Issues, Functional Mobility    Impairments Affecting Function (Mobility) balance;endurance/activity tolerance;strength  -               User Key  (r) = Recorded By, (t) = Taken By, (c) = Cosigned By      Initials Name Provider Type     Jenniffer Cortez OT Occupational Therapist                     Mobility/ADL's       Row Name 09/25/23 6400          Bed  Mobility    Bed Mobility supine-sit  -     Supine-Sit [place] (Bed Mobility) minimum assist (75% patient effort);verbal cues  -       Row Name 09/25/23 1507          Transfers    Transfers sit-stand transfer;stand-sit transfer  -       Row Name 09/25/23 1507          Sit-Stand Transfer    Sit-Stand [place] (Transfers) contact guard;verbal cues  -LF     Assistive Device (Sit-Stand Transfers) walker, front-wheeled  -LF       Row Name 09/25/23 1507          Stand-Sit Transfer    Stand-Sit [place] (Transfers) contact guard;verbal cues  -LF     Assistive Device (Stand-Sit Transfers) walker, front-wheeled  -LF       Row Name 09/25/23 1507          Activities of Daily Living    BADL Assessment/Intervention bathing;upper body dressing;lower body dressing;grooming;feeding;toileting  -       Row Name 09/25/23 1507          Bathing Assessment/Intervention    [place] Level (Bathing) bathing skills;upper body;standby assist;lower body;moderate assist (50% patient effort)  -       Row Name 09/25/23 1507          Upper Body Dressing Assessment/Training    [place] Level (Upper Body Dressing) upper body dressing skills;standby assist  -       Row Name 09/25/23 1507          Lower Body Dressing Assessment/Training    [place] Level (Lower Body Dressing) lower body dressing skills;moderate assist (50% patient effort)  -       Row Name 09/25/23 1507          Grooming Assessment/Training    [place] Level (Grooming) grooming skills;set up  -       Row Name 09/25/23 1507          Self-Feeding Assessment/Training    [place] Level (Feeding) feeding skills;set up  -       Row Name 09/25/23 1507          Toileting Assessment/Training    [place] Level (Toileting) toileting skills;moderate assist (50% patient effort)  -               User Key  (r) = Recorded By, (t) = Taken By, (c) = Cosigned By      Initials Name Provider Type    Jenniffer Elkins OT Occupational Therapist                    Obj/Interventions       Row Name 09/25/23 1508          Sensory Assessment (Somatosensory)    Sensory Assessment (Somatosensory) UE sensation intact  -LF       Row Name 09/25/23 1508          Vision Assessment/Intervention    Visual Impairment/Limitations WFL  -LF       Row Name 09/25/23 1508          Range of Motion Comprehensive    General Range of Motion bilateral upper extremity ROM WFL  -LF       Row Name 09/25/23 1508          Strength Comprehensive (MMT)    Comment, General Manual Muscle Testing (MMT) Assessment 4/5 BUEs  -LF       Row Name 09/25/23 1508          Motor Skills    Motor Skills coordination;functional endurance  -LF     Coordination WFL  -     Functional Endurance Fair-/fair  -LF       Row Name 09/25/23 1508          Balance    Balance Assessment standing dynamic balance  -LF     Dynamic Standing Balance minimal assist  -LF     Position/Device Used, Standing Balance supported;walker, front-wheeled  -LF               User Key  (r) = Recorded By, (t) = Taken By, (c) = Cosigned By      Initials Name Provider Type     Jenniffer Cortez, OT Occupational Therapist                   Goals/Plan       Row Name 09/25/23 1509          Bed Mobility Goal 1 (OT)    Activity/Assistive Device (Bed Mobility Goal 1, OT) bed mobility activities, all  -LF     [place] Level/Cues Needed (Bed Mobility Goal 1, OT) modified independence  -LF     Time Frame (Bed Mobility Goal 1, OT) long term goal (LTG);10 days  -LF       Row Name 09/25/23 1509          Transfer Goal 1 (OT)    Activity/Assistive Device (Transfer Goal 1, OT) transfers, all;walker, rolling  -LF     [place] Level/Cues Needed (Transfer Goal 1, OT) modified independence  -LF     Time Frame (Transfer Goal 1, OT) long term goal (LTG);10 days  -LF       Row Name 09/25/23 1509          Bathing Goal 1 (OT)    Activity/Device (Bathing Goal 1, OT) bathing skills, all  -LF     [place] Level/Cues Needed (Bathing Goal 1, OT) modified  independence  -LF     Time Frame (Bathing Goal 1, OT) long term goal (LTG);10 days  -LF       Row Name 09/25/23 1509          Dressing Goal 1 (OT)    Activity/Device (Dressing Goal 1, OT) dressing skills, all  -LF     [place]/Cues Needed (Dressing Goal 1, OT) modified independence  -LF     Time Frame (Dressing Goal 1, OT) long term goal (LTG);10 days  -LF       Row Name 09/25/23 1509          Toileting Goal 1 (OT)    Activity/Device (Toileting Goal 1, OT) toileting skills, all  -LF     [place] Level/Cues Needed (Toileting Goal 1, OT) modified independence  -LF     Time Frame (Toileting Goal 1, OT) long term goal (LTG);10 days  -LF       Row Name 09/25/23 1509          Problem Specific Goal 1 (OT)    Problem Specific Goal 1 (OT) Patient will demonstrate good endurance to support ADLs/functional transfers.  -LF     Time Frame (Problem Specific Goal 1, OT) long term goal (LTG);10 days  -       Row Name 09/25/23 1509          Therapy Assessment/Plan (OT)    Planned Therapy Interventions (OT) activity tolerance training;patient/caregiver education/training;BADL retraining;functional balance retraining;occupation/activity based interventions;strengthening exercise;transfer/mobility retraining  -               User Key  (r) = Recorded By, (t) = Taken By, (c) = Cosigned By      Initials Name Provider Type    LF Jenniffer Cortez OT Occupational Therapist                   Clinical Impression       Row Name 09/25/23 1508          Pain Assessment    Additional Documentation Pain Scale: FACES Pre/Post-Treatment (Group)  -LF       Row Name 09/25/23 1508          Pain Scale: FACES Pre/Post-Treatment    Pain: FACES Scale, Pretreatment 0-->no hurt  -LF     Posttreatment Pain Rating 0-->no hurt  -LF       Row Name 09/25/23 1508          Plan of Care Review    Plan of Care Reviewed With patient;spouse  -LF     Progress no change  -LF     Outcome Evaluation Patient presents with limitations in self-care, functional  transfers, balance, and endurance. He would benefit from continued skilled occupational therapy services to maximize independence with ADLs/functional transfers.  -       Row Name 09/25/23 1508          Therapy Assessment/Plan (OT)    Patient/Family Therapy Goal Statement (OT) To maximize independence.  -     Rehab Potential (OT) good, to achieve stated therapy goals  -     Criteria for Skilled Therapeutic Interventions Met (OT) yes;meets criteria;skilled treatment is necessary  -     Therapy Frequency (OT) 5 times/wk  -       Row Name 09/25/23 1508          Therapy Plan Review/Discharge Plan (OT)    Anticipated Discharge Disposition (OT) home with assist;home with home health  -       Row Name 09/25/23 1508          Vital Signs    O2 Delivery Pre Treatment room air  -LF     O2 Delivery Intra Treatment room air  -LF     O2 Delivery Post Treatment room air  -LF               User Key  (r) = Recorded By, (t) = Taken By, (c) = Cosigned By      Initials Name Provider Type     Jenniffer Cortez, OT Occupational Therapist                   Outcome Measures       Row Name 09/25/23 1510          How much help from another is currently needed...    Putting on and taking off regular lower body clothing? 2  -LF     Bathing (including washing, rinsing, and drying) 2  -LF     Toileting (which includes using toilet bed pan or urinal) 2  -LF     Putting on and taking off regular upper body clothing 4  -LF     Taking care of personal grooming (such as brushing teeth) 4  -LF     Eating meals 4  -LF     AM-PAC 6 Clicks Score (OT) 18  -LF       Row Name 09/25/23 1310 09/25/23 0800       How much help from another person do you currently need...    Turning from your back to your side while in flat bed without using bedrails? 4  -WM 4  -JM    Moving from lying on back to sitting on the side of a flat bed without bedrails? 3  -WM 3  -JM    Moving to and from a bed to a chair (including a wheelchair)? 3  -WM 3  -JM     Standing up from a chair using your arms (e.g., wheelchair, bedside chair)? 3  -WM 3  -JM    Climbing 3-5 steps with a railing? 2  -WM 2  -JM    To walk in hospital room? 3  -WM 3  -JM    AM-PAC 6 Clicks Score (PT) 18  -WM 18  -JM    Highest level of mobility 6 --> Walked 10 steps or more  -WM 6 --> Walked 10 steps or more  -JM      Row Name 09/25/23 1510          Functional Assessment    Outcome Measure Options AM-PAC 6 Clicks Daily Activity (OT);Optimal Instrument  -LF       Row Name 09/25/23 1510          Optimal Instrument    Optimal Instrument Optimal - 3  -LF     Bending/Stooping 3  -LF     Standing 2  -LF     Reaching 1  -LF     From the list, choose the 3 activities you would most like to be able to do without any difficulty Bending/stooping;Standing;Reaching  -LF     Total Score Optimal - 3 6  -LF               User Key  (r) = Recorded By, (t) = Taken By, (c) = Cosigned By      Initials Name Provider Type     Reji Dietz PTA Physical Therapist Assistant     Jenniffer Cortez, OT Occupational Therapist    Cj Benson, RN Registered Nurse                    Occupational Therapy Education       Title: PT OT SLP Therapies (In Progress)       Topic: Occupational Therapy (In Progress)       Point: ADL training (Done)       Description:   Instruct learner(s) on proper safety adaptation and remediation techniques during self care or transfers.   Instruct in proper use of assistive devices.                  Learning Progress Summary             Patient Acceptance, E,TB, VU by  at 9/25/2023 1510   Significant Other Acceptance, E,TB, VU by  at 9/25/2023 1510                         Point: Home exercise program (Not Started)       Description:   Instruct learner(s) on appropriate technique for monitoring, assisting and/or progressing therapeutic exercises/activities.                  Learner Progress:  Not documented in this visit.              Point: Precautions (Done)       Description:   Instruct  learner(s) on prescribed precautions during self-care and functional transfers.                  Learning Progress Summary             Patient Acceptance, E,TB, VU by  at 9/25/2023 1510   Significant Other Acceptance, E,TB, VU by  at 9/25/2023 1510                         Point: Body mechanics (Done)       Description:   Instruct learner(s) on proper positioning and spine alignment during self-care, functional mobility activities and/or exercises.                  Learning Progress Summary             Patient Acceptance, E,TB, VU by  at 9/25/2023 1510   Significant Other Acceptance, E,TB, VU by  at 9/25/2023 1510                                         User Key       Initials Effective Dates Name Provider Type Discipline     06/16/21 -  Jenniffer Cortez OT Occupational Therapist OT                  OT Recommendation and Plan  Planned Therapy Interventions (OT): activity tolerance training, patient/caregiver education/training, BADL retraining, functional balance retraining, occupation/activity based interventions, strengthening exercise, transfer/mobility retraining  Therapy Frequency (OT): 5 times/wk  Plan of Care Review  Plan of Care Reviewed With: patient, spouse  Progress: no change  Outcome Evaluation: Patient presents with limitations in self-care, functional transfers, balance, and endurance. He would benefit from continued skilled occupational therapy services to maximize independence with ADLs/functional transfers.     Time Calculation:   Evaluation Complexity (OT)  Review Occupational Profile/Medical/Therapy History Complexity: brief/low complexity  Assessment, Occupational Performance/Identification of Deficit Complexity: 3-5 performance deficits  Clinical Decision Making Complexity (OT): problem focused assessment/low complexity  Overall Complexity of Evaluation (OT): low complexity     Time Calculation- OT       Row Name 09/25/23 1511 09/25/23 1306          Time Calculation- OT    OT Received  On 09/25/23  -LF --     OT Goal Re-Cert Due Date 10/04/23  -LF --        Timed Charges    07992 - Gait Training Minutes  -- 9  -WM        Untimed Charges    OT Eval/Re-eval Minutes 35  -LF --        Total Minutes    Timed Charges Total Minutes -- 9  -WM     Untimed Charges Total Minutes 35  -LF --      Total Minutes 35  -LF 9  -WM               User Key  (r) = Recorded By, (t) = Taken By, (c) = Cosigned By      Initials Name Provider Type    Reji Snowden PTA Physical Therapist Assistant     Jenniffer Cortez OT Occupational Therapist                  Therapy Charges for Today       Code Description Service Date Service Provider Modifiers Qty    [id number] HC OT EVAL LOW COMPLEXITY 3 9/25/2023 Jenniffer Cortez OT GO 1                 Jenniffer Cortez OT  9/25/2023

## 2023-09-25 NOTE — PLAN OF CARE
"  Problem: Adult Inpatient Plan of Care  Goal: Plan of Care Review  Outcome: Ongoing, Progressing  Flowsheets (Taken 9/24/2023 2021)  Progress: no change  Plan of Care Reviewed With: patient  Outcome Evaluation: Patient alert and oriented throughout shift. Patient is on room air. Patient complained of chest pain but stated \"it does not feel like pressure, it feels like it did when I came into hospital\". Patient stated \"I think it is reflux\". MD aware. Patient ambulated with physical therapy today. Patient is still fatigue. Fever 100.9, tylenol given. fecal occult test positive, MD aware. Continuing with plan of care.   Goal Outcome Evaluation:  Plan of Care Reviewed With: patient        Progress: no change  Outcome Evaluation: Patient alert and oriented throughout shift. Patient is on room air. Patient complained of chest pain but stated \"it does not feel like pressure, it feels like it did when I came into hospital\". Patient stated \"I think it is reflux\". MD aware. Patient ambulated with physical therapy today. Patient is still fatigue. Fever 100.9, tylenol given. fecal occult test positive, MD aware. Continuing with plan of care.         "

## 2023-09-25 NOTE — ANESTHESIA PREPROCEDURE EVALUATION
Anesthesia Evaluation     Patient summary reviewed and Nursing notes reviewed   no history of anesthetic complications:   NPO Solid Status: > 8 hours  NPO Liquid Status: > 2 hours           Airway   Mallampati: II  TM distance: >3 FB  Neck ROM: full  No difficulty expected  Dental    (+) poor dentition        Pulmonary - normal exam    breath sounds clear to auscultation  (+) a smoker Former, COPD,sleep apnea  Cardiovascular - normal exam  Exercise tolerance: good (4-7 METS)    Rhythm: regular  Rate: normal    (+) hypertension, hyperlipidemia      Neuro/Psych- negative ROS  GI/Hepatic/Renal/Endo - negative ROS   (+) renal disease ARF, diabetes mellitus    ROS Comment: Stomach CA, metastatic     Musculoskeletal     Abdominal    Substance History - negative use     OB/GYN          Other - negative ROS  blood dyscrasia (transfused this admission) anemia,   history of cancer    ROS/Med Hx Other:         Phys Exam Other: Right arm PICC         Metastatic stage IV gastric cancer.  Patient with PET scan (9/12/23) with hepatic mets that have been biopsy-proven to be gastric cancer       Anesthesia Plan    ASA 4     general   total IV anesthesia  (Patient understands anesthesia not responsible for dental damage.)  intravenous induction     Anesthetic plan, risks, benefits, and alternatives have been provided, discussed and informed consent has been obtained with: patient.    Plan discussed with CRNA.      CODE STATUS:

## 2023-09-25 NOTE — PLAN OF CARE
Problem: Hypertension Comorbidity  Goal: Blood Pressure in Desired Range  Outcome: Ongoing, Progressing  Intervention: Maintain Blood Pressure Management  Recent Flowsheet Documentation  Taken 9/24/2023 2109 by Stacia Brown, RN  Medication Review/Management: medications reviewed   Goal Outcome Evaluation:                      Pt a&o x4. Wife at bedside. Pt had fever this shift, given tylenol, cold rag, and fan which helped. Given pain pills per MAR. Pt is going for chest port placement either today or tomorrow.

## 2023-09-25 NOTE — PROGRESS NOTES
[facility]   Hospitalist Progress Note  Date: 2023  Patient Name: Kevin Ordonez  : 1960  MRN: [id number]  Date of admission: 2023  Room/Bed: 410/1      Subjective Dizziness, lightheadedness, chest pain.   Subjective     Chief Complaint: Dizziness, lightheadedness, chest pain.    Summary: Kevin Ordonez is a 63 y.o. male with past medical history of diabetes, hypertension, hyperlipidemia, BRIDGET, COPD, neoplasm of the parotid gland, neoplasm of the stomach, gastritis, and GERD presented to the ED with complaints of dizziness, lightheadedness, and chest pain.  Patient states that for the last week or so he has been having worsening weakness with fatigue, dizziness and lightheadedness resulting in a fall prior to arrival.  Patient has also been having chest pain since yesterday morning which she substernal/epigastric, 7 out of 10, dull/burning, and nonradiating.  He also states that his p.o. intake has been very limited over the last week or so.  Due to concerns patient was brought to the ED for further evaluation.  In the ED patient's vitals were all within normal limits on arrival.  Labs showed that he was significantly anemic with hemoglobin of 7.5 and mildly elevated troponin, and reduced renal function.  CT of the head was negative for any acute findings as well as a chest x-ray.  When asked he denied any recent fevers, chills, headaches, focal weakness, palpitation, shortness of breath, cough, vomiting, diarrhea, constipation, dysuria, hematuria, hematochezia, melena, or anxiety.  Patient admitted for evaluation and treatment     Interval Followup: Patient had  a fever last night.  No fever today.  He feels weak and fatigued.  U/A WNL.     Hemoglobin stable.        Review of Systems   Constitutional:  Positive for fatigue.   HENT: Negative.     Eyes: Negative.    Respiratory: Negative.     Cardiovascular: Negative.    Gastrointestinal: Negative.    Genitourinary: Negative.    Musculoskeletal:   Positive for arthralgias.   Skin: Negative.    Allergic/Immunologic: Negative.    Neurological: Negative.    Hematological: Negative.    Psychiatric/Behavioral: Negative.       All systems reviewed and negative except for what is outlined above.      Objective   Objective     Vitals:   Temp:  [97.2 °F (36.2 °C)-100.8 °F (38.2 °C)] 97.7 °F (36.5 °C)  Heart Rate:  [] 81  Resp:  [18-20] 18  BP: (133-164)/(49-83) 133/63    Physical Exam   General: Awake, alert, NAD  HENT: NCAT, MMM  Eyes: pupils equal, no scleral icterus  Cardiovascular: RRR, no murmurs   Pulmonary: CTA bilaterally; no wheezes; no conversational dyspnea  Gastrointestinal: S/ND/NT, +BS  Musculoskeletal: No gross deformities  Skin: No jaundice, no rash on exposed skin appreciated  Neuro: CN II through XII grossly intact; speech clear; no tremor  Psych: Mood and affect appropriate  : No Lehman catheter; no suprapubic tenderness    Result Review    Result Review:  I have personally reviewed these results:  [x]  Laboratory      Lab 09/25/23 0420 09/24/23  2140 09/24/23  0621 09/23/23  1713 09/23/23  0543   WBC 8.84  --  11.13*  --  9.82   HEMOGLOBIN 8.0*  --  8.0* 8.6* 6.8*   HEMATOCRIT 26.3*  --  26.1* 27.9* 23.3*   PLATELETS 317  --  302  --  266   NEUTROS ABS 6.91  --  8.95*  --  7.52*   IMMATURE GRANS (ABS) 0.05  --  0.06*  --  0.06*   LYMPHS ABS 0.57*  --  0.71  --  0.93   MONOS ABS 0.97*  --  1.06*  --  0.98*   EOS ABS 0.30  --  0.30  --  0.28   MCV 85.4  --  85.9  --  85.3   PROCALCITONIN  --  1.02*  --   --   --            Lab 09/25/23  0420 09/24/23  0621 09/23/23  0543 09/22/23  0423 09/21/23 2126   SODIUM 133* 135* 137   < > 133*   POTASSIUM 4.1 3.5 4.3   < > 4.1   CHLORIDE 99 107 103   < > 96*   CO2 23.6 19.2* 25.3   < > 25.8   ANION GAP 10.4 8.8 8.7   < > 11.2   BUN 27* 27* 33*   < > 32*   CREATININE 1.02 0.88 1.25   < > 1.71*   EGFR 82.1 96.0 64.3   < > 44.1*   GLUCOSE 192* 170* 136*   < > 213*   CALCIUM 8.6 7.0* 8.6   < > 9.3    MAGNESIUM  --   --   --   --  1.7    < > = values in this interval not displayed.           Lab 09/21/23 2126   TOTAL PROTEIN 5.9*   ALBUMIN 3.1*   GLOBULIN 2.8   ALT (SGPT) 89*   AST (SGOT) 115*   BILIRUBIN 0.4   ALK PHOS 519*           Lab 09/22/23  0423 09/22/23  0003 09/21/23 2126   HSTROP T 56* 60* 59*               Lab 09/22/23  0036   ABO TYPING A   RH TYPING Positive   ANTIBODY SCREEN Negative           Brief Urine Lab Results  (Last result in the past 365 days)        Color   Clarity   Blood   Leuk Est   Nitrite   Protein   CREAT   Urine HCG        09/25/23 0309 Yellow   Clear   Negative   Negative   Negative   30 mg/dL (1+)                 [x]  Microbiology   Microbiology Results (last 10 days)       ** No results found for the last 240 hours. **          [x]  Radiology  CT Head Without Contrast    Result Date: 9/21/2023   1. No acute intracranial abnormality. 2. Moderate mucosal thickening in the left maxillary sinus.     ROSA WHITE DO       Electronically Signed and Approved By: ROSA WHITE DO on 9/21/2023 at 20:38             XR Chest 1 View    Result Date: 9/24/2023    No acute infiltrate is appreciated.     Please note that portions of this note were completed with a voice recognition program.  LYNSEY DE LOS SANTOS JR, MD       Electronically Signed and Approved By: LYNSEY DE LOS SANTOS JR, MD on 9/24/2023 at 22:35              XR Chest 1 View    Result Date: 9/21/2023   No acute cardiopulmonary process or radiographic signs of chest trauma.       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 Copeland.    -Dr. Pruitt can proceed with outpatient chemotherapy on  Wednesday.  With the option of proceeding on Thursday or Friday in with infusional 5-FU could take place in the hospital on Saturday and Sunday if needed.  -Continue gabapentin for pain and as needed meds ordered.    #2 anemia  -Patient has received IV fluids so dilution can be a factor on his chronic anemia.  S/P 1 unit of blood.  Patient has received recent IV iron at outside institution.  -FOBT positive to be expected with gastric cancer.     #4 insulin-dependent diabetes continue basal and insulin sliding scale    #5 hyperlipidemia continue Lipitor    #6 COPD continue Symbicort    #7 GERD continue PPI    Dispo planning: NPO at midnight; port tomorrow. Will reassess the patient after port placement to see if he can go home and start chemo outpatient or will need to start in the hospital.       Discussed with RN.    DVT prophylaxis:  Mechanical DVT prophylaxis orders are present.    CODE STATUS:   Level Of Support Discussed With: Patient  Code Status (Patient has no pulse and is not breathing): CPR (Attempt to Resuscitate)  Medical Interventions (Patient has pulse or is breathing): Full Support    Electronically signed by Edgar Serrano DO, 09/25/23, 10:23 AM EDT.

## 2023-09-25 NOTE — THERAPY TREATMENT NOTE
Acute Care - Physical Therapy Treatment Note  [facility]     Patient Name: Kevin Ordonez  : 1960  MRN: [id number]  Today's Date: 2023      Visit Dx:     ICD-10-CM ICD-9-CM   1. Acute renal failure, unspecified acute renal failure type  N17.9 584.9   2. Symptomatic anemia  D64.9 285.9   3. Malignant neoplasm of cardia of stomach  C16.0 151.0   4. Generalized weakness  R53.1 780.79   5. Frequent falls  R29.6 V15.88   6. Difficulty walking  R26.2 719.7     Patient Active Problem List   Diagnosis    Rotator cuff tendonitis, left    Tear of left acetabular labrum    Primary osteoarthritis of left shoulder    Left shoulder pain    Asthma    Closed nondisplaced fracture of shaft of fifth metacarpal bone of right hand    Diabetes mellitus    Hyperlipidemia    Hypertension    Neuropathy    Obstructive sleep apnea syndrome    Malignant neoplasm of parotid gland    Calcific tendinitis of left shoulder    Aftercare following surgery of left shoulder arthroscopic rotator cuff debridement, labral debridement, subacromial decompression, bicep tenodesis, 4/3/2023    Malignant neoplasm of cardia of stomach    PICC (peripherally inserted central catheter) in place    Iron deficiency anemia due to chronic blood loss    Malabsorption due to intolerance, not elsewhere classified    Acute kidney injury    Generalized weakness    Frequent falls    Symptomatic anemia    Chest pain, atypical    Elevated troponin    Dehydration     Past Medical History:   Diagnosis Date    Asthma     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    Diabetes mellitus     Hyperlipidemia     Hypertension     Rotator cuff disorder     LEFT    Sleep apnea      Past Surgical History:   Procedure Laterality Date    CAROTID ENDARTERECTOMY Right     CHOLECYSTECTOMY      FOOT SURGERY Right     TOE DEBRIDMENT    HERNIA REPAIR      VENTRAL HERNIA    SHOULDER ARTHROSCOPY W/ ROTATOR CUFF REPAIR Left 4/3/2023    Procedure: LEFT SHOULDER ARTHROSCOPIC  ROTATOR CUFF DEBRIDEMENT, LABRAL DEBRIDEMENT, BICEPS TENODESIS, SUBACROMIAL DECOMPRESSION;  Surgeon: Chas Patterson MD;  [place] [facility] [place] [facility] [place]  Service: [place]opedics;  Laterality: Left;    TUMOR REMOVAL Left     PAROTID GLAND    VASCULAR SURGERY Right     STENT R LEG     PT Assessment (last 12 hours)       PT Evaluation and Treatment       Row Name 09/25/23 1307          Physical Therapy Time and Intention    Document Type therapy note (daily note)  -WM     Mode of Treatment individual therapy;physical therapy  -WM     Patient Effort good  -WM     Symptoms Noted During/After Treatment fatigue  -       Row Name 09/25/23 1307          Bed Mobility    Supine-Sit Raccoon (Bed Mobility) minimum assist (75% patient effort);verbal cues  -     Assistive Device (Bed Mobility) bed rails  -       Row Name 09/25/23 1307          Sit-Stand Transfer    Sit-Stand Raccoon (Transfers) contact guard;verbal cues  -     Assistive Device (Sit-Stand Transfers) walker, front-wheeled  -       Row Name 09/25/23 1307          Stand-Sit Transfer    Stand-Sit Raccoon (Transfers) contact guard;verbal cues  -     Assistive Device (Stand-Sit Transfers) walker, front-wheeled  -       Row Name 09/25/23 1307          Gait/Stairs (Locomotion)    Raccoon Level (Gait) contact guard;verbal cues  -     Assistive Device (Gait) walker, front-wheeled  -     Distance in Feet (Gait) 425  -     Pattern (Gait) 4-point;step-through  -     Deviations/Abnormal Patterns (Gait) gait speed decreased;stride length decreased  -       Row Name 09/25/23 1307          Safety Issues, Functional Mobility    Impairments Affecting Function (Mobility) balance;endurance/activity tolerance;strength  -       Row Name 09/25/23 1307          Motor Skills    Therapeutic Exercise hip;knee;ankle  -       Row Name 09/25/23 1307          Hip (Therapeutic Exercise)    Hip (Therapeutic Exercise) strengthening exercise  -     Hip  Strengthening (Therapeutic Exercise) bilateral;aBduction;aDduction;marching while seated;sitting;resistance band;yellow;20 repititions  -       Row Name 09/25/23 1307          Knee (Therapeutic Exercise)    Knee (Therapeutic Exercise) AROM (active range of motion);strengthening exercise  -     Knee AROM (Therapeutic Exercise) bilateral;LAQ (long arc quad);sitting;20 repititions  -     Knee Strengthening (Therapeutic Exercise) bilateral;hamstring curls;sitting;resistance band;yellow;20 repititions  -       Row Name 09/25/23 1307          Ankle (Therapeutic Exercise)    Ankle (Therapeutic Exercise) AROM (active range of motion)  -     Ankle AROM (Therapeutic Exercise) bilateral;dorsiflexion;plantarflexion;sitting;20 repititions  -       Row Name 09/25/23 1307          Progress Summary (PT)    Progress Toward Functional Goals (PT) progress toward functional goals is good  -               User Key  (r) = Recorded By, (t) = Taken By, (c) = Cosigned By      Initials Name Provider Type     Reji Dietz PTA Physical Therapist Assistant                    Physical Therapy Education       Title: PT OT SLP Therapies (In Progress)       Topic: Physical Therapy (In Progress)       Point: Mobility training (Done)       Learning Progress Summary             Patient Acceptance, E,TB, VU by  at 9/22/2023 1406                         Point: Home exercise program (Not Started)       Learner Progress:  Not documented in this visit.              Point: Body mechanics (Done)       Learning Progress Summary             Patient Acceptance, E,TB, VU by AV at 9/22/2023 1406                         Point: Precautions (Done)       Learning Progress Summary             Patient Acceptance, E,TB, VU by  at 9/22/2023 1406                                         User Key       Initials Effective Dates Name Provider Type Discipline     06/11/21 -  Bi Ledezma, PT Physical Therapist PT                  PT  Recommendation and Plan     Progress Summary (PT)  Progress Toward Functional Goals (PT): progress toward functional goals is good   Outcome Measures       Row Name 09/25/23 1310 09/24/23 1600 09/22/23 1400       How much help from another person do you currently need...    Turning from your back to your side while in flat bed without using bedrails? 4  -WM 3  -CS 4  -AV    Moving from lying on back to sitting on the side of a flat bed without bedrails? 3  -WM 3  -CS 4  -AV    Moving to and from a bed to a chair (including a wheelchair)? 3  -WM 3  -CS 3  -AV    Standing up from a chair using your arms (e.g., wheelchair, bedside chair)? 3  -WM 3  -CS 3  -AV    Climbing 3-5 steps with a railing? 2  -WM 2  -CS 3  -AV    To walk in hospital room? 3  -WM 3  -CS 3  -AV    AM-PAC 6 Clicks Score (PT) 18  -WM 17  -CS 20  -AV       Functional Assessment    Outcome Measure Options -- AM-PAC 6 Clicks Basic Mobility (PT)  -CS AM-PAC 6 Clicks Basic Mobility (PT)  -AV              User Key  (r) = Recorded By, (t) = Taken By, (c) = Cosigned By      Initials Name Provider Type     Reji Dietz, PTA Physical Therapist Assistant    AV Bi Ledezma, PT Physical Therapist    CS Jesus Littlejohn PTA Physical Therapist Assistant                     Time Calculation:    PT Charges       Row Name 09/25/23 1306             Time Calculation    PT Received On 09/25/23  -WM         Timed Charges    54498 - PT Therapeutic Exercise Minutes 14  -WM      65153 - Gait Training Minutes  9  -WM      94081 - PT Therapeutic Activity Minutes 2  -WM         Total Minutes    Timed Charges Total Minutes 25  -WM       Total Minutes 25  -WM                User Key  (r) = Recorded By, (t) = Taken By, (c) = Cosigned By      Initials Name Provider Type     Reji Dietz, SHELBI Physical Therapist Assistant                      PT G-Codes  Outcome Measure Options: AM-PAC 6 Clicks Basic Mobility (PT)  AM-PAC 6 Clicks Score (PT): 18    Reji Dietz  PTA  9/25/2023

## 2023-09-25 NOTE — PLAN OF CARE
Problem: Adult Inpatient Plan of Care  Goal: Plan of Care Review  Outcome: Ongoing, Progressing  Flowsheets (Taken 9/25/2023 1616)  Progress: improving  Plan of Care Reviewed With: patient  Outcome Evaluation: Patient alert and oriented throughout shift. VSS. No complaints of pain or discomfort throughout shift. Family has been present at bedside throughout shift. Continuing with plan of care.   Goal Outcome Evaluation:  Plan of Care Reviewed With: patient        Progress: improving  Outcome Evaluation: Patient alert and oriented throughout shift. VSS. No complaints of pain or discomfort throughout shift. Family has been present at bedside throughout shift. Continuing with plan of care.

## 2023-09-26 ENCOUNTER — ANESTHESIA (OUTPATIENT)
Dept: PERIOP | Facility: HOSPITAL | Age: 63
DRG: 674 | End: 2023-09-26
Payer: OTHER GOVERNMENT

## 2023-09-26 ENCOUNTER — APPOINTMENT (OUTPATIENT)
Dept: GENERAL RADIOLOGY | Facility: HOSPITAL | Age: 63
DRG: 674 | End: 2023-09-26
Payer: OTHER GOVERNMENT

## 2023-09-26 ENCOUNTER — DOCUMENTATION (OUTPATIENT)
Dept: NUTRITION | Facility: HOSPITAL | Age: 63
End: 2023-09-26
Payer: OTHER GOVERNMENT

## 2023-09-26 ENCOUNTER — READMISSION MANAGEMENT (OUTPATIENT)
Dept: CALL CENTER | Facility: HOSPITAL | Age: 63
End: 2023-09-26
Payer: OTHER GOVERNMENT

## 2023-09-26 VITALS
OXYGEN SATURATION: 98 % | BODY MASS INDEX: 31.25 KG/M2 | HEIGHT: 66 IN | HEART RATE: 87 BPM | RESPIRATION RATE: 18 BRPM | SYSTOLIC BLOOD PRESSURE: 180 MMHG | WEIGHT: 194.45 LBS | TEMPERATURE: 98.6 F | DIASTOLIC BLOOD PRESSURE: 74 MMHG

## 2023-09-26 DIAGNOSIS — C16.0 MALIGNANT NEOPLASM OF CARDIA OF STOMACH: Primary | ICD-10-CM

## 2023-09-26 PROBLEM — Z45.2 FITTING AND ADJUSTMENT OF VASCULAR CATHETER: Status: ACTIVE | Noted: 2023-09-26

## 2023-09-26 PROBLEM — N17.9 ACUTE KIDNEY INJURY: Status: RESOLVED | Noted: 2023-09-22 | Resolved: 2023-09-26

## 2023-09-26 PROBLEM — R29.6 FREQUENT FALLS: Status: RESOLVED | Noted: 2023-09-22 | Resolved: 2023-09-26

## 2023-09-26 PROBLEM — R79.89 ELEVATED TROPONIN: Status: RESOLVED | Noted: 2023-09-22 | Resolved: 2023-09-26

## 2023-09-26 PROBLEM — E86.0 DEHYDRATION: Status: RESOLVED | Noted: 2023-09-24 | Resolved: 2023-09-26

## 2023-09-26 PROBLEM — R07.89 CHEST PAIN, ATYPICAL: Status: RESOLVED | Noted: 2023-09-22 | Resolved: 2023-09-26

## 2023-09-26 LAB
ANION GAP SERPL CALCULATED.3IONS-SCNC: 8.3 MMOL/L (ref 5–15)
BASOPHILS # BLD AUTO: 0.06 10*3/MM3 (ref 0–0.2)
BASOPHILS NFR BLD AUTO: 1 % (ref 0–1.5)
BUN SERPL-MCNC: 25 MG/DL (ref 8–23)
BUN/CREAT SERPL: 25.8 (ref 7–25)
CALCIUM SPEC-SCNC: 8.4 MG/DL (ref 8.6–10.5)
CHLORIDE SERPL-SCNC: 98 MMOL/L (ref 98–107)
CO2 SERPL-SCNC: 25.7 MMOL/L (ref 22–29)
CREAT SERPL-MCNC: 0.97 MG/DL (ref 0.76–1.27)
D-LACTATE SERPL-SCNC: 2 MMOL/L (ref 0.5–2)
DEPRECATED RDW RBC AUTO: 65.8 FL (ref 37–54)
EGFRCR SERPLBLD CKD-EPI 2021: 87.2 ML/MIN/1.73
EOSINOPHIL # BLD AUTO: 0.36 10*3/MM3 (ref 0–0.4)
EOSINOPHIL NFR BLD AUTO: 6.1 % (ref 0.3–6.2)
ERYTHROCYTE [DISTWIDTH] IN BLOOD BY AUTOMATED COUNT: 20.7 % (ref 12.3–15.4)
GLUCOSE BLDC GLUCOMTR-MCNC: 124 MG/DL (ref 70–99)
GLUCOSE BLDC GLUCOMTR-MCNC: 143 MG/DL (ref 70–99)
GLUCOSE BLDC GLUCOMTR-MCNC: 155 MG/DL (ref 70–99)
GLUCOSE SERPL-MCNC: 183 MG/DL (ref 65–99)
HCT VFR BLD AUTO: 23.6 % (ref 37.5–51)
HGB BLD-MCNC: 7.2 G/DL (ref 13–17.7)
IMM GRANULOCYTES # BLD AUTO: 0.03 10*3/MM3 (ref 0–0.05)
IMM GRANULOCYTES NFR BLD AUTO: 0.5 % (ref 0–0.5)
LYMPHOCYTES # BLD AUTO: 0.72 10*3/MM3 (ref 0.7–3.1)
LYMPHOCYTES NFR BLD AUTO: 12.1 % (ref 19.6–45.3)
MCH RBC QN AUTO: 26.4 PG (ref 26.6–33)
MCHC RBC AUTO-ENTMCNC: 30.5 G/DL (ref 31.5–35.7)
MCV RBC AUTO: 86.4 FL (ref 79–97)
MONOCYTES # BLD AUTO: 0.81 10*3/MM3 (ref 0.1–0.9)
MONOCYTES NFR BLD AUTO: 13.6 % (ref 5–12)
NEUTROPHILS NFR BLD AUTO: 3.97 10*3/MM3 (ref 1.7–7)
NEUTROPHILS NFR BLD AUTO: 66.7 % (ref 42.7–76)
NRBC BLD AUTO-RTO: 0 /100 WBC (ref 0–0.2)
PLATELET # BLD AUTO: 329 10*3/MM3 (ref 140–450)
PMV BLD AUTO: 10.6 FL (ref 6–12)
POTASSIUM SERPL-SCNC: 4.1 MMOL/L (ref 3.5–5.2)
PROCALCITONIN SERPL-MCNC: 0.83 NG/ML (ref 0–0.25)
RBC # BLD AUTO: 2.73 10*6/MM3 (ref 4.14–5.8)
SODIUM SERPL-SCNC: 132 MMOL/L (ref 136–145)
WBC NRBC COR # BLD: 5.95 10*3/MM3 (ref 3.4–10.8)

## 2023-09-26 PROCEDURE — 86923 COMPATIBILITY TEST ELECTRIC: CPT

## 2023-09-26 PROCEDURE — 77001 FLUOROGUIDE FOR VEIN DEVICE: CPT | Performed by: SURGERY

## 2023-09-26 PROCEDURE — 71045 X-RAY EXAM CHEST 1 VIEW: CPT

## 2023-09-26 PROCEDURE — 25010000002 ONDANSETRON PER 1 MG: Performed by: NURSE ANESTHETIST, CERTIFIED REGISTERED

## 2023-09-26 PROCEDURE — 25010000002 PROPOFOL 10 MG/ML EMULSION: Performed by: NURSE ANESTHETIST, CERTIFIED REGISTERED

## 2023-09-26 PROCEDURE — 76937 US GUIDE VASCULAR ACCESS: CPT | Performed by: SURGERY

## 2023-09-26 PROCEDURE — 82948 REAGENT STRIP/BLOOD GLUCOSE: CPT

## 2023-09-26 PROCEDURE — 80048 BASIC METABOLIC PNL TOTAL CA: CPT | Performed by: HOSPITALIST

## 2023-09-26 PROCEDURE — C1788 PORT, INDWELLING, IMP: HCPCS | Performed by: SURGERY

## 2023-09-26 PROCEDURE — 25010000002 CEFAZOLIN PER 500 MG: Performed by: NURSE ANESTHETIST, CERTIFIED REGISTERED

## 2023-09-26 PROCEDURE — P9016 RBC LEUKOCYTES REDUCED: HCPCS

## 2023-09-26 PROCEDURE — 99222 1ST HOSP IP/OBS MODERATE 55: CPT | Performed by: SURGERY

## 2023-09-26 PROCEDURE — 25010000002 BUPIVACAINE (PF) 0.25 % SOLUTION: Performed by: SURGERY

## 2023-09-26 PROCEDURE — 02HV33Z INSERTION OF INFUSION DEVICE INTO SUPERIOR VENA CAVA, PERCUTANEOUS APPROACH: ICD-10-PCS | Performed by: SURGERY

## 2023-09-26 PROCEDURE — 97110 THERAPEUTIC EXERCISES: CPT

## 2023-09-26 PROCEDURE — 25010000002 HEPARIN (PORCINE) PER 1000 UNITS: Performed by: SURGERY

## 2023-09-26 PROCEDURE — 36430 TRANSFUSION BLD/BLD COMPNT: CPT

## 2023-09-26 PROCEDURE — 76000 FLUOROSCOPY <1 HR PHYS/QHP: CPT

## 2023-09-26 PROCEDURE — 0JH60WZ INSERTION OF TOTALLY IMPLANTABLE VASCULAR ACCESS DEVICE INTO CHEST SUBCUTANEOUS TISSUE AND FASCIA, OPEN APPROACH: ICD-10-PCS | Performed by: SURGERY

## 2023-09-26 PROCEDURE — 25010000002 MIDAZOLAM PER 1MG: Performed by: ANESTHESIOLOGY

## 2023-09-26 PROCEDURE — 84145 PROCALCITONIN (PCT): CPT | Performed by: INTERNAL MEDICINE

## 2023-09-26 PROCEDURE — 25010000002 HEPARIN LOCK FLUSH PER 10 UNITS: Performed by: INTERNAL MEDICINE

## 2023-09-26 PROCEDURE — 97116 GAIT TRAINING THERAPY: CPT

## 2023-09-26 PROCEDURE — 36561 INSERT TUNNELED CV CATH: CPT | Performed by: SURGERY

## 2023-09-26 PROCEDURE — 85025 COMPLETE CBC W/AUTO DIFF WBC: CPT | Performed by: HOSPITALIST

## 2023-09-26 PROCEDURE — 86900 BLOOD TYPING SEROLOGIC ABO: CPT

## 2023-09-26 PROCEDURE — 83605 ASSAY OF LACTIC ACID: CPT | Performed by: INTERNAL MEDICINE

## 2023-09-26 PROCEDURE — 25010000002 CEFAZOLIN IN DEXTROSE 2-4 GM/100ML-% SOLUTION: Performed by: SURGERY

## 2023-09-26 DEVICE — PRT INTRO VASC/INTERV VORTEX FILL/HL DETACH/POLYURET/CATH 8F: Type: IMPLANTABLE DEVICE | Site: INTERNAL JUGULAR | Status: FUNCTIONAL

## 2023-09-26 RX ORDER — HEPARIN SODIUM (PORCINE) LOCK FLUSH IV SOLN 100 UNIT/ML 100 UNIT/ML
500 SOLUTION INTRAVENOUS AS NEEDED
Status: CANCELLED | OUTPATIENT
Start: 2023-09-27

## 2023-09-26 RX ORDER — CEFAZOLIN SODIUM 2 G/100ML
2 INJECTION, SOLUTION INTRAVENOUS
Status: COMPLETED | OUTPATIENT
Start: 2023-09-26 | End: 2023-09-26

## 2023-09-26 RX ORDER — CEFAZOLIN SODIUM 1 G/3ML
INJECTION, POWDER, FOR SOLUTION INTRAMUSCULAR; INTRAVENOUS AS NEEDED
Status: DISCONTINUED | OUTPATIENT
Start: 2023-09-26 | End: 2023-09-26 | Stop reason: SURG

## 2023-09-26 RX ORDER — SODIUM CHLORIDE, SODIUM LACTATE, POTASSIUM CHLORIDE, CALCIUM CHLORIDE 600; 310; 30; 20 MG/100ML; MG/100ML; MG/100ML; MG/100ML
9 INJECTION, SOLUTION INTRAVENOUS CONTINUOUS PRN
Status: DISCONTINUED | OUTPATIENT
Start: 2023-09-26 | End: 2023-09-26 | Stop reason: HOSPADM

## 2023-09-26 RX ORDER — ONDANSETRON 2 MG/ML
4 INJECTION INTRAMUSCULAR; INTRAVENOUS ONCE AS NEEDED
Status: DISCONTINUED | OUTPATIENT
Start: 2023-09-26 | End: 2023-09-26 | Stop reason: HOSPADM

## 2023-09-26 RX ORDER — LIDOCAINE HYDROCHLORIDE 20 MG/ML
INJECTION, SOLUTION EPIDURAL; INFILTRATION; INTRACAUDAL; PERINEURAL AS NEEDED
Status: DISCONTINUED | OUTPATIENT
Start: 2023-09-26 | End: 2023-09-26 | Stop reason: SURG

## 2023-09-26 RX ORDER — OXYCODONE HYDROCHLORIDE 5 MG/1
5 TABLET ORAL
Status: DISCONTINUED | OUTPATIENT
Start: 2023-09-26 | End: 2023-09-26 | Stop reason: HOSPADM

## 2023-09-26 RX ORDER — HYDROCODONE BITARTRATE AND ACETAMINOPHEN 5; 325 MG/1; MG/1
1 TABLET ORAL EVERY 6 HOURS PRN
Qty: 6 TABLET | Refills: 0 | Status: SHIPPED | OUTPATIENT
Start: 2023-09-26

## 2023-09-26 RX ORDER — LEVOFLOXACIN 750 MG/1
750 TABLET ORAL DAILY
Qty: 7 TABLET | Refills: 0 | Status: SHIPPED | OUTPATIENT
Start: 2023-09-27 | End: 2023-10-04

## 2023-09-26 RX ORDER — BUPIVACAINE HYDROCHLORIDE 2.5 MG/ML
INJECTION, SOLUTION EPIDURAL; INFILTRATION; INTRACAUDAL AS NEEDED
Status: DISCONTINUED | OUTPATIENT
Start: 2023-09-26 | End: 2023-09-26 | Stop reason: HOSPADM

## 2023-09-26 RX ORDER — SODIUM CHLORIDE 0.9 % (FLUSH) 0.9 %
20 SYRINGE (ML) INJECTION AS NEEDED
Status: CANCELLED | OUTPATIENT
Start: 2023-09-27

## 2023-09-26 RX ORDER — PROPOFOL 10 MG/ML
VIAL (ML) INTRAVENOUS AS NEEDED
Status: DISCONTINUED | OUTPATIENT
Start: 2023-09-26 | End: 2023-09-26 | Stop reason: SURG

## 2023-09-26 RX ORDER — PROMETHAZINE HYDROCHLORIDE 12.5 MG/1
25 TABLET ORAL ONCE AS NEEDED
Status: DISCONTINUED | OUTPATIENT
Start: 2023-09-26 | End: 2023-09-26 | Stop reason: HOSPADM

## 2023-09-26 RX ORDER — PROMETHAZINE HYDROCHLORIDE 25 MG/1
25 SUPPOSITORY RECTAL ONCE AS NEEDED
Status: DISCONTINUED | OUTPATIENT
Start: 2023-09-26 | End: 2023-09-26 | Stop reason: HOSPADM

## 2023-09-26 RX ORDER — CEFTRIAXONE SODIUM 1 G/50ML
1000 INJECTION, SOLUTION INTRAVENOUS EVERY 24 HOURS
Status: DISCONTINUED | OUTPATIENT
Start: 2023-09-26 | End: 2023-09-26 | Stop reason: HOSPADM

## 2023-09-26 RX ORDER — MEPERIDINE HYDROCHLORIDE 25 MG/ML
12.5 INJECTION INTRAMUSCULAR; INTRAVENOUS; SUBCUTANEOUS
Status: DISCONTINUED | OUTPATIENT
Start: 2023-09-26 | End: 2023-09-26 | Stop reason: HOSPADM

## 2023-09-26 RX ORDER — ACETAMINOPHEN 500 MG
1000 TABLET ORAL ONCE
Status: COMPLETED | OUTPATIENT
Start: 2023-09-26 | End: 2023-09-26

## 2023-09-26 RX ORDER — ONDANSETRON 2 MG/ML
INJECTION INTRAMUSCULAR; INTRAVENOUS AS NEEDED
Status: DISCONTINUED | OUTPATIENT
Start: 2023-09-26 | End: 2023-09-26

## 2023-09-26 RX ORDER — MIDAZOLAM HYDROCHLORIDE 2 MG/2ML
2 INJECTION, SOLUTION INTRAMUSCULAR; INTRAVENOUS ONCE
Status: COMPLETED | OUTPATIENT
Start: 2023-09-26 | End: 2023-09-26

## 2023-09-26 RX ORDER — HEPARIN SODIUM (PORCINE) LOCK FLUSH IV SOLN 100 UNIT/ML 100 UNIT/ML
5 SOLUTION INTRAVENOUS AS NEEDED
Status: DISCONTINUED | OUTPATIENT
Start: 2023-09-26 | End: 2023-09-26 | Stop reason: HOSPADM

## 2023-09-26 RX ADMIN — ACETAMINOPHEN 1000 MG: 500 TABLET ORAL at 12:04

## 2023-09-26 RX ADMIN — MIDAZOLAM HYDROCHLORIDE 2 MG: 1 INJECTION, SOLUTION INTRAMUSCULAR; INTRAVENOUS at 12:04

## 2023-09-26 RX ADMIN — HYDROCODONE BITARTRATE AND ACETAMINOPHEN 2 TABLET: 7.5; 325 TABLET ORAL at 00:08

## 2023-09-26 RX ADMIN — CEFAZOLIN SODIUM 2 G: 2 INJECTION, SOLUTION INTRAVENOUS at 12:05

## 2023-09-26 RX ADMIN — SODIUM CHLORIDE 40 ML/HR: 9 INJECTION, SOLUTION INTRAVENOUS at 12:37

## 2023-09-26 RX ADMIN — Medication 10 ML: at 09:14

## 2023-09-26 RX ADMIN — PROPOFOL 200 MG: 10 INJECTION, EMULSION INTRAVENOUS at 12:47

## 2023-09-26 RX ADMIN — LIDOCAINE HYDROCHLORIDE 40 MG: 20 INJECTION, SOLUTION EPIDURAL; INFILTRATION; INTRACAUDAL; PERINEURAL at 12:47

## 2023-09-26 RX ADMIN — HEPARIN 500 UNITS: 100 SYRINGE at 19:05

## 2023-09-26 RX ADMIN — GABAPENTIN 600 MG: 300 CAPSULE ORAL at 09:13

## 2023-09-26 RX ADMIN — SODIUM CHLORIDE 250 ML: 9 INJECTION, SOLUTION INTRAVENOUS at 13:28

## 2023-09-26 RX ADMIN — ONDANSETRON 4 MG: 2 INJECTION INTRAMUSCULAR; INTRAVENOUS at 12:54

## 2023-09-26 RX ADMIN — CEFAZOLIN SODIUM 2 G: 1 INJECTION, POWDER, FOR SOLUTION INTRAMUSCULAR; INTRAVENOUS at 12:50

## 2023-09-26 NOTE — PROGRESS NOTES
9/26/23:    Pt to d/c from inpatient visit today after port-a-cath placement.  Pt is scheduled to receive his 1st chemotherapy infusion ([id number]) tomorrow 9/27/23.  Unable to meet w/ pt tomorrow at his infusion appointment (due to Oncology RD seeing patients in radiation oncology at separate facility).  Provided written materials for infusion staff to give to pt tomorrow 9/27/23, including handouts on oral care, diarrhea, and n/v.  Pt has received other nutritional information recently- please refer to documentation from 9/19/23.    Oncology RD remains available per protocol.      Addendum 9/27/23:    Nutrition referral received from OSW, Gypsy, today.  Please refer to 9/19/23 telephone encounter / consult.  After speaking with pt on 9/19, Oncology RD called the [employer] (Ensure brand) rep (Janie Livingston) to inquire about pt being unable to find the apple flavored Ensure Clear product, his favorite flavor.  Janie indicated that there is a nationwide shortage on the apple flavor.  Today, pt mentioned to OSW that [facility] the creamy[facility] ([facility]), however does not like the creamy ONS.  Pt stated to OSW he would prefer to receive the apple flavor & reported he recently received the apple flavor Ensure Clear during an inpatient visit.  Received the referral from OSW.  Called OSW to explain that there is a nationwide shortage in the apple flavor and the reason he received the apple flavor ONS while inpatient is likely due to hospitals receive priority on supply of flavor and type of ONS (vs. Commercial).  OSW planned to relay this information to pt and to call the RD from [facility] to see if pt could receive the clear-type ONS (Ensure Clear).  Explained to OSW that blueberry pomegranate and fruit punch flavors are usually available commercially, so this may be an option for him to receive these flavors through the [facility] program.     Oncology RD remains available per protocol.

## 2023-09-26 NOTE — CONSULTS
[facility]   GENERAL SURGERY  CONSULTATION    Patient Name: Kevin Ordonez  : 1960  MRN: [id number]  Primary Care Physician:  Ev Peck APRN  Date of admission: 2023    Subjective     Reason for Consult:  Stomach cancer - need port-a-catheter    Referring Physician:  Dr. Serrano    HPI:  Kevin Ordonez is a 63 y.o. male who has stomach cancer and needs to have a portacatheter placed.  Portacatheter placement was scheduled as an outpatient but patient was admitted to the hospital with acute kidney injury.  Portacatheter placement is needed while the patient is in the hospital.    Personal History   Allergies:  No Known Allergies    Home Medications:  Prior to Admission medications    Medication Sig Start Date End Date Taking? Authorizing Provider   atorvastatin (LIPITOR) 80 MG tablet Take 1 tablet by mouth Every Night.   Yes Tanvir Boateng MD   chlorthalidone (HYGROTON) 25 MG tablet Take 0.5 tablets by mouth Daily.   Yes Tanvir Boateng MD   cholecalciferol (VITAMIN D3) 25 MCG (1000 UT) tablet Take 1 tablet by mouth Daily. LAST DOSE 3/26/23   Yes Tanvir Boateng MD   fluticasone-salmeterol (ADVAIR HFA) 230-21 MCG/ACT inhaler Inhale 2 puffs 2 (Two) Times a Day.   Yes Tanvir Boateng MD   gabapentin (NEURONTIN) 600 MG tablet Take 2 tablets by mouth 2 (Two) Times a Day.   Yes Tanvir Boateng MD   glipizide (GLUCOTROL) 10 MG tablet Take 1 tablet by mouth 2 (Two) Times a Day Before Meals.   Yes Tanvir Boateng MD   insulin NPH (NovoLIN N ReliOn) 100 UNIT/ML injection Inject 52 Units under the skin into the appropriate area as directed 2 (Two) Times a Day Before Meals.   Yes Tanvir Boateng MD   losartan (COZAAR) 50 MG tablet Take 2 tablets by mouth Daily.   Yes Tanvir Boateng MD   metFORMIN (GLUCOPHAGE) 1000 MG tablet Take 1 tablet by mouth 2 (Two) Times a Day With Meals. INST PER ANESTHESIA  PROTOCOL   Yes Provider, Historical, MD   montelukast  (SINGULAIR) 10 MG tablet Take 1 tablet by mouth Every Night.   Yes Tanvir Boateng MD   Omega-3 Fatty Acids (fish oil) 1000 MG capsule capsule Take  by mouth Daily With Breakfast. LAST DOSE 3/26/23   Yes Tanvir Boateng MD   omeprazole (priLOSEC) 40 MG capsule Take 1 capsule by mouth 2 (Two) Times a Day.   Yes Tanvir Boateng MD   ondansetron (ZOFRAN) 8 MG tablet Take 1 tablet by mouth 3 (Three) Times a Day As Needed for Nausea or Vomiting (mail to patient please.). 9/20/23  Yes Marisol Castano APRN   Dulaglutide 1.5 MG/0.5ML solution pen-injector Inject 1.5 mg under the skin into the appropriate area as directed Every 7 (Seven) Days. THURSDAY    Tanvir Boateng MD   OLANZapine (zyPREXA) 5 MG tablet Take 1 tablet by mouth Every Night. 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    Diabetes mellitus     Hyperlipidemia     Hypertension     Rotator cuff disorder     LEFT    Sleep apnea        Past Surgical History:   Procedure Laterality Date    CAROTID ENDARTERECTOMY Right     CHOLECYSTECTOMY      FOOT SURGERY Right     TOE DEBRIDMENT    HERNIA REPAIR      VENTRAL HERNIA    SHOULDER ARTHROSCOPY W/ ROTATOR CUFF REPAIR Left 4/3/2023    Procedure: LEFT SHOULDER ARTHROSCOPIC ROTATOR CUFF DEBRIDEMENT, LABRAL DEBRIDEMENT, BICEPS TENODESIS, SUBACROMIAL DECOMPRESSION;  Surgeon: Chas Patterson MD;  [place] [facility] [place] [facility] [place]  Service: Orthopedics;  Laterality: Left;    TUMOR REMOVAL Left     PAROTID GLAND    VASCULAR SURGERY Right     STENT R LEG       Social History:  reports that he quit smoking about 23 years ago. His smoking use included cigarettes. He started smoking about 49 years ago. He has quit using smokeless tobacco. He reports that he does not drink alcohol and does not use  drugs.    Family History: family history includes Lung cancer in his father. Otherwise pertinent FHx was reviewed and not pertinent to current issue.    Review of Systems: Review of systems is noncontributory besides as mentioned in above HPI section.       Objective   Vitals:   Temp:  [97.2 °F (36.2 °C)-100.7 °F (38.2 °C)] 97.7 °F (36.5 °C)  Heart Rate:  [79-96] 94  Resp:  [18] 18  BP: (127-155)/(48-67) 134/66  Physical Exam   Respiratory:  breathing not labored, respiratory effort appears normal  Cardiovascular:  heart regular rate and rhythm  Musculoskeletal: moving all extremities symmetrically and purposefully  Neurologic:  no obvious motor or sensory deficits    Assessment & Plan   Assessment / Plan     Assessment:  Metastatic stomach cancer    Plan:   Port-a-catheter    Discussion:  Indications, options, risks, benefits, and expected outcomes of planned surgery were discussed with the patient and he agrees to proceed.    James Copeland MD  09/26/2023    Electronically signed by James Copeland MD, 09/26/23, 10:56 AM EDT.

## 2023-09-26 NOTE — DISCHARGE SUMMARY
[facility]        HOSPITALIST  DISCHARGE SUMMARY    Patient Name: Kevin Ordonez  : 1960  MRN: [id number]    Date of Admission: 2023  Date of Discharge:  2023  Primary Care Physician: Ev Peck APRN    Consults       Date and Time Order Name Status Description    2023  9:51 AM Hematology & Oncology Inpatient Consult Completed     2023  4:44 AM Hematology & Oncology Inpatient Consult Completed     2023  4:44 AM Inpatient General Surgery Consult      2023 12:27 AM Hospitalist (on-call MD unless specified)              Active and Resolved Hospital Problems:  Active Hospital Problems    Diagnosis POA    Generalized weakness [R53.1] Yes    Symptomatic anemia [D64.9] Yes    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 male with past medical history of diabetes, hypertension, hyperlipidemia, BRIDGET, COPD, neoplasm of the parotid gland, neoplasm of the stomach, gastritis, and GERD presented to the ED with complaints of dizziness, lightheadedness, and chest pain.  Patient states that for the last week or so he has been having worsening weakness with fatigue, dizziness and lightheadedness resulting in a fall prior to arrival.  Patient has also been having chest pain since yesterday morning which she substernal/epigastric, 7 out of 10, dull/burning, and nonradiating.  He also states that his p.o. intake has been very limited over the last week or so.  Due to concerns patient was brought  to the ED for further evaluation.  In the ED patient's vitals were all within normal limits on arrival.  Labs showed that he was significantly anemic with hemoglobin of 7.5 and mildly elevated troponin, and reduced renal function.  CT of the head was negative for any acute findings as well as a chest x-ray.  When asked he denied any recent fevers, chills, headaches, focal weakness, palpitation, shortness of breath, cough, vomiting, diarrhea, constipation, dysuria, hematuria, hematochezia, melena, or anxiety.  Patient admitted for evaluation and treatment      Interval Followup: Patient had  a fever last night.  No fever today.  He feels weak and fatigued.  Chest x-ray and U/A WNL.   Procalcitonin trending down.  No leukocytosis.  Patient wants to be released home tonight so he can get chemotherapy in the morning.  Discussed with Dr. Pruitt due to intermittent fever.  Will discharge patient home on Levaquin as recommended by heme-onc.  Patient did get a dose of Rocephin.  Getting blood transfusion prior to discharge.  Patient had port placed today.  Right upper extremity PICC line DC'd.      DISCHARGE Follow Up Recommendations for labs and diagnostics: Heme-onc in a.m. for chemotherapy.  Follow-up with PCP.  Follow with general surgery as recommended.      Day of Discharge     Vital Signs:  Temp:  [97.2 °F (36.2 °C)-99.1 °F (37.3 °C)] 98.6 °F (37 °C)  Heart Rate:  [73-96] 83  Resp:  [10-18] 18  BP: (100-153)/(40-67) 120/60  Flow (L/min):  [2-3] 2    Physical Exam:   General: Awake, alert, NAD  HENT: NCAT, MMM  Eyes: pupils equal, no scleral icterus  Cardiovascular: RRR, no murmurs.  Right upper chest Port-A-Cath  Pulmonary: CTA bilaterally; no wheezes; no conversational dyspnea  Gastrointestinal: S/ND/NT, +BS  Musculoskeletal: No gross deformities  Skin: No jaundice, no rash on exposed skin appreciated  Neuro: CN II through XII grossly intact; speech clear; no tremor  Psych: Mood and affect appropriate  : No Lehman  catheter; no suprapubic tenderness    Discharge Details        Discharge Medications        New Medications        Instructions Start Date   HYDROcodone-acetaminophen 5-325 MG per tablet  Commonly known as: Norco   1 tablet, Oral, Every 6 Hours PRN      levoFLOXacin 750 MG tablet  Commonly known as: Levaquin   750 mg, Oral, Daily   Start Date: September 27, 2023            Continue These Medications        Instructions Start Date   atorvastatin 80 MG tablet  Commonly known as: LIPITOR   80 mg, Oral, Nightly      chlorthalidone 25 MG tablet  Commonly known as: HYGROTON   12.5 mg, Oral, Daily      cholecalciferol 25 MCG (1000 UT) tablet  Commonly known as: VITAMIN D3   1,000 Units, Oral, Daily, LAST DOSE 3/26/23      Dulaglutide 1.5 MG/0.5ML solution pen-injector   1.5 mg, Subcutaneous, Every 7 Days, THURSDAY      fish oil 1000 MG capsule capsule   Oral, Daily With Breakfast, LAST DOSE 3/26/23      fluticasone-salmeterol 230-21 MCG/ACT inhaler  Commonly known as: ADVAIR HFA   2 puffs, Inhalation, 2 Times Daily - RT      gabapentin 600 MG tablet  Commonly known as: NEURONTIN   1,200 mg, Oral, 2 Times Daily      glipizide 10 MG tablet  Commonly known as: GLUCOTROL   10 mg, Oral, 2 Times Daily Before Meals      losartan 50 MG tablet  Commonly known as: COZAAR   100 mg, Oral, Daily      metFORMIN 1000 MG tablet  Commonly known as: GLUCOPHAGE   1,000 mg, Oral, 2 Times Daily With Meals, INST PER ANESTHESIA  PROTOCOL      montelukast 10 MG tablet  Commonly known as: SINGULAIR   10 mg, Oral, Nightly      NovoLIN N ReliOn 100 UNIT/ML injection  Generic drug: insulin NPH   52 Units, Subcutaneous, 2 Times Daily Before Meals      OLANZapine 5 MG tablet  Commonly known as: zyPREXA   5 mg, Oral, Nightly      omeprazole 40 MG capsule  Commonly known as: priLOSEC   40 mg, Oral, 2 Times Daily      ondansetron 8 MG tablet  Commonly known as: ZOFRAN   8 mg, Oral, 3 Times Daily PRN               No Known Allergies    Discharge  Disposition:  Home or Self Care.  In private vehicle with his family member.    Diet:  Diet Instructions       Diet: Diabetic Diets; Consistent Carbohydrate; Regular Texture (IDDSI 7); Thin (IDDSI 0)      Discharge Diet: Diabetic Diets    Diabetic Diet: Consistent Carbohydrate    Texture: Regular Texture (IDDSI 7)    Fluid Consistency: Thin (IDDSI 0)            Discharge Activity:   Activity Instructions       Activity as Tolerated              CODE STATUS:  Code Status and Medical Interventions:   Ordered at: 09/23/23 1506     Level Of Support Discussed With:    Patient     Code Status (Patient has no pulse and is not breathing):    CPR (Attempt to Resuscitate)     Medical Interventions (Patient has pulse or is breathing):    Full Support         Future Appointments   Date Time Provider Department Center   9/27/2023  8:00 AM CHAIR 13 [facility] OP INFU  [facility] OPIF [facility]   10/4/2023  2:30 PM CHAIR 19 [facility] OP INFU  [facility] OPIF [facility]   10/10/2023  1:30 PM INJ ROOM 01 [facility] OP INFU  [facility] OPIF [facility]   10/10/2023  2:15 PM Govind Pruitt MD [facility] ONC E521 [facility]   10/11/2023  8:00 AM CHAIR 11 [facility] OP INFU  [facility] OPIF [facility]   10/24/2023  9:00 AM INJ ROOM 01 [facility] OP INFU  [facility] OPIF [facility]   10/24/2023  9:45 AM Govind Pruitt MD [facility] ONC E521 [facility]   10/25/2023  8:00 AM CHAIR 11 [facility] OP INFU  [facility] OPIF [facility]   11/7/2023  1:15 PM INJ ROOM 01 [facility] OP INFU  [facility] OPIF [facility]   11/7/2023  2:15 PM Govind Pruitt MD [facility] ONC E521 [facility]   11/8/2023  8:00 AM CHAIR 10 [facility] OP INFU  [facility] OPIF [facility]   1/29/2024 10:00 AM [facility] CT 2 [facility]   2/1/2024 10:00 AM Reji Fall MD [facility]       Additional Instructions for the Follow-ups that You Need to Schedule       Discharge Follow-up with PCP   As directed       Currently Documented PCP:    Ev Peck APRN    PCP Phone Number:    [phone]     Follow Up Details: 2 weeks        Discharge Follow-up with Specified Provider: Dr. Pruitt; 1 Day   As directed      To:  Dr. Pruitt   Follow Up: 1 Day   Follow Up Details: Chemo in a.m.                Pertinent  and/or Most Recent Results     PROCEDURES:   Procedures:    * INSERTION VENOUS ACCESS DEVICE     LAB RESULTS:      Lab 09/26/23  1517 09/26/23  0514 09/25/23  0420 09/24/23  2140 09/24/23  0621 09/23/23  1713 09/23/23  0543 09/22/23  0423   WBC  --  5.95 8.84  --  11.13*  --  9.82 7.16   HEMOGLOBIN  --  7.2* 8.0*  --  8.0* 8.6* 6.8* 7.3*   HEMATOCRIT  --  23.6* 26.3*  --  26.1* 27.9* 23.3* 24.3*   PLATELETS  --  329 317  --  302  --  266 288   NEUTROS ABS  --  3.97 6.91  --  8.95*  --  7.52* 5.42   IMMATURE GRANS (ABS)  --  0.03 0.05  --  0.06*  --  0.06* 0.03   LYMPHS ABS  --  0.72 0.57*  --  0.71  --  0.93 0.69*   MONOS ABS  --  0.81 0.97*  --  1.06*  --  0.98* 0.63   EOS ABS  --  0.36 0.30  --  0.30  --  0.28 0.32   MCV  --  86.4 85.4  --  85.9  --  85.3 86.8   PROCALCITONIN  --  0.83*  --  1.02*  --   --   --   --    LACTATE 2.0  --   --   --   --   --   --   --          Lab 09/26/23  0514 09/25/23  0420 09/24/23  0621 09/23/23  0543 09/22/23  0423 09/21/23 2126   SODIUM 132* 133* 135* 137 135* 133*   POTASSIUM 4.1 4.1 3.5 4.3 3.8 4.1   CHLORIDE 98 99 107 103 99 96*   CO2 25.7 23.6 19.2* 25.3 24.7 25.8   ANION GAP 8.3 10.4 8.8 8.7 11.3 11.2   BUN 25* 27* 27* 33* 32* 32*   CREATININE 0.97 1.02 0.88 1.25 1.53* 1.71*   EGFR 87.2 82.1 96.0 64.3 50.5* 44.1*   GLUCOSE 183* 192* 170* 136* 181* 213*   CALCIUM 8.4* 8.6 7.0* 8.6 9.0 9.3   MAGNESIUM  --   --   --   --   --  1.7         Lab 09/21/23 2126   TOTAL PROTEIN 5.9*   ALBUMIN 3.1*   GLOBULIN 2.8   ALT (SGPT) 89*   AST (SGOT) 115*   BILIRUBIN 0.4   ALK PHOS 519*         Lab 09/22/23  0423 09/22/23  0003 09/21/23 2126   HSTROP T 56* 60* 59*             Lab 09/22/23 0036   ABO TYPING A   RH TYPING Positive   ANTIBODY SCREEN Negative         Brief Urine Lab Results  (Last result in the past 365 days)        Color   Clarity   Blood   Leuk Est   Nitrite   Protein   CREAT    Urine HCG        09/25/23 0309 Yellow   Clear   Negative   Negative   Negative   30 mg/dL (1+)                 Microbiology Results (last 10 days)       Procedure Component Value - Date/Time    Blood Culture - Blood, Arm, Left [[id number]]  (Normal) Collected: 09/24/23 2141    Lab Status: Preliminary result Specimen: Blood from Arm, Left Updated: 09/25/23 2146     Blood Culture No growth at 24 hours            CT Head Without Contrast    Result Date: 9/21/2023  Impression:  1. No acute intracranial abnormality. 2. Moderate mucosal thickening in the left maxillary sinus.     ROSA WHITE DO       Electronically Signed and Approved By: ROSA WHITE DO on 9/21/2023 at 20:38             XR Chest 1 View    Result Date: 9/26/2023  Impression:  Right IJ port with tip at cavoatrial junction.  No pneumothorax identified.       JOSE D COYNE MD       Electronically Signed and Approved By: JOSE D COYNE MD on 9/26/2023 at 14:35             XR Chest 1 View    Result Date: 9/24/2023  Impression:   No acute infiltrate is appreciated.     Please note that portions of this note were completed with a voice recognition program.  LYNSEY DE LOS SANTOS JR, MD       Electronically Signed and Approved By: LYNSEY DE LOS SANTOS JR, MD on 9/24/2023 at 22:35              XR Chest 1 View    Result Date: 9/21/2023  Impression:  No acute cardiopulmonary process or radiographic signs of chest trauma.       ROSA WHITE DO       Electronically Signed and Approved By: ROSA WHITE DO on 9/21/2023 at 20:20                          Imaging Results (All)       Procedure Component Value Units Date/Time    XR Chest 1 View [[id number]] Collected: 09/26/23 1435     Updated: 09/26/23 1439    Narrative:      PROCEDURE: XR CHEST 1 VW     COMPARISON: [facility], , XR CHEST 1 VW, 9/24/2023, 21:07.     INDICATIONS: s/p port-a-catheter placement, eval for PTX     FINDINGS:   LUNGS: Normal.  No significant pulmonary parenchymal abnormalities.     VASCULATURE: Normal.  Unremarkable pulmonary vasculature.    CARDIAC: Normal.  No cardiac silhouette abnormality or cardiomegaly.    MEDIASTINUM: Normal.  No visible mass or adenopathy.    PLEURA: Normal.  No effusion or pleural thickening.    BONES: Normal.  No fracture or visible bony lesion.    OTHER: A right internal jugular port has its tip at the cavoatrial junction.  A right-sided PICC   has its tip at the mid SVC.  No pneumothorax is identified.       Impression:       Right IJ port with tip at cavoatrial junction.  No pneumothorax identified.                  JOSE D COYNE MD         Electronically Signed and Approved By: JOSE D COYNE MD on 9/26/2023 at 14:35                     [place] Surgery Fluoro [[id number]] Resulted: 09/26/23 1329     Updated: 09/26/23 1329    Narrative:      This procedure was auto-finalized with no dictation required.    XR Chest 1 View [[id number]] Collected: 09/24/23 2235     Updated: 09/24/23 2239    Narrative:      PROCEDURE: XR CHEST 1 VW     COMPARISON: 9/21/2023.     INDICATIONS: new fever     FINDINGS:  A single AP (or PA) upright portable chest radiograph was performed.  Borderline cardiac   enlargement is seen.  No acute infiltrate is appreciated.  No pleural effusion or pneumothorax is   identified.  Probably no significant change is seen in the right PICC line with its distal tip in   the expected mid to lower superior vena cava (SVC).  The thoracic aorta is atherosclerotic.    External artifacts obscure detail.  Chronic calcified granulomatous disease involves the chest.    There is slight pulmonary hypoinflation.  Otherwise, no significant interval change is seen since   the prior study (or studies).       Impression:        No acute infiltrate is appreciated.              Please note that portions of this note were completed with a voice recognition program.     LYNSEY DE LOS SANTOS JR, MD         Electronically Signed and Approved By: LYNSEY DE LOS SANTOS JR, MD on 9/24/2023  at 22:35                        CT Head Without Contrast [[id number]] Collected: 09/21/23 2039     Updated: 09/21/23 2042    Narrative:      PROCEDURE: CT HEAD WO CONTRAST     COMPARISON:  [facility], CT, CT HEAD WO CONTRAST, 12/12/2022, 17:54.  INDICATIONS: fall/weakness     PROTOCOL:   Standard imaging protocol performed      RADIATION:   DLP: 1082.2mGy*cm    MA and/or KV was adjusted to minimize radiation dose.          TECHNIQUE: After obtaining the patient's consent, CT images were obtained without non-ionic   intravenous contrast material.      FINDINGS:   There is no CT evidence of acute hemorrhage, infarct, mass, mass effect or midline shift. There is   no hydrocephalus. The gray-white matter junctions are preserved. The mastoid air cells are clear.    There is moderate mucosal thickening in the left maxillary sinus.  Remaining visualized paranasal   sinuses are clear.  Of the right ocular lens replacement is present globes and orbital contents   otherwise are unremarkable.  No skull fractures or calvarial lesions.          Impression:       1. No acute intracranial abnormality.  2. Moderate mucosal thickening in the left maxillary sinus.            ROSA WHITE DO         Electronically Signed and Approved By: ROSA WHITE DO on 9/21/2023 at 20:38                     XR Chest 1 View [[id number]] Collected: 09/21/23 2020     Updated: 09/21/23 2023    Narrative:      PROCEDURE: XR CHEST 1 VW     COMPARISON: [facility], CR, XR CHEST 1 VW, 12/12/2022, 18:04.     INDICATIONS: WEAKNESS; DIZZINESS; RECENT FALL     FINDINGS:   A right-sided PICC is in place with the tip in the lower SVC.  The lungs are clear. Cardiac, hilar,   and mediastinal silhouettes are unremarkable. No pneumothorax or pleural effusion. Bony structures   are intact.  The trachea is midline.                    Impression:       No acute cardiopulmonary process or radiographic signs of chest trauma.                   ROSA WHITE DO         Electronically Signed and Approved By: ROSA WHITE DO on 9/21/2023 at 20:20                              Labs Pending at Discharge:  Pending Labs       Order Current Status    Blood Culture - Blood, Arm, Left Preliminary result                Time spent on Discharge including face to face service: 35 minutes  Part of this note may be an electronic transcription/translation of spoken language to printed text using the Dragon Dictation System.     TElectronically signed by Nithin Romero MD, 09/26/23, 5:48 PM EDT.

## 2023-09-26 NOTE — PLAN OF CARE
Problem: Adult Inpatient Plan of Care  Goal: Plan of Care Review  Outcome: Met  Flowsheets (Taken 9/26/2023 1815)  Progress: improving  Plan of Care Reviewed With: patient  Outcome Evaluation: Patient alert and oriented throughout shift. Patient is on room air. No complaints of pain or discomfort at this time. Wife at bedside. 1 unit of blood transfused, no reaction noted. PICC removed per MD. PAC placement on shift, accessed for blood transfusion. Plan of care discussed with patient and spouse at bedside. Patient is to be discharged today.   Goal Outcome Evaluation:  Plan of Care Reviewed With: patient        Progress: improving  Outcome Evaluation: Patient alert and oriented throughout shift. Patient is on room air. No complaints of pain or discomfort at this time. Wife at bedside. 1 unit of blood transfused, no reaction noted. PICC removed per MD. PAC placement on shift, accessed for blood transfusion. Plan of care discussed with patient and spouse at bedside. Patient is to be discharged today.

## 2023-09-26 NOTE — DISCHARGE INSTRUCTIONS
Dr. Copeland's - Port Instructions          DIET  Resume your regular diet.     ACTIVITY  Do not lift anything greater than 15 pounds with the arm on the same side the port was placed for one week.  After one week, you have no activity restrictions and may gradually increase your activities using common sense.     WOUND CARE & SHOWERING/BATHING  Your incisions are covered with a plastic type material that will flake off on its own in the next few weeks.  If the plastic type material has not flaked off on its own by 2 weeks after your surgery then use tweezers to pull it off.  You have sutures in your incisions but they are placed below the level of the skin and they will slowly dissolve (they do not need to be removed).  The skin around your incisions will likely have some bruising.  This is normal.  You can shower beginning tomorrow but wait two weeks after your surgery before taking any tub baths.      HOME MEDICATIONS  Resume all of your normal home medications.     PAIN CONTROL  You will receive a narcotic pain medicine prescription before you are discharged home.  Be sure to take the narcotic pain medication with some food so as not to upset your stomach.  Do not drive while you are taking the prescription pain medication.  Take Tylenol or Motrin for mild pain.     BOWEL MOVEMENTS  It is not unusual for narcotic pain medications to cause constipation.  Also, the medications and anesthesia you received for your surgery can have a constipating effect.  To help avoid constipation, drink at least four eight-ounce glasses of water each day and use over-the-counter laxatives/stool softeners (dulcolax, milk of magnesia, senokot, etc.).  I recommend drinking the aforementioned amount of water daily and taking 30 ml of milk of magnesia two times each day while you are using the prescribed narcotic pain medication.  If your bowel movements become too loose or too frequent, then simply stop following these recommendations  unless you start to feel constipated.     FOLLOW-UP VISIT & QUESTIONS/CONCERNS  Call Dr. Davis office at [phone] and schedule a follow-up appointment for about two weeks after your surgery date.  However, if you are doing fine and don´t have any concerns then simply cancel the follow-up appointment.  Should you have any questions or concerns, please call Dr. Davis office.

## 2023-09-26 NOTE — ANESTHESIA POSTPROCEDURE EVALUATION
Patient: Kevin Ordonez    Procedure Summary       Date: 09/26/23 Room / Location: [facility] OR 05 / [facility] MAIN OR    Anesthesia Start: 1244 Anesthesia Stop: 1336    Procedure: INSERTION VENOUS ACCESS DEVICE (Right: Jugular) Diagnosis:       Malignant neoplasm of stomach, unspecified location      (Malignant neoplasm of stomach, unspecified location [C16.9])    Surgeons: James Copeland MD Provider: Reji Kelley MD    Anesthesia Type: general ASA Status: 4            Anesthesia Type: general    Vitals  Vitals Value Taken Time   /55 09/26/23 1411   Temp 36.7 °C (98 °F) 09/26/23 1335   Pulse 74 09/26/23 1416   Resp 14 09/26/23 1410   SpO2 97 % 09/26/23 1416   Vitals shown include unvalidated device data.        Post Anesthesia Care and Evaluation    Patient location during evaluation: bedside  Patient participation: complete - patient participated  Level of consciousness: awake  Pain management: adequate    Airway patency: patent  PONV Status: none  Cardiovascular status: acceptable  Respiratory status: acceptable  Hydration status: acceptable    Comments: An Anesthesiologist personally participated in the most demanding procedures (including induction and emergence if applicable) in the anesthesia plan, monitored the course of anesthesia administration at frequent intervals and remained physically present and available for immediate diagnosis and treatment of emergencies.

## 2023-09-26 NOTE — PLAN OF CARE
Problem: Hypertension Comorbidity  Goal: Blood Pressure in Desired Range  Outcome: Ongoing, Progressing  Intervention: Maintain Blood Pressure Management  Recent Flowsheet Documentation  Taken 9/25/2023 2007 by Stacia Brown RN  Medication Review/Management: medications reviewed   Goal Outcome Evaluation:                      Pt NPO for port placement today. Given pain pills per MAR. Hgb 7.2 this morning.

## 2023-09-26 NOTE — THERAPY TREATMENT NOTE
Acute Care - Physical Therapy Treatment Note  [facility]     Patient Name: Kevin Ordonez  : 1960  MRN: [id number]  Today's Date: 2023      Visit Dx:     ICD-10-CM ICD-9-CM   1. Acute renal failure, unspecified acute renal failure type  N17.9 584.9   2. Symptomatic anemia  D64.9 285.9   3. Malignant neoplasm of cardia of stomach  C16.0 151.0   4. Generalized weakness  R53.1 780.79   5. Frequent falls  R29.6 V15.88   6. Difficulty walking  R26.2 719.7   7. Decreased activities of daily living (ADL)  Z78.9 V49.89   8. Malignant neoplasm of stomach, unspecified location  C16.9 151.9     Patient Active Problem List   Diagnosis    Rotator cuff tendonitis, left    Tear of left acetabular labrum    Primary osteoarthritis of left shoulder    Left shoulder pain    Asthma    Closed nondisplaced fracture of shaft of fifth metacarpal bone of right hand    Diabetes mellitus    Hyperlipidemia    Hypertension    Neuropathy    Obstructive sleep apnea syndrome    Malignant neoplasm of parotid gland    Calcific tendinitis of left shoulder    Aftercare following surgery of left shoulder arthroscopic rotator cuff debridement, labral debridement, subacromial decompression, bicep tenodesis, 4/3/2023    Malignant neoplasm of cardia of stomach    PICC (peripherally inserted central catheter) in place    Iron deficiency anemia due to chronic blood loss    Malabsorption due to intolerance, not elsewhere classified    Acute kidney injury    Generalized weakness    Frequent falls    Symptomatic anemia    Chest pain, atypical    Elevated troponin    Dehydration     Past Medical History:   Diagnosis Date    Asthma     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    Diabetes mellitus     Hyperlipidemia     Hypertension     Rotator cuff disorder     LEFT    Sleep apnea      Past Surgical History:   Procedure Laterality Date    CAROTID ENDARTERECTOMY Right     CHOLECYSTECTOMY      FOOT SURGERY Right     TOE DEBRIDMENT     HERNIA REPAIR      VENTRAL HERNIA    SHOULDER ARTHROSCOPY W/ ROTATOR CUFF REPAIR Left 4/3/2023    Procedure: LEFT SHOULDER ARTHROSCOPIC ROTATOR CUFF DEBRIDEMENT, LABRAL DEBRIDEMENT, BICEPS TENODESIS, SUBACROMIAL DECOMPRESSION;  Surgeon: Chas Patterson MD;  [place] [facility] [place] [facility] [place]  Service: Orthopedics;  Laterality: Left;    TUMOR REMOVAL Left     PAROTID GLAND    VASCULAR SURGERY Right     STENT R LEG     PT Assessment (last 12 hours)       PT Evaluation and Treatment       Row Name 09/26/23 1248          Physical Therapy Time and Intention    Document Type therapy note (daily note)  -WM     Mode of Treatment individual therapy;physical therapy  -WM     Patient Effort good  -WM     Symptoms Noted During/After Treatment fatigue  -WM       Row Name 09/26/23 1248          Bed Mobility    Supine-Sit [place] (Bed Mobility) standby assist;verbal cues  -WM     Sit-Supine [place] (Bed Mobility) verbal cues  -WM     Assistive Device (Bed Mobility) bed rails  -       Row Name 09/26/23 1248          Sit-Stand Transfer    Sit-Stand [place] (Transfers) contact guard;standby assist;verbal cues  -WM     Assistive Device (Sit-Stand Transfers) walker, front-wheeled  -WM       Row Name 09/26/23 1248          Stand-Sit Transfer    Stand-Sit [place] (Transfers) contact guard;standby assist;verbal cues  -WM     Assistive Device (Stand-Sit Transfers) walker, front-wheeled  -WM       Row Name 09/26/23 1248          Gait/Stairs (Locomotion)    [place] Level (Gait) standby assist;verbal cues  -WM     Assistive Device (Gait) walker, front-wheeled  -WM     Distance in Feet (Gait) 450  -WM     Pattern (Gait) 4-point;step-through  -WM     Deviations/Abnormal Patterns (Gait) stride length decreased;gait speed decreased  -       Row Name 09/26/23 1248          Safety Issues, Functional Mobility    Impairments Affecting Function (Mobility) balance;endurance/activity tolerance;strength  -       Row Name  09/26/23 1248          Hip (Therapeutic Exercise)    Hip Strengthening (Therapeutic Exercise) bilateral;aBduction;aDduction;marching while seated;sitting;resistance band;yellow;20 repititions  -       Row Name 09/26/23 1248          Knee (Therapeutic Exercise)    Knee AROM (Therapeutic Exercise) bilateral;LAQ (long arc quad);sitting;20 repititions  -     Knee Strengthening (Therapeutic Exercise) bilateral;hamstring curls;resistance band;20 repititions  -       Row Name 09/26/23 1248          Progress Summary (PT)    Progress Toward Functional Goals (PT) progress toward functional goals is good  -               User Key  (r) = Recorded By, (t) = Taken By, (c) = Cosigned By      Initials Name Provider Type     Reji Dietz PTA Physical Therapist Assistant                    Physical Therapy Education       Title: PT OT SLP Therapies (In Progress)       Topic: Physical Therapy (In Progress)       Point: Mobility training (Done)       Learning Progress Summary             Patient Acceptance, E,TB, VU by AV at 9/22/2023 1406                         Point: Home exercise program (Not Started)       Learner Progress:  Not documented in this visit.              Point: Body mechanics (Done)       Learning Progress Summary             Patient Acceptance, E,TB, VU by AV at 9/22/2023 1406                         Point: Precautions (Done)       Learning Progress Summary             Patient Acceptance, E,TB, VU by AV at 9/22/2023 1406                                         User Key       Initials Effective Dates Name Provider Type Discipline     06/11/21 -  Bi Ledezma, PT Physical Therapist PT                  PT Recommendation and Plan     Progress Summary (PT)  Progress Toward Functional Goals (PT): progress toward functional goals is good   Outcome Measures       Row Name 09/26/23 1257 09/25/23 1310 09/24/23 1600       How much help from another person do you currently need...    Turning from your back  to your side while in flat bed without using bedrails? 4  -WM 4  -WM 3  -CS    Moving from lying on back to sitting on the side of a flat bed without bedrails? 3  -WM 3  -WM 3  -CS    Moving to and from a bed to a chair (including a wheelchair)? 3  -WM 3  -WM 3  -CS    Standing up from a chair using your arms (e.g., wheelchair, bedside chair)? 3  -WM 3  -WM 3  -CS    Climbing 3-5 steps with a railing? 2  -WM 2  -WM 2  -CS    To walk in hospital room? 3  -WM 3  -WM 3  -CS    AM-PAC 6 Clicks Score (PT) 18  -WM 18  -WM 17  -CS       Functional Assessment    Outcome Measure Options -- -- AM-PAC 6 Clicks Basic Mobility (PT)  -CS              User Key  (r) = Recorded By, (t) = Taken By, (c) = Cosigned By      Initials Name Provider Type     Reji Dietz PTA Physical Therapist Assistant    Jesus Campbell PTA Physical Therapist Assistant                     Time Calculation:    PT Charges       Row Name 09/26/23 1247             Time Calculation    PT Received On 09/26/23  -WM         Timed Charges    07319 - PT Therapeutic Exercise Minutes 14  -WM      47956 - Gait Training Minutes  10  -WM      97220 - PT Therapeutic Activity Minutes 2  -WM         Total Minutes    Timed Charges Total Minutes 26  -WM       Total Minutes 26  -WM                User Key  (r) = Recorded By, (t) = Taken By, (c) = Cosigned By      Initials Name Provider Type     Reji Dietz PTA Physical Therapist Assistant                  Therapy Charges for Today       Code Description Service Date Service Provider Modifiers Qty    28420779894 HC PT THER PROC EA 15 MIN 9/25/2023 Reji Dietz PTA GP 1    60676464623 HC GAIT TRAINING EA 15 MIN 9/25/2023 Reji Dietz PTA GP 1            PT G-Codes  Outcome Measure Options: AM-PAC 6 Clicks Daily Activity (OT), Optimal Instrument  AM-PAC 6 Clicks Score (PT): 18  AM-PAC 6 Clicks Score (OT): 18    Reji Dietz PTA  9/26/2023

## 2023-09-26 NOTE — CONSULTS
"Nutrition Services    Patient Name: Kevin Ordonez  YOB: 1960  MRN: [id number]  Admission date: 9/21/2023      CLINICAL NUTRITION ASSESSMENT      Reason for Assessment  MST score 2+   H&P:    Past Medical History:   Diagnosis Date    Asthma     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    Diabetes mellitus     Hyperlipidemia     Hypertension     Rotator cuff disorder     LEFT    Sleep apnea         Current Problems:   Active Hospital Problems    Diagnosis     **Acute kidney injury     Dehydration     Generalized weakness     Frequent falls     Symptomatic anemia     Chest pain, atypical     Elevated troponin     Malignant neoplasm of cardia of stomach     Malignant neoplasm of parotid gland     Diabetes mellitus     Hypertension     Hyperlipidemia     Obstructive sleep apnea syndrome         Nutrition/Diet History         Narrative     RD nutrition assessment 2/2 MST 3 for 14-23 lbs weight loss and decreased appetite. Pt presented to ED d/t lightheadedness and weakness. Of note, pt with stage IV terminal cancer. Pt has outpatient appointment for  marissa-catheter placement so he can begin chemotherapy. Admitted with [facility].     Over the past year, patient has had a 16.7% decrease in weight. Per nursing documentation, pt with % meal intake.     Pt not in room at this time, in OR for insertion of venous access device. Diet has been advanced per surgeon to house diet. RD will order ONS and CTM per protocol.      Anthropometrics        Current Height, Weight Height: 167.6 cm (66\")  Weight: 88.2 kg (194 lb 7.1 oz)   Current BMI Body mass index is 31.38 kg/m².       Weight Hx  Wt Readings from Last 30 Encounters:   09/21/23 2255 88.2 kg (194 lb 7.1 oz)   09/21/23 1902 86.2 kg (190 lb 0.6 oz)   09/20/23 1051 85.5 kg (188 lb 7.9 oz)   09/15/23 1531 89 kg (196 lb 3.4 oz)   08/22/23 0934 95.7 kg (210 lb 15.7 oz)   07/31/23 0920 95.7 kg (210 lb 15.7 oz)   06/27/23 1032 99.3 kg (219 lb)   05/16/23 " 0930 102 kg (224 lb)   04/17/23 0859 105 kg (231 lb 7.7 oz)   04/03/23 0940 105 kg (231 lb 0.7 oz)   02/17/23 0956 101 kg (222 lb 10.6 oz)   02/09/23 1021 101 kg (222 lb)   02/02/23 0947 101 kg (222 lb 14.2 oz)   01/06/23 1330 100 kg (221 lb 5.5 oz)   12/15/22 1054 99.3 kg (219 lb)   12/12/22 1705 99.5 kg (219 lb 5.7 oz)   11/03/22 0700 101 kg (222 lb 0.1 oz)   10/31/22 1014 102 kg (225 lb 8.5 oz)   10/14/22 0857 102 kg (224 lb 11.2 oz)   10/13/22 0700 101 kg (222 lb 14.2 oz)   10/11/22 0825 98.5 kg (217 lb 2.5 oz)   10/10/22 0832 98.1 kg (216 lb 4.8 oz)   10/07/22 0900 101 kg (222 lb 7.1 oz)   10/04/22 0824 103 kg (227 lb 8.2 oz)   09/30/22 0907 105 kg (232 lb 2.3 oz)   09/29/22 0700 105 kg (232 lb 4.4 oz)   09/27/22 0821 105 kg (232 lb 2.3 oz)   09/22/22 0700 104 kg (230 lb 6.1 oz)   09/20/22 0818 106 kg (233 lb 0.4 oz)   09/15/22 0700 106 kg (233 lb 7.5 oz)   09/15/22 1002 106 kg (233 lb)            Wt Change Observation -16.7% x 1 year     Estimated/Assessed Needs       Energy Requirements 30-35 kcal/kg   EST Needs (kcal/day) 2431-0709 kcal       Protein Requirements 1.3 g/kg   EST Daily Needs (g/day) 115 g       Fluid Requirements 25 ml/kg    Estimated Needs (mL/day) 2205 ml     Labs/Medications         Pertinent Labs Reviewed.   Results from last 7 days   Lab Units 09/26/23  0514 09/25/23  0420 09/24/23  0621 09/22/23  0423 09/21/23 2126   SODIUM mmol/L 132* 133* 135*   < > 133*   POTASSIUM mmol/L 4.1 4.1 3.5   < > 4.1   CHLORIDE mmol/L 98 99 107   < > 96*   CO2 mmol/L 25.7 23.6 19.2*   < > 25.8   BUN mg/dL 25* 27* 27*   < > 32*   CREATININE mg/dL 0.97 1.02 0.88   < > 1.71*   CALCIUM mg/dL 8.4* 8.6 7.0*   < > 9.3   BILIRUBIN mg/dL  --   --   --   --  0.4   ALK PHOS U/L  --   --   --   --  519*   ALT (SGPT) U/L  --   --   --   --  89*   AST (SGOT) U/L  --   --   --   --  115*   GLUCOSE mg/dL 183* 192* 170*   < > 213*    < > = values in this interval not displayed.     Results from last 7 days   Lab Units  09/26/23  0514 09/22/23  0423 09/21/23  2126   MAGNESIUM mg/dL  --   --  1.7   HEMOGLOBIN g/dL 7.2*   < > 7.5*   HEMATOCRIT % 23.6*   < > 25.1*    < > = values in this interval not displayed.     No results found for: COVID19  No results found for: HGBA1C      Pertinent Medications Reviewed.     Current Nutrition Orders & Evaluation of Intake       Oral Nutrition     Current PO Diet Diet: Regular/House Diet; Texture: Regular Texture (IDDSI 7); Fluid Consistency: Thin (IDDSI 0)   Supplement No active supplement orders       Malnutrition Severity Assessment                Nutrition Diagnosis         Nutrition Dx Problem 1 Increased nutrient needs related to increased nutrient needs due to catabolic disease as evidenced by  stage IV stomach CA beginning chemotherapy.      Nutrition Intervention         Ensure Enlive BID ( +700 kcal, 40 g pro)     Medical Nutrition Therapy/Nutrition Education          Learner     Readiness N/A not in room  N/A     Method     Response N/A  N/A     Monitor/Evaluation        Monitor Per protocol, PO intake, Supplement intake, POC/GOC     Nutrition Discharge Plan         Continue with high protein supplement.      Electronically signed by:  Abi Fung RD  09/26/23 14:57 EDT

## 2023-09-26 NOTE — OP NOTE
INSERTION VENOUS ACCESS DEVICE  Procedure Report    Patient Name:  Kevin Ordonez  YOB: 1960    Date of Surgery:  9/26/2023     Pre-op Diagnosis:   Malignant neoplasm of stomach, unspecified location [C16.9]      Post-op Diagnosis:   Post-Op Diagnosis Codes:     * Malignant neoplasm of stomach, unspecified location [C16.9]    Procedure:  Venous access device placement (CPT 63523)    Staff:  Surgeon(s):  James Copeland MD    Anesthesia: Monitored Anesthesia Care    Estimated Blood Loss: Minimal    Complications:  None    Drains:  None    Packing:  None    Implants:    Implant Name Type Inv. Item Serial No.  Lot No. LRB No. Used Action   PRT INTRO VASC/INTERV VORTEX FILL/HL DETACH/POLYURET/CATH 8F - [id number] Implant PRT INTRO VASC/INTERV VORTEX FILL/HL DETACH/POLYURET/CATH 8F  [employer] [id number] Right 1 Implanted     Specimen: None    Indications:  See my preop note.     Findings:  Angiodynamics Smartport placed via right internal jugular vein.     Description of Procedure: Patient was taken to the operating room and placed supine on the operating table.  Timeout verification was performed. MAC anesthesia was administered.  Patient was prepped and draped in usual fashion.  Using an empty syringe attached to a needle, 2 passes were made under the right clavicle but I was unsuccessful at accessing the right subclavian vein so I moved to the patient's right neck.  Using ultrasound, the right internal jugular vein was identified and then accessed with a needle.  A guidewire was placed through the needle and the needle was withdrawn.  Fluoroscopy was used to confirm the guidewire was positioned appropriately in the superior vena cava.  A subcutaneous pocket was created at the right upper chest to accommodate the port.  The inner dilator was placed inside of the tear-away sheath and both were placed over the guidewire into the right internal jugular vein.  The inner guidewire and dilator  were removed.  The catheter was placed through the tear-away sheath and the sheath was withdrawn.  The tunneler trocar was used to tunnel the catheter to the subcutaneous pocket.  Then under direct visualization with fluoroscopy, the catheter was pulled back until the tip was positioned appropriately in the superior vena cava.  The catheter was cut to the appropriate length and the locking cap was placed onto the catheter.   The catheter was connected to the port, the locking cap was secured, and the port was placed within the subcutaneous pocket.  I accessed the port with a Rivas needle.  I could easily aspirate venous blood.  The port was then flushed with heparinized solution.  The wound was closed appropriately with buried absorbable suture followed by appropriate dressings.  No complications.  Sponge needle and instrument counts were correct.  Patient was transported to the recovery area in stable condition.      James Copeland MD     Date: 9/26/2023  Time: 13:31 EDT    Electronically signed by James Copeland MD, 09/26/23, 1:31 PM EDT.

## 2023-09-27 ENCOUNTER — DOCUMENTATION (OUTPATIENT)
Dept: PHARMACY | Facility: HOSPITAL | Age: 63
End: 2023-09-27
Payer: OTHER GOVERNMENT

## 2023-09-27 ENCOUNTER — HOSPITAL ENCOUNTER (OUTPATIENT)
Dept: ONCOLOGY | Facility: HOSPITAL | Age: 63
Discharge: HOME | End: 2023-09-27
Admitting: INTERNAL MEDICINE
Payer: OTHER GOVERNMENT

## 2023-09-27 ENCOUNTER — DOCUMENTATION (OUTPATIENT)
Dept: ONCOLOGY | Facility: HOSPITAL | Age: 63
End: 2023-09-27
Payer: OTHER GOVERNMENT

## 2023-09-27 VITALS
TEMPERATURE: 98.8 F | HEIGHT: 66 IN | HEART RATE: 77 BPM | WEIGHT: 200.62 LBS | RESPIRATION RATE: 16 BRPM | SYSTOLIC BLOOD PRESSURE: 165 MMHG | BODY MASS INDEX: 32.24 KG/M2 | OXYGEN SATURATION: 95 % | DIASTOLIC BLOOD PRESSURE: 67 MMHG

## 2023-09-27 DIAGNOSIS — D50.8 OTHER IRON DEFICIENCY ANEMIA: Primary | ICD-10-CM

## 2023-09-27 DIAGNOSIS — C16.0 MALIGNANT NEOPLASM OF CARDIA OF STOMACH: Primary | ICD-10-CM

## 2023-09-27 DIAGNOSIS — D50.8 OTHER IRON DEFICIENCY ANEMIA: ICD-10-CM

## 2023-09-27 LAB
ALBUMIN SERPL-MCNC: 2.4 G/DL (ref 3.5–5.2)
ALBUMIN/GLOB SERPL: 0.7 G/DL
ALP SERPL-CCNC: 625 U/L (ref 39–117)
ALT SERPL W P-5'-P-CCNC: 50 U/L (ref 1–41)
ANION GAP SERPL CALCULATED.3IONS-SCNC: 8.9 MMOL/L (ref 5–15)
AST SERPL-CCNC: 55 U/L (ref 1–40)
BASOPHILS # BLD AUTO: 0.04 10*3/MM3 (ref 0–0.2)
BASOPHILS NFR BLD AUTO: 0.7 % (ref 0–1.5)
BILIRUB SERPL-MCNC: 0.7 MG/DL (ref 0–1.2)
BUN SERPL-MCNC: 17 MG/DL (ref 8–23)
BUN/CREAT SERPL: 18.9 (ref 7–25)
CALCIUM SPEC-SCNC: 8.3 MG/DL (ref 8.6–10.5)
CHLORIDE SERPL-SCNC: 100 MMOL/L (ref 98–107)
CO2 SERPL-SCNC: 24.1 MMOL/L (ref 22–29)
CREAT SERPL-MCNC: 0.9 MG/DL (ref 0.76–1.27)
DEPRECATED RDW RBC AUTO: 66.2 FL (ref 37–54)
EGFRCR SERPLBLD CKD-EPI 2021: 95.4 ML/MIN/1.73
EOSINOPHIL # BLD AUTO: 0.22 10*3/MM3 (ref 0–0.4)
EOSINOPHIL NFR BLD AUTO: 4 % (ref 0.3–6.2)
ERYTHROCYTE [DISTWIDTH] IN BLOOD BY AUTOMATED COUNT: 20.7 % (ref 12.3–15.4)
FERRITIN SERPL-MCNC: 519.2 NG/ML (ref 30–400)
FOLATE SERPL-MCNC: 6.41 NG/ML (ref 4.78–24.2)
GLOBULIN UR ELPH-MCNC: 3.3 GM/DL
GLUCOSE BLDC GLUCOMTR-MCNC: 252 MG/DL (ref 70–99)
GLUCOSE SERPL-MCNC: 290 MG/DL (ref 65–99)
HCT VFR BLD AUTO: 26.9 % (ref 37.5–51)
HGB BLD-MCNC: 8.3 G/DL (ref 13–17.7)
IMM GRANULOCYTES # BLD AUTO: 0.01 10*3/MM3 (ref 0–0.05)
IMM GRANULOCYTES NFR BLD AUTO: 0.2 % (ref 0–0.5)
IRON 24H UR-MRATE: 11 MCG/DL (ref 59–158)
IRON SATN MFR SERPL: 6 % (ref 20–50)
LYMPHOCYTES # BLD AUTO: 0.45 10*3/MM3 (ref 0.7–3.1)
LYMPHOCYTES NFR BLD AUTO: 8.3 % (ref 19.6–45.3)
MCH RBC QN AUTO: 26.3 PG (ref 26.6–33)
MCHC RBC AUTO-ENTMCNC: 30.9 G/DL (ref 31.5–35.7)
MCV RBC AUTO: 85.4 FL (ref 79–97)
MONOCYTES # BLD AUTO: 0.65 10*3/MM3 (ref 0.1–0.9)
MONOCYTES NFR BLD AUTO: 11.9 % (ref 5–12)
NEUTROPHILS NFR BLD AUTO: 4.07 10*3/MM3 (ref 1.7–7)
NEUTROPHILS NFR BLD AUTO: 74.9 % (ref 42.7–76)
PLATELET # BLD AUTO: 428 10*3/MM3 (ref 140–450)
PMV BLD AUTO: 10 FL (ref 6–12)
POTASSIUM SERPL-SCNC: 4.1 MMOL/L (ref 3.5–5.2)
PROT SERPL-MCNC: 5.7 G/DL (ref 6–8.5)
RBC # BLD AUTO: 3.15 10*6/MM3 (ref 4.14–5.8)
SODIUM SERPL-SCNC: 133 MMOL/L (ref 136–145)
TIBC SERPL-MCNC: 183 MCG/DL (ref 298–536)
TRANSFERRIN SERPL-MCNC: 123 MG/DL (ref 200–360)
VIT B12 BLD-MCNC: 608 PG/ML (ref 211–946)
WBC NRBC COR # BLD: 5.44 10*3/MM3 (ref 3.4–10.8)

## 2023-09-27 PROCEDURE — 84466 ASSAY OF TRANSFERRIN: CPT | Performed by: NURSE PRACTITIONER

## 2023-09-27 PROCEDURE — 96368 THER/DIAG CONCURRENT INF: CPT

## 2023-09-27 PROCEDURE — 96411 CHEMO IV PUSH ADDL DRUG: CPT

## 2023-09-27 PROCEDURE — 96413 CHEMO IV INFUSION 1 HR: CPT

## 2023-09-27 PROCEDURE — 25010000002 FLUOROURACIL PER 500 MG: Performed by: INTERNAL MEDICINE

## 2023-09-27 PROCEDURE — 82607 VITAMIN B-12: CPT | Performed by: NURSE PRACTITIONER

## 2023-09-27 PROCEDURE — 82746 ASSAY OF FOLIC ACID SERUM: CPT | Performed by: NURSE PRACTITIONER

## 2023-09-27 PROCEDURE — 25010000002 PALONOSETRON PER 25 MCG: Performed by: INTERNAL MEDICINE

## 2023-09-27 PROCEDURE — 96415 CHEMO IV INFUSION ADDL HR: CPT

## 2023-09-27 PROCEDURE — 25010000002 DEXAMETHASONE SODIUM PHOSPHATE 120 MG/30ML SOLUTION: Performed by: INTERNAL MEDICINE

## 2023-09-27 PROCEDURE — 82728 ASSAY OF FERRITIN: CPT | Performed by: NURSE PRACTITIONER

## 2023-09-27 PROCEDURE — 96409 CHEMO IV PUSH SNGL DRUG: CPT

## 2023-09-27 PROCEDURE — 82948 REAGENT STRIP/BLOOD GLUCOSE: CPT

## 2023-09-27 PROCEDURE — 83540 ASSAY OF IRON: CPT | Performed by: NURSE PRACTITIONER

## 2023-09-27 PROCEDURE — 96375 TX/PRO/DX INJ NEW DRUG ADDON: CPT

## 2023-09-27 PROCEDURE — G0498 CHEMO EXTEND IV INFUS W/PUMP: HCPCS

## 2023-09-27 PROCEDURE — 25010000002 OXALIPLATIN PER 0.5 MG: Performed by: INTERNAL MEDICINE

## 2023-09-27 PROCEDURE — 25010000002 LEUCOVORIN CALCIUM PER 50 MG: Performed by: INTERNAL MEDICINE

## 2023-09-27 PROCEDURE — 85025 COMPLETE CBC W/AUTO DIFF WBC: CPT | Performed by: INTERNAL MEDICINE

## 2023-09-27 PROCEDURE — 80053 COMPREHEN METABOLIC PANEL: CPT | Performed by: INTERNAL MEDICINE

## 2023-09-27 RX ORDER — DEXTROSE MONOHYDRATE 50 MG/ML
250 INJECTION, SOLUTION INTRAVENOUS ONCE
Status: COMPLETED | OUTPATIENT
Start: 2023-09-27 | End: 2023-09-27

## 2023-09-27 RX ORDER — FLUOROURACIL 50 MG/ML
800 INJECTION, SOLUTION INTRAVENOUS ONCE
Status: COMPLETED | OUTPATIENT
Start: 2023-09-27 | End: 2023-09-27

## 2023-09-27 RX ORDER — ACETAMINOPHEN 500 MG
1000 TABLET ORAL EVERY 6 HOURS PRN
Status: DISCONTINUED | OUTPATIENT
Start: 2023-09-27 | End: 2023-09-28 | Stop reason: HOSPADM

## 2023-09-27 RX ORDER — DEXTROSE MONOHYDRATE 50 MG/ML
250 INJECTION, SOLUTION INTRAVENOUS ONCE
Status: CANCELLED | OUTPATIENT
Start: 2023-09-27

## 2023-09-27 RX ORDER — FAMOTIDINE 10 MG/ML
20 INJECTION, SOLUTION INTRAVENOUS AS NEEDED
Status: CANCELLED | OUTPATIENT
Start: 2023-09-27

## 2023-09-27 RX ORDER — PALONOSETRON 0.05 MG/ML
0.25 INJECTION, SOLUTION INTRAVENOUS ONCE
Status: COMPLETED | OUTPATIENT
Start: 2023-09-27 | End: 2023-09-27

## 2023-09-27 RX ORDER — FLUOROURACIL 50 MG/ML
400 INJECTION, SOLUTION INTRAVENOUS ONCE
Status: CANCELLED | OUTPATIENT
Start: 2023-09-27

## 2023-09-27 RX ORDER — DIPHENHYDRAMINE HYDROCHLORIDE 50 MG/ML
50 INJECTION INTRAMUSCULAR; INTRAVENOUS AS NEEDED
Status: CANCELLED | OUTPATIENT
Start: 2023-09-27

## 2023-09-27 RX ORDER — PALONOSETRON 0.05 MG/ML
0.25 INJECTION, SOLUTION INTRAVENOUS ONCE
Status: CANCELLED | OUTPATIENT
Start: 2023-09-27

## 2023-09-27 RX ADMIN — DEXTROSE MONOHYDRATE 250 ML: 50 INJECTION, SOLUTION INTRAVENOUS at 09:46

## 2023-09-27 RX ADMIN — DEXAMETHASONE SODIUM PHOSPHATE 12 MG: 4 INJECTION, SOLUTION INTRA-ARTICULAR; INTRALESIONAL; INTRAMUSCULAR; INTRAVENOUS; SOFT TISSUE at 09:48

## 2023-09-27 RX ADMIN — ACETAMINOPHEN 1000 MG: 500 TABLET ORAL at 11:21

## 2023-09-27 RX ADMIN — FLUOROURACIL 4750 MG: 50 INJECTION, SOLUTION INTRAVENOUS at 12:33

## 2023-09-27 RX ADMIN — FLUOROURACIL 800 MG: 50 INJECTION, SOLUTION INTRAVENOUS at 12:30

## 2023-09-27 RX ADMIN — PALONOSETRON 0.25 MG: 0.05 INJECTION, SOLUTION INTRAVENOUS at 09:47

## 2023-09-27 RX ADMIN — OXALIPLATIN 170 MG: 5 INJECTION, SOLUTION INTRAVENOUS at 10:18

## 2023-09-27 RX ADMIN — LEUCOVORIN CALCIUM 800 MG: 350 INJECTION, POWDER, LYOPHILIZED, FOR SOLUTION INTRAMUSCULAR; INTRAVENOUS at 10:19

## 2023-09-27 NOTE — PROGRESS NOTES
"PHARMACY C1D1 PRE VISIT ASSESSMENT    Patient will present for C1D1 of Fluorouracil, oxaliplatin, leucovorin    63 y.o. male  Estimated body surface area is 1.98 meters squared as calculated from the following:    Height as of 9/21/23: 167.6 cm (66\").    Weight as of 9/21/23: 88.2 kg (194 lb 7.1 oz).    Past Medical History:   Diagnosis Date    Asthma     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    Diabetes mellitus     Hyperlipidemia     Hypertension     Rotator cuff disorder     LEFT    Sleep apnea        Oncology/Hematology History   Malignant neoplasm of parotid gland   8/26/2022 Initial Diagnosis    Malignant neoplasm of parotid gland (HCC)     8/26/2022 -  Radiation    RADIATION THERAPY Treatment Details (Noted on 8/26/2022)  Site: Left Head and Neck  Technique: IMRT  Goal: No goal specified  Planned Treatment Start Date: No planned start date specified     Malignant neoplasm of cardia of stomach   9/14/2023 Initial Diagnosis    Malignant neoplasm of cardia of stomach     9/14/2023 Cancer Staged    Staging form: Stomach, AJCC 8th Edition  - Clinical: Stage IVB (pM1) - Signed by Govind Pruitt MD on 9/14/2023     9/15/2023 - 9/15/2023 Chemotherapy    OP CENTRAL VENOUS ACCESS DEVICE Access, Care, and Maintenance (CVAD)       9/27/2023 -  Chemotherapy    OP COLON [id number] OXALIplatin / Leucovorin / Fluorouracil       9/27/2023 -  Chemotherapy    OP CENTRAL VENOUS ACCESS DEVICE Access, Care, and Maintenance (CVAD)           ONC Staging:  Metastatic gastric cancer with metastases to the liver, s/p admission for GI blood loss and anemia. 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 YES    Medication treatment plan entered is appropriate per NCCN Guidelines    Pharmacy Follow-up Considerations: PDL1 status, anemia

## 2023-09-27 NOTE — OUTREACH NOTE
Prep Survey      Flowsheet Row Responses   [facility] discharged [facility]? [facility]   Is LACE score < 7 ? No   Eligibility Readm Mgmt   Discharge diagnosis Dehydration   Does the patient have one of the following disease processes/diagnoses(primary or secondary)? Other   Does the patient have Home health ordered? Yes   What is the Home health agency?  [facility]   Is there a DME ordered? No   Prep survey completed? Yes            Paola SIMONS - Registered Nurse

## 2023-09-27 NOTE — PROGRESS NOTES
Distress Screening Follow-Up    Date of Distress Screenin/15/23    Location of Distress Screening: Medical oncology     Distress Level: 5    Problems Indicated: Fatigue, sadness or depression, anger, taking care of myself, taking care of others, treatment decisions    Diagnosis: Metastatic gastric cancer to the liver    Current Tx Plan: Mr. Ordonez started chemotherapy treatment this morning and will receive every 2 weeks until progression or intolerance    Previous Cancer Tx (if applicable): Mr. 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 Ordonez was very pleasant and openly engaged in conversation with [facility] this afternoon. He acknowledges things have been a whirlwind lately [facility] then re[facility]hospital[facility] shortly after here at [facility]. Mr. Ordonez just discharged back home yesterday evening. He is relieved he was discharged in time to begin treatment today as scheduled and this didn't delay his care. He was forward thinking and has no SI, plans or intentions of self harm.    Mental Health Treatment: No history reported    Substance Use/Tobacco Use: Mr. Ordonez is a former cigarette smoker, quitting roughly 23 years ago. He has no reported h/o alcohol dependence or illicit drug use.    Support System: Mr. Ordonez receives support from his spouse who is present with him today, along with his adult daughter and 17 year old daughter. He has two grandchildren.    Spirituality: Mr. Ordonez is of the [employer] rhea.  He was recently seen by the  while in the hospital on 23.    Hobbies: Mr. Ordonez previously informed [facility] that he enjoys spending time with his young grandchildren, cooking and baking, bottle feeding the animals on their farm, has a Koi pond, etc. He reported he went to  [employer].      Residential status/Who lives in the home: Mr. Ordonez resides with his spouse and 17 year old daughter in their home in [place]. They live on a farm with goats, cows, etc. His adult daughter and two grandchildren are currently living there as well while daughter undergoes divorce from her spouse.    Home Care Needs: [facility], being seen twice a week for PT and SN. He is utilizing a rollator walker today to assist with ambulating. [facility], but reports an intolerance to the creamy based flavors and is interested in receiving the Ensure Clear instead. [facility] contacted the [facility] clinic and spoke with the RN regarding this request. RN reports their RD has had him on Glucerna. She will send a message to the RD to further address.    Insurance: [facility]    Finances: No financial concerns reported    Transportation: Mr. Ordonez's spouse and adult daughter are available to assist with his transportation. If daughter takes a day off work to assist, she will need an excuse letter to prevent her from getting points at work. Mr. Ordone[facility] and is requesting a letter confirming his recent attended appointments.     Advance Care Planning: No directive on file. Provided education regarding the Living Will directive and offered to assist Mr. Ordonez in completing this directive to appoint himself a healthcare surrogate(s) and to make his wishes known regarding life-sustaining treatments should he no longer have decisional capacity, have a terminal condition, or become permanently unconscious. Mr. Ordonez reports he's had discussions with his wife and family regarding his wishes to not be on life-sustaining treatments. He'd like to discuss this more with his spouse prior to completing a Living Will directive. Also encouraged he and spouse speak with an  for estate planning, to create a Will  and set up protections for his dependent child. They v/u and had already planned to get these affairs in order.     Resources/Referrals: Emotional support, oral nutrition supplements via [facility] RD, advance care planning education and discussion, letter for [facility] travel reimbursement     Additional Comment: OSW met with Mr. Ordonez and his spouse in the medical oncology infusion center to follow-up in regards to identified distress and re-assess for psychosocial needs now that Mr. Ordonez has a new cancer diagnosis and is starting chemotherapy treatments. OSW previously met with Mr. Ordonez in radiation oncology in 2022 while he underwent treatment to the parotid gland. Provided Mr. Ordonez with my business card again, encouraging [facility] support remains available. Will plan to follow-up with him on Friday, 9/29/23 during his unhook.

## 2023-09-27 NOTE — PROGRESS NOTES
"Presents for C1D1 of Fluorouracil, leucovorin, oxaliplatin     91 kg (200 lb 9.9 oz)  167 cm (65.75\")  Body surface area is 2 meters squared.    Doses verified using today's parameters and are within acceptable limits of variation and rounding.     Labs reviewed and are within EPIC hold parameter limits to proceed.     Patient was educated on information about each medication including dose, route, frequency, and common adverse effects. Patient was provided both verbal and written counseling. UpToDate patient information printed and provided to patient and key information verbally highlighted including: Overview of regimen including but not limited to infusion times; recognition and management of allergic/infusion reactions; \"call your doctor right away\" parameters.     All of the patient's questions were answered and patient expressed verbal understanding    "

## 2023-09-28 LAB
BH BB BLOOD EXPIRATION DATE: NORMAL
BH BB BLOOD TYPE BARCODE: 6200
BH BB DISPENSE STATUS: NORMAL
BH BB PRODUCT CODE: NORMAL
BH BB UNIT NUMBER: NORMAL
CROSSMATCH INTERPRETATION: NORMAL
QT INTERVAL: 417 MS
QT INTERVAL: 456 MS
QTC INTERVAL: 470 MS
QTC INTERVAL: 487 MS
UNIT  ABO: NORMAL
UNIT  RH: NORMAL

## 2023-09-29 ENCOUNTER — HOSPITAL ENCOUNTER (OUTPATIENT)
Dept: ONCOLOGY | Facility: HOSPITAL | Age: 63
Discharge: HOME | End: 2023-09-29
Payer: OTHER GOVERNMENT

## 2023-09-29 ENCOUNTER — DOCUMENTATION (OUTPATIENT)
Dept: ONCOLOGY | Facility: HOSPITAL | Age: 63
End: 2023-09-29
Payer: OTHER GOVERNMENT

## 2023-09-29 DIAGNOSIS — Z45.2 FITTING AND ADJUSTMENT OF VASCULAR CATHETER: Primary | ICD-10-CM

## 2023-09-29 DIAGNOSIS — Z45.2 PICC (PERIPHERALLY INSERTED CENTRAL CATHETER) IN PLACE: ICD-10-CM

## 2023-09-29 DIAGNOSIS — C16.0 MALIGNANT NEOPLASM OF CARDIA OF STOMACH: ICD-10-CM

## 2023-09-29 DIAGNOSIS — G47.00 INSOMNIA, UNSPECIFIED TYPE: Primary | ICD-10-CM

## 2023-09-29 LAB — BACTERIA SPEC AEROBE CULT: NORMAL

## 2023-09-29 PROCEDURE — 25010000002 HEPARIN LOCK FLUSH PER 10 UNITS: Performed by: INTERNAL MEDICINE

## 2023-09-29 RX ORDER — MIRTAZAPINE 15 MG/1
15 TABLET, FILM COATED ORAL NIGHTLY
Qty: 30 TABLET | Refills: 5 | Status: SHIPPED | OUTPATIENT
Start: 2023-09-29

## 2023-09-29 RX ORDER — HEPARIN SODIUM (PORCINE) LOCK FLUSH IV SOLN 100 UNIT/ML 100 UNIT/ML
500 SOLUTION INTRAVENOUS AS NEEDED
OUTPATIENT
Start: 2023-09-29

## 2023-09-29 RX ORDER — SODIUM CHLORIDE 0.9 % (FLUSH) 0.9 %
20 SYRINGE (ML) INJECTION AS NEEDED
Status: DISCONTINUED | OUTPATIENT
Start: 2023-09-29 | End: 2023-09-30 | Stop reason: HOSPADM

## 2023-09-29 RX ORDER — SODIUM CHLORIDE 0.9 % (FLUSH) 0.9 %
20 SYRINGE (ML) INJECTION AS NEEDED
OUTPATIENT
Start: 2023-09-29

## 2023-09-29 RX ORDER — HEPARIN SODIUM (PORCINE) LOCK FLUSH IV SOLN 100 UNIT/ML 100 UNIT/ML
500 SOLUTION INTRAVENOUS AS NEEDED
Status: DISCONTINUED | OUTPATIENT
Start: 2023-09-29 | End: 2023-09-30 | Stop reason: HOSPADM

## 2023-09-29 RX ADMIN — Medication 20 ML: at 13:33

## 2023-09-29 RX ADMIN — HEPARIN SODIUM (PORCINE) LOCK FLUSH IV SOLN 100 UNIT/ML 500 UNITS: 100 SOLUTION at 13:34

## 2023-09-29 NOTE — TELEPHONE ENCOUNTER
Received the following message from Gypsy Lizarraga SW  Patient was previously prescribed Mirtazapine 15mg once a day before bed by Dr. Fall back in October 2022 to aid with insomnia, appetite and anxiety/depression. He is currently taking this prescription and only has 5+ pills left. Would Dr. Pruitt be willing to prescribe this for him and [facility]? Mr. Ordonez reports he is open to having Dr. Pruitt increase this quantity as well.    Mr. Ordonez reports it's been efficacious for him, improving his sleep at night by controlling his racing thoughts, he wakes up with an appetite, etc.

## 2023-09-29 NOTE — PROGRESS NOTES
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 Ordonez and his spouse in the medical oncology clinic this afternoon after his chemotherapy unhook. Mr. Ordonez presented in a pleasant mood and reports tolerating his first cycle of chemotherapy well thus far. He inquired how he will know if his tumor is shrinking and if the chemotherapy is working. OSW consulted with clinical RN, Rocío CALDERA, and she advised Dr. Pruitt will likely order repeat imaging after 3-4 cycles. Mr. Ordonez v/u. Mr. Ordonez became minimally tearful, reporting he'd never thought this would happen to him. Provided Mr. Ordonez with emotional support, utilizing empathy and active listening. Inquired if he'd be open to medication to address his symptoms of depression. Mr. Ordonez reports he's been taking a prescription Dr. Fall prescribed back in October of last year that has been efficacious. He's interested in getting this refilled by Dr. Pruitt, as he reports it's been controlling his racing thoughts and therefore improving his insomnia and sleep, he's waking up with an appetite, etc. The prescription is Mirtazapine 15mg once a day before bed. OSW relayed this to clinical RN, Rocío CALDERA, to see if medical oncologist is willing to refill this prescription. Mr. Ordonez reports he is also open to the prescription dosage being increased. No SI/HI reported or indicated today.   Assisted Mr. Ordonez in completing his VA travel reimbursement form for appointments attended in the past 30 days (9/15/23, 9/20/23, 9/27/23, and 9/29/23) and mailed this to the [facility] today.   Provided Mr. Ordonez with advance care planning education as we reviewed the Living Will directive today. He was receptive to the education provided. Facilitated a discussion regarding his wishes and assisted him in completing the directive to accurately reflect those wishes.  Notarized his signature and provided him with the original directive, as well as an additional copy to take to his [facility] provider. Submitted a copy to the [facility] medical records department to be scanned into his EMR.   Informed Mr. Ordonez that I spoke with the nurse at the [facility] Wednesday afternoon regarding the Ensure Clear and she was going to relay this request to the RD. Mr. Ordonez v/u and reports he plans to contact the [facility] himself as well, as he's not heard anything back yet. Encouraged OSW support remains available. Mr. Ordonez and spouse expressed appreciation.     Resources/Referrals Provided: VA travel reimbursement; advance care planning education and directive completion; care coordination; emotional support and refill on psychotropic medication

## 2023-09-29 NOTE — TELEPHONE ENCOUNTER
Kevin Ordonez 1960       Symptoms    Does patient have nausea and vomiting?    Does patient have medications prescribed for N/V?    Does patient have diarrhea?    Does the patient have diarrhea medications?    Does the patient have pain?    Is pain medications effective?    Discharge  Do you have any questions about your discharge instructions?    Patient Satisfaction with Cancer [facility]    Where you satisfied with the care you received?    Pt suggestions for the [facility]    Do you have any suggestions to improve your care?    Comments    Follow up phone call comments  No one answered. This nurse left a vm.

## 2023-10-03 ENCOUNTER — TELEPHONE (OUTPATIENT)
Dept: ONCOLOGY | Facility: HOSPITAL | Age: 63
End: 2023-10-03
Payer: OTHER GOVERNMENT

## 2023-10-03 DIAGNOSIS — R63.4 WEIGHT LOSS: ICD-10-CM

## 2023-10-03 DIAGNOSIS — G47.00 INSOMNIA, UNSPECIFIED TYPE: ICD-10-CM

## 2023-10-03 RX ORDER — MIRTAZAPINE 15 MG/1
15 TABLET, FILM COATED ORAL NIGHTLY
Qty: 7 TABLET | Refills: 0 | Status: SHIPPED | OUTPATIENT
Start: 2023-10-03

## 2023-10-03 RX ORDER — MIRTAZAPINE 15 MG/1
15 TABLET, FILM COATED ORAL NIGHTLY
Qty: 30 TABLET | Refills: 5 | Status: SHIPPED | OUTPATIENT
Start: 2023-10-03

## 2023-10-03 RX ORDER — OLANZAPINE 5 MG/1
5 TABLET ORAL NIGHTLY
Qty: 7 TABLET | Refills: 0 | Status: SHIPPED | OUTPATIENT
Start: 2023-10-03

## 2023-10-03 RX ORDER — OLANZAPINE 5 MG/1
5 TABLET ORAL NIGHTLY
Qty: 30 TABLET | Refills: 2 | Status: SHIPPED | OUTPATIENT
Start: 2023-10-03

## 2023-10-03 NOTE — TELEPHONE ENCOUNTER
The pt called and states that he still has not received his Remeron, Olanzapine, or Zofran from [facility]. The pt states that he had to take his pain med in order to sleep last night and that he does not prefer to use the pain med for sleep. The pt is out of Remeron and Olanzapine. The pt is asking if Dr Pruitt could call in temporary doses of Remeron and Olanzapine [facility] until his scripts come in from the [facility]. The pt also asked that if Dr Pruitt does call in meds, if he could increase the dose of the meds due to difficulty sleeping.

## 2023-10-06 ENCOUNTER — READMISSION MANAGEMENT (OUTPATIENT)
Dept: CALL CENTER | Facility: HOSPITAL | Age: 63
End: 2023-10-06
Payer: OTHER GOVERNMENT

## 2023-10-06 NOTE — OUTREACH NOTE
Medical Week 2 Survey      Flowsheet Row Responses   [facility] patient discharged from? [facility]   Does the patient have one of the following disease processes/diagnoses(primary or secondary)? Other   Week 2 attempt successful? Yes   Call start time 0936   Discharge diagnosis Dehydration   Call end time 0939   Meds reviewed with patient/caregiver? Yes   Is the patient having any side effects they believe may be caused by any medication additions or changes? No   Does the patient have all medications ordered at discharge? Yes   Is the patient taking all medications as directed (includes completed medication regime)? Yes   Medication comments [facility] to send more meds per pt   Comments regarding appointments 10/10/23 apt hematology   Does the patient have a primary care provider?  Yes   Has the patient kept scheduled appointments due by today? N/A   What is the Home health agency?  [facility]    Home health comments  still visits   Psychosocial issues? No   Did the patient receive a copy of their discharge instructions? Yes   Nursing interventions Reviewed instructions with patient   What is the patient's perception of their health status since discharge? Improving   Is the patient/caregiver able to teach back signs and symptoms related to disease process for when to call PCP? Yes   Is the patient/caregiver able to teach back signs and symptoms related to disease process for when to call 911? Yes   Is the patient/caregiver able to teach back the hierarchy of who to call/visit for symptoms/problems? PCP, Specialist, Home health nurse, Urgent Care, ED, 911 Yes   If the patient is a current smoker, are they able to teach back resources for cessation? Not a smoker   Week 2 Call Completed? Yes   Graduated Yes   Did the patient feel the follow up calls were helpful during their recovery period? Yes   Was the number of calls appropriate? Yes   Graduated/Revoked comments [facility] is handling additional RX's - pt has completed his  antibiotic-HH still continues to visit   Call end time [id number]            Karina H - Registered Nurse

## 2023-10-10 ENCOUNTER — OFFICE VISIT (OUTPATIENT)
Dept: ONCOLOGY | Facility: HOSPITAL | Age: 63
End: 2023-10-10
Payer: OTHER GOVERNMENT

## 2023-10-10 ENCOUNTER — HOSPITAL ENCOUNTER (OUTPATIENT)
Dept: ONCOLOGY | Facility: HOSPITAL | Age: 63
Discharge: HOME | End: 2023-10-10
Admitting: INTERNAL MEDICINE
Payer: OTHER GOVERNMENT

## 2023-10-10 VITALS
OXYGEN SATURATION: 100 % | WEIGHT: 190.92 LBS | RESPIRATION RATE: 18 BRPM | HEART RATE: 92 BPM | SYSTOLIC BLOOD PRESSURE: 142 MMHG | TEMPERATURE: 97.5 F | DIASTOLIC BLOOD PRESSURE: 71 MMHG | BODY MASS INDEX: 31.05 KG/M2

## 2023-10-10 DIAGNOSIS — Z45.2 PICC (PERIPHERALLY INSERTED CENTRAL CATHETER) IN PLACE: ICD-10-CM

## 2023-10-10 DIAGNOSIS — G47.00 INSOMNIA, UNSPECIFIED TYPE: Primary | ICD-10-CM

## 2023-10-10 DIAGNOSIS — D50.0 IRON DEFICIENCY ANEMIA DUE TO CHRONIC BLOOD LOSS: ICD-10-CM

## 2023-10-10 DIAGNOSIS — C16.0 MALIGNANT NEOPLASM OF CARDIA OF STOMACH: Primary | ICD-10-CM

## 2023-10-10 DIAGNOSIS — C16.0 MALIGNANT NEOPLASM OF CARDIA OF STOMACH: ICD-10-CM

## 2023-10-10 DIAGNOSIS — Z45.2 FITTING AND ADJUSTMENT OF VASCULAR CATHETER: ICD-10-CM

## 2023-10-10 LAB
ALBUMIN SERPL-MCNC: 2.8 G/DL (ref 3.5–5.2)
ALBUMIN/GLOB SERPL: 0.9 G/DL
ALP SERPL-CCNC: 429 U/L (ref 39–117)
ALT SERPL W P-5'-P-CCNC: 20 U/L (ref 1–41)
ANION GAP SERPL CALCULATED.3IONS-SCNC: 6.5 MMOL/L (ref 5–15)
AST SERPL-CCNC: 27 U/L (ref 1–40)
BASOPHILS # BLD AUTO: 0.09 10*3/MM3 (ref 0–0.2)
BASOPHILS NFR BLD AUTO: 2.9 % (ref 0–1.5)
BILIRUB SERPL-MCNC: 0.5 MG/DL (ref 0–1.2)
BUN SERPL-MCNC: 8 MG/DL (ref 8–23)
BUN/CREAT SERPL: 10.4 (ref 7–25)
CALCIUM SPEC-SCNC: 9 MG/DL (ref 8.6–10.5)
CHLORIDE SERPL-SCNC: 101 MMOL/L (ref 98–107)
CO2 SERPL-SCNC: 27.5 MMOL/L (ref 22–29)
CREAT SERPL-MCNC: 0.77 MG/DL (ref 0.76–1.27)
DEPRECATED RDW RBC AUTO: 61.7 FL (ref 37–54)
EGFRCR SERPLBLD CKD-EPI 2021: 100 ML/MIN/1.73
EOSINOPHIL # BLD AUTO: 0.13 10*3/MM3 (ref 0–0.4)
EOSINOPHIL NFR BLD AUTO: 4.2 % (ref 0.3–6.2)
ERYTHROCYTE [DISTWIDTH] IN BLOOD BY AUTOMATED COUNT: 20.2 % (ref 12.3–15.4)
GLOBULIN UR ELPH-MCNC: 3.1 GM/DL
GLUCOSE SERPL-MCNC: 249 MG/DL (ref 65–99)
HCT VFR BLD AUTO: 30.2 % (ref 37.5–51)
HGB BLD-MCNC: 9.3 G/DL (ref 13–17.7)
IMM GRANULOCYTES # BLD AUTO: 0 10*3/MM3 (ref 0–0.05)
IMM GRANULOCYTES NFR BLD AUTO: 0 % (ref 0–0.5)
LYMPHOCYTES # BLD AUTO: 0.75 10*3/MM3 (ref 0.7–3.1)
LYMPHOCYTES NFR BLD AUTO: 24.2 % (ref 19.6–45.3)
MCH RBC QN AUTO: 25.8 PG (ref 26.6–33)
MCHC RBC AUTO-ENTMCNC: 30.8 G/DL (ref 31.5–35.7)
MCV RBC AUTO: 83.9 FL (ref 79–97)
MONOCYTES # BLD AUTO: 0.32 10*3/MM3 (ref 0.1–0.9)
MONOCYTES NFR BLD AUTO: 10.3 % (ref 5–12)
NEUTROPHILS NFR BLD AUTO: 1.81 10*3/MM3 (ref 1.7–7)
NEUTROPHILS NFR BLD AUTO: 58.4 % (ref 42.7–76)
PLATELET # BLD AUTO: 333 10*3/MM3 (ref 140–450)
PMV BLD AUTO: 9.5 FL (ref 6–12)
POTASSIUM SERPL-SCNC: 3.6 MMOL/L (ref 3.5–5.2)
PROT SERPL-MCNC: 5.9 G/DL (ref 6–8.5)
RBC # BLD AUTO: 3.6 10*6/MM3 (ref 4.14–5.8)
SODIUM SERPL-SCNC: 135 MMOL/L (ref 136–145)
WBC NRBC COR # BLD: 3.1 10*3/MM3 (ref 3.4–10.8)

## 2023-10-10 PROCEDURE — 85025 COMPLETE CBC W/AUTO DIFF WBC: CPT | Performed by: INTERNAL MEDICINE

## 2023-10-10 PROCEDURE — 36591 DRAW BLOOD OFF VENOUS DEVICE: CPT

## 2023-10-10 PROCEDURE — 25010000002 HEPARIN LOCK FLUSH PER 10 UNITS: Performed by: INTERNAL MEDICINE

## 2023-10-10 PROCEDURE — 80053 COMPREHEN METABOLIC PANEL: CPT | Performed by: INTERNAL MEDICINE

## 2023-10-10 RX ORDER — LORAZEPAM 1 MG/1
1 TABLET ORAL EVERY 8 HOURS PRN
Qty: 10 TABLET | Refills: 0 | Status: SHIPPED | OUTPATIENT
Start: 2023-10-10 | End: 2023-10-11 | Stop reason: SDUPTHER

## 2023-10-10 RX ORDER — PALONOSETRON 0.05 MG/ML
0.25 INJECTION, SOLUTION INTRAVENOUS ONCE
Status: CANCELLED | OUTPATIENT
Start: 2023-10-11

## 2023-10-10 RX ORDER — FAMOTIDINE 10 MG/ML
20 INJECTION, SOLUTION INTRAVENOUS AS NEEDED
Status: CANCELLED | OUTPATIENT
Start: 2023-10-11

## 2023-10-10 RX ORDER — DIPHENHYDRAMINE HYDROCHLORIDE 50 MG/ML
50 INJECTION INTRAMUSCULAR; INTRAVENOUS AS NEEDED
Status: CANCELLED | OUTPATIENT
Start: 2023-10-11

## 2023-10-10 RX ORDER — HEPARIN SODIUM (PORCINE) LOCK FLUSH IV SOLN 100 UNIT/ML 100 UNIT/ML
500 SOLUTION INTRAVENOUS AS NEEDED
Status: DISCONTINUED | OUTPATIENT
Start: 2023-10-10 | End: 2023-10-11 | Stop reason: HOSPADM

## 2023-10-10 RX ORDER — HEPARIN SODIUM (PORCINE) LOCK FLUSH IV SOLN 100 UNIT/ML 100 UNIT/ML
500 SOLUTION INTRAVENOUS AS NEEDED
Status: CANCELLED | OUTPATIENT
Start: 2023-10-10

## 2023-10-10 RX ORDER — LORAZEPAM 1 MG/1
1 TABLET ORAL EVERY 8 HOURS PRN
Qty: 60 TABLET | Refills: 0 | Status: SHIPPED | OUTPATIENT
Start: 2023-10-10

## 2023-10-10 RX ORDER — LORAZEPAM 0.5 MG/1
0.5 TABLET ORAL EVERY 8 HOURS PRN
COMMUNITY
End: 2023-10-10 | Stop reason: SDUPTHER

## 2023-10-10 RX ORDER — SODIUM CHLORIDE 0.9 % (FLUSH) 0.9 %
20 SYRINGE (ML) INJECTION AS NEEDED
Status: DISCONTINUED | OUTPATIENT
Start: 2023-10-10 | End: 2023-10-11 | Stop reason: HOSPADM

## 2023-10-10 RX ORDER — DEXTROSE MONOHYDRATE 50 MG/ML
250 INJECTION, SOLUTION INTRAVENOUS ONCE
Status: CANCELLED | OUTPATIENT
Start: 2023-10-11

## 2023-10-10 RX ORDER — FLUOROURACIL 50 MG/ML
400 INJECTION, SOLUTION INTRAVENOUS ONCE
Status: CANCELLED | OUTPATIENT
Start: 2023-10-11

## 2023-10-10 RX ORDER — SODIUM CHLORIDE 0.9 % (FLUSH) 0.9 %
20 SYRINGE (ML) INJECTION AS NEEDED
Status: CANCELLED | OUTPATIENT
Start: 2023-10-10

## 2023-10-10 RX ADMIN — HEPARIN SODIUM (PORCINE) LOCK FLUSH IV SOLN 100 UNIT/ML 500 UNITS: 100 SOLUTION at 13:15

## 2023-10-10 RX ADMIN — Medication 20 ML: at 13:14

## 2023-10-10 NOTE — PROGRESS NOTES
Chief Complaint  Malignant neoplasm of parotid gland    Reji Fall MD Blakey, Ev Fan, CHIOMA    Subjective          Kevin José Luis presents to [facility] for metastatic gastric cancer    Mr. 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 Patient started on FOLFOX on 9/27/23.    Interval History  Patient presents for follow up. He is due for cycle 2 of FOLFOX tomorrow. Tolerated first cycle well. No nausea or vomiting. Reports he feels stronger. Sensation when eating has resolved. Not as much abdominal discomfort. Anemia improved. Needs refill of lorazepam. Takes 1 mg at night for sleep. No fevers, chills, infections.      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     [facility] [place] [place] [facility] 2.5 weeks of right side temporal pain, decreased appetite with 19 pounds of weight loss. With associated melanotic stools.     9/6/23: EGD with Gastric mass biopsy positive for high grade malignant neoplasm consistent with carcinoma (non small cell)    9/27/23: C1D1 mFOLFOX    9/12/23: NM PET: Gastric hypermetabolic mass involving th gastric cardia and fundus with loco-regional sarkis mets and disseminated hepatic metastases.     9/12/23: Liver lesion biopsy: positive for metastatic gastric adenocarcinoma    9/19/23: [facility] NGS: PIK3CA amplification, TP53 [id number], no associated FDA-approved therapies. MS-stable.     9/26/23: C1D1 FOLFOX     Malignant neoplasm of parotid gland   8/26/2022 Initial Diagnosis    Malignant neoplasm of parotid gland (HCC)     8/26/2022 -  Radiation    RADIATION THERAPY Treatment Details  (Noted on 8/26/2022)  Site: Left Head and Neck  Technique: IMRT  Goal: No goal specified  Planned Treatment Start Date: No planned start date specified     Malignant neoplasm of cardia of stomach   9/14/2023 Initial Diagnosis    Malignant neoplasm of cardia of stomach     9/14/2023 Cancer Staged    Staging form: Stomach, AJCC 8th Edition  - Clinical: Stage IVB (pM1) - Signed by Govind Pruitt MD on 9/14/2023     9/15/2023 - 9/15/2023 Chemotherapy    OP CENTRAL VENOUS ACCESS DEVICE Access, Care, and Maintenance (CVAD)     9/27/2023 -  Chemotherapy    OP COLON mFOLFOX6 OXALIplatin / Leucovorin / Fluorouracil     9/29/2023 -  Chemotherapy    OP CENTRAL VENOUS ACCESS DEVICE Access, Care, and Maintenance (CVAD)           Review of Systems   Constitutional:  Positive for fatigue.   Respiratory:  Negative for chest tightness.    Psychiatric/Behavioral:  Positive for sleep disturbance.    All other systems reviewed and are negative.    Current Outpatient Medications on File Prior to Visit   Medication Sig Dispense Refill    atorvastatin (LIPITOR) 80 MG tablet Take 1 tablet by mouth Every Night.      chlorthalidone (HYGROTON) 25 MG tablet Take 0.5 tablets by mouth Daily.      cholecalciferol (VITAMIN D3) 25 MCG (1000 UT) tablet Take 1 tablet by mouth Daily. LAST DOSE 3/26/23      Dulaglutide 1.5 MG/0.5ML solution pen-injector Inject 1.5 mg under the skin into the appropriate area as directed Every 7 (Seven) Days. THURSDAY      fluticasone-salmeterol (ADVAIR HFA) 230-21 MCG/ACT inhaler Inhale 2 puffs 2 (Two) Times a Day.      gabapentin (NEURONTIN) 600 MG tablet Take 2 tablets by mouth 2 (Two) Times a Day.      glipizide (GLUCOTROL) 10 MG tablet Take 1 tablet by mouth 2 (Two) Times a Day Before Meals.      HYDROcodone-acetaminophen (Norco) 5-325 MG per tablet Take 1 tablet by mouth Every 6 (Six) Hours As Needed for Moderate Pain or Severe Pain. 6 tablet 0    insulin NPH (NovoLIN N ReliOn) 100 UNIT/ML injection Inject  52 Units under the skin into the appropriate area as directed 2 (Two) Times a Day Before Meals.      LORazepam (ATIVAN) 0.5 MG tablet Take 1 tablet by mouth Every 8 (Eight) Hours As Needed for Anxiety.      losartan (COZAAR) 50 MG tablet Take 2 tablets by mouth Daily.      metFORMIN (GLUCOPHAGE) 1000 MG tablet Take 1 tablet by mouth 2 (Two) Times a Day With Meals. INST PER ANESTHESIA  PROTOCOL      mirtazapine (REMERON) 15 MG tablet Take 1 tablet by mouth Every Night. 30 tablet 5    montelukast (SINGULAIR) 10 MG tablet Take 1 tablet by mouth Every Night.      OLANZapine (zyPREXA) 5 MG tablet Take 1 tablet by mouth Every Night. Indications: Nausea and Vomiting caused by Cancer Chemotherapy, if no improvement, call provider 30 tablet 2    Omega-3 Fatty Acids (fish oil) 1000 MG capsule capsule Take  by mouth Daily With Breakfast. LAST DOSE 3/26/23      omeprazole (priLOSEC) 40 MG capsule Take 1 capsule by mouth 2 (Two) Times a Day.      ondansetron (ZOFRAN) 8 MG tablet Take 1 tablet by mouth 3 (Three) Times a Day As Needed for Nausea or Vomiting (mail to patient please.). 30 tablet 5    mirtazapine (REMERON) 15 MG tablet Take 1 tablet by mouth Every Night. (Patient not taking: Reported on 10/10/2023) 7 tablet 0    OLANZapine (zyPREXA) 5 MG tablet Take 1 tablet by mouth Every Night. (Patient not taking: Reported on 10/10/2023) 7 tablet 0     Current Facility-Administered Medications on File Prior to Visit   Medication Dose Route Frequency Provider Last Rate Last Admin    heparin injection 500 Units  500 Units Intravenous PRN Govind Pruitt MD   500 Units at 10/10/23 1315    sodium chloride 0.9 % flush 20 mL  20 mL Intravenous PRN Govind Pruitt MD   20 mL at 10/10/23 1314       No Known Allergies  Past Medical History:   Diagnosis Date    Asthma     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    Diabetes mellitus     Hyperlipidemia     Hypertension     Rotator cuff disorder     LEFT     Sleep apnea      Past Surgical History:   Procedure Laterality Date    CAROTID ENDARTERECTOMY Right     CHOLECYSTECTOMY      FOOT SURGERY Right     TOE DEBRIDMENT    HERNIA REPAIR      VENTRAL HERNIA    SHOULDER ARTHROSCOPY W/ ROTATOR CUFF REPAIR Left 4/3/2023    Procedure: LEFT SHOULDER ARTHROSCOPIC ROTATOR CUFF DEBRIDEMENT, LABRAL DEBRIDEMENT, BICEPS TENODESIS, SUBACROMIAL DECOMPRESSION;  Surgeon: Chas Patterson MD;  Location[place] [facility] [place] [facility];  Service: Orthopedics;  Laterality: Left;    TUMOR REMOVAL Left     PAROTID GLAND    VASCULAR SURGERY Right     STENT R LEG    VENOUS ACCESS DEVICE (PORT) INSERTION Right 2023    Procedure: INSERTION VENOUS ACCESS DEVICE;  Surgeon: James Copeland MD;  Location: [facility] MAIN OR;  Service: General;  Laterality: Right;     Social History     Socioeconomic History    Marital status:    Tobacco Use    Smoking status: Former     Years: 25     Types: Cigarettes     Start date:      Quit date:      Years since quittin.    Smokeless tobacco: Former   Vaping Use    Vaping Use: Never used   Substance and Sexual Activity    Alcohol use: Never    Drug use: Never    Sexual activity: Defer     Family History   Problem Relation Age of Onset    Lung cancer Father     Malig Hyperthermia Neg Hx        Objective   Physical Exam  Well appearing patient in no acute distress on RA  Anicteric sclerae, no rash on exposed skin  Respirations non-labored  Awake, alert, and oriented x 4. Speech intact. No gross neurologic deficit  Appropriate mood and affect    Vitals:    10/10/23 1343   Weight: 86.6 kg (190 lb 14.7 oz)   PainSc: 0-No pain     ECOG score: 0         PHQ-9 Total Score:             Result Review :   The following data was reviewed by: Govind Pruitt MD on 10/10/23:  Lab Results   Component Value Date    HGB 9.3 (L) 10/10/2023    HCT 30.2 (L) 10/10/2023    MCV 83.9 10/10/2023     10/10/2023    WBC 3.10 (L) 10/10/2023    NEUTROABS 1.81 10/10/2023     LYMPHSABS 0.75 10/10/2023    MONOSABS 0.32 10/10/2023    EOSABS 0.13 10/10/2023    BASOSABS 0.09 10/10/2023     Lab Results   Component Value Date    GLUCOSE 249 (H) 10/10/2023    BUN 8 10/10/2023    CREATININE 0.77 10/10/2023     (L) 10/10/2023    K 3.6 10/10/2023     10/10/2023    CO2 27.5 10/10/2023    CALCIUM 9.0 10/10/2023    PROTEINTOT 5.9 (L) 10/10/2023    ALBUMIN 2.8 (L) 10/10/2023    BILITOT 0.5 10/10/2023    ALKPHOS 429 (H) 10/10/2023    AST 27 10/10/2023    ALT 20 10/10/2023     Lab Results   Component Value Date    MG 1.7 09/21/2023     Labs personally reviewed and anemia improving. Alk phos decreasing. ANC normal.     [facility]    [facility] notes personally reviewed    Pathology report personally reviewed    PET report personally reviewed           XR Chest 1 View    Result Date: 9/26/2023   Right IJ port with tip at cavoatrial junction.  No pneumothorax identified.       JOSE D COYNE MD       Electronically Signed and Approved By: JOSE D COYNE MD on 9/26/2023 at 14:35             XR Chest 1 View    Result Date: 9/24/2023    No acute infiltrate is appreciated.     Please note that portions of this note were completed with a voice recognition program.  LYNSEY DE LOS SANTOS JR, MD       Electronically Signed and Approved By: LYNSEY DE LOS SANTOS JR, MD on 9/24/2023 at 22:35              CT Head Without Contrast    Result Date: 9/21/2023   1. No acute intracranial abnormality. 2. Moderate mucosal thickening in the left maxillary sinus.     ROSA WHITE DO       Electronically Signed and Approved By: ROSA WHITE DO on 9/21/2023 at 20:38             XR Chest 1 View    Result Date: 9/21/2023   No acute cardiopulmonary process or radiographic signs of chest trauma.       ROSA WHITE DO       Electronically Signed and Approved By: ROSA WHITE DO on 9/21/2023 at 20:20                  Assessment and Plan    Diagnoses and all orders for this visit:    1. Insomnia,  unspecified type (Primary)  -     LORazepam (ATIVAN) 1 MG tablet; Take 1 tablet by mouth Every 8 (Eight) Hours As Needed for Anxiety.  Dispense: 60 tablet; Refill: 0  -     LORazepam (ATIVAN) 1 MG tablet; Take 1 tablet by mouth Every 8 (Eight) Hours As Needed for Anxiety.  Dispense: 10 tablet; Refill: 0    2. Malignant neoplasm of cardia of stomach  -     Infusion Appointment Request 6; Future    3. Iron deficiency anemia due to chronic blood loss    Other orders  -     dextrose (D5W) 5 % infusion 250 mL  -     palonosetron (ALOXI) injection 0.25 mg  -     dexAMETHasone (DECADRON) 12 mg in sodium chloride 0.9 % IVPB  -     OXALIplatin (ELOXATIN) 170 mg in dextrose (D5W) 5 % 284 mL chemo IVPB  -     leucovorin 790 mg in dextrose (D5W) 5 % 329 mL IVPB  -     fluorouracil (ADRUCIL) chemo injection 790 mg  -     fluorouracil (ADRUCIL) 4,750 mg in sodium chloride 0.9 % 95 mL chemo infusion - FOR HOME USE  -     Hydrocortisone Sod Suc (PF) (Solu-CORTEF) injection 100 mg  -     diphenhydrAMINE (BENADRYL) injection 50 mg  -     famotidine (PEPCID) injection 20 mg      Metastatic stage IV gastric cancer.  Patient with PET scan (9/12/23) with hepatic mets that have been biopsy-proven to be gastric cancer.  HER2 and PD-L1 pending.  We will obtain guardant CT DNA testing as well as [facility] comprehensive genomic profile.  Discussed prognosis of cancer with patient today.  Discussed the treatment of metastatic cancer is systemic chemotherapy.  Discussed that no guarantees of treatment can be made and that this is not curable.  Patient understands.  Recommend systemic therapy with modified FOLFOX which includes bolus and infusional 5-FU with oxaliplatin.  This is given every 2 weeks until progression or intolerance.  Risk, benefit, side effect profile discussed in detail with patient. Consent obtained. We do have the option to add nivolumab if PD-L1 is greater than 5.  We also have the ability to add trastuzumab and and  pembrolizumab if HER2 is positive. [facility]ant negative for HER2. We will go ahead and start chemotherapy due need to get treatment started while waiting for additional testing and per patient request.      C1D1 mFOLFOX 9/27/23. Tolerated well. Alk phos decreased after treatment.    C2D1 mFOLFOX 10/11/23. Labs appropriate to proceed    Plan for imaging after C4.     Anemia from cancer with iron deficiency  Recently received IV iron while hospitalized. Iron saturation remains low. Will repeat Injectafer x 2 starting 10/11/23. Hgb improving as of 10/10/23.     Portal vein thrombosis  Will defer on anticoagulation and will proceed with systemic treatment of his cancer.    Insomnia  Lorazepam refilled. Continue nightly remeron     This is an acute or chronic illness that poses a threat to life or bodily function. The above treatment plan involves a high risk of complications and/or mortality of patient management.    I spent 25 minutes caring for Kevin on this date of service. This time includes time spent by me in the following activities:preparing for the visit, reviewing tests, obtaining and/or reviewing a separately obtained history, performing a medically appropriate examination and/or evaluation , counseling and educating the patient/family/caregiver, ordering medications, tests, or procedures, referring and communicating with other health care professionals , documenting information in the medical record, independently interpreting results and communicating that information with the patient/family/caregiver, and care coordination    Patient Follow Up: C3 chemo  Patient was given instructions and counseling regarding his condition or for health maintenance advice. Please see specific information pulled into the AVS if appropriate.

## 2023-10-11 ENCOUNTER — HOSPITAL ENCOUNTER (OUTPATIENT)
Dept: ONCOLOGY | Facility: HOSPITAL | Age: 63
Discharge: HOME | End: 2023-10-11
Admitting: INTERNAL MEDICINE
Payer: OTHER GOVERNMENT

## 2023-10-11 ENCOUNTER — DOCUMENTATION (OUTPATIENT)
Dept: ONCOLOGY | Facility: HOSPITAL | Age: 63
End: 2023-10-11
Payer: OTHER GOVERNMENT

## 2023-10-11 VITALS
RESPIRATION RATE: 18 BRPM | WEIGHT: 192.3 LBS | OXYGEN SATURATION: 100 % | DIASTOLIC BLOOD PRESSURE: 72 MMHG | BODY MASS INDEX: 31.28 KG/M2 | TEMPERATURE: 96.8 F | HEART RATE: 89 BPM | SYSTOLIC BLOOD PRESSURE: 161 MMHG

## 2023-10-11 DIAGNOSIS — C16.0 MALIGNANT NEOPLASM OF CARDIA OF STOMACH: Primary | ICD-10-CM

## 2023-10-11 DIAGNOSIS — Z45.2 PICC (PERIPHERALLY INSERTED CENTRAL CATHETER) IN PLACE: ICD-10-CM

## 2023-10-11 DIAGNOSIS — K90.49 MALABSORPTION DUE TO INTOLERANCE, NOT ELSEWHERE CLASSIFIED: ICD-10-CM

## 2023-10-11 DIAGNOSIS — Z45.2 FITTING AND ADJUSTMENT OF VASCULAR CATHETER: ICD-10-CM

## 2023-10-11 DIAGNOSIS — D50.0 IRON DEFICIENCY ANEMIA DUE TO CHRONIC BLOOD LOSS: ICD-10-CM

## 2023-10-11 DIAGNOSIS — G47.00 INSOMNIA, UNSPECIFIED TYPE: ICD-10-CM

## 2023-10-11 PROCEDURE — 25010000002 DEXAMETHASONE SODIUM PHOSPHATE 120 MG/30ML SOLUTION: Performed by: INTERNAL MEDICINE

## 2023-10-11 PROCEDURE — 25010000002 FERRIC CARBOXYMALTOSE 750 MG/15ML SOLUTION: Performed by: INTERNAL MEDICINE

## 2023-10-11 PROCEDURE — 25010000002 PALONOSETRON PER 25 MCG: Performed by: INTERNAL MEDICINE

## 2023-10-11 PROCEDURE — 25010000002 FLUOROURACIL PER 500 MG: Performed by: INTERNAL MEDICINE

## 2023-10-11 PROCEDURE — 96415 CHEMO IV INFUSION ADDL HR: CPT

## 2023-10-11 PROCEDURE — 0 DEXTROSE 5 % SOLUTION: Performed by: INTERNAL MEDICINE

## 2023-10-11 PROCEDURE — 96375 TX/PRO/DX INJ NEW DRUG ADDON: CPT

## 2023-10-11 PROCEDURE — 96368 THER/DIAG CONCURRENT INF: CPT

## 2023-10-11 PROCEDURE — 96411 CHEMO IV PUSH ADDL DRUG: CPT

## 2023-10-11 PROCEDURE — 25010000002 LEUCOVORIN CALCIUM PER 50 MG: Performed by: INTERNAL MEDICINE

## 2023-10-11 PROCEDURE — 96413 CHEMO IV INFUSION 1 HR: CPT

## 2023-10-11 PROCEDURE — 25010000002 OXALIPLATIN PER 0.5 MG: Performed by: INTERNAL MEDICINE

## 2023-10-11 PROCEDURE — 0 DEXTROSE 5 % SOLUTION 250 ML FLEX CONT: Performed by: INTERNAL MEDICINE

## 2023-10-11 PROCEDURE — 25810000003 SODIUM CHLORIDE 0.9 % SOLUTION: Performed by: INTERNAL MEDICINE

## 2023-10-11 PROCEDURE — 96417 CHEMO IV INFUS EACH ADDL SEQ: CPT

## 2023-10-11 PROCEDURE — G0498 CHEMO EXTEND IV INFUS W/PUMP: HCPCS

## 2023-10-11 RX ORDER — PALONOSETRON 0.05 MG/ML
0.25 INJECTION, SOLUTION INTRAVENOUS ONCE
Status: COMPLETED | OUTPATIENT
Start: 2023-10-11 | End: 2023-10-11

## 2023-10-11 RX ORDER — SODIUM CHLORIDE 0.9 % (FLUSH) 0.9 %
20 SYRINGE (ML) INJECTION AS NEEDED
Status: CANCELLED | OUTPATIENT
Start: 2023-10-11

## 2023-10-11 RX ORDER — SODIUM CHLORIDE 9 MG/ML
250 INJECTION, SOLUTION INTRAVENOUS ONCE
OUTPATIENT
Start: 2023-10-18

## 2023-10-11 RX ORDER — FLUOROURACIL 50 MG/ML
800 INJECTION, SOLUTION INTRAVENOUS ONCE
Status: COMPLETED | OUTPATIENT
Start: 2023-10-11 | End: 2023-10-11

## 2023-10-11 RX ORDER — SODIUM CHLORIDE 0.9 % (FLUSH) 0.9 %
20 SYRINGE (ML) INJECTION AS NEEDED
Status: DISCONTINUED | OUTPATIENT
Start: 2023-10-11 | End: 2023-10-12 | Stop reason: HOSPADM

## 2023-10-11 RX ORDER — DEXTROSE MONOHYDRATE 50 MG/ML
250 INJECTION, SOLUTION INTRAVENOUS ONCE
Status: COMPLETED | OUTPATIENT
Start: 2023-10-11 | End: 2023-10-11

## 2023-10-11 RX ORDER — HEPARIN SODIUM (PORCINE) LOCK FLUSH IV SOLN 100 UNIT/ML 100 UNIT/ML
500 SOLUTION INTRAVENOUS AS NEEDED
Status: CANCELLED | OUTPATIENT
Start: 2023-10-11

## 2023-10-11 RX ORDER — LORAZEPAM 1 MG/1
1 TABLET ORAL EVERY 8 HOURS PRN
Qty: 10 TABLET | Refills: 0 | Status: SHIPPED | OUTPATIENT
Start: 2023-10-11

## 2023-10-11 RX ORDER — DIPHENHYDRAMINE HYDROCHLORIDE 50 MG/ML
50 INJECTION INTRAMUSCULAR; INTRAVENOUS AS NEEDED
Status: DISCONTINUED | OUTPATIENT
Start: 2023-10-11 | End: 2023-10-12 | Stop reason: HOSPADM

## 2023-10-11 RX ORDER — HEPARIN SODIUM (PORCINE) LOCK FLUSH IV SOLN 100 UNIT/ML 100 UNIT/ML
500 SOLUTION INTRAVENOUS AS NEEDED
Status: DISCONTINUED | OUTPATIENT
Start: 2023-10-11 | End: 2023-10-12 | Stop reason: HOSPADM

## 2023-10-11 RX ORDER — SODIUM CHLORIDE 9 MG/ML
250 INJECTION, SOLUTION INTRAVENOUS ONCE
Status: CANCELLED | OUTPATIENT
Start: 2023-10-11

## 2023-10-11 RX ORDER — FAMOTIDINE 10 MG/ML
20 INJECTION, SOLUTION INTRAVENOUS AS NEEDED
Status: DISCONTINUED | OUTPATIENT
Start: 2023-10-11 | End: 2023-10-12 | Stop reason: HOSPADM

## 2023-10-11 RX ORDER — SODIUM CHLORIDE 9 MG/ML
250 INJECTION, SOLUTION INTRAVENOUS ONCE
Status: COMPLETED | OUTPATIENT
Start: 2023-10-11 | End: 2023-10-11

## 2023-10-11 RX ADMIN — FLUOROURACIL 4750 MG: 50 INJECTION, SOLUTION INTRAVENOUS at 11:25

## 2023-10-11 RX ADMIN — FERRIC CARBOXYMALTOSE INJECTION 750 MG: 50 INJECTION, SOLUTION INTRAVENOUS at 08:44

## 2023-10-11 RX ADMIN — SODIUM CHLORIDE 250 ML: 9 INJECTION, SOLUTION INTRAVENOUS at 08:44

## 2023-10-11 RX ADMIN — FLUOROURACIL 800 MG: 50 INJECTION, SOLUTION INTRAVENOUS at 11:20

## 2023-10-11 RX ADMIN — DEXTROSE MONOHYDRATE 250 ML: 50 INJECTION, SOLUTION INTRAVENOUS at 08:20

## 2023-10-11 RX ADMIN — LEUCOVORIN CALCIUM 800 MG: 350 INJECTION, POWDER, LYOPHILIZED, FOR SOLUTION INTRAMUSCULAR; INTRAVENOUS at 09:14

## 2023-10-11 RX ADMIN — OXALIPLATIN 170 MG: 5 INJECTION, SOLUTION INTRAVENOUS at 09:14

## 2023-10-11 RX ADMIN — DEXAMETHASONE SODIUM PHOSPHATE 12 MG: 4 INJECTION, SOLUTION INTRA-ARTICULAR; INTRALESIONAL; INTRAMUSCULAR; INTRAVENOUS; SOFT TISSUE at 08:19

## 2023-10-11 RX ADMIN — PALONOSETRON 0.25 MG: 0.05 INJECTION, SOLUTION INTRAVENOUS at 08:15

## 2023-10-11 NOTE — PROGRESS NOTES
Diagnosis: Metastatic gastric cancer to the liver    Reason for Referral: [facility] travel reimbursement    Content of Visit: OSW met with Mr. Ordonez and his spouse in the medical oncology infusion center this morning to assist him in completing his VA travel reimbursement form for appointments attended yesterday, 10/10/23 and today, 10/11/23. OSW mailed this to the [facility] today. Mr. Ordonez was very pleasant and reports feeling and doing well. He tolerated his first treatment with limited side effects and reports he was informed his lab numbers have significantly improved. He reports he also gained 9 pounds. Joined Mr. Ordonez in celebrating this wonderful news and he voiced plans to ring the bell after his treatment today to celebrate today's victory. Encouraged OSW support remains available.    Resources/Referrals Provided: VA Travel Reimbursement

## 2023-10-13 ENCOUNTER — HOSPITAL ENCOUNTER (OUTPATIENT)
Dept: ONCOLOGY | Facility: HOSPITAL | Age: 63
Discharge: HOME | End: 2023-10-13
Payer: OTHER GOVERNMENT

## 2023-10-13 DIAGNOSIS — Z45.2 FITTING AND ADJUSTMENT OF VASCULAR CATHETER: ICD-10-CM

## 2023-10-13 DIAGNOSIS — C16.0 MALIGNANT NEOPLASM OF CARDIA OF STOMACH: Primary | ICD-10-CM

## 2023-10-13 DIAGNOSIS — Z45.2 PICC (PERIPHERALLY INSERTED CENTRAL CATHETER) IN PLACE: ICD-10-CM

## 2023-10-13 PROCEDURE — 25010000002 HEPARIN LOCK FLUSH PER 10 UNITS: Performed by: INTERNAL MEDICINE

## 2023-10-13 RX ORDER — HEPARIN SODIUM (PORCINE) LOCK FLUSH IV SOLN 100 UNIT/ML 100 UNIT/ML
500 SOLUTION INTRAVENOUS AS NEEDED
OUTPATIENT
Start: 2023-10-13

## 2023-10-13 RX ORDER — SODIUM CHLORIDE 0.9 % (FLUSH) 0.9 %
20 SYRINGE (ML) INJECTION AS NEEDED
Status: DISCONTINUED | OUTPATIENT
Start: 2023-10-13 | End: 2023-10-14 | Stop reason: HOSPADM

## 2023-10-13 RX ORDER — HEPARIN SODIUM (PORCINE) LOCK FLUSH IV SOLN 100 UNIT/ML 100 UNIT/ML
500 SOLUTION INTRAVENOUS AS NEEDED
Status: DISCONTINUED | OUTPATIENT
Start: 2023-10-13 | End: 2023-10-14 | Stop reason: HOSPADM

## 2023-10-13 RX ORDER — SODIUM CHLORIDE 0.9 % (FLUSH) 0.9 %
20 SYRINGE (ML) INJECTION AS NEEDED
OUTPATIENT
Start: 2023-10-13

## 2023-10-13 RX ADMIN — Medication 20 ML: at 11:18

## 2023-10-13 RX ADMIN — HEPARIN SODIUM (PORCINE) LOCK FLUSH IV SOLN 100 UNIT/ML 500 UNITS: 100 SOLUTION at 11:19

## 2023-10-16 ENCOUNTER — TELEPHONE (OUTPATIENT)
Dept: ONCOLOGY | Facility: HOSPITAL | Age: 63
End: 2023-10-16
Payer: OTHER GOVERNMENT

## 2023-10-16 ENCOUNTER — APPOINTMENT (OUTPATIENT)
Facility: HOSPITAL | Age: 63
End: 2023-10-16
Payer: OTHER GOVERNMENT

## 2023-10-16 ENCOUNTER — HOSPITAL ENCOUNTER (EMERGENCY)
Facility: HOSPITAL | Age: 63
Discharge: HOME | End: 2023-10-16
Attending: EMERGENCY MEDICINE | Admitting: EMERGENCY MEDICINE
Payer: OTHER GOVERNMENT

## 2023-10-16 VITALS
BODY MASS INDEX: 29.8 KG/M2 | DIASTOLIC BLOOD PRESSURE: 61 MMHG | HEART RATE: 82 BPM | HEIGHT: 66 IN | TEMPERATURE: 98 F | SYSTOLIC BLOOD PRESSURE: 142 MMHG | WEIGHT: 185.41 LBS | RESPIRATION RATE: 16 BRPM | OXYGEN SATURATION: 96 %

## 2023-10-16 DIAGNOSIS — D70.1 CHEMOTHERAPY-INDUCED NEUTROPENIA: ICD-10-CM

## 2023-10-16 DIAGNOSIS — I82.441 ACUTE DEEP VEIN THROMBOSIS (DVT) OF TIBIAL VEIN OF RIGHT LOWER EXTREMITY: Primary | ICD-10-CM

## 2023-10-16 DIAGNOSIS — T45.1X5A CHEMOTHERAPY-INDUCED NEUTROPENIA: ICD-10-CM

## 2023-10-16 DIAGNOSIS — M79.89 PAIN AND SWELLING OF RIGHT LOWER EXTREMITY: Primary | ICD-10-CM

## 2023-10-16 DIAGNOSIS — Z85.818 HISTORY OF PAROTID CANCER: ICD-10-CM

## 2023-10-16 DIAGNOSIS — M79.604 PAIN AND SWELLING OF RIGHT LOWER EXTREMITY: Primary | ICD-10-CM

## 2023-10-16 LAB
ALBUMIN SERPL-MCNC: 2.9 G/DL (ref 3.5–5.2)
ALBUMIN/GLOB SERPL: 0.9 G/DL
ALP SERPL-CCNC: 305 U/L (ref 39–117)
ALT SERPL W P-5'-P-CCNC: 24 U/L (ref 1–41)
ANION GAP SERPL CALCULATED.3IONS-SCNC: 8.9 MMOL/L (ref 5–15)
ANISOCYTOSIS BLD QL: ABNORMAL
AST SERPL-CCNC: 35 U/L (ref 1–40)
BASOPHILS # BLD MANUAL: 0.03 10*3/MM3 (ref 0–0.2)
BASOPHILS NFR BLD MANUAL: 3 % (ref 0–1.5)
BH CV LOW VAS RIGHT LESSER SAPH VESSEL: 1
BH CV LOW VAS RIGHT PERONEAL VESSEL: 1
BH CV LOW VAS RIGHT POSTERIOR TIBIAL VESSEL: 1
BH CV LOWER VASCULAR LEFT COMMON FEMORAL AUGMENT: NORMAL
BH CV LOWER VASCULAR LEFT COMMON FEMORAL COMPETENT: NORMAL
BH CV LOWER VASCULAR LEFT COMMON FEMORAL COMPRESS: NORMAL
BH CV LOWER VASCULAR LEFT COMMON FEMORAL PHASIC: NORMAL
BH CV LOWER VASCULAR LEFT COMMON FEMORAL SPONT: NORMAL
BH CV LOWER VASCULAR RIGHT COMMON FEMORAL AUGMENT: NORMAL
BH CV LOWER VASCULAR RIGHT COMMON FEMORAL COMPETENT: NORMAL
BH CV LOWER VASCULAR RIGHT COMMON FEMORAL COMPRESS: NORMAL
BH CV LOWER VASCULAR RIGHT COMMON FEMORAL PHASIC: NORMAL
BH CV LOWER VASCULAR RIGHT COMMON FEMORAL SPONT: NORMAL
BH CV LOWER VASCULAR RIGHT DISTAL FEMORAL COMPRESS: NORMAL
BH CV LOWER VASCULAR RIGHT GASTRONEMIUS COMPRESS: NORMAL
BH CV LOWER VASCULAR RIGHT GREATER SAPH AK AUGMENT: NORMAL
BH CV LOWER VASCULAR RIGHT GREATER SAPH AK COMPETENT: NORMAL
BH CV LOWER VASCULAR RIGHT GREATER SAPH AK COMPRESS: NORMAL
BH CV LOWER VASCULAR RIGHT GREATER SAPH AK PHASIC: NORMAL
BH CV LOWER VASCULAR RIGHT GREATER SAPH AK SPONT: NORMAL
BH CV LOWER VASCULAR RIGHT GREATER SAPH BK COMPRESS: NORMAL
BH CV LOWER VASCULAR RIGHT LESSER SAPH COMPRESS: NORMAL
BH CV LOWER VASCULAR RIGHT MID FEMORAL AUGMENT: NORMAL
BH CV LOWER VASCULAR RIGHT MID FEMORAL COMPETENT: NORMAL
BH CV LOWER VASCULAR RIGHT MID FEMORAL COMPRESS: NORMAL
BH CV LOWER VASCULAR RIGHT MID FEMORAL PHASIC: NORMAL
BH CV LOWER VASCULAR RIGHT MID FEMORAL SPONT: NORMAL
BH CV LOWER VASCULAR RIGHT PERONEAL AUGMENT: NORMAL
BH CV LOWER VASCULAR RIGHT PERONEAL COMPRESS: NORMAL
BH CV LOWER VASCULAR RIGHT PERONEAL PHASIC: NORMAL
BH CV LOWER VASCULAR RIGHT PERONEAL SPONT: NORMAL
BH CV LOWER VASCULAR RIGHT POPLITEAL AUGMENT: NORMAL
BH CV LOWER VASCULAR RIGHT POPLITEAL COMPETENT: NORMAL
BH CV LOWER VASCULAR RIGHT POPLITEAL COMPRESS: NORMAL
BH CV LOWER VASCULAR RIGHT POPLITEAL PHASIC: NORMAL
BH CV LOWER VASCULAR RIGHT POPLITEAL SPONT: NORMAL
BH CV LOWER VASCULAR RIGHT POSTERIOR TIBIAL AUGMENT: NORMAL
BH CV LOWER VASCULAR RIGHT POSTERIOR TIBIAL COMPRESS: NORMAL
BH CV LOWER VASCULAR RIGHT POSTERIOR TIBIAL PHASIC: NORMAL
BH CV LOWER VASCULAR RIGHT POSTERIOR TIBIAL SPONT: NORMAL
BH CV LOWER VASCULAR RIGHT PROXIMAL FEMORAL COMPRESS: NORMAL
BH CV VAS PRELIMINARY FINDINGS SCRIPTING: 1
BILIRUB SERPL-MCNC: 0.6 MG/DL (ref 0–1.2)
BUN SERPL-MCNC: 11 MG/DL (ref 8–23)
BUN/CREAT SERPL: 14.5 (ref 7–25)
CALCIUM SPEC-SCNC: 8.9 MG/DL (ref 8.6–10.5)
CHLORIDE SERPL-SCNC: 101 MMOL/L (ref 98–107)
CO2 SERPL-SCNC: 27.1 MMOL/L (ref 22–29)
CREAT SERPL-MCNC: 0.76 MG/DL (ref 0.76–1.27)
DEPRECATED RDW RBC AUTO: 59.8 FL (ref 37–54)
EGFRCR SERPLBLD CKD-EPI 2021: 100.4 ML/MIN/1.73
EOSINOPHIL # BLD MANUAL: 0.1 10*3/MM3 (ref 0–0.4)
EOSINOPHIL NFR BLD MANUAL: 9 % (ref 0.3–6.2)
ERYTHROCYTE [DISTWIDTH] IN BLOOD BY AUTOMATED COUNT: 20.5 % (ref 12.3–15.4)
GLOBULIN UR ELPH-MCNC: 3.2 GM/DL
GLUCOSE SERPL-MCNC: 150 MG/DL (ref 65–99)
HCT VFR BLD AUTO: 31 % (ref 37.5–51)
HGB BLD-MCNC: 9.8 G/DL (ref 13–17.7)
HOLD SPECIMEN: NORMAL
HOLD SPECIMEN: NORMAL
HYPOCHROMIA BLD QL: ABNORMAL
LYMPHOCYTES # BLD MANUAL: 0.7 10*3/MM3 (ref 0.7–3.1)
LYMPHOCYTES NFR BLD MANUAL: 14 % (ref 5–12)
MACROCYTES BLD QL SMEAR: ABNORMAL
MCH RBC QN AUTO: 25.8 PG (ref 26.6–33)
MCHC RBC AUTO-ENTMCNC: 31.6 G/DL (ref 31.5–35.7)
MCV RBC AUTO: 81.6 FL (ref 79–97)
MONOCYTES # BLD: 0.16 10*3/MM3 (ref 0.1–0.9)
MYELOCYTES NFR BLD MANUAL: 1 % (ref 0–0)
NEUTROPHILS # BLD AUTO: 0.1 10*3/MM3 (ref 1.7–7)
NEUTROPHILS NFR BLD MANUAL: 9 % (ref 42.7–76)
NRBC BLD AUTO-RTO: 0 /100 WBC (ref 0–0.2)
NRBC SPEC MANUAL: 1 /100 WBC (ref 0–0.2)
PLATELET # BLD AUTO: 269 10*3/MM3 (ref 140–450)
PMV BLD AUTO: 9.5 FL (ref 6–12)
POTASSIUM SERPL-SCNC: 3.6 MMOL/L (ref 3.5–5.2)
PROMYELOCYTES NFR BLD MANUAL: 2 % (ref 0–0)
PROT SERPL-MCNC: 6.1 G/DL (ref 6–8.5)
RBC # BLD AUTO: 3.8 10*6/MM3 (ref 4.14–5.8)
SMALL PLATELETS BLD QL SMEAR: ADEQUATE
SODIUM SERPL-SCNC: 137 MMOL/L (ref 136–145)
VARIANT LYMPHS NFR BLD MANUAL: 62 % (ref 19.6–45.3)
WBC MORPH BLD: NORMAL
WBC NRBC COR # BLD: 1.13 10*3/MM3 (ref 3.4–10.8)
WHOLE BLOOD HOLD COAG: NORMAL
WHOLE BLOOD HOLD SPECIMEN: NORMAL

## 2023-10-16 PROCEDURE — 93971 EXTREMITY STUDY: CPT

## 2023-10-16 PROCEDURE — 93971 EXTREMITY STUDY: CPT | Performed by: SURGERY

## 2023-10-16 PROCEDURE — 85025 COMPLETE CBC W/AUTO DIFF WBC: CPT

## 2023-10-16 PROCEDURE — 99284 EMERGENCY DEPT VISIT MOD MDM: CPT

## 2023-10-16 PROCEDURE — 85007 BL SMEAR W/DIFF WBC COUNT: CPT

## 2023-10-16 PROCEDURE — 36415 COLL VENOUS BLD VENIPUNCTURE: CPT

## 2023-10-16 PROCEDURE — 80053 COMPREHEN METABOLIC PANEL: CPT

## 2023-10-16 PROCEDURE — 25010000002 HEPARIN LOCK FLUSH PER 10 UNITS: Performed by: EMERGENCY MEDICINE

## 2023-10-16 RX ORDER — HYDROCODONE BITARTRATE AND ACETAMINOPHEN 5; 325 MG/1; MG/1
1 TABLET ORAL EVERY 6 HOURS PRN
Qty: 12 TABLET | Refills: 0 | Status: SHIPPED | OUTPATIENT
Start: 2023-10-16 | End: 2023-10-16 | Stop reason: SDUPTHER

## 2023-10-16 RX ORDER — HEPARIN SODIUM (PORCINE) LOCK FLUSH IV SOLN 100 UNIT/ML 100 UNIT/ML
500 SOLUTION INTRAVENOUS ONCE
Status: COMPLETED | OUTPATIENT
Start: 2023-10-16 | End: 2023-10-16

## 2023-10-16 RX ORDER — HYDROCODONE BITARTRATE AND ACETAMINOPHEN 5; 325 MG/1; MG/1
1 TABLET ORAL EVERY 6 HOURS PRN
Qty: 12 TABLET | Refills: 0 | Status: SHIPPED | OUTPATIENT
Start: 2023-10-16

## 2023-10-16 RX ADMIN — Medication 500 UNITS: at 19:13

## 2023-10-16 NOTE — TELEPHONE ENCOUNTER
Marcelo from [facility] called and states that the pt is c/o right calf pain that started yesterday. When questioned Marcelo states that the right calf is not swollen but is darker than the other calf. Marcelo also states that the pt has a hx of DVT's.

## 2023-10-16 NOTE — ED NOTES
Pt discharged home, he was assisted out of ED via wheelchair. His port was deaccessed prior to him leaving.

## 2023-10-16 NOTE — ED PROVIDER NOTES
"Time: 6:09 PM EDT  Date of encounter:  10/16/2023  Independent Historian/Clinical History and Information was obtained by:   Patient    History is limited by: N/A    Chief Complaint: Right calf pain since yesterday      History of Present Illness:  Patient is a 63 y.o. 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    He is not currently on any anticoagulation and denies any chest pain or dyspnea.    He reports having 2 \"blood clots in his liver\".    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    Diabetes mellitus     Hyperlipidemia     Hypertension     Rotator cuff disorder     LEFT    Sleep apnea      Past Surgical History:   Procedure Laterality Date    CAROTID ENDARTERECTOMY Right     CHOLECYSTECTOMY      FOOT SURGERY Right     TOE DEBRIDMENT    HERNIA REPAIR      VENTRAL HERNIA    SHOULDER ARTHROSCOPY W/ ROTATOR CUFF REPAIR Left 4/3/2023    Procedure: LEFT SHOULDER ARTHROSCOPIC ROTATOR CUFF DEBRIDEMENT, LABRAL DEBRIDEMENT, BICEPS TENODESIS, SUBACROMIAL DECOMPRESSION;  Surgeon: Chas Patterson MD;  Location: [facility] OR [facility] [place];  Service: Orthopedics;  Laterality: Left;    TUMOR REMOVAL Left     PAROTID GLAND    VASCULAR SURGERY Right     STENT R LEG    VENOUS ACCESS DEVICE (PORT) INSERTION Right 9/26/2023    Procedure: INSERTION VENOUS ACCESS DEVICE;  Surgeon: James Copeland MD;  Location: [facility] OR;  Service: General;  Laterality: Right;     Family History   Problem Relation Age of Onset    Lung cancer Father     Malig Hyperthermia Neg Hx        Home Medications:  Prior to Admission medications    Medication Sig Start Date End Date Taking? Authorizing Provider   atorvastatin " (LIPITOR) 80 MG tablet Take 1 tablet by mouth Every Night.    Tanvir Boateng MD   chlorthalidone (HYGROTON) 25 MG tablet Take 0.5 tablets by mouth Daily.    Tanvir Boateng MD   cholecalciferol (VITAMIN D3) 25 MCG (1000 UT) tablet Take 1 tablet by mouth Daily. LAST DOSE 3/26/23    Tanvir Boateng MD   Dulaglutide 1.5 MG/0.5ML solution pen-injector Inject 1.5 mg under the skin into the appropriate area as directed Every 7 (Seven) Days. THURSDAY    Tanvir Boateng MD   fluticasone-salmeterol (ADVAIR HFA) 230-21 MCG/ACT inhaler Inhale 2 puffs 2 (Two) Times a Day.    Tanvir Boateng MD   gabapentin (NEURONTIN) 600 MG tablet Take 2 tablets by mouth 2 (Two) Times a Day.    Tanvir Boateng MD   glipizide (GLUCOTROL) 10 MG tablet Take 1 tablet by mouth 2 (Two) Times a Day Before Meals.    Tanvir Boateng MD   HYDROcodone-acetaminophen (Norco) 5-325 MG per tablet Take 1 tablet by mouth Every 6 (Six) Hours As Needed for Moderate Pain or Severe Pain. 9/26/23   James Copeland MD   insulin NPH (NovoLIN N ReliOn) 100 UNIT/ML injection Inject 52 Units under the skin into the appropriate area as directed 2 (Two) Times a Day Before Meals.    Tanvir Boateng MD   LORazepam (ATIVAN) 1 MG tablet Take 1 tablet by mouth Every 8 (Eight) Hours As Needed for Anxiety. 10/10/23   Govind Pruitt MD   LORazepam (ATIVAN) 1 MG tablet Take 1 tablet by mouth Every 8 (Eight) Hours As Needed for Anxiety. 10/11/23   Govind Pruitt MD   losartan (COZAAR) 50 MG tablet Take 2 tablets by mouth Daily.    Tanvir Boateng MD   metFORMIN (GLUCOPHAGE) 1000 MG tablet Take 1 tablet by mouth 2 (Two) Times a Day With Meals. INST PER ANESTHESIA  PROTOCOL    Tanvir Boateng MD   mirtazapine (REMERON) 15 MG tablet Take 1 tablet by mouth Every Night. 10/3/23   Govind Pruitt MD   mirtazapine (REMERON) 15 MG tablet Take 1 tablet by mouth Every Night.  Patient not taking: Reported  "on 10/10/2023 10/3/23   Govind Pruitt MD   montelukast (SINGULAIR) 10 MG tablet Take 1 tablet by mouth Every Night.    ProviderTanvir MD   OLANZapine (zyPREXA) 5 MG tablet Take 1 tablet by mouth Every Night. Indications: Nausea and Vomiting caused by Cancer Chemotherapy, if no improvement, call provider 10/3/23   Govind Pruitt MD   OLANZapine (zyPREXA) 5 MG tablet Take 1 tablet by mouth Every Night.  Patient not taking: Reported on 10/10/2023 10/3/23   Govind Pruitt MD   Omega-3 Fatty Acids (fish oil) 1000 MG capsule capsule Take  by mouth Daily With Breakfast. LAST DOSE 3/26/23    ProviderTanvir MD   omeprazole (priLOSEC) 40 MG capsule Take 1 capsule by mouth 2 (Two) Times a Day.    ProviderTanvir MD   ondansetron (ZOFRAN) 8 MG tablet Take 1 tablet by mouth 3 (Three) Times a Day As Needed for Nausea or Vomiting (mail to patient please.). 23   Marisol Castano, APRN        Social History:   Social History     Tobacco Use    Smoking status: Former     Years: 25     Types: Cigarettes     Start date:      Quit date:      Years since quittin.8    Smokeless tobacco: Former   Vaping Use    Vaping Use: Never used   Substance Use Topics    Alcohol use: Never    Drug use: Never         Review of Systems:  Review of Systems   I performed a 10 point review of systems which was all negative, except for the positives found in the HPI above.  Physical Exam:  /61   Pulse 82   Temp 98 °F (36.7 °C) (Oral)   Resp 16   Ht 167.6 cm (66\")   Wt 84.1 kg (185 lb 6.5 oz)   SpO2 96%   BMI 29.93 kg/m²     Physical Exam   General: Awake alert and in no obvious distress    HEENT: Head normocephalic atraumatic, eyes PERRLA EOMI, nose normal, oropharynx normal.    Neck: Supple full range of motion, no meningismus, no lymphadenopathy    Heart: Regular rate and rhythm, no murmurs or rubs, 2+ radial pulses bilaterally    Lungs: Clear to auscultation bilaterally " without wheezes or crackles, no respiratory distress    Abdomen: Soft, nontender, nondistended, no rebound or guarding    Skin: Warm, dry, no rash    Musculoskeletal: Normal range of motion, minimal right lower extremity edema with reproducible posterior right calf tenderness but no signs of Achilles tendon damage, normal pedal pulses and sensation in the right foot.    Neurologic: Oriented x3, no motor deficits no sensory deficits    Psychiatric: Mood appears stable, no psychosis          Procedures:  Procedures      Medical Decision Making:      Comorbidities that affect care:    Cancer    External Notes reviewed:    Previous Clinic Note: I reviewed previous oncology clinic notes for his parotid cancer and chemotherapy.      The following orders were placed and all results were independently analyzed by me:  Orders Placed This Encounter   Procedures    [place] Draw    Comprehensive Metabolic Panel    CBC Auto Differential    Manual Differential    NPO Diet NPO Type: Strict NPO    Undress & Gown    IP General Consult (Use specialty-specific consult if known)    CBC & Differential    Green Top (Gel)    Lavender Top    Gold Top - SST    Light Blue Top       Medications Given in the Emergency Department:  Medications   heparin injection 500 Units (500 Units Intravenous Given 10/16/23 1913)        ED Course:         Labs:    Lab Results (last 24 hours)       Procedure Component Value Units Date/Time    CBC & Differential [[id number]]  (Abnormal) Collected: 10/16/23 1536    Specimen: Blood Updated: 10/16/23 1638    Narrative:      The following orders were created for panel order CBC & Differential.  Procedure                               Abnormality         Status                     ---------                               -----------         ------                     CBC Auto Differential[[id number]]        Abnormal            Final result               Scan Slide[[id number]]                                                                     Please view results for these tests on the individual orders.    CBC Auto Differential [[id number]]  (Abnormal) Collected: 10/16/23 1536    Specimen: Blood Updated: 10/16/23 1557     WBC 1.13 10*3/mm3      RBC 3.80 10*6/mm3      Hemoglobin 9.8 g/dL      Hematocrit 31.0 %      MCV 81.6 fL      MCH 25.8 pg      MCHC 31.6 g/dL      RDW 20.5 %      RDW-SD 59.8 fl      MPV 9.5 fL      Platelets 269 10*3/mm3      nRBC 0.0 /100 WBC     Manual Differential [[id number]]  (Abnormal) Collected: 10/16/23 1536    Specimen: Blood Updated: 10/16/23 1638     Neutrophil % 9.0 %      Lymphocyte % 62.0 %      Monocyte % 14.0 %      Eosinophil % 9.0 %      Basophil % 3.0 %      Myelocyte % 1.0 %      Promyelocyte % 2.0 %      Neutrophils Absolute 0.10 10*3/mm3      Lymphocytes Absolute 0.70 10*3/mm3      Monocytes Absolute 0.16 10*3/mm3      Eosinophils Absolute 0.10 10*3/mm3      Basophils Absolute 0.03 10*3/mm3      nRBC 1.0 /100 WBC      Anisocytosis Slight/1+     Hypochromia Slight/1+     Macrocytes Slight/1+     WBC Morphology Normal     Platelet Estimate Adequate    Comprehensive Metabolic Panel [[id number]]  (Abnormal) Collected: 10/16/23 1537    Specimen: Blood Updated: 10/16/23 1559     Glucose 150 mg/dL      BUN 11 mg/dL      Creatinine 0.76 mg/dL      Sodium 137 mmol/L      Potassium 3.6 mmol/L      Chloride 101 mmol/L      CO2 27.1 mmol/L      Calcium 8.9 mg/dL      Total Protein 6.1 g/dL      Albumin 2.9 g/dL      ALT (SGPT) 24 U/L      AST (SGOT) 35 U/L      Alkaline Phosphatase 305 U/L      Total Bilirubin 0.6 mg/dL      Globulin 3.2 gm/dL      A/G Ratio 0.9 g/dL      BUN/Creatinine Ratio 14.5     Anion Gap 8.9 mmol/L      eGFR 100.4 mL/min/1.73     Narrative:      GFR Normal >60  Chronic Kidney Disease <60  Kidney Failure <15               Imaging:    Duplex Venous Lower Extremity Right    Result Date: 10/16/2023    Acute right lower extremity deep vein thrombosis noted in the posterior tibial and  peroneal.   Chronic right lower extremity superficial thrombophlebitis noted in the small saphenous.   All other right sided veins appeared normal.   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        Differential Diagnosis and Discussion:    Extremity Pain: Differential diagnosis includes but is not limited to soft tissue sprain, tendonitis, tendon injury, dislocation, fracture, deep vein thrombosis, arterial insufficiency, osteoarthritis, bursitis, and ligamentous damage.    All labs were reviewed and interpreted by me.  Ultrasound impression was interpreted by me.     MDM     Amount and/or Complexity of Data Reviewed  Clinical lab tests: reviewed  Tests in the radiology section of CPT®: reviewed  Decide to obtain previous medical records or to obtain history from someone other than the patient: yes             This patient is a pleasant 63-year-old male with history of parotid cancer and gastric cancer now on chemotherapy who presents with right calf pain and swelling since yesterday.    He has pulses present and I do not see any signs of infection or arterial occlusion, but given his malignancy history, I considered DVT possible.    We obtained duplex venous ultrasound of the right leg which demonstrates acute tibial vein and peroneal vein DVT and also chronic superficial thrombophlebitis.      I consulted with the on-call heme-onc physician at his facility at [facility], and it was recommended to start him on Eliquis for this distal right leg DVT in the setting of his metastatic cancer.              Patient Care Considerations:          Consultants/Shared Management Plan:    Consultant: I discussed plan of management with the on-call heme-onc physician    Social Determinants of Health:    Patient is independent, reliable, and has access to care.       Disposition and Care Coordination:    Discharged: The patient is suitable and stable for discharge with no  need for consideration of observation or admission.    I have explained the patient´s condition, diagnoses and treatment plan based on the information available to me at this time. I have answered questions and addressed any concerns. The patient has a good  understanding of the patient´s diagnosis, condition, and treatment plan as can be expected at this point. The vital signs have been stable. The patient´s condition is stable and appropriate for discharge from the emergency department.      The patient will pursue further outpatient evaluation with the primary care physician or other designated or consulting physician as outlined in the discharge instructions. They are agreeable to this plan of care and follow-up instructions have been explained in detail. The patient has received these instructions in written format and have expressed an understanding of the discharge instructions. The patient is aware that any significant change in condition or worsening of symptoms should prompt an immediate return to this or the closest emergency department or call to 911.  I have explained discharge medications and the need for follow up with the patient/caretakers. This was also printed in the discharge instructions. Patient was discharged with the following medications and follow up:      Medication List        New Prescriptions      Eliquis 5 MG tablet tablet  Generic drug: apixaban  Take two 5 mg tablets by mouth every 12 hours for 7 days. Followed by one 5 mg tablet every 12 hours.            Changed      HYDROcodone-acetaminophen 5-325 MG per tablet  Commonly known as: NORCO  Take 1 tablet by mouth Every 6 (Six) Hours As Needed for Severe Pain.  What changed: reasons to take this               Where to Get Your Medications        These medications were sent to [facility] [place]  [address] [place] [address]      Hours: Monday to Friday 9 AM to 7:30 PM, Saturday 9 AM to 2 PM Phone: [phone]    Eliquis 5 MG tablet tablet  HYDROcodone-acetaminophen 5-325 MG per tablet      No follow-up provider specified.     Final diagnoses:   Acute deep vein thrombosis (DVT) of tibial vein of right lower extremity   History of parotid cancer   Chemotherapy-induced neutropenia        ED Disposition       ED Disposition   Discharge    Condition   Stable    Comment   --               This medical record created using voice recognition software.             Stuart Ingram MD  10/16/23 2667

## 2023-10-18 ENCOUNTER — HOSPITAL ENCOUNTER (OUTPATIENT)
Dept: ONCOLOGY | Facility: HOSPITAL | Age: 63
Discharge: HOME | End: 2023-10-18
Admitting: INTERNAL MEDICINE
Payer: OTHER GOVERNMENT

## 2023-10-18 VITALS
SYSTOLIC BLOOD PRESSURE: 134 MMHG | WEIGHT: 177.69 LBS | RESPIRATION RATE: 18 BRPM | DIASTOLIC BLOOD PRESSURE: 67 MMHG | TEMPERATURE: 97.7 F | BODY MASS INDEX: 28.68 KG/M2 | HEART RATE: 77 BPM | OXYGEN SATURATION: 99 %

## 2023-10-18 DIAGNOSIS — C16.0 MALIGNANT NEOPLASM OF CARDIA OF STOMACH: ICD-10-CM

## 2023-10-18 DIAGNOSIS — D50.0 IRON DEFICIENCY ANEMIA DUE TO CHRONIC BLOOD LOSS: ICD-10-CM

## 2023-10-18 DIAGNOSIS — Z45.2 PICC (PERIPHERALLY INSERTED CENTRAL CATHETER) IN PLACE: ICD-10-CM

## 2023-10-18 DIAGNOSIS — Z45.2 FITTING AND ADJUSTMENT OF VASCULAR CATHETER: ICD-10-CM

## 2023-10-18 DIAGNOSIS — K90.49 MALABSORPTION DUE TO INTOLERANCE, NOT ELSEWHERE CLASSIFIED: Primary | ICD-10-CM

## 2023-10-18 PROCEDURE — 25010000002 HEPARIN LOCK FLUSH PER 10 UNITS: Performed by: INTERNAL MEDICINE

## 2023-10-18 PROCEDURE — 96374 THER/PROPH/DIAG INJ IV PUSH: CPT

## 2023-10-18 PROCEDURE — 25810000003 SODIUM CHLORIDE 0.9 % SOLUTION: Performed by: INTERNAL MEDICINE

## 2023-10-18 PROCEDURE — 25010000002 FERRIC CARBOXYMALTOSE 750 MG/15ML SOLUTION: Performed by: INTERNAL MEDICINE

## 2023-10-18 RX ORDER — HEPARIN SODIUM (PORCINE) LOCK FLUSH IV SOLN 100 UNIT/ML 100 UNIT/ML
500 SOLUTION INTRAVENOUS AS NEEDED
OUTPATIENT
Start: 2023-10-18

## 2023-10-18 RX ORDER — SODIUM CHLORIDE 0.9 % (FLUSH) 0.9 %
20 SYRINGE (ML) INJECTION AS NEEDED
OUTPATIENT
Start: 2023-10-18

## 2023-10-18 RX ORDER — HEPARIN SODIUM (PORCINE) LOCK FLUSH IV SOLN 100 UNIT/ML 100 UNIT/ML
500 SOLUTION INTRAVENOUS AS NEEDED
Status: DISCONTINUED | OUTPATIENT
Start: 2023-10-18 | End: 2023-10-19 | Stop reason: HOSPADM

## 2023-10-18 RX ORDER — SODIUM CHLORIDE 9 MG/ML
250 INJECTION, SOLUTION INTRAVENOUS ONCE
Status: COMPLETED | OUTPATIENT
Start: 2023-10-18 | End: 2023-10-18

## 2023-10-18 RX ORDER — SODIUM CHLORIDE 0.9 % (FLUSH) 0.9 %
20 SYRINGE (ML) INJECTION AS NEEDED
Status: DISCONTINUED | OUTPATIENT
Start: 2023-10-18 | End: 2023-10-19 | Stop reason: HOSPADM

## 2023-10-18 RX ADMIN — Medication 20 ML: at 15:30

## 2023-10-18 RX ADMIN — SODIUM CHLORIDE 250 ML: 9 INJECTION, SOLUTION INTRAVENOUS at 14:37

## 2023-10-18 RX ADMIN — HEPARIN SODIUM (PORCINE) LOCK FLUSH IV SOLN 100 UNIT/ML 500 UNITS: 100 SOLUTION at 15:30

## 2023-10-18 RX ADMIN — FERRIC CARBOXYMALTOSE INJECTION 750 MG: 50 INJECTION, SOLUTION INTRAVENOUS at 14:39

## 2023-10-24 ENCOUNTER — OFFICE VISIT (OUTPATIENT)
Dept: ONCOLOGY | Facility: HOSPITAL | Age: 63
End: 2023-10-24
Payer: OTHER GOVERNMENT

## 2023-10-24 ENCOUNTER — HOSPITAL ENCOUNTER (OUTPATIENT)
Dept: ONCOLOGY | Facility: HOSPITAL | Age: 63
Discharge: HOME | End: 2023-10-24
Admitting: INTERNAL MEDICINE
Payer: OTHER GOVERNMENT

## 2023-10-24 VITALS
SYSTOLIC BLOOD PRESSURE: 136 MMHG | TEMPERATURE: 97 F | RESPIRATION RATE: 18 BRPM | BODY MASS INDEX: 30.08 KG/M2 | HEART RATE: 72 BPM | DIASTOLIC BLOOD PRESSURE: 70 MMHG | WEIGHT: 187.17 LBS | OXYGEN SATURATION: 100 % | HEIGHT: 66 IN

## 2023-10-24 DIAGNOSIS — C16.0 MALIGNANT NEOPLASM OF CARDIA OF STOMACH: Primary | ICD-10-CM

## 2023-10-24 DIAGNOSIS — D64.9 ANEMIA, UNSPECIFIED TYPE: ICD-10-CM

## 2023-10-24 DIAGNOSIS — Z45.2 FITTING AND ADJUSTMENT OF VASCULAR CATHETER: ICD-10-CM

## 2023-10-24 DIAGNOSIS — Z45.2 PICC (PERIPHERALLY INSERTED CENTRAL CATHETER) IN PLACE: ICD-10-CM

## 2023-10-24 DIAGNOSIS — I82.451 ACUTE DEEP VEIN THROMBOSIS (DVT) OF RIGHT PERONEAL VEIN: ICD-10-CM

## 2023-10-24 PROBLEM — Z79.4 LONG TERM (CURRENT) USE OF INSULIN: Status: ACTIVE | Noted: 2023-01-01

## 2023-10-24 PROBLEM — E78.5 HYPERLIPIDEMIA, UNSPECIFIED: Status: ACTIVE | Noted: 2023-01-01

## 2023-10-24 PROBLEM — C16.2 MALIGNANT NEOPLASM OF BODY OF STOMACH: Status: RESOLVED | Noted: 2023-09-28 | Resolved: 2023-10-24

## 2023-10-24 PROBLEM — R53.1 WEAKNESS: Status: ACTIVE | Noted: 2023-09-21

## 2023-10-24 PROBLEM — E11.40 TYPE 2 DIABETES MELLITUS WITH DIABETIC NEUROPATHY, UNSPECIFIED: Status: ACTIVE | Noted: 2023-01-01

## 2023-10-24 PROBLEM — J45.909 UNSPECIFIED ASTHMA, UNCOMPLICATED: Status: ACTIVE | Noted: 2023-01-01

## 2023-10-24 PROBLEM — Z87.891 PERSONAL HISTORY OF NICOTINE DEPENDENCE: Status: ACTIVE | Noted: 2023-01-01

## 2023-10-24 PROBLEM — E55.9 VITAMIN D DEFICIENCY, UNSPECIFIED: Status: ACTIVE | Noted: 2023-01-01

## 2023-10-24 PROBLEM — I10 ESSENTIAL (PRIMARY) HYPERTENSION: Status: ACTIVE | Noted: 2023-09-28

## 2023-10-24 PROBLEM — E11.51 TYPE 2 DIABETES MELLITUS WITH DIABETIC PERIPHERAL ANGIOPATHY WITHOUT GANGRENE: Status: ACTIVE | Noted: 2023-01-01

## 2023-10-24 PROBLEM — J44.9 CHRONIC OBSTRUCTIVE PULMONARY DISEASE, UNSPECIFIED: Status: ACTIVE | Noted: 2023-09-28

## 2023-10-24 PROBLEM — N17.9 ACUTE KIDNEY FAILURE, UNSPECIFIED: Status: ACTIVE | Noted: 2023-09-21

## 2023-10-24 PROBLEM — E66.9 OBESITY, UNSPECIFIED: Status: ACTIVE | Noted: 2023-01-01

## 2023-10-24 PROBLEM — C16.2 MALIGNANT NEOPLASM OF BODY OF STOMACH: Status: ACTIVE | Noted: 2023-09-28

## 2023-10-24 PROBLEM — Z95.0 PRESENCE OF CARDIAC PACEMAKER: Status: ACTIVE | Noted: 2023-01-01

## 2023-10-24 PROBLEM — Z79.84 LONG TERM (CURRENT) USE OF ORAL HYPOGLYCEMIC DRUGS: Status: ACTIVE | Noted: 2023-01-01

## 2023-10-24 PROBLEM — G47.33 OBSTRUCTIVE SLEEP APNEA (ADULT) (PEDIATRIC): Status: ACTIVE | Noted: 2023-01-01

## 2023-10-24 LAB
ALBUMIN SERPL-MCNC: 2.9 G/DL (ref 3.5–5.2)
ALBUMIN/GLOB SERPL: 1.1 G/DL
ALP SERPL-CCNC: 254 U/L (ref 39–117)
ALT SERPL W P-5'-P-CCNC: 27 U/L (ref 1–41)
ANION GAP SERPL CALCULATED.3IONS-SCNC: 2.8 MMOL/L (ref 5–15)
AST SERPL-CCNC: 42 U/L (ref 1–40)
BASOPHILS # BLD AUTO: 0.1 10*3/MM3 (ref 0–0.2)
BASOPHILS NFR BLD AUTO: 2.7 % (ref 0–1.5)
BILIRUB SERPL-MCNC: 0.3 MG/DL (ref 0–1.2)
BUN SERPL-MCNC: 6 MG/DL (ref 8–23)
BUN/CREAT SERPL: 8.7 (ref 7–25)
CALCIUM SPEC-SCNC: 8.4 MG/DL (ref 8.6–10.5)
CHLORIDE SERPL-SCNC: 106 MMOL/L (ref 98–107)
CO2 SERPL-SCNC: 27.2 MMOL/L (ref 22–29)
CREAT SERPL-MCNC: 0.69 MG/DL (ref 0.76–1.27)
DEPRECATED RDW RBC AUTO: 69.3 FL (ref 37–54)
EGFRCR SERPLBLD CKD-EPI 2021: 103.3 ML/MIN/1.73
EOSINOPHIL # BLD AUTO: 0.13 10*3/MM3 (ref 0–0.4)
EOSINOPHIL NFR BLD AUTO: 3.5 % (ref 0.3–6.2)
ERYTHROCYTE [DISTWIDTH] IN BLOOD BY AUTOMATED COUNT: 22.7 % (ref 12.3–15.4)
GLOBULIN UR ELPH-MCNC: 2.7 GM/DL
GLUCOSE SERPL-MCNC: 122 MG/DL (ref 65–99)
HCT VFR BLD AUTO: 35.7 % (ref 37.5–51)
HGB BLD-MCNC: 11 G/DL (ref 13–17.7)
IMM GRANULOCYTES # BLD AUTO: 0 10*3/MM3 (ref 0–0.05)
IMM GRANULOCYTES NFR BLD AUTO: 0 % (ref 0–0.5)
LYMPHOCYTES # BLD AUTO: 1.31 10*3/MM3 (ref 0.7–3.1)
LYMPHOCYTES NFR BLD AUTO: 35.4 % (ref 19.6–45.3)
MCH RBC QN AUTO: 26.3 PG (ref 26.6–33)
MCHC RBC AUTO-ENTMCNC: 30.8 G/DL (ref 31.5–35.7)
MCV RBC AUTO: 85.4 FL (ref 79–97)
MONOCYTES # BLD AUTO: 0.57 10*3/MM3 (ref 0.1–0.9)
MONOCYTES NFR BLD AUTO: 15.4 % (ref 5–12)
NEUTROPHILS NFR BLD AUTO: 1.59 10*3/MM3 (ref 1.7–7)
NEUTROPHILS NFR BLD AUTO: 43 % (ref 42.7–76)
PLATELET # BLD AUTO: 217 10*3/MM3 (ref 140–450)
PMV BLD AUTO: 9 FL (ref 6–12)
POTASSIUM SERPL-SCNC: 3.4 MMOL/L (ref 3.5–5.2)
PROT SERPL-MCNC: 5.6 G/DL (ref 6–8.5)
RBC # BLD AUTO: 4.18 10*6/MM3 (ref 4.14–5.8)
SODIUM SERPL-SCNC: 136 MMOL/L (ref 136–145)
WBC NRBC COR # BLD: 3.7 10*3/MM3 (ref 3.4–10.8)

## 2023-10-24 PROCEDURE — 36591 DRAW BLOOD OFF VENOUS DEVICE: CPT

## 2023-10-24 PROCEDURE — 80053 COMPREHEN METABOLIC PANEL: CPT | Performed by: INTERNAL MEDICINE

## 2023-10-24 PROCEDURE — 85025 COMPLETE CBC W/AUTO DIFF WBC: CPT | Performed by: INTERNAL MEDICINE

## 2023-10-24 PROCEDURE — 25010000002 HEPARIN LOCK FLUSH PER 10 UNITS: Performed by: INTERNAL MEDICINE

## 2023-10-24 RX ORDER — FAMOTIDINE 10 MG/ML
20 INJECTION, SOLUTION INTRAVENOUS AS NEEDED
Status: CANCELLED | OUTPATIENT
Start: 2023-10-25

## 2023-10-24 RX ORDER — SODIUM CHLORIDE 0.9 % (FLUSH) 0.9 %
20 SYRINGE (ML) INJECTION AS NEEDED
Status: CANCELLED | OUTPATIENT
Start: 2023-10-24

## 2023-10-24 RX ORDER — FLUOROURACIL 50 MG/ML
400 INJECTION, SOLUTION INTRAVENOUS ONCE
Status: CANCELLED | OUTPATIENT
Start: 2023-10-25

## 2023-10-24 RX ORDER — HEPARIN SODIUM (PORCINE) LOCK FLUSH IV SOLN 100 UNIT/ML 100 UNIT/ML
500 SOLUTION INTRAVENOUS AS NEEDED
Status: CANCELLED | OUTPATIENT
Start: 2023-10-27

## 2023-10-24 RX ORDER — HEPARIN SODIUM (PORCINE) LOCK FLUSH IV SOLN 100 UNIT/ML 100 UNIT/ML
500 SOLUTION INTRAVENOUS AS NEEDED
Status: DISCONTINUED | OUTPATIENT
Start: 2023-10-24 | End: 2023-10-25 | Stop reason: HOSPADM

## 2023-10-24 RX ORDER — DIPHENHYDRAMINE HYDROCHLORIDE 50 MG/ML
50 INJECTION INTRAMUSCULAR; INTRAVENOUS AS NEEDED
Status: CANCELLED | OUTPATIENT
Start: 2023-10-25

## 2023-10-24 RX ORDER — SODIUM CHLORIDE 0.9 % (FLUSH) 0.9 %
20 SYRINGE (ML) INJECTION AS NEEDED
Status: DISCONTINUED | OUTPATIENT
Start: 2023-10-24 | End: 2023-10-25 | Stop reason: HOSPADM

## 2023-10-24 RX ORDER — DEXTROSE MONOHYDRATE 50 MG/ML
250 INJECTION, SOLUTION INTRAVENOUS ONCE
Status: CANCELLED | OUTPATIENT
Start: 2023-10-25

## 2023-10-24 RX ORDER — PALONOSETRON 0.05 MG/ML
0.25 INJECTION, SOLUTION INTRAVENOUS ONCE
Status: CANCELLED | OUTPATIENT
Start: 2023-10-25

## 2023-10-24 RX ADMIN — HEPARIN SODIUM (PORCINE) LOCK FLUSH IV SOLN 100 UNIT/ML 500 UNITS: 100 SOLUTION at 09:24

## 2023-10-24 RX ADMIN — Medication 20 ML: at 09:24

## 2023-10-24 NOTE — PROGRESS NOTES
Chief Complaint  Malignant neoplasm of body of stomach    Reji Fall MD Blakey, Ev Fan, CHIOMA    Subjective          Kevin José Luis presents to [facility] for metastatic gastric cancer    Mr. 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 Patient started on FOLFOX on 9/27/23.    Interval History  Patient presents for follow up. He is due for cycle 3 of FOLFOX tomorrow. Tolerated second cycle well. No nausea or vomiting. He does have some fatigue for a few days after but this resolves. Reports his central pain has resolved. Sensation when eating has resolved.  Anemia continues to improve. No fevers, chills, infections.  Did have diarrhea yesterday but thinks this may have been diet related.     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     [facility]: [facility] [place] [place] [facility].5 [facility] of right side temporal pain, decreased appetite with 19 pounds of weight loss. With associated melanotic stools.     9/6/23: EGD with Gastric mass biopsy positive for high grade malignant neoplasm consistent with carcinoma (non small cell)    9/27/23: C1D1 mFOLFOX    9/12/23: NM PET: Gastric hypermetabolic mass involving th gastric cardia and fundus with loco-regional sarkis mets and disseminated hepatic metastases.     9/12/23: Liver lesion biopsy: positive for metastatic gastric adenocarcinoma    9/19/23: [facility] NGS: PIK3CA amplification, TP53 R273H, no associated FDA-approved therapies. MS-stable. [facility] NGS: HER2 negative, MSI-stable, BRAF not detected, PDL1 IHC score of 0. 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     9/26/23: C1D1  FOLFOX     Malignant neoplasm of parotid gland   8/26/2022 Initial Diagnosis    Malignant neoplasm of parotid gland (HCC)     8/26/2022 -  Radiation    RADIATION THERAPY Treatment Details (Noted on 8/26/2022)  Site: Left Head and Neck  Technique: IMRT  Goal: No goal specified  Planned Treatment Start Date: No planned start date specified     Malignant neoplasm of cardia of stomach   9/14/2023 Initial Diagnosis    Malignant neoplasm of cardia of stomach     9/14/2023 Cancer Staged    Staging form: Stomach, AJCC 8th Edition  - Clinical: Stage IVB (pM1) - Signed by Govind Pruitt MD on 9/14/2023     9/15/2023 - 9/15/2023 Chemotherapy    OP CENTRAL VENOUS ACCESS DEVICE Access, Care, and Maintenance (CVAD)     9/27/2023 -  Chemotherapy    OP COLON mFOLFOX6 OXALIplatin / Leucovorin / Fluorouracil     9/29/2023 -  Chemotherapy    OP CENTRAL VENOUS ACCESS DEVICE Access, Care, and Maintenance (CVAD)         Review of Systems   Constitutional:  Positive for fatigue.   Respiratory:  Negative for chest tightness.    Gastrointestinal:  Positive for diarrhea.   Psychiatric/Behavioral:  Positive for sleep disturbance.    All other systems reviewed and are negative.    Current Outpatient Medications on File Prior to Visit   Medication Sig Dispense Refill    apixaban (ELIQUIS) 5 MG tablet tablet Take two 5 mg tablets by mouth every 12 hours for 7 days. Followed by one 5 mg tablet every 12 hours. 74 tablet 0    atorvastatin (LIPITOR) 80 MG tablet Take 1 tablet by mouth Every Night.      chlorthalidone (HYGROTON) 25 MG tablet Take 0.5 tablets by mouth Daily.      cholecalciferol (VITAMIN D3) 25 MCG (1000 UT) tablet Take 1 tablet by mouth Daily. LAST DOSE 3/26/23      Dulaglutide 1.5 MG/0.5ML solution pen-injector Inject 1.5 mg under the skin into the appropriate area as directed Every 7 (Seven) Days. THURSDAY      fluticasone-salmeterol (ADVAIR HFA) 230-21 MCG/ACT inhaler Inhale 2 puffs 2 (Two) Times a Day.      gabapentin  (NEURONTIN) 600 MG tablet Take 2 tablets by mouth 2 (Two) Times a Day.      glipizide (GLUCOTROL) 10 MG tablet Take 1 tablet by mouth 2 (Two) Times a Day Before Meals.      HYDROcodone-acetaminophen (NORCO) 5-325 MG per tablet Take 1 tablet by mouth Every 6 (Six) Hours As Needed for Severe Pain. 12 tablet 0    insulin NPH (NovoLIN N ReliOn) 100 UNIT/ML injection Inject 52 Units under the skin into the appropriate area as directed 2 (Two) Times a Day Before Meals.      LORazepam (ATIVAN) 1 MG tablet Take 1 tablet by mouth Every 8 (Eight) Hours As Needed for Anxiety. 60 tablet 0    LORazepam (ATIVAN) 1 MG tablet Take 1 tablet by mouth Every 8 (Eight) Hours As Needed for Anxiety. 10 tablet 0    losartan (COZAAR) 50 MG tablet Take 2 tablets by mouth Daily.      metFORMIN (GLUCOPHAGE) 1000 MG tablet Take 1 tablet by mouth 2 (Two) Times a Day With Meals. INST PER ANESTHESIA  PROTOCOL      mirtazapine (REMERON) 15 MG tablet Take 1 tablet by mouth Every Night. 30 tablet 5    mirtazapine (REMERON) 15 MG tablet Take 1 tablet by mouth Every Night. (Patient not taking: Reported on 10/10/2023) 7 tablet 0    montelukast (SINGULAIR) 10 MG tablet Take 1 tablet by mouth Every Night.      OLANZapine (zyPREXA) 5 MG tablet Take 1 tablet by mouth Every Night. Indications: Nausea and Vomiting caused by Cancer Chemotherapy, if no improvement, call provider 30 tablet 2    OLANZapine (zyPREXA) 5 MG tablet Take 1 tablet by mouth Every Night. (Patient not taking: Reported on 10/10/2023) 7 tablet 0    Omega-3 Fatty Acids (fish oil) 1000 MG capsule capsule Take  by mouth Daily With Breakfast. LAST DOSE 3/26/23      omeprazole (priLOSEC) 40 MG capsule Take 1 capsule by mouth 2 (Two) Times a Day.      ondansetron (ZOFRAN) 8 MG tablet Take 1 tablet by mouth 3 (Three) Times a Day As Needed for Nausea or Vomiting (mail to patient please.). 30 tablet 5     Current Facility-Administered Medications on File Prior to Visit   Medication Dose Route  Frequency Provider Last Rate Last Admin    heparin injection 500 Units  500 Units Intravenous PRN Govind Pruitt MD   500 Units at 10/24/23 0924    sodium chloride 0.9 % flush 20 mL  20 mL Intravenous PRN Govind Pruitt MD   20 mL at 10/24/23 0924       No Known Allergies  Past Medical History:   Diagnosis Date    Asthma     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    Diabetes mellitus     Hyperlipidemia     Hypertension     Rotator cuff disorder     LEFT    Sleep apnea      Past Surgical History:   Procedure Laterality Date    CAROTID ENDARTERECTOMY Right     CHOLECYSTECTOMY      FOOT SURGERY Right     TOE DEBRIDMENT    HERNIA REPAIR      VENTRAL HERNIA    SHOULDER ARTHROSCOPY W/ ROTATOR CUFF REPAIR Left 4/3/2023    Procedure: LEFT SHOULDER ARTHROSCOPIC ROTATOR CUFF DEBRIDEMENT, LABRAL DEBRIDEMENT, BICEPS TENODESIS, SUBACROMIAL DECOMPRESSION;  Surgeon: Chas Patterson MD;  Location: [facility] OR [facility];  Service: Orthopedics;  Laterality: Left;    TUMOR REMOVAL Left     PAROTID GLAND    VASCULAR SURGERY Right     STENT R LEG    VENOUS ACCESS DEVICE (PORT) INSERTION Right 2023    Procedure: INSERTION VENOUS ACCESS DEVICE;  Surgeon: James Copeland MD;  Location: [facility] OR;  Service: General;  Laterality: Right;     Social History     Socioeconomic History    Marital status:    Tobacco Use    Smoking status: Former     Years: 25     Types: Cigarettes     Start date:      Quit date:      Years since quittin.8    Smokeless tobacco: Former   Vaping Use    Vaping Use: Never used   Substance and Sexual Activity    Alcohol use: Never    Drug use: Never    Sexual activity: Defer     Family History   Problem Relation Age of Onset    Lung cancer Father     Malig Hyperthermia Neg Hx        Objective   Physical Exam  Well appearing patient in no acute distress on RA  Anicteric sclerae, no rash on exposed skin  Respirations non-labored  Awake, alert, and oriented x  "4. Speech intact. No gross neurologic deficit  Appropriate mood and affect    Vitals:    10/24/23 0950   BP: 136/70   Pulse: 72   Resp: 18   Temp: 97 °F (36.1 °C)   SpO2: 100%   Weight: 84.9 kg (187 lb 2.7 oz)   Height: 167.6 cm (65.98\")   PainSc: 0-No pain                 PHQ-9 Total Score:             Result Review :   The following data was reviewed by: Govind Pruitt MD on 10/24/23:  Lab Results   Component Value Date    HGB 11.0 (L) 10/24/2023    HCT 35.7 (L) 10/24/2023    MCV 85.4 10/24/2023     10/24/2023    WBC 3.70 10/24/2023    NEUTROABS 1.59 (L) 10/24/2023    LYMPHSABS 1.31 10/24/2023    MONOSABS 0.57 10/24/2023    EOSABS 0.13 10/24/2023    BASOSABS 0.10 10/24/2023     Lab Results   Component Value Date    GLUCOSE 122 (H) 10/24/2023    BUN 6 (L) 10/24/2023    CREATININE 0.69 (L) 10/24/2023     10/24/2023    K 3.4 (L) 10/24/2023     10/24/2023    CO2 27.2 10/24/2023    CALCIUM 8.4 (L) 10/24/2023    PROTEINTOT 5.6 (L) 10/24/2023    ALBUMIN 2.9 (L) 10/24/2023    BILITOT 0.3 10/24/2023    ALKPHOS 254 (H) 10/24/2023    AST 42 (H) 10/24/2023    ALT 27 10/24/2023     Lab Results   Component Value Date    MG 1.7 09/21/2023     Labs personally reviewed and anemia improving. Alk phos decreasing. ANC 1.59.     [facility] personally reviewed    [facility]H notes personally reviewed    Pathology report personally reviewed    PET report personally reviewed      XR Chest 1 View    Result Date: 9/26/2023   Right IJ port with tip at cavoatrial junction.  No pneumothorax identified.       JOSE D COYNE MD       Electronically Signed and Approved By: JOSE D COYNE MD on 9/26/2023 at 14:35             XR Chest 1 View    Result Date: 9/24/2023    No acute infiltrate is appreciated.     Please note that portions of this note were completed with a voice recognition program.  LYNSEY DE LOS SANTOS JR, MD       Electronically Signed and Approved By: LYNSEY DE LOS SANTOS JR, MD on 9/24/2023 at 22:35                 "   Assessment and Plan    Diagnoses and all orders for this visit:    1. 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 Acute deep vein thrombosis (DVT) of right peroneal vein    3. Anemia, unspecified type    Other orders  -     dextrose (D5W) 5 % infusion 250 mL  -     palonosetron (ALOXI) injection 0.25 mg  -     dexAMETHasone (DECADRON) 12 mg in sodium chloride 0.9 % IVPB  -     OXALIplatin (ELOXATIN) 170 mg in dextrose (D5W) 5 % 284 mL chemo IVPB  -     leucovorin 790 mg in dextrose (D5W) 5 % 329 mL IVPB  -     fluorouracil (ADRUCIL) chemo injection 790 mg  -     fluorouracil (ADRUCIL) 4,750 mg in sodium chloride 0.9 % 95 mL chemo infusion - FOR HOME USE  -     Hydrocortisone Sod Suc (PF) (Solu-CORTEF) injection 100 mg  -     diphenhydrAMINE (BENADRYL) injection 50 mg  -     famotidine (PEPCID) injection 20 mg        Metastatic stage IV gastric cancer.  Patient with PET scan (9/12/23) with hepatic mets that have been biopsy-proven to be gastric cancer.  HER2 and PD-L1 both negative per Jamie and vivian. Recommend systemic therapy with modified FOLFOX which includes bolus and infusional 5-FU with oxaliplatin.  C1D1 mFOLFOX 9/27/23.  Alk phos decreased after treatment.    C3D1 mFOLFOX 10/25/23. Labs appropriate to proceed    Plan for imaging after C4.     Anemia from cancer with iron deficiency  Recently received IV iron while hospitalized. Iron saturation remains low. Will repeat Injectafer x 2 starting 10/11/23. Hgb improving as of 10/10/23. Up to 11.0 as of 10/24.    RLE DVT  Duplex on 10/16 for RLE edema: Acute right lower extremity deep vein thrombosis noted in the posterior tibial and peroneal. Provoked from gastric cancer. Started on Eliquis at 5 mg BID with improvement in symptoms. Continue lifelong due to active malignancy.     Portal vein thrombosis  Anticoagulation for RLE DVT should treat this as well as treatment of above malignanct    Insomnia  Continue nightly remeron, lorazepam.     This  is an acute or chronic illness that poses a threat to life or bodily function. The above treatment plan involves a high risk of complications and/or mortality of patient management.    I spent 25 minutes caring for Kevin on this date of service. This time includes time spent by me in the following activities:preparing for the visit, reviewing tests, obtaining and/or reviewing a separately obtained history, performing a medically appropriate examination and/or evaluation , counseling and educating the patient/family/caregiver, ordering medications, tests, or procedures, referring and communicating with other health care professionals , documenting information in the medical record, independently interpreting results and communicating that information with the patient/family/caregiver, and care coordination    Patient Follow Up: C4 chemo  Patient was given instructions and counseling regarding his condition or for health maintenance advice. Please see specific information pulled into the AVS if appropriate.

## 2023-10-25 ENCOUNTER — HOSPITAL ENCOUNTER (OUTPATIENT)
Dept: ONCOLOGY | Facility: HOSPITAL | Age: 63
Discharge: HOME | End: 2023-10-25
Admitting: INTERNAL MEDICINE
Payer: OTHER GOVERNMENT

## 2023-10-25 ENCOUNTER — DOCUMENTATION (OUTPATIENT)
Dept: ONCOLOGY | Facility: HOSPITAL | Age: 63
End: 2023-10-25
Payer: OTHER GOVERNMENT

## 2023-10-25 VITALS
HEART RATE: 77 BPM | DIASTOLIC BLOOD PRESSURE: 70 MMHG | OXYGEN SATURATION: 100 % | SYSTOLIC BLOOD PRESSURE: 136 MMHG | TEMPERATURE: 97.1 F | HEIGHT: 66 IN | RESPIRATION RATE: 20 BRPM | BODY MASS INDEX: 30.15 KG/M2 | WEIGHT: 187.61 LBS

## 2023-10-25 DIAGNOSIS — C16.0 MALIGNANT NEOPLASM OF CARDIA OF STOMACH: Primary | ICD-10-CM

## 2023-10-25 PROCEDURE — 0 DEXTROSE 5 % SOLUTION: Performed by: INTERNAL MEDICINE

## 2023-10-25 PROCEDURE — 96368 THER/DIAG CONCURRENT INF: CPT

## 2023-10-25 PROCEDURE — 0 DEXTROSE 5 % SOLUTION 250 ML FLEX CONT: Performed by: INTERNAL MEDICINE

## 2023-10-25 PROCEDURE — 25010000002 PALONOSETRON PER 25 MCG: Performed by: INTERNAL MEDICINE

## 2023-10-25 PROCEDURE — 96411 CHEMO IV PUSH ADDL DRUG: CPT

## 2023-10-25 PROCEDURE — G0498 CHEMO EXTEND IV INFUS W/PUMP: HCPCS

## 2023-10-25 PROCEDURE — 25010000002 LEUCOVORIN CALCIUM PER 50 MG: Performed by: INTERNAL MEDICINE

## 2023-10-25 PROCEDURE — 25010000002 DEXAMETHASONE SODIUM PHOSPHATE 120 MG/30ML SOLUTION: Performed by: INTERNAL MEDICINE

## 2023-10-25 PROCEDURE — 25010000002 FLUOROURACIL PER 500 MG: Performed by: INTERNAL MEDICINE

## 2023-10-25 PROCEDURE — 96375 TX/PRO/DX INJ NEW DRUG ADDON: CPT

## 2023-10-25 PROCEDURE — 96413 CHEMO IV INFUSION 1 HR: CPT

## 2023-10-25 PROCEDURE — 25010000002 OXALIPLATIN PER 0.5 MG: Performed by: INTERNAL MEDICINE

## 2023-10-25 PROCEDURE — 96415 CHEMO IV INFUSION ADDL HR: CPT

## 2023-10-25 RX ORDER — FLUOROURACIL 50 MG/ML
800 INJECTION, SOLUTION INTRAVENOUS ONCE
Status: COMPLETED | OUTPATIENT
Start: 2023-10-25 | End: 2023-10-25

## 2023-10-25 RX ORDER — DEXTROSE MONOHYDRATE 50 MG/ML
250 INJECTION, SOLUTION INTRAVENOUS ONCE
Status: COMPLETED | OUTPATIENT
Start: 2023-10-25 | End: 2023-10-25

## 2023-10-25 RX ORDER — PALONOSETRON 0.05 MG/ML
0.25 INJECTION, SOLUTION INTRAVENOUS ONCE
Status: COMPLETED | OUTPATIENT
Start: 2023-10-25 | End: 2023-10-25

## 2023-10-25 RX ADMIN — LEUCOVORIN CALCIUM 800 MG: 350 INJECTION, POWDER, LYOPHILIZED, FOR SOLUTION INTRAMUSCULAR; INTRAVENOUS at 09:03

## 2023-10-25 RX ADMIN — FLUOROURACIL 800 MG: 50 INJECTION, SOLUTION INTRAVENOUS at 11:08

## 2023-10-25 RX ADMIN — PALONOSETRON 0.25 MG: 0.05 INJECTION, SOLUTION INTRAVENOUS at 08:21

## 2023-10-25 RX ADMIN — DEXTROSE MONOHYDRATE 250 ML: 50 INJECTION, SOLUTION INTRAVENOUS at 08:12

## 2023-10-25 RX ADMIN — DEXAMETHASONE SODIUM PHOSPHATE 12 MG: 4 INJECTION, SOLUTION INTRA-ARTICULAR; INTRALESIONAL; INTRAMUSCULAR; INTRAVENOUS; SOFT TISSUE at 08:23

## 2023-10-25 RX ADMIN — FLUOROURACIL 4750 MG: 50 INJECTION, SOLUTION INTRAVENOUS at 11:16

## 2023-10-25 RX ADMIN — OXALIPLATIN 170 MG: 5 INJECTION, SOLUTION INTRAVENOUS at 09:03

## 2023-10-25 NOTE — PROGRESS NOTES
Diagnosis: Metastatic gastric cancer to the liver     Reason for Referral: [facility] travel reimbursement    Content of Visit: OSW met with Mr. Ordonez and his spouse in the medical oncology infusion center this morning to assist him in completing his VA travel reimbursement form for appointments attended on 10/13/23, 10/18/23, 10/24/23, and 10/25/23. OSW mailed this to the [facility] today.   Mr. Ordonez was pleasant and in good spirits this morning. He reports overall feeling well and hoping to continue to feel this way. His spouse reports he's been keeping busy and active, completing projects around the house. They're currently putting up a wood fence for their miniature horses and plan to restain the dock to their [place]oi pond. Mr. Ordonez reports his lab numbers have continued to improve. He is hopeful the cancer is shrinking or stable and has not progressed. He reports he'll have a repeat scan after his next cycle. He is fearful of receiving results that indicate his disease is progressing. Provided emotional support throughout our encounter, utilizing empathy and active listening and normalizing his scaniexty. Mr. Ordonez reports he was able to get a hold of the dietitian at his PCP office and she has located the [employer] Ensure Clear for him. He reports eating a lot of fish sandwiches from [employer] and was advised by his oncologist he can eat whatever he wants as long as he doesn't lose weight. He reports his weight has fluctuated, but is not consistently losing. Mr. Ordonez and his spouse graciously shared stories of their youngest daughter's interests in hunting and animals. She is a clarissa in high school and has an interest in attending [employer]. She recently got herself her first job and today is her first day working at [employer]. Mr. Ordonez and his spouse are looking forward to their daughter harvesting deer this season for them to prepare their favorite venison stew, jerky, steak on the grill, etc. No  additional needs identified at this time. Encouraged OSW support remains available.      Resources/Referrals Provided: VA travel reimbursement, emotional support

## 2023-10-27 ENCOUNTER — HOSPITAL ENCOUNTER (OUTPATIENT)
Dept: ONCOLOGY | Facility: HOSPITAL | Age: 63
Discharge: HOME | End: 2023-10-27
Payer: OTHER GOVERNMENT

## 2023-10-27 DIAGNOSIS — Z45.2 PICC (PERIPHERALLY INSERTED CENTRAL CATHETER) IN PLACE: ICD-10-CM

## 2023-10-27 DIAGNOSIS — C16.0 MALIGNANT NEOPLASM OF CARDIA OF STOMACH: ICD-10-CM

## 2023-10-27 DIAGNOSIS — Z45.2 FITTING AND ADJUSTMENT OF VASCULAR CATHETER: Primary | ICD-10-CM

## 2023-10-27 PROCEDURE — 25010000002 HEPARIN LOCK FLUSH PER 10 UNITS: Performed by: INTERNAL MEDICINE

## 2023-10-27 RX ORDER — SODIUM CHLORIDE 0.9 % (FLUSH) 0.9 %
20 SYRINGE (ML) INJECTION AS NEEDED
OUTPATIENT
Start: 2023-10-27

## 2023-10-27 RX ORDER — HEPARIN SODIUM (PORCINE) LOCK FLUSH IV SOLN 100 UNIT/ML 100 UNIT/ML
500 SOLUTION INTRAVENOUS AS NEEDED
OUTPATIENT
Start: 2023-10-27

## 2023-10-27 RX ORDER — HEPARIN SODIUM (PORCINE) LOCK FLUSH IV SOLN 100 UNIT/ML 100 UNIT/ML
500 SOLUTION INTRAVENOUS AS NEEDED
Status: DISCONTINUED | OUTPATIENT
Start: 2023-10-27 | End: 2023-10-28 | Stop reason: HOSPADM

## 2023-10-27 RX ORDER — SODIUM CHLORIDE 0.9 % (FLUSH) 0.9 %
20 SYRINGE (ML) INJECTION AS NEEDED
Status: DISCONTINUED | OUTPATIENT
Start: 2023-10-27 | End: 2023-10-28 | Stop reason: HOSPADM

## 2023-10-27 RX ADMIN — Medication 20 ML: at 11:07

## 2023-10-27 RX ADMIN — HEPARIN SODIUM (PORCINE) LOCK FLUSH IV SOLN 100 UNIT/ML 500 UNITS: 100 SOLUTION at 11:07

## 2023-11-02 ENCOUNTER — TELEPHONE (OUTPATIENT)
Dept: ONCOLOGY | Facility: HOSPITAL | Age: 63
End: 2023-11-02
Payer: OTHER GOVERNMENT

## 2023-11-02 NOTE — TELEPHONE ENCOUNTER
Received message from Blank LOZANO, that the home health nurse called to report patient has a subconjunctival hemorrhage  in his right eye. Nurses stated she felt it was improving and patient denied any pain and vision changes. But since he was taking eliquis she felt she should notify the provider.

## 2023-11-07 ENCOUNTER — HOSPITAL ENCOUNTER (OUTPATIENT)
Dept: ONCOLOGY | Facility: HOSPITAL | Age: 63
Discharge: HOME | End: 2023-11-07
Admitting: INTERNAL MEDICINE
Payer: OTHER GOVERNMENT

## 2023-11-07 ENCOUNTER — OFFICE VISIT (OUTPATIENT)
Dept: ONCOLOGY | Facility: HOSPITAL | Age: 63
End: 2023-11-07
Payer: OTHER GOVERNMENT

## 2023-11-07 VITALS
TEMPERATURE: 97.4 F | OXYGEN SATURATION: 99 % | HEIGHT: 66 IN | SYSTOLIC BLOOD PRESSURE: 139 MMHG | RESPIRATION RATE: 18 BRPM | WEIGHT: 191.8 LBS | HEART RATE: 80 BPM | DIASTOLIC BLOOD PRESSURE: 61 MMHG | BODY MASS INDEX: 30.82 KG/M2

## 2023-11-07 DIAGNOSIS — T45.1X5A ANEMIA ASSOCIATED WITH CHEMOTHERAPY: ICD-10-CM

## 2023-11-07 DIAGNOSIS — Z45.2 FITTING AND ADJUSTMENT OF VASCULAR CATHETER: ICD-10-CM

## 2023-11-07 DIAGNOSIS — C16.0 MALIGNANT NEOPLASM OF CARDIA OF STOMACH: Primary | ICD-10-CM

## 2023-11-07 DIAGNOSIS — D50.0 IRON DEFICIENCY ANEMIA DUE TO CHRONIC BLOOD LOSS: ICD-10-CM

## 2023-11-07 DIAGNOSIS — D64.81 ANEMIA ASSOCIATED WITH CHEMOTHERAPY: ICD-10-CM

## 2023-11-07 DIAGNOSIS — E87.6 HYPOKALEMIA: ICD-10-CM

## 2023-11-07 DIAGNOSIS — Z45.2 PICC (PERIPHERALLY INSERTED CENTRAL CATHETER) IN PLACE: ICD-10-CM

## 2023-11-07 LAB
ALBUMIN SERPL-MCNC: 3.1 G/DL (ref 3.5–5.2)
ALBUMIN/GLOB SERPL: 1.1 G/DL
ALP SERPL-CCNC: 173 U/L (ref 39–117)
ALT SERPL W P-5'-P-CCNC: 20 U/L (ref 1–41)
ANION GAP SERPL CALCULATED.3IONS-SCNC: 12 MMOL/L (ref 5–15)
AST SERPL-CCNC: 34 U/L (ref 1–40)
BASOPHILS # BLD AUTO: 0.08 10*3/MM3 (ref 0–0.2)
BASOPHILS NFR BLD AUTO: 1.6 % (ref 0–1.5)
BILIRUB SERPL-MCNC: 0.4 MG/DL (ref 0–1.2)
BUN SERPL-MCNC: 10 MG/DL (ref 8–23)
BUN/CREAT SERPL: 11.6 (ref 7–25)
CALCIUM SPEC-SCNC: 8.4 MG/DL (ref 8.6–10.5)
CHLORIDE SERPL-SCNC: 107 MMOL/L (ref 98–107)
CO2 SERPL-SCNC: 22 MMOL/L (ref 22–29)
CREAT SERPL-MCNC: 0.86 MG/DL (ref 0.76–1.27)
DEPRECATED RDW RBC AUTO: 71.6 FL (ref 37–54)
EGFRCR SERPLBLD CKD-EPI 2021: 96.7 ML/MIN/1.73
EOSINOPHIL # BLD AUTO: 0.21 10*3/MM3 (ref 0–0.4)
EOSINOPHIL NFR BLD AUTO: 4.2 % (ref 0.3–6.2)
ERYTHROCYTE [DISTWIDTH] IN BLOOD BY AUTOMATED COUNT: 23.1 % (ref 12.3–15.4)
GLOBULIN UR ELPH-MCNC: 2.9 GM/DL
GLUCOSE SERPL-MCNC: 86 MG/DL (ref 65–99)
HCT VFR BLD AUTO: 35 % (ref 37.5–51)
HGB BLD-MCNC: 11.2 G/DL (ref 13–17.7)
IMM GRANULOCYTES # BLD AUTO: 0 10*3/MM3 (ref 0–0.05)
IMM GRANULOCYTES NFR BLD AUTO: 0 % (ref 0–0.5)
LYMPHOCYTES # BLD AUTO: 1.23 10*3/MM3 (ref 0.7–3.1)
LYMPHOCYTES NFR BLD AUTO: 24.7 % (ref 19.6–45.3)
MCH RBC QN AUTO: 27 PG (ref 26.6–33)
MCHC RBC AUTO-ENTMCNC: 32 G/DL (ref 31.5–35.7)
MCV RBC AUTO: 84.3 FL (ref 79–97)
MONOCYTES # BLD AUTO: 0.61 10*3/MM3 (ref 0.1–0.9)
MONOCYTES NFR BLD AUTO: 12.3 % (ref 5–12)
NEUTROPHILS NFR BLD AUTO: 2.84 10*3/MM3 (ref 1.7–7)
NEUTROPHILS NFR BLD AUTO: 57.2 % (ref 42.7–76)
PLATELET # BLD AUTO: 161 10*3/MM3 (ref 140–450)
PMV BLD AUTO: 9.5 FL (ref 6–12)
POTASSIUM SERPL-SCNC: 3.4 MMOL/L (ref 3.5–5.2)
PROT SERPL-MCNC: 6 G/DL (ref 6–8.5)
RBC # BLD AUTO: 4.15 10*6/MM3 (ref 4.14–5.8)
SODIUM SERPL-SCNC: 141 MMOL/L (ref 136–145)
WBC NRBC COR # BLD: 4.97 10*3/MM3 (ref 3.4–10.8)

## 2023-11-07 PROCEDURE — 36591 DRAW BLOOD OFF VENOUS DEVICE: CPT

## 2023-11-07 PROCEDURE — 80053 COMPREHEN METABOLIC PANEL: CPT | Performed by: INTERNAL MEDICINE

## 2023-11-07 PROCEDURE — 25010000002 HEPARIN LOCK FLUSH PER 10 UNITS: Performed by: INTERNAL MEDICINE

## 2023-11-07 PROCEDURE — 85025 COMPLETE CBC W/AUTO DIFF WBC: CPT | Performed by: INTERNAL MEDICINE

## 2023-11-07 RX ORDER — SODIUM CHLORIDE 0.9 % (FLUSH) 0.9 %
20 SYRINGE (ML) INJECTION AS NEEDED
Status: DISCONTINUED | OUTPATIENT
Start: 2023-11-07 | End: 2023-11-08 | Stop reason: HOSPADM

## 2023-11-07 RX ORDER — DEXTROSE MONOHYDRATE 50 MG/ML
250 INJECTION, SOLUTION INTRAVENOUS ONCE
Status: CANCELLED | OUTPATIENT
Start: 2023-11-08

## 2023-11-07 RX ORDER — FAMOTIDINE 10 MG/ML
20 INJECTION, SOLUTION INTRAVENOUS AS NEEDED
Status: CANCELLED | OUTPATIENT
Start: 2023-11-08

## 2023-11-07 RX ORDER — HEPARIN SODIUM (PORCINE) LOCK FLUSH IV SOLN 100 UNIT/ML 100 UNIT/ML
500 SOLUTION INTRAVENOUS AS NEEDED
Status: CANCELLED | OUTPATIENT
Start: 2023-11-08

## 2023-11-07 RX ORDER — SODIUM CHLORIDE 0.9 % (FLUSH) 0.9 %
20 SYRINGE (ML) INJECTION AS NEEDED
Status: CANCELLED | OUTPATIENT
Start: 2023-11-07

## 2023-11-07 RX ORDER — POTASSIUM CHLORIDE 750 MG/1
10 TABLET, EXTENDED RELEASE ORAL DAILY
Qty: 14 TABLET | Refills: 0 | Status: SHIPPED | OUTPATIENT
Start: 2023-11-07

## 2023-11-07 RX ORDER — DIPHENHYDRAMINE HYDROCHLORIDE 50 MG/ML
50 INJECTION INTRAMUSCULAR; INTRAVENOUS AS NEEDED
Status: CANCELLED | OUTPATIENT
Start: 2023-11-08

## 2023-11-07 RX ORDER — PALONOSETRON 0.05 MG/ML
0.25 INJECTION, SOLUTION INTRAVENOUS ONCE
Status: CANCELLED | OUTPATIENT
Start: 2023-11-08

## 2023-11-07 RX ORDER — HEPARIN SODIUM (PORCINE) LOCK FLUSH IV SOLN 100 UNIT/ML 100 UNIT/ML
500 SOLUTION INTRAVENOUS AS NEEDED
Status: DISCONTINUED | OUTPATIENT
Start: 2023-11-07 | End: 2023-11-08 | Stop reason: HOSPADM

## 2023-11-07 RX ORDER — FLUOROURACIL 50 MG/ML
400 INJECTION, SOLUTION INTRAVENOUS ONCE
Status: CANCELLED | OUTPATIENT
Start: 2023-11-08

## 2023-11-07 RX ADMIN — HEPARIN SODIUM (PORCINE) LOCK FLUSH IV SOLN 100 UNIT/ML 500 UNITS: 100 SOLUTION at 13:51

## 2023-11-07 RX ADMIN — Medication 20 ML: at 13:51

## 2023-11-07 NOTE — PROGRESS NOTES
Chief Complaint  Malignant neoplasm of cardia of stomach    Reji Fall MD Blakey, Ev Fan, CHIOMA    Subjective          Kevin José Luis presents to [facility] for metastatic gastric cancer    Mr. 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 Patient started on FOLFOX on 9/27/23.    Interval History  Patient presents for follow up. He is due for cycle 4 of FOLFOX tomorrow. Tolerated third cycle well.  He does have some fatigue for a few days after chemo but this resolves. Reports his central pain has resolved. Sensation when eating remains resolved.  Anemia continues to improve. No fevers, chills, infections. Moving bowels well. Sleeping has improved. He is off nightly remeron and lorazepam. Uses lorazepam only as needed for sleep.     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     [facility]: [facility] [place] [place] [facility] 2.5 weeks of right side temporal pain, decreased appetite with 19 pounds of weight loss. With associated melanotic stools.     9/6/23: EGD with Gastric mass biopsy positive for high grade malignant neoplasm consistent with carcinoma (non small cell)    9/27/23: C1D1 mFOLFOX    9/12/23: NM PET: Gastric hypermetabolic mass involving th gastric cardia and fundus with loco-regional sarkis mets and disseminated hepatic metastases.     9/12/23: Liver lesion biopsy: positive for metastatic gastric adenocarcinoma    9/19/23: [facility] NGS: PIK3CA amplification, TP53 R273H, no associated FDA-approved therapies. MS-stable. [facility] NGS: HER2 negative, MSI-stable, BRAF not detected, PDL1 IHC score of 0. TP53 R273H  pathogenic variant seen.     9/26/23: C1D1 FOLFOX    10/16/23: Duplex due to RLE edema: Acute right lower extremity deep vein thrombosis noted in the posterior tibial and peroneal. Started on eliquis 5 mg BID.      Malignant neoplasm of parotid gland   8/26/2022 Initial Diagnosis    Malignant neoplasm of parotid gland (HCC)     8/26/2022 -  Radiation    RADIATION THERAPY Treatment Details (Noted on 8/26/2022)  Site: Left Head and Neck  Technique: IMRT  Goal: No goal specified  Planned Treatment Start Date: No planned start date specified     Malignant neoplasm of cardia of stomach   9/14/2023 Initial Diagnosis    Malignant neoplasm of cardia of stomach     9/14/2023 Cancer Staged    Staging form: Stomach, AJCC 8th Edition  - Clinical: Stage IVB (pM1) - Signed by Govind Pruitt MD on 9/14/2023     9/15/2023 - 9/15/2023 Chemotherapy    OP CENTRAL VENOUS ACCESS DEVICE Access, Care, and Maintenance (CVAD)     9/27/2023 -  Chemotherapy    OP COLON mFOLFOX6 OXALIplatin / Leucovorin / Fluorouracil     9/29/2023 -  Chemotherapy    OP CENTRAL VENOUS ACCESS DEVICE Access, Care, and Maintenance (CVAD)         Review of Systems   Constitutional:  Negative for fatigue.   Respiratory:  Negative for chest tightness.    Gastrointestinal:  Negative for diarrhea.   Musculoskeletal:  Positive for back pain (fell 11/06/2023).   Psychiatric/Behavioral:  Negative for sleep disturbance.    All other systems reviewed and are negative.    Current Outpatient Medications on File Prior to Visit   Medication Sig Dispense Refill    apixaban (ELIQUIS) 5 MG tablet tablet Take 1 tablet by mouth 2 (Two) Times a Day.      atorvastatin (LIPITOR) 80 MG tablet Take 1 tablet by mouth Every Night.      chlorthalidone (HYGROTON) 25 MG tablet Take 0.5 tablets by mouth Daily.      cholecalciferol (VITAMIN D3) 25 MCG (1000 UT) tablet Take 1 tablet by mouth Daily. LAST DOSE 3/26/23      Dulaglutide 1.5 MG/0.5ML solution pen-injector Inject 1.5 mg  under the skin into the appropriate area as directed Every 7 (Seven) Days. THURSDAY      fluticasone-salmeterol (ADVAIR HFA) 230-21 MCG/ACT inhaler Inhale 2 puffs 2 (Two) Times a Day.      gabapentin (NEURONTIN) 600 MG tablet Take 2 tablets by mouth 2 (Two) Times a Day.      glipizide (GLUCOTROL) 10 MG tablet Take 1 tablet by mouth 2 (Two) Times a Day Before Meals.      HYDROcodone-acetaminophen (NORCO) 5-325 MG per tablet Take 1 tablet by mouth Every 6 (Six) Hours As Needed for Severe Pain. 12 tablet 0    insulin NPH (NovoLIN N ReliOn) 100 UNIT/ML injection Inject 52 Units under the skin into the appropriate area as directed 2 (Two) Times a Day Before Meals.      LORazepam (ATIVAN) 1 MG tablet Take 1 tablet by mouth Every 8 (Eight) Hours As Needed for Anxiety. 60 tablet 0    LORazepam (ATIVAN) 1 MG tablet Take 1 tablet by mouth Every 8 (Eight) Hours As Needed for Anxiety. 10 tablet 0    losartan (COZAAR) 50 MG tablet Take 2 tablets by mouth Daily.      metFORMIN (GLUCOPHAGE) 1000 MG tablet Take 1 tablet by mouth 2 (Two) Times a Day With Meals. INST PER ANESTHESIA  PROTOCOL      mirtazapine (REMERON) 15 MG tablet Take 1 tablet by mouth Every Night. 30 tablet 5    mirtazapine (REMERON) 15 MG tablet Take 1 tablet by mouth Every Night. 7 tablet 0    montelukast (SINGULAIR) 10 MG tablet Take 1 tablet by mouth Every Night.      OLANZapine (zyPREXA) 5 MG tablet Take 1 tablet by mouth Every Night. Indications: Nausea and Vomiting caused by Cancer Chemotherapy, if no improvement, call provider 30 tablet 2    OLANZapine (zyPREXA) 5 MG tablet Take 1 tablet by mouth Every Night. 7 tablet 0    Omega-3 Fatty Acids (fish oil) 1000 MG capsule capsule Take  by mouth Daily With Breakfast. LAST DOSE 3/26/23      omeprazole (priLOSEC) 40 MG capsule Take 1 capsule by mouth 2 (Two) Times a Day.      ondansetron (ZOFRAN) 8 MG tablet Take 1 tablet by mouth 3 (Three) Times a Day As Needed for Nausea or Vomiting (mail to patient please.).  30 tablet 5     No current facility-administered medications on file prior to visit.       No Known Allergies  Past Medical History:   Diagnosis Date    Asthma     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    Diabetes mellitus     Hyperlipidemia     Hypertension     Rotator cuff disorder     LEFT    Sleep apnea      Past Surgical History:   Procedure Laterality Date    CAROTID ENDARTERECTOMY Right     CHOLECYSTECTOMY      FOOT SURGERY Right     TOE DEBRIDMENT    HERNIA REPAIR      VENTRAL HERNIA    SHOULDER ARTHROSCOPY W/ ROTATOR CUFF REPAIR Left 4/3/2023    Procedure: LEFT SHOULDER ARTHROSCOPIC ROTATOR CUFF DEBRIDEMENT, LABRAL DEBRIDEMENT, BICEPS TENODESIS, SUBACROMIAL DECOMPRESSION;  Surgeon: Chas Patterson MD;  Location[place] [facility] [place] [facility] [place];  Service: Orthopedics;  Laterality: Left;    TUMOR REMOVAL Left     PAROTID GLAND    VASCULAR SURGERY Right     STENT R LEG    VENOUS ACCESS DEVICE (PORT) INSERTION Right 2023    Procedure: INSERTION VENOUS ACCESS DEVICE;  Surgeon: James Copeland MD;  Location: [facility] MAIN OR;  Service: General;  Laterality: Right;     Social History     Socioeconomic History    Marital status:    Tobacco Use    Smoking status: Former     Years: 25     Types: Cigarettes     Start date:      Quit date:      Years since quittin.8    Smokeless tobacco: Former   Vaping Use    Vaping Use: Never used   Substance and Sexual Activity    Alcohol use: Never    Drug use: Never    Sexual activity: Defer     Family History   Problem Relation Age of Onset    Lung cancer Father     Malig Hyperthermia Neg Hx        Objective   Physical Exam  Well appearing patient in no acute distress on RA  Anicteric sclerae, no rash on exposed skin  Respirations non-labored  Awake, alert, and oriented x 4. Speech intact. No gross neurologic deficit  Appropriate mood and affect  +_right subconjunctival hemorrhage    Vitals:    23 1410   BP: 139/61   Pulse: 80   Resp: 18   Temp:  "97.4 °F (36.3 °C)   SpO2: 99%   Weight: 87 kg (191 lb 12.8 oz)   Height: 167.6 cm (65.98\")   PainSc:   4   PainLoc: Back  Comment: fell 11/06/2023                   PHQ-9 Total Score:             Result Review :   The following data was reviewed by: Govind Pruitt MD on 11/07/23:  Lab Results   Component Value Date    HGB 11.2 (L) 11/07/2023    HCT 35.0 (L) 11/07/2023    MCV 84.3 11/07/2023     11/07/2023    WBC 4.97 11/07/2023    NEUTROABS 2.84 11/07/2023    LYMPHSABS 1.23 11/07/2023    MONOSABS 0.61 11/07/2023    EOSABS 0.21 11/07/2023    BASOSABS 0.08 11/07/2023     Lab Results   Component Value Date    GLUCOSE 122 (H) 10/24/2023    BUN 6 (L) 10/24/2023    CREATININE 0.69 (L) 10/24/2023     10/24/2023    K 3.4 (L) 10/24/2023     10/24/2023    CO2 27.2 10/24/2023    CALCIUM 8.4 (L) 10/24/2023    PROTEINTOT 5.6 (L) 10/24/2023    ALBUMIN 2.9 (L) 10/24/2023    BILITOT 0.3 10/24/2023    ALKPHOS 254 (H) 10/24/2023    AST 42 (H) 10/24/2023    ALT 27 10/24/2023     Lab Results   Component Value Date    MG 1.7 09/21/2023     Labs personally reviewed and anemia improving. Alk phos decreasing. K slightly low      No radiology results for the last 30 days.        Assessment and Plan    Diagnoses and all orders for this visit:    1. 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)  -     CT chest w contrast; Future  -     CT abdomen pelvis w contrast; Future    2. Hypokalemia  -     potassium chloride (K-DUR,KLOR-CON) 10 MEQ CR tablet; Take 1 tablet by mouth Daily.  Dispense: 14 tablet; Refill: 0    3. Anemia associated with chemotherapy    4. Iron deficiency anemia due to chronic blood loss          Metastatic stage IV gastric cancer.  Patient with PET scan (9/12/23) with hepatic mets that have been biopsy-proven to be gastric cancer.  HER2 and PD-L1 both negative per Jamie and vivian. Recommend systemic therapy with modified FOLFOX which includes bolus and infusional 5-FU with oxaliplatin.  C1D1 " mFOLFOX 9/27/23.  Alk phos decreasing with  treatment.    C4D1 mFOLFOX 11/8/23. Labs appropriate to proceed    Plan for repeat CT imaging after this cycle, ordered for 11/27/23. Delay C5 1 week due to Thanksgiving.      Anemia from cancer with iron deficiency  Recently received IV iron while hospitalized. Iron saturation remains low. Repeat Injectafer x 2 completed 10/18/23. Hgb improving as of 10/10/23. Up to 11.2 as of 11/7.    RLE DVT  Duplex on 10/16 for RLE edema: Acute right lower extremity deep vein thrombosis noted in the posterior tibial and peroneal. Provoked from gastric cancer. Started on Eliquis at 5 mg BID with improvement in symptoms. Continue lifelong due to active malignancy.     Portal vein thrombosis  Anticoagulation for RLE DVT should treat this as well as treatment of above malignancy    Insomnia  Continue lorazepam as needed    Hypokalemia  Kcl 20 meq x 2 weeks daily    This is an acute or chronic illness that poses a threat to life or bodily function. The above treatment plan involves a high risk of complications and/or mortality of patient management.    I spent 25 minutes caring for Kevin on this date of service. This time includes time spent by me in the following activities:preparing for the visit, reviewing tests, obtaining and/or reviewing a separately obtained history, performing a medically appropriate examination and/or evaluation , counseling and educating the patient/family/caregiver, ordering medications, tests, or procedures, referring and communicating with other health care professionals , documenting information in the medical record, independently interpreting results and communicating that information with the patient/family/caregiver, and care coordination    Patient Follow Up: C5 chemo  Patient was given instructions and counseling regarding his condition or for health maintenance advice. Please see specific information pulled into the AVS if appropriate.

## 2023-11-08 ENCOUNTER — HOSPITAL ENCOUNTER (OUTPATIENT)
Dept: ONCOLOGY | Facility: HOSPITAL | Age: 63
Discharge: HOME | End: 2023-11-08
Admitting: INTERNAL MEDICINE
Payer: OTHER GOVERNMENT

## 2023-11-08 VITALS
WEIGHT: 193.34 LBS | SYSTOLIC BLOOD PRESSURE: 140 MMHG | RESPIRATION RATE: 18 BRPM | OXYGEN SATURATION: 100 % | BODY MASS INDEX: 31.22 KG/M2 | TEMPERATURE: 97.4 F | DIASTOLIC BLOOD PRESSURE: 71 MMHG | HEART RATE: 80 BPM

## 2023-11-08 DIAGNOSIS — C16.0 MALIGNANT NEOPLASM OF CARDIA OF STOMACH: Primary | ICD-10-CM

## 2023-11-08 PROCEDURE — 25010000002 LEUCOVORIN CALCIUM PER 50 MG: Performed by: INTERNAL MEDICINE

## 2023-11-08 PROCEDURE — 96415 CHEMO IV INFUSION ADDL HR: CPT

## 2023-11-08 PROCEDURE — G0498 CHEMO EXTEND IV INFUS W/PUMP: HCPCS

## 2023-11-08 PROCEDURE — 25010000002 OXALIPLATIN PER 0.5 MG: Performed by: INTERNAL MEDICINE

## 2023-11-08 PROCEDURE — 96367 TX/PROPH/DG ADDL SEQ IV INF: CPT

## 2023-11-08 PROCEDURE — 25010000002 PALONOSETRON PER 25 MCG: Performed by: INTERNAL MEDICINE

## 2023-11-08 PROCEDURE — 96416 CHEMO PROLONG INFUSE W/PUMP: CPT

## 2023-11-08 PROCEDURE — 96375 TX/PRO/DX INJ NEW DRUG ADDON: CPT

## 2023-11-08 PROCEDURE — 25010000002 FLUOROURACIL PER 500 MG: Performed by: INTERNAL MEDICINE

## 2023-11-08 PROCEDURE — 96411 CHEMO IV PUSH ADDL DRUG: CPT

## 2023-11-08 PROCEDURE — 0 DEXTROSE 5 % SOLUTION: Performed by: INTERNAL MEDICINE

## 2023-11-08 PROCEDURE — 0 DEXTROSE 5 % SOLUTION 250 ML FLEX CONT: Performed by: INTERNAL MEDICINE

## 2023-11-08 PROCEDURE — 25010000002 DEXAMETHASONE SODIUM PHOSPHATE 120 MG/30ML SOLUTION: Performed by: INTERNAL MEDICINE

## 2023-11-08 PROCEDURE — 96413 CHEMO IV INFUSION 1 HR: CPT

## 2023-11-08 RX ORDER — DEXTROSE MONOHYDRATE 50 MG/ML
250 INJECTION, SOLUTION INTRAVENOUS ONCE
Status: COMPLETED | OUTPATIENT
Start: 2023-11-08 | End: 2023-11-08

## 2023-11-08 RX ORDER — PALONOSETRON 0.05 MG/ML
0.25 INJECTION, SOLUTION INTRAVENOUS ONCE
Status: COMPLETED | OUTPATIENT
Start: 2023-11-08 | End: 2023-11-08

## 2023-11-08 RX ORDER — FLUOROURACIL 50 MG/ML
800 INJECTION, SOLUTION INTRAVENOUS ONCE
Status: COMPLETED | OUTPATIENT
Start: 2023-11-08 | End: 2023-11-08

## 2023-11-08 RX ADMIN — OXALIPLATIN 170 MG: 5 INJECTION, SOLUTION INTRAVENOUS at 08:57

## 2023-11-08 RX ADMIN — DEXTROSE MONOHYDRATE 250 ML: 50 INJECTION, SOLUTION INTRAVENOUS at 08:10

## 2023-11-08 RX ADMIN — PALONOSETRON 0.25 MG: 0.05 INJECTION, SOLUTION INTRAVENOUS at 08:15

## 2023-11-08 RX ADMIN — LEUCOVORIN CALCIUM 800 MG: 350 INJECTION, POWDER, LYOPHILIZED, FOR SOLUTION INTRAMUSCULAR; INTRAVENOUS at 08:57

## 2023-11-08 RX ADMIN — FLUOROURACIL 800 MG: 50 INJECTION, SOLUTION INTRAVENOUS at 11:00

## 2023-11-08 RX ADMIN — FLUOROURACIL 4750 MG: 50 INJECTION, SOLUTION INTRAVENOUS at 11:07

## 2023-11-08 RX ADMIN — DEXAMETHASONE SODIUM PHOSPHATE 12 MG: 4 INJECTION, SOLUTION INTRA-ARTICULAR; INTRALESIONAL; INTRAMUSCULAR; INTRAVENOUS; SOFT TISSUE at 08:13

## 2023-11-10 ENCOUNTER — HOSPITAL ENCOUNTER (OUTPATIENT)
Dept: ONCOLOGY | Facility: HOSPITAL | Age: 63
Discharge: HOME | End: 2023-11-10
Admitting: INTERNAL MEDICINE
Payer: OTHER GOVERNMENT

## 2023-11-10 ENCOUNTER — TELEPHONE (OUTPATIENT)
Dept: ONCOLOGY | Facility: HOSPITAL | Age: 63
End: 2023-11-10
Payer: OTHER GOVERNMENT

## 2023-11-10 DIAGNOSIS — Z45.2 PICC (PERIPHERALLY INSERTED CENTRAL CATHETER) IN PLACE: ICD-10-CM

## 2023-11-10 DIAGNOSIS — R30.0 DYSURIA: Primary | ICD-10-CM

## 2023-11-10 DIAGNOSIS — R30.0 DYSURIA: ICD-10-CM

## 2023-11-10 DIAGNOSIS — C16.0 MALIGNANT NEOPLASM OF CARDIA OF STOMACH: ICD-10-CM

## 2023-11-10 DIAGNOSIS — Z45.2 FITTING AND ADJUSTMENT OF VASCULAR CATHETER: Primary | ICD-10-CM

## 2023-11-10 LAB
BACTERIA UR QL AUTO: NORMAL /HPF
BILIRUB UR QL STRIP: NEGATIVE
CLARITY UR: CLEAR
COLOR UR: YELLOW
GLUCOSE UR STRIP-MCNC: ABNORMAL MG/DL
HGB UR QL STRIP.AUTO: NEGATIVE
HYALINE CASTS UR QL AUTO: NORMAL /LPF
KETONES UR QL STRIP: NEGATIVE
LEUKOCYTE ESTERASE UR QL STRIP.AUTO: NEGATIVE
NITRITE UR QL STRIP: NEGATIVE
PH UR STRIP.AUTO: 6.5 [PH] (ref 5–8)
PROT UR QL STRIP: ABNORMAL
RBC # UR STRIP: NORMAL /HPF
REF LAB TEST METHOD: NORMAL
SP GR UR STRIP: 1.02 (ref 1–1.03)
SQUAMOUS #/AREA URNS HPF: NORMAL /HPF
UROBILINOGEN UR QL STRIP: ABNORMAL
WBC # UR STRIP: NORMAL /HPF

## 2023-11-10 PROCEDURE — 25010000002 HEPARIN LOCK FLUSH PER 10 UNITS: Performed by: INTERNAL MEDICINE

## 2023-11-10 PROCEDURE — 81001 URINALYSIS AUTO W/SCOPE: CPT | Performed by: INTERNAL MEDICINE

## 2023-11-10 RX ORDER — SODIUM CHLORIDE 0.9 % (FLUSH) 0.9 %
20 SYRINGE (ML) INJECTION AS NEEDED
Status: DISCONTINUED | OUTPATIENT
Start: 2023-11-10 | End: 2023-11-11 | Stop reason: HOSPADM

## 2023-11-10 RX ORDER — HEPARIN SODIUM (PORCINE) LOCK FLUSH IV SOLN 100 UNIT/ML 100 UNIT/ML
500 SOLUTION INTRAVENOUS AS NEEDED
OUTPATIENT
Start: 2023-11-10

## 2023-11-10 RX ORDER — SODIUM CHLORIDE 0.9 % (FLUSH) 0.9 %
20 SYRINGE (ML) INJECTION AS NEEDED
OUTPATIENT
Start: 2023-11-10

## 2023-11-10 RX ORDER — HEPARIN SODIUM (PORCINE) LOCK FLUSH IV SOLN 100 UNIT/ML 100 UNIT/ML
500 SOLUTION INTRAVENOUS AS NEEDED
Status: DISCONTINUED | OUTPATIENT
Start: 2023-11-10 | End: 2023-11-11 | Stop reason: HOSPADM

## 2023-11-10 RX ADMIN — HEPARIN SODIUM (PORCINE) LOCK FLUSH IV SOLN 100 UNIT/ML 500 UNITS: 100 SOLUTION at 09:44

## 2023-11-10 RX ADMIN — Medication 20 ML: at 09:44

## 2023-11-13 NOTE — TELEPHONE ENCOUNTER
Diagnosis: Metastatic gastric cancer to the liver     Reason for Referral: [facility] travel reimbursement    Content of Visit: OSW attempted to meet with Mr. Ordonez in the infusion center earlier this week during his treatment and this morning during his unhook, but unfortunately did not make contact during those times. OSW contacted Mr. Ordonez via telephone this morning in regards to his VA travel reimbursement. Offered to mail this to his home or have him sign at his next visit. Mr. Ordonez reports he'll be back in town for a Veterans Day ceremony and will plan to stop by this afternoon to complete his form. OSW left Mr. Ordonez's form in the pickup folder to stop by and sign at his convenience. OSW support remains available.    Update 11/15/23: OSW contacted Mr. Ordonez via telephone this morning to touch base regarding his travel reimbursement form, as he has not stopped by to sign this. Offered to mail this to him, however, he will try to stop by sometime within the next week when they're in town next. OSW support remains available.     Resources/Referrals Provided: VA travel reimbursement

## 2023-11-20 ENCOUNTER — TELEPHONE (OUTPATIENT)
Dept: ONCOLOGY | Facility: HOSPITAL | Age: 63
End: 2023-11-20
Payer: OTHER GOVERNMENT

## 2023-11-20 NOTE — TELEPHONE ENCOUNTER
Called and spoke with pt and he is aware that test results  will be called in stat to  Office in time for appt.

## 2023-11-21 ENCOUNTER — TELEPHONE (OUTPATIENT)
Dept: ONCOLOGY | Facility: HOSPITAL | Age: 63
End: 2023-11-21
Payer: OTHER GOVERNMENT

## 2023-11-21 RX ORDER — MECLIZINE HYDROCHLORIDE 25 MG/1
25 TABLET ORAL 3 TIMES DAILY PRN
Qty: 30 TABLET | Refills: 1 | Status: SHIPPED | OUTPATIENT
Start: 2023-11-21

## 2023-11-21 NOTE — TELEPHONE ENCOUNTER
Patient notified that script for meclizine has been sent to [facility] in [facility].

## 2023-11-21 NOTE — TELEPHONE ENCOUNTER
Patient states that he has been experiencing  episodes of vertigo for about a week, extreme bouts of dizziness when lying flat and sometimes affects his ability to walk.  He has a home health nurse that has working with him to do the Epley maneuvers and recommended that he try meclizine. Patient is requesting a script to be sent to the [facility] pharmacy in [facility]

## 2023-11-27 ENCOUNTER — HOSPITAL ENCOUNTER (OUTPATIENT)
Dept: ONCOLOGY | Facility: HOSPITAL | Age: 63
Discharge: HOME | End: 2023-11-27
Admitting: INTERNAL MEDICINE
Payer: OTHER GOVERNMENT

## 2023-11-27 ENCOUNTER — HOSPITAL ENCOUNTER (OUTPATIENT)
Dept: CT IMAGING | Facility: HOSPITAL | Age: 63
Discharge: HOME | End: 2023-11-27
Admitting: INTERNAL MEDICINE
Payer: OTHER GOVERNMENT

## 2023-11-27 ENCOUNTER — OFFICE VISIT (OUTPATIENT)
Dept: ONCOLOGY | Facility: HOSPITAL | Age: 63
End: 2023-11-27
Payer: OTHER GOVERNMENT

## 2023-11-27 ENCOUNTER — TELEPHONE (OUTPATIENT)
Dept: ONCOLOGY | Facility: HOSPITAL | Age: 63
End: 2023-11-27

## 2023-11-27 VITALS
HEART RATE: 81 BPM | WEIGHT: 190.92 LBS | SYSTOLIC BLOOD PRESSURE: 177 MMHG | TEMPERATURE: 98.1 F | RESPIRATION RATE: 18 BRPM | DIASTOLIC BLOOD PRESSURE: 79 MMHG | OXYGEN SATURATION: 100 % | BODY MASS INDEX: 30.83 KG/M2

## 2023-11-27 DIAGNOSIS — C16.0 MALIGNANT NEOPLASM OF CARDIA OF STOMACH: ICD-10-CM

## 2023-11-27 DIAGNOSIS — Z45.2 FITTING AND ADJUSTMENT OF VASCULAR CATHETER: ICD-10-CM

## 2023-11-27 DIAGNOSIS — R42 VERTIGO: ICD-10-CM

## 2023-11-27 DIAGNOSIS — Z45.2 PICC (PERIPHERALLY INSERTED CENTRAL CATHETER) IN PLACE: ICD-10-CM

## 2023-11-27 DIAGNOSIS — C16.0 MALIGNANT NEOPLASM OF CARDIA OF STOMACH: Primary | ICD-10-CM

## 2023-11-27 DIAGNOSIS — D50.0 IRON DEFICIENCY ANEMIA DUE TO CHRONIC BLOOD LOSS: ICD-10-CM

## 2023-11-27 DIAGNOSIS — D50.0 IRON DEFICIENCY ANEMIA DUE TO CHRONIC BLOOD LOSS: Primary | ICD-10-CM

## 2023-11-27 PROBLEM — C07 MALIGNANT NEOPLASM OF PAROTID GLAND: Status: RESOLVED | Noted: 2022-08-26 | Resolved: 2023-11-27

## 2023-11-27 LAB
ALBUMIN SERPL-MCNC: 3 G/DL (ref 3.5–5.2)
ALBUMIN/GLOB SERPL: 1 G/DL
ALP SERPL-CCNC: 156 U/L (ref 39–117)
ALT SERPL W P-5'-P-CCNC: 19 U/L (ref 1–41)
ANION GAP SERPL CALCULATED.3IONS-SCNC: 10 MMOL/L (ref 5–15)
AST SERPL-CCNC: 25 U/L (ref 1–40)
BASOPHILS # BLD AUTO: 0.04 10*3/MM3 (ref 0–0.2)
BASOPHILS NFR BLD AUTO: 1.7 % (ref 0–1.5)
BILIRUB SERPL-MCNC: 0.5 MG/DL (ref 0–1.2)
BUN SERPL-MCNC: 8 MG/DL (ref 8–23)
BUN/CREAT SERPL: 8.9 (ref 7–25)
CALCIUM SPEC-SCNC: 8.5 MG/DL (ref 8.6–10.5)
CHLORIDE SERPL-SCNC: 101 MMOL/L (ref 98–107)
CO2 SERPL-SCNC: 22 MMOL/L (ref 22–29)
CREAT SERPL-MCNC: 0.9 MG/DL (ref 0.76–1.27)
DEPRECATED RDW RBC AUTO: 74.5 FL (ref 37–54)
EGFRCR SERPLBLD CKD-EPI 2021: 95.4 ML/MIN/1.73
EOSINOPHIL # BLD AUTO: 0.08 10*3/MM3 (ref 0–0.4)
EOSINOPHIL NFR BLD AUTO: 3.3 % (ref 0.3–6.2)
ERYTHROCYTE [DISTWIDTH] IN BLOOD BY AUTOMATED COUNT: 23.2 % (ref 12.3–15.4)
FERRITIN SERPL-MCNC: 1571 NG/ML (ref 30–400)
GLOBULIN UR ELPH-MCNC: 3.1 GM/DL
GLUCOSE SERPL-MCNC: 436 MG/DL (ref 65–99)
HCT VFR BLD AUTO: 37.3 % (ref 37.5–51)
HGB BLD-MCNC: 12 G/DL (ref 13–17.7)
IMM GRANULOCYTES # BLD AUTO: 0.02 10*3/MM3 (ref 0–0.05)
IMM GRANULOCYTES NFR BLD AUTO: 0.8 % (ref 0–0.5)
IRON 24H UR-MRATE: 53 MCG/DL (ref 59–158)
IRON SATN MFR SERPL: 22 % (ref 20–50)
LYMPHOCYTES # BLD AUTO: 0.91 10*3/MM3 (ref 0.7–3.1)
LYMPHOCYTES NFR BLD AUTO: 38.1 % (ref 19.6–45.3)
MCH RBC QN AUTO: 28.2 PG (ref 26.6–33)
MCHC RBC AUTO-ENTMCNC: 32.2 G/DL (ref 31.5–35.7)
MCV RBC AUTO: 87.6 FL (ref 79–97)
MONOCYTES # BLD AUTO: 0.78 10*3/MM3 (ref 0.1–0.9)
MONOCYTES NFR BLD AUTO: 32.6 % (ref 5–12)
NEUTROPHILS NFR BLD AUTO: 0.56 10*3/MM3 (ref 1.7–7)
NEUTROPHILS NFR BLD AUTO: 23.5 % (ref 42.7–76)
PLATELET # BLD AUTO: 216 10*3/MM3 (ref 140–450)
PMV BLD AUTO: 10.1 FL (ref 6–12)
POTASSIUM SERPL-SCNC: 3.7 MMOL/L (ref 3.5–5.2)
PROT SERPL-MCNC: 6.1 G/DL (ref 6–8.5)
RBC # BLD AUTO: 4.26 10*6/MM3 (ref 4.14–5.8)
SODIUM SERPL-SCNC: 133 MMOL/L (ref 136–145)
TIBC SERPL-MCNC: 237 MCG/DL (ref 298–536)
TRANSFERRIN SERPL-MCNC: 159 MG/DL (ref 200–360)
WBC NRBC COR # BLD AUTO: 2.39 10*3/MM3 (ref 3.4–10.8)

## 2023-11-27 PROCEDURE — 82728 ASSAY OF FERRITIN: CPT | Performed by: INTERNAL MEDICINE

## 2023-11-27 PROCEDURE — 85025 COMPLETE CBC W/AUTO DIFF WBC: CPT | Performed by: INTERNAL MEDICINE

## 2023-11-27 PROCEDURE — 71260 CT THORAX DX C+: CPT

## 2023-11-27 PROCEDURE — 83540 ASSAY OF IRON: CPT | Performed by: INTERNAL MEDICINE

## 2023-11-27 PROCEDURE — 25010000002 HEPARIN LOCK FLUSH PER 10 UNITS: Performed by: INTERNAL MEDICINE

## 2023-11-27 PROCEDURE — 25510000001 IOPAMIDOL PER 1 ML: Performed by: INTERNAL MEDICINE

## 2023-11-27 PROCEDURE — 80053 COMPREHEN METABOLIC PANEL: CPT | Performed by: INTERNAL MEDICINE

## 2023-11-27 PROCEDURE — 99214 OFFICE O/P EST MOD 30 MIN: CPT | Performed by: INTERNAL MEDICINE

## 2023-11-27 PROCEDURE — 74177 CT ABD & PELVIS W/CONTRAST: CPT

## 2023-11-27 PROCEDURE — 84466 ASSAY OF TRANSFERRIN: CPT | Performed by: INTERNAL MEDICINE

## 2023-11-27 PROCEDURE — 36591 DRAW BLOOD OFF VENOUS DEVICE: CPT

## 2023-11-27 RX ORDER — FAMOTIDINE 10 MG/ML
20 INJECTION, SOLUTION INTRAVENOUS AS NEEDED
OUTPATIENT
Start: 2023-12-06

## 2023-11-27 RX ORDER — HEPARIN SODIUM (PORCINE) LOCK FLUSH IV SOLN 100 UNIT/ML 100 UNIT/ML
500 SOLUTION INTRAVENOUS AS NEEDED
OUTPATIENT
Start: 2023-11-29

## 2023-11-27 RX ORDER — DEXTROSE MONOHYDRATE 50 MG/ML
250 INJECTION, SOLUTION INTRAVENOUS ONCE
OUTPATIENT
Start: 2023-12-06

## 2023-11-27 RX ORDER — SODIUM CHLORIDE 0.9 % (FLUSH) 0.9 %
20 SYRINGE (ML) INJECTION AS NEEDED
OUTPATIENT
Start: 2023-11-27

## 2023-11-27 RX ORDER — DIPHENHYDRAMINE HYDROCHLORIDE 50 MG/ML
50 INJECTION INTRAMUSCULAR; INTRAVENOUS AS NEEDED
OUTPATIENT
Start: 2023-12-06

## 2023-11-27 RX ORDER — SODIUM CHLORIDE 0.9 % (FLUSH) 0.9 %
20 SYRINGE (ML) INJECTION AS NEEDED
Status: DISCONTINUED | OUTPATIENT
Start: 2023-11-27 | End: 2023-11-28 | Stop reason: HOSPADM

## 2023-11-27 RX ORDER — HEPARIN SODIUM (PORCINE) LOCK FLUSH IV SOLN 100 UNIT/ML 100 UNIT/ML
500 SOLUTION INTRAVENOUS AS NEEDED
Status: DISCONTINUED | OUTPATIENT
Start: 2023-11-27 | End: 2023-11-28 | Stop reason: HOSPADM

## 2023-11-27 RX ORDER — PALONOSETRON 0.05 MG/ML
0.25 INJECTION, SOLUTION INTRAVENOUS ONCE
OUTPATIENT
Start: 2023-12-06

## 2023-11-27 RX ADMIN — Medication 20 ML: at 13:14

## 2023-11-27 RX ADMIN — HEPARIN SODIUM (PORCINE) LOCK FLUSH IV SOLN 100 UNIT/ML 500 UNITS: 100 SOLUTION at 13:14

## 2023-11-27 RX ADMIN — IOPAMIDOL 100 ML: 755 INJECTION, SOLUTION INTRAVENOUS at 09:18

## 2023-11-27 NOTE — PROGRESS NOTES
Chief Complaint  Malignant neoplasm of cardia of stomach    Reji Fall MD Blakey, Ev Fan, CHIOMA    Subjective          Kevin José Luis presents to [facility]EM[facility]Y for metastatic gastric cancer    Mr. 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 Patient started on FOLFOX on 9/27/23.    Interval History  Patient presents for follow up. He is due for cycle 5 of FOLFOX tomorrow. Tolerated fourth cycle well.  His fatigue has been worse with the most recent cycle. Anemia continues to improve. No fevers, chills, infections. Reports positional vertigo worse when laying down. Epley maneuver has yet to be helpful. Moving bowels well.  Uses lorazepam only as needed for sleep.     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     [facility]: [facility] [place] [place] [facility] 2.5 weeks of right side temporal pain, decreased appetite with 19 pounds of weight loss. With associated melanotic stools.     9/6/23: EGD with Gastric mass biopsy positive for high grade malignant neoplasm consistent with carcinoma (non small cell)    9/27/23: C1D1 mFOLFOX    9/12/23: NM PET: Gastric hypermetabolic mass involving the gastric cardia and fundus with loco-regional sarkis mets and disseminated hepatic metastases.     9/12/23: Liver lesion biopsy: positive for metastatic gastric adenocarcinoma    9/19/23: [facility] NGS: PIK3CA amplification, TP53 R273H, no associated FDA-approved therapies. MS-stable. [facility] NGS: HER2 negative, MSI-stable, BRAF not detected, PDL1 IHC score of 0. 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     9/26/23: C1D1 FOLFOX    10/16/23: Duplex due  to RLE edema: Acute right lower extremity deep vein thrombosis noted in the posterior tibial and peroneal. Started on eliquis 5 mg BID.     10/18/23: Completed IV injectafer x 2    11/8/23: C4D1 FOLFOX    1127/23: CT CAP with multiple liver lesions and portal vein thrombosis, unable to be compared to PET from 9/2023 so not able to assess progression. Extensive acute thrombus throughout the portal venous tree.     11/29/23: C5D1 FOLFOX (5-FU bolus and leucovorin dropped with this cycle)     Malignant neoplasm of parotid gland   8/26/2022 Initial Diagnosis    Malignant neoplasm of parotid gland (HCC)     8/26/2022 -  Radiation    RADIATION THERAPY Treatment Details (Noted on 8/26/2022)  Site: Left Head and Neck  Technique: IMRT  Goal: No goal specified  Planned Treatment Start Date: No planned start date specified     Malignant neoplasm of cardia of stomach   9/14/2023 Initial Diagnosis    Malignant neoplasm of cardia of stomach     9/14/2023 Cancer Staged    Staging form: Stomach, AJCC 8th Edition  - Clinical: Stage IVB (pM1) - Signed by Govind Pruitt MD on 9/14/2023     9/15/2023 - 9/15/2023 Chemotherapy    OP CENTRAL VENOUS ACCESS DEVICE Access, Care, and Maintenance (CVAD)     9/27/2023 -  Chemotherapy    OP COLON mFOLFOX6 OXALIplatin / Leucovorin / Fluorouracil     9/29/2023 -  Chemotherapy    OP CENTRAL VENOUS ACCESS DEVICE Access, Care, and Maintenance (CVAD)         Review of Systems   Constitutional:  Positive for fatigue.   Respiratory:  Negative for chest tightness.    Gastrointestinal:  Negative for diarrhea.   Musculoskeletal:  Negative for back pain.   Neurological:  Positive for dizziness.   Psychiatric/Behavioral:  Negative for sleep disturbance.    All other systems reviewed and are negative.    Current Outpatient Medications on File Prior to Visit   Medication Sig Dispense Refill   • apixaban (ELIQUIS) 5 MG tablet tablet Take 1 tablet by mouth 2 (Two) Times a Day.     • atorvastatin  (LIPITOR) 80 MG tablet Take 1 tablet by mouth Every Night.     • chlorthalidone (HYGROTON) 25 MG tablet Take 0.5 tablets by mouth Daily.     • cholecalciferol (VITAMIN D3) 25 MCG (1000 UT) tablet Take 1 tablet by mouth Daily. LAST DOSE 3/26/23     • Dulaglutide 1.5 MG/0.5ML solution pen-injector Inject 1.5 mg under the skin into the appropriate area as directed Every 7 (Seven) Days. THURSDAY     • fluticasone-salmeterol (ADVAIR HFA) 230-21 MCG/ACT inhaler Inhale 2 puffs 2 (Two) Times a Day.     • gabapentin (NEURONTIN) 600 MG tablet Take 2 tablets by mouth 2 (Two) Times a Day.     • glipizide (GLUCOTROL) 10 MG tablet Take 1 tablet by mouth 2 (Two) Times a Day Before Meals.     • HYDROcodone-acetaminophen (NORCO) 5-325 MG per tablet Take 1 tablet by mouth Every 6 (Six) Hours As Needed for Severe Pain. 12 tablet 0   • insulin NPH (NovoLIN N ReliOn) 100 UNIT/ML injection Inject 52 Units under the skin into the appropriate area as directed 2 (Two) Times a Day Before Meals.     • LORazepam (ATIVAN) 1 MG tablet Take 1 tablet by mouth Every 8 (Eight) Hours As Needed for Anxiety. 60 tablet 0   • losartan (COZAAR) 50 MG tablet Take 2 tablets by mouth Daily.     • meclizine (ANTIVERT) 25 MG tablet Take 1 tablet by mouth 3 (Three) Times a Day As Needed for Dizziness. 30 tablet 1   • metFORMIN (GLUCOPHAGE) 1000 MG tablet Take 1 tablet by mouth 2 (Two) Times a Day With Meals. INST PER ANESTHESIA  PROTOCOL     • mirtazapine (REMERON) 15 MG tablet Take 1 tablet by mouth Every Night. 30 tablet 5   • mirtazapine (REMERON) 15 MG tablet Take 1 tablet by mouth Every Night. 7 tablet 0   • montelukast (SINGULAIR) 10 MG tablet Take 1 tablet by mouth Every Night.     • OLANZapine (zyPREXA) 5 MG tablet Take 1 tablet by mouth Every Night. Indications: Nausea and Vomiting caused by Cancer Chemotherapy, if no improvement, call provider 30 tablet 2   • OLANZapine (zyPREXA) 5 MG tablet Take 1 tablet by mouth Every Night. 7 tablet 0   • Omega-3  Fatty Acids (fish oil) 1000 MG capsule capsule Take  by mouth Daily With Breakfast. LAST DOSE 3/26/23     • omeprazole (priLOSEC) 40 MG capsule Take 1 capsule by mouth 2 (Two) Times a Day.     • ondansetron (ZOFRAN) 8 MG tablet Take 1 tablet by mouth 3 (Three) Times a Day As Needed for Nausea or Vomiting (mail to patient please.). 30 tablet 5   • potassium chloride (K-DUR,KLOR-CON) 10 MEQ CR tablet Take 1 tablet by mouth Daily. 14 tablet 0   • LORazepam (ATIVAN) 1 MG tablet Take 1 tablet by mouth Every 8 (Eight) Hours As Needed for Anxiety. (Patient not taking: Reported on 11/7/2023) 10 tablet 0     Current Facility-Administered Medications on File Prior to Visit   Medication Dose Route Frequency Provider Last Rate Last Admin   • [COMPLETED] iopamidol (ISOVUE-370) 76 % injection 100 mL  100 mL Intravenous Once in imaging Govind Pruitt MD   100 mL at 11/27/23 0918       No Known Allergies  Past Medical History:   Diagnosis Date   • Asthma    • 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   • Diabetes mellitus    • Hyperlipidemia    • Hypertension    • Rotator cuff disorder     LEFT   • Sleep apnea      Past Surgical History:   Procedure Laterality Date   • CAROTID ENDARTERECTOMY Right    • CHOLECYSTECTOMY     • FOOT SURGERY Right     TOE DEBRIDMENT   • HERNIA REPAIR      VENTRAL HERNIA   • SHOULDER ARTHROSCOPY W/ ROTATOR CUFF REPAIR Left 4/3/2023    Procedure: LEFT SHOULDER ARTHROSCOPIC ROTATOR CUFF DEBRIDEMENT, LABRAL DEBRIDEMENT, BICEPS TENODESIS, SUBACROMIAL DECOMPRESSION;  Surgeon: Chas Patterson MD;  [place] [facility] [place] [facility] [place]  Service: Orthopedics;  Laterality: Left;   • TUMOR REMOVAL Left     PAROTID GLAND   • VASCULAR SURGERY Right     STENT R LEG   • VENOUS ACCESS DEVICE (PORT) INSERTION Right 9/26/2023    Procedure: INSERTION VENOUS ACCESS DEVICE;  Surgeon: James Copeland MD;  Location: [facility] MAIN OR;  Service: General;  Laterality: Right;     Social History     Socioeconomic  History   • Marital status:    Tobacco Use   • Smoking status: Former     Years: 25     Types: Cigarettes     Start date:      Quit date:      Years since quittin.9   • Smokeless tobacco: Former   Vaping Use   • Vaping Use: Never used   Substance and Sexual Activity   • Alcohol use: Never   • Drug use: Never   • Sexual activity: Defer     Family History   Problem Relation Age of Onset   • Lung cancer Father    • Malig Hyperthermia Neg Hx        Objective   Physical Exam  Well appearing patient in no acute distress on RA  Anicteric sclerae, no rash on exposed skin  Respirations non-labored  Awake, alert, and oriented x 4. Speech intact. No gross neurologic deficit  Appropriate mood and affect    Vitals:    23 1400   BP: 177/79  Comment: informed MD   Pulse: 81   Resp: 18   Temp: 98.1 °F (36.7 °C)   TempSrc: Temporal   SpO2: 100%   Weight: 86.6 kg (190 lb 14.7 oz)   PainSc: 0-No pain         ECOG score: 0         PHQ-9 Total Score: 0           Result Review :   The following data was reviewed by: Govind Pruitt MD on 23:  Lab Results   Component Value Date    HGB 12.0 (L) 2023    HCT 37.3 (L) 2023    MCV 87.6 2023     2023    WBC 2.39 (L) 2023    NEUTROABS 0.56 (L) 2023    LYMPHSABS 0.91 2023    MONOSABS 0.78 2023    EOSABS 0.08 2023    BASOSABS 0.04 2023     Lab Results   Component Value Date    GLUCOSE 436 (C) 2023    BUN 8 2023    CREATININE 0.90 2023     (L) 2023    K 3.7 2023     2023    CO2 22.0 2023    CALCIUM 8.5 (L) 2023    PROTEINTOT 6.1 2023    ALBUMIN 3.0 (L) 2023    BILITOT 0.5 2023    ALKPHOS 156 (H) 2023    AST 25 2023    ALT 19 2023     Lab Results   Component Value Date    MG 1.7 2023     Labs personally reviewed and anemia improving. Alk phos decreasing. K normal. Na low. Glucose high.       CT  Chest With Contrast Diagnostic    Result Date: 11/27/2023    1. Multiple new liver lesions since 2022 comparison compatible with disease progression.  There is report of an outside PET-CT from September of 2023 not available for comparison also showing liver metastases.  Recommend attention on follow-up exam. 2. Few prominent marissa hepatic nodes probably representing sarkis metastases. 3. Single small right lower lobe nodule new from 2022, recommend attention on follow-up imaging. 4. Extensive probably acute thrombus throughout the portal venous tree. 5. Trace perihepatic ascites. 6. Other chronic/ancillary findings as above.   Findings communicated with ordering provider Dr. Pruitt over the phone at 9:40 a.m. On 11/27/2023.     HERSON ESTRADA MD       Electronically Signed and Approved By: HERSON ESTRADA MD on 11/27/2023 at 9:56             CT Abdomen Pelvis With Contrast    Result Date: 11/27/2023    1. Multiple new liver lesions since 2022 comparison compatible with disease progression.  There is report of an outside PET-CT from September of 2023 not available for comparison also showing liver metastases.  Recommend attention on follow-up exam. 2. Few prominent marissa hepatic nodes probably representing sarkis metastases. 3. Single small right lower lobe nodule new from 2022, recommend attention on follow-up imaging. 4. Extensive probably acute thrombus throughout the portal venous tree. 5. Trace perihepatic ascites. 6. Other chronic/ancillary findings as above.   Findings communicated with ordering provider Dr. Pruitt over the phone at 9:40 a.m. On 11/27/2023.     HERSON ESTRADA MD       Electronically Signed and Approved By: HERSON ESTRADA MD on 11/27/2023 at 9:56                  Assessment and Plan    Diagnoses and all orders for this visit:    1. Iron deficiency anemia due to chronic blood loss (Primary)  -     Iron Profile; Future  -     Ferritin; Future    2. Malignant neoplasm of cardia of stomach  -      Cancel: Ambulatory Referral to ENT (Otolaryngology)  -     Ambulatory Referral to ENT (Otolaryngology)    3. Vertigo  -     Ambulatory Referral to ENT (Otolaryngology)    Other orders  -     dextrose (D5W) 5 % infusion 250 mL  -     palonosetron (ALOXI) injection 0.25 mg  -     dexAMETHasone (DECADRON) 12 mg in sodium chloride 0.9 % IVPB  -     OXALIplatin (ELOXATIN) 170 mg in dextrose (D5W) 5 % 284 mL chemo IVPB  -     fluorouracil (ADRUCIL) 4,750 mg in sodium chloride 0.9 % 95 mL chemo infusion - FOR HOME USE  -     Hydrocortisone Sod Suc (PF) (Solu-CORTEF) injection 100 mg  -     diphenhydrAMINE (BENADRYL) injection 50 mg  -     famotidine (PEPCID) injection 20 mg            Metastatic stage IV gastric cancer.  Patient with PET scan (9/12/23) with hepatic mets that have been biopsy-proven to be gastric cancer.  HER2 and PD-L1 both negative per Jamie and vivian. Recommend systemic therapy with modified FOLFOX which includes bolus and infusional 5-FU with oxaliplatin.  C1D1 mFOLFOX 9/27/23.  Alk phos decreasing with  treatment.    Post cycle 4 CT imaging with diffuse hepatic mets. Unable to be compared to imaging obtained prior to start of chemotherapy as [facility]. As patient feeling better and tolerating chemotherapy, will continue with current treatment. Drop 5-FU bolus and leucovorin starting with C5 due to fatigue.     C5D1 mFOLFOX 11/29/23. Labs appropriate to proceed      Anemia from cancer with iron deficiency  Recently received IV iron while hospitalized. Iron saturation remains low. Repeat Injectafer x 2 completed 10/18/23. Hgb improving as of 10/10/23. Up to 11.2 as of 11/7. Repeat iron labs today.     RLE DVT  Portal vein thrombosis  Duplex on 10/16 for RLE edema: Acute right lower extremity deep vein thrombosis noted in the posterior tibial and peroneal. Provoked from gastric cancer. Started on Eliquis at 5 mg BID with improvement in symptoms. Continue lifelong due to  active malignancy.     Portal vein thrombosis  Anticoagulation for RLE DVT should treat this as well as treatment of above malignancy.     Insomnia  Continue lorazepam as needed    Vertigo  Epley maneuver. Meclizine PRN. Refer to ENT    This is an acute or chronic illness that poses a threat to life or bodily function. The above treatment plan involves a high risk of complications and/or mortality of patient management.    I spent 25 minutes caring for Kevin on this date of service. This time includes time spent by me in the following activities:preparing for the visit, reviewing tests, obtaining and/or reviewing a separately obtained history, performing a medically appropriate examination and/or evaluation , counseling and educating the patient/family/caregiver, ordering medications, tests, or procedures, referring and communicating with other health care professionals , documenting information in the medical record, independently interpreting results and communicating that information with the patient/family/caregiver, and care coordination    Patient Follow Up: C6 chemo  Patient was given instructions and counseling regarding his condition or for health maintenance advice. Please see specific information pulled into the AVS if appropriate.

## 2023-11-27 NOTE — PROGRESS NOTES
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     [facility]/23: [facility] [place] [place] [facility] 2.5 weeks of right side temporal pain, decreased appetite with 19 pounds of weight loss. With associated melanotic stools.     9/6/23: EGD with Gastric mass biopsy positive for high grade malignant neoplasm consistent with carcinoma (non small cell)    9/27/23: C1D1 mFOLFOX    9/12/23: NM PET: Gastric hypermetabolic mass involving th gastric cardia and fundus with loco-regional sarkis mets and disseminated hepatic metastases.     9/12/23: Liver lesion biopsy: positive for metastatic gastric adenocarcinoma    9/19/23: [facility] NGS: PIK3CA amplification, TP53 R273H, no associated FDA-approved therapies. MS-stable. [facility] NGS: HER2 negative, MSI-stable, BRAF not detected, PDL1 IHC score of 0. 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     9/26/23: C1D1 FOLFOX    10/16/23: Duplex due to RLE edema: Acute right lower extremity deep vein thrombosis noted in the posterior tibial and peroneal. Started on eliquis 5 mg BID.     10/18/23: Completed IV injectafer x 2    11/8/23: C4D1 FOLFOX     Malignant neoplasm of parotid gland   8/26/2022 Initial Diagnosis    Malignant neoplasm of parotid gland (HCC)     8/26/2022 -  Radiation    RADIATION THERAPY Treatment Details (Noted on 8/26/2022)  Site: Left Head and Neck  Technique: IMRT  Goal: No goal specified  Planned Treatment Start Date: No planned start date specified     Malignant neoplasm of cardia of stomach   9/14/2023 Initial Diagnosis    Malignant neoplasm of cardia of stomach     9/14/2023 Cancer  Staged    Staging form: Stomach, AJCC 8th Edition  - Clinical: Stage IVB (pM1) - Signed by Govind Pruitt MD on 9/14/2023     9/15/2023 - 9/15/2023 Chemotherapy    OP CENTRAL VENOUS ACCESS DEVICE Access, Care, and Maintenance (CVAD)     9/27/2023 -  Chemotherapy    OP COLON mFOLFOX6 OXALIplatin / Leucovorin / Fluorouracil     9/29/2023 -  Chemotherapy    OP CENTRAL VENOUS ACCESS DEVICE Access, Care, and Maintenance (CVAD)         Review of Systems   Constitutional:  Negative for appetite change, diaphoresis, fatigue, fever, unexpected weight gain and unexpected weight loss.   HENT:  Negative for hearing loss, mouth sores, sore throat, swollen glands, trouble swallowing and voice change.    Eyes:  Negative for blurred vision.   Respiratory:  Negative for cough, shortness of breath and wheezing.    Cardiovascular:  Negative for chest pain and palpitations.   Gastrointestinal:  Negative for abdominal pain, blood in stool, constipation, diarrhea, nausea and vomiting.   Endocrine: Negative for cold intolerance and heat intolerance.   Genitourinary:  Negative for difficulty urinating, dysuria, frequency, hematuria and urinary incontinence.   Musculoskeletal:  Negative for arthralgias, back pain and myalgias.   Skin:  Negative for rash, skin lesions and wound.   Neurological:  Negative for dizziness, seizures, weakness, numbness and headache.   Hematological:  Does not bruise/bleed easily.   Psychiatric/Behavioral:  Negative for depressed mood. The patient is not nervous/anxious.      Current Outpatient Medications on File Prior to Visit   Medication Sig Dispense Refill    apixaban (ELIQUIS) 5 MG tablet tablet Take 1 tablet by mouth 2 (Two) Times a Day.      atorvastatin (LIPITOR) 80 MG tablet Take 1 tablet by mouth Every Night.      chlorthalidone (HYGROTON) 25 MG tablet Take 0.5 tablets by mouth Daily.      cholecalciferol (VITAMIN D3) 25 MCG (1000 UT) tablet Take 1 tablet by mouth Daily. LAST DOSE 3/26/23       Dulaglutide 1.5 MG/0.5ML solution pen-injector Inject 1.5 mg under the skin into the appropriate area as directed Every 7 (Seven) Days. THURSDAY      fluticasone-salmeterol (ADVAIR HFA) 230-21 MCG/ACT inhaler Inhale 2 puffs 2 (Two) Times a Day.      gabapentin (NEURONTIN) 600 MG tablet Take 2 tablets by mouth 2 (Two) Times a Day.      glipizide (GLUCOTROL) 10 MG tablet Take 1 tablet by mouth 2 (Two) Times a Day Before Meals.      HYDROcodone-acetaminophen (NORCO) 5-325 MG per tablet Take 1 tablet by mouth Every 6 (Six) Hours As Needed for Severe Pain. 12 tablet 0    insulin NPH (NovoLIN N ReliOn) 100 UNIT/ML injection Inject 52 Units under the skin into the appropriate area as directed 2 (Two) Times a Day Before Meals.      LORazepam (ATIVAN) 1 MG tablet Take 1 tablet by mouth Every 8 (Eight) Hours As Needed for Anxiety. 60 tablet 0    LORazepam (ATIVAN) 1 MG tablet Take 1 tablet by mouth Every 8 (Eight) Hours As Needed for Anxiety. (Patient not taking: Reported on 11/7/2023) 10 tablet 0    losartan (COZAAR) 50 MG tablet Take 2 tablets by mouth Daily.      meclizine (ANTIVERT) 25 MG tablet Take 1 tablet by mouth 3 (Three) Times a Day As Needed for Dizziness. 30 tablet 1    metFORMIN (GLUCOPHAGE) 1000 MG tablet Take 1 tablet by mouth 2 (Two) Times a Day With Meals. INST PER ANESTHESIA  PROTOCOL      mirtazapine (REMERON) 15 MG tablet Take 1 tablet by mouth Every Night. 30 tablet 5    mirtazapine (REMERON) 15 MG tablet Take 1 tablet by mouth Every Night. 7 tablet 0    montelukast (SINGULAIR) 10 MG tablet Take 1 tablet by mouth Every Night.      OLANZapine (zyPREXA) 5 MG tablet Take 1 tablet by mouth Every Night. Indications: Nausea and Vomiting caused by Cancer Chemotherapy, if no improvement, call provider 30 tablet 2    OLANZapine (zyPREXA) 5 MG tablet Take 1 tablet by mouth Every Night. 7 tablet 0    Omega-3 Fatty Acids (fish oil) 1000 MG capsule capsule Take  by mouth Daily With Breakfast. LAST DOSE 3/26/23       omeprazole (priLOSEC) 40 MG capsule Take 1 capsule by mouth 2 (Two) Times a Day.      ondansetron (ZOFRAN) 8 MG tablet Take 1 tablet by mouth 3 (Three) Times a Day As Needed for Nausea or Vomiting (mail to patient please.). 30 tablet 5    potassium chloride (K-DUR,KLOR-CON) 10 MEQ CR tablet Take 1 tablet by mouth Daily. 14 tablet 0     Current Facility-Administered Medications on File Prior to Visit   Medication Dose Route Frequency Provider Last Rate Last Admin    [COMPLETED] iopamidol (ISOVUE-370) 76 % injection 100 mL  100 mL Intravenous Once in imaging Govind Pruitt MD   100 mL at 23 0918       No Known Allergies  Past Medical History:   Diagnosis Date    Asthma     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    Diabetes mellitus     Hyperlipidemia     Hypertension     Rotator cuff disorder     LEFT    Sleep apnea      Past Surgical History:   Procedure Laterality Date    CAROTID ENDARTERECTOMY Right     CHOLECYSTECTOMY      FOOT SURGERY Right     TOE DEBRIDMENT    HERNIA REPAIR      VENTRAL HERNIA    SHOULDER ARTHROSCOPY W/ ROTATOR CUFF REPAIR Left 4/3/2023    Procedure: LEFT SHOULDER ARTHROSCOPIC ROTATOR CUFF DEBRIDEMENT, LABRAL DEBRIDEMENT, BICEPS TENODESIS, SUBACROMIAL DECOMPRESSION;  Surgeon: Chas Patterson MD;  Location: [facility];  Service: Orthopedics;  Laterality: Left;    TUMOR REMOVAL Left     PAROTID GLAND    VASCULAR SURGERY Right     STENT R LEG    VENOUS ACCESS DEVICE (PORT) INSERTION Right 2023    Procedure: INSERTION VENOUS ACCESS DEVICE;  Surgeon: James Copeland MD;  Location: [facility];  Service: General;  Laterality: Right;     Social History     Socioeconomic History    Marital status:    Tobacco Use    Smoking status: Former     Years: 25     Types: Cigarettes     Start date:      Quit date:      Years since quittin.9    Smokeless tobacco: Former   Vaping Use    Vaping Use: Never used   Substance and Sexual Activity     "Alcohol use: Never    Drug use: Never    Sexual activity: Defer     Family History   Problem Relation Age of Onset    Lung cancer Father     Malig Hyperthermia Neg Hx        Objective   Physical Exam    There were no vitals filed for this visit.            PHQ-9 Total Score:         {The patient is/is not experiencing fatigue. (Optional):69340}   {PT fx screen 1 (Optional):92815}  {Speech Functional Screening (Optional):30935}  {Rehab to be ordered (Optional):60052}    Result Review :{Labs  Result Review  Imaging  Med Tab  Media  Procedures :23}   The following data was reviewed by: Anita Foley MA on 11/27/2023:  Lab Results   Component Value Date    HGB 12.0 (L) 11/27/2023    HCT 37.3 (L) 11/27/2023    MCV 87.6 11/27/2023     11/27/2023    WBC 2.39 (L) 11/27/2023    NEUTROABS 0.56 (L) 11/27/2023    LYMPHSABS 0.91 11/27/2023    MONOSABS 0.78 11/27/2023    EOSABS 0.08 11/27/2023    BASOSABS 0.04 11/27/2023     Lab Results   Component Value Date    GLUCOSE 86 11/07/2023    BUN 10 11/07/2023    CREATININE 0.86 11/07/2023     11/07/2023    K 3.4 (L) 11/07/2023     11/07/2023    CO2 22.0 11/07/2023    CALCIUM 8.4 (L) 11/07/2023    PROTEINTOT 6.0 11/07/2023    ALBUMIN 3.1 (L) 11/07/2023    BILITOT 0.4 11/07/2023    ALKPHOS 173 (H) 11/07/2023    AST 34 11/07/2023    ALT 20 11/07/2023     Lab Results   Component Value Date    MG 1.7 09/21/2023     Lab Results   Component Value Date    IRON 11 (L) 09/27/2023    LABIRON 6 (L) 09/27/2023    TRANSFERRIN 123 (L) 09/27/2023    TIBC 183 (L) 09/27/2023     Lab Results   Component Value Date    FERRITIN 519.20 (H) 09/27/2023    BTKTZRGT15 608 09/27/2023    FOLATE 6.41 09/27/2023     No results found for: \"PSA\", \"CEA\", \"AFP\", \"\", \"\"    {Data reviewed (Optional):35209:::1}      Assessment and Plan {CC Problem List  Visit Diagnosis   ROS  Review (Popup)  Health Maintenance  Quality  BestPractice  Medications  SmartSets  SnapShot " Encounters  Media :23}   There are no diagnoses linked to this encounter.    {Time Spent (Optional):24488}    Patient Follow Up: ***  Patient was given instructions and counseling regarding his condition or for health maintenance advice. Please see specific information pulled into the AVS if appropriate.     Anita Foley MA    11/27/2023

## 2023-11-27 NOTE — TELEPHONE ENCOUNTER
Caller: Kevin Ordonez    Relationship: Self    Best call back number: [phone]    What was the call regarding: KEVIN CALLED TO LET DR. FERGUSON KNOW THAT THE [facility] IS SEND[facility] IMAGING FROM HIS PREVIOUS ONCOLOGIST.

## 2023-12-01 NOTE — TELEPHONE ENCOUNTER
Caller: Kevin Ordonez    Relationship: Self    Best call back number: [phone]     What is the best time to reach you: ASAP    Who are you requesting to speak with (clinical staff, provider,  specific staff member): CLINICAL    What was the call regarding: PLEASE CALL PT BACK TO ADVISE IF THE NEEDED RECORDS HAVE BEEN RECEIVED ON THIS YET FROM THE [facility].

## 2023-12-05 ENCOUNTER — TELEPHONE (OUTPATIENT)
Dept: ONCOLOGY | Facility: HOSPITAL | Age: 63
End: 2023-12-05
Payer: OTHER GOVERNMENT

## 2023-12-05 DIAGNOSIS — I82.441 ACUTE DEEP VEIN THROMBOSIS (DVT) OF TIBIAL VEIN OF RIGHT LOWER EXTREMITY: ICD-10-CM

## 2023-12-05 RX ORDER — HYDROCODONE BITARTRATE AND ACETAMINOPHEN 5; 325 MG/1; MG/1
1 TABLET ORAL EVERY 6 HOURS PRN
Qty: 12 TABLET | Refills: 0 | OUTPATIENT
Start: 2023-12-05 | End: 2023-12-09

## 2023-12-05 NOTE — TELEPHONE ENCOUNTER
PATIENT CALLED IN STATING THAT HE IS IN A LOT OF PAIN. HE IS REQUESTING A SCRIPT FOR A FEW HYDROCODONE TO GET HIM THROUGH UNTIL HIS INFUSION TOMORROW. HE WOULD LIKE THEM TO BE SENT TO [facility] HERE IN [place] PLEASE IF POSSIBLE. THANK YOU

## 2023-12-06 ENCOUNTER — DOCUMENTATION (OUTPATIENT)
Dept: ONCOLOGY | Facility: HOSPITAL | Age: 63
End: 2023-12-06
Payer: OTHER GOVERNMENT

## 2023-12-06 ENCOUNTER — HOSPITAL ENCOUNTER (OUTPATIENT)
Dept: GENERAL RADIOLOGY | Facility: HOSPITAL | Age: 63
Discharge: HOME | End: 2023-12-06
Payer: OTHER GOVERNMENT

## 2023-12-06 ENCOUNTER — HOSPITAL ENCOUNTER (OUTPATIENT)
Dept: ONCOLOGY | Facility: HOSPITAL | Age: 63
Discharge: HOME | End: 2023-12-06
Payer: OTHER GOVERNMENT

## 2023-12-06 VITALS
SYSTOLIC BLOOD PRESSURE: 118 MMHG | DIASTOLIC BLOOD PRESSURE: 62 MMHG | BODY MASS INDEX: 29.8 KG/M2 | RESPIRATION RATE: 20 BRPM | TEMPERATURE: 97.9 F | HEART RATE: 73 BPM | OXYGEN SATURATION: 99 % | HEIGHT: 66 IN | WEIGHT: 185.41 LBS

## 2023-12-06 DIAGNOSIS — R07.81 RIB PAIN: Primary | ICD-10-CM

## 2023-12-06 DIAGNOSIS — R07.81 RIB PAIN: ICD-10-CM

## 2023-12-06 DIAGNOSIS — C16.0 MALIGNANT NEOPLASM OF CARDIA OF STOMACH: Primary | ICD-10-CM

## 2023-12-06 LAB
ALBUMIN SERPL-MCNC: 2.9 G/DL (ref 3.5–5.2)
ALBUMIN/GLOB SERPL: 0.7 G/DL
ALP SERPL-CCNC: 253 U/L (ref 39–117)
ALT SERPL W P-5'-P-CCNC: 17 U/L (ref 1–41)
ANION GAP SERPL CALCULATED.3IONS-SCNC: 11.5 MMOL/L (ref 5–15)
AST SERPL-CCNC: 26 U/L (ref 1–40)
BASOPHILS # BLD AUTO: 0.03 10*3/MM3 (ref 0–0.2)
BASOPHILS NFR BLD AUTO: 0.4 % (ref 0–1.5)
BILIRUB SERPL-MCNC: 0.6 MG/DL (ref 0–1.2)
BUN SERPL-MCNC: 9 MG/DL (ref 8–23)
BUN/CREAT SERPL: 11.1 (ref 7–25)
CALCIUM SPEC-SCNC: 9.3 MG/DL (ref 8.6–10.5)
CHLORIDE SERPL-SCNC: 99 MMOL/L (ref 98–107)
CO2 SERPL-SCNC: 21.5 MMOL/L (ref 22–29)
CREAT SERPL-MCNC: 0.81 MG/DL (ref 0.76–1.27)
DEPRECATED RDW RBC AUTO: 65.1 FL (ref 37–54)
EGFRCR SERPLBLD CKD-EPI 2021: 98.5 ML/MIN/1.73
EOSINOPHIL # BLD AUTO: 0.09 10*3/MM3 (ref 0–0.4)
EOSINOPHIL NFR BLD AUTO: 1.1 % (ref 0.3–6.2)
ERYTHROCYTE [DISTWIDTH] IN BLOOD BY AUTOMATED COUNT: 20.2 % (ref 12.3–15.4)
GLOBULIN UR ELPH-MCNC: 3.9 GM/DL
GLUCOSE SERPL-MCNC: 253 MG/DL (ref 65–99)
HCT VFR BLD AUTO: 38.8 % (ref 37.5–51)
HGB BLD-MCNC: 12.8 G/DL (ref 13–17.7)
IMM GRANULOCYTES # BLD AUTO: 0.02 10*3/MM3 (ref 0–0.05)
IMM GRANULOCYTES NFR BLD AUTO: 0.2 % (ref 0–0.5)
LYMPHOCYTES # BLD AUTO: 0.86 10*3/MM3 (ref 0.7–3.1)
LYMPHOCYTES NFR BLD AUTO: 10.5 % (ref 19.6–45.3)
MCH RBC QN AUTO: 28.4 PG (ref 26.6–33)
MCHC RBC AUTO-ENTMCNC: 33 G/DL (ref 31.5–35.7)
MCV RBC AUTO: 86.2 FL (ref 79–97)
MONOCYTES # BLD AUTO: 0.71 10*3/MM3 (ref 0.1–0.9)
MONOCYTES NFR BLD AUTO: 8.7 % (ref 5–12)
NEUTROPHILS NFR BLD AUTO: 6.45 10*3/MM3 (ref 1.7–7)
NEUTROPHILS NFR BLD AUTO: 79.1 % (ref 42.7–76)
PLATELET # BLD AUTO: 343 10*3/MM3 (ref 140–450)
PMV BLD AUTO: 9.8 FL (ref 6–12)
POTASSIUM SERPL-SCNC: 3.7 MMOL/L (ref 3.5–5.2)
PROT SERPL-MCNC: 6.8 G/DL (ref 6–8.5)
RBC # BLD AUTO: 4.5 10*6/MM3 (ref 4.14–5.8)
SODIUM SERPL-SCNC: 132 MMOL/L (ref 136–145)
WBC NRBC COR # BLD AUTO: 8.16 10*3/MM3 (ref 3.4–10.8)

## 2023-12-06 PROCEDURE — 25010000002 FLUOROURACIL PER 500 MG: Performed by: INTERNAL MEDICINE

## 2023-12-06 PROCEDURE — 25010000002 OXALIPLATIN PER 0.5 MG: Performed by: INTERNAL MEDICINE

## 2023-12-06 PROCEDURE — 85025 COMPLETE CBC W/AUTO DIFF WBC: CPT | Performed by: INTERNAL MEDICINE

## 2023-12-06 PROCEDURE — 80053 COMPREHEN METABOLIC PANEL: CPT | Performed by: INTERNAL MEDICINE

## 2023-12-06 PROCEDURE — 96415 CHEMO IV INFUSION ADDL HR: CPT

## 2023-12-06 PROCEDURE — 0 DEXTROSE 5 % SOLUTION: Performed by: INTERNAL MEDICINE

## 2023-12-06 PROCEDURE — 71046 X-RAY EXAM CHEST 2 VIEWS: CPT

## 2023-12-06 PROCEDURE — G0498 CHEMO EXTEND IV INFUS W/PUMP: HCPCS

## 2023-12-06 PROCEDURE — 25010000002 DEXAMETHASONE SODIUM PHOSPHATE 120 MG/30ML SOLUTION: Performed by: INTERNAL MEDICINE

## 2023-12-06 PROCEDURE — 0 DEXTROSE 5 % SOLUTION 250 ML FLEX CONT: Performed by: INTERNAL MEDICINE

## 2023-12-06 PROCEDURE — 96413 CHEMO IV INFUSION 1 HR: CPT

## 2023-12-06 PROCEDURE — 96375 TX/PRO/DX INJ NEW DRUG ADDON: CPT

## 2023-12-06 PROCEDURE — 25010000002 PALONOSETRON PER 25 MCG: Performed by: INTERNAL MEDICINE

## 2023-12-06 RX ORDER — PALONOSETRON 0.05 MG/ML
0.25 INJECTION, SOLUTION INTRAVENOUS ONCE
Status: COMPLETED | OUTPATIENT
Start: 2023-12-06 | End: 2023-12-06

## 2023-12-06 RX ORDER — DEXTROSE MONOHYDRATE 50 MG/ML
250 INJECTION, SOLUTION INTRAVENOUS ONCE
Status: COMPLETED | OUTPATIENT
Start: 2023-12-06 | End: 2023-12-06

## 2023-12-06 RX ADMIN — PALONOSETRON 0.25 MG: 0.05 INJECTION, SOLUTION INTRAVENOUS at 09:15

## 2023-12-06 RX ADMIN — DEXTROSE MONOHYDRATE 250 ML: 50 INJECTION, SOLUTION INTRAVENOUS at 09:42

## 2023-12-06 RX ADMIN — OXALIPLATIN 170 MG: 5 INJECTION, SOLUTION INTRAVENOUS at 09:46

## 2023-12-06 RX ADMIN — FLUOROURACIL 4750 MG: 50 INJECTION, SOLUTION INTRAVENOUS at 11:54

## 2023-12-06 RX ADMIN — DEXAMETHASONE SODIUM PHOSPHATE 12 MG: 4 INJECTION, SOLUTION INTRA-ARTICULAR; INTRALESIONAL; INTRAMUSCULAR; INTRAVENOUS; SOFT TISSUE at 09:19

## 2023-12-06 NOTE — PROGRESS NOTES
Diagnosis: Metastatic gastric cancer to the liver      Reason for Referral: [facility] travel reimbursement     Content of Visit: OSW met with Mr. Ordonez and his spouse in the medical oncology infusion center this morning to assist him in completing his VA travel reimbursement form for visits attended on 12/27/23, 12/6/23, and 12/8/23. Mr. Ordonez reports he had driven to the [facility] for tx on 12/29/23 and received a phone call while he was in the parking lot that this was being cancelled due to his labs. Therefore, he would like to include this date on his reimbursement form to see if he is eligible for travel reimbursement. OSW will submit Mr. Ordonez's completed form to the [facility] once his visit on 12/8/23 has been attended.  Mr. Ordonez reports he received punch flavor Ensure Clear from the [facility] and was advised by the RD that there is a shortage on the apple flavor related to the COVID pandemic.  Mr. Ordonez reports the [facility] has sent his imaging from his recent CT scan and he is interested in seeing this imaging compared to the one prior as a visual. OSW relayed this request to clinical RN, Evelyn CROWDER, to see if she can help facilitate this during Mr. Ordonez's next clinic visit with oncologist. OSW support remains available.      12/8/23: Mailed Mr. Ordonez's VA travel reimbursement form to the [facility].    Resources/Referrals Provided: VA travel reimbursement; care coordination

## 2023-12-08 ENCOUNTER — HOSPITAL ENCOUNTER (OUTPATIENT)
Dept: ONCOLOGY | Facility: HOSPITAL | Age: 63
Discharge: HOME | End: 2023-12-08
Admitting: INTERNAL MEDICINE
Payer: OTHER GOVERNMENT

## 2023-12-08 DIAGNOSIS — Z45.2 PICC (PERIPHERALLY INSERTED CENTRAL CATHETER) IN PLACE: ICD-10-CM

## 2023-12-08 DIAGNOSIS — Z45.2 FITTING AND ADJUSTMENT OF VASCULAR CATHETER: Primary | ICD-10-CM

## 2023-12-08 DIAGNOSIS — C16.0 MALIGNANT NEOPLASM OF CARDIA OF STOMACH: ICD-10-CM

## 2023-12-08 PROCEDURE — 25010000002 HEPARIN LOCK FLUSH PER 10 UNITS: Performed by: INTERNAL MEDICINE

## 2023-12-08 RX ORDER — SODIUM CHLORIDE 0.9 % (FLUSH) 0.9 %
20 SYRINGE (ML) INJECTION AS NEEDED
OUTPATIENT
Start: 2023-12-08

## 2023-12-08 RX ORDER — HEPARIN SODIUM (PORCINE) LOCK FLUSH IV SOLN 100 UNIT/ML 100 UNIT/ML
500 SOLUTION INTRAVENOUS AS NEEDED
OUTPATIENT
Start: 2023-12-08

## 2023-12-08 RX ORDER — HEPARIN SODIUM (PORCINE) LOCK FLUSH IV SOLN 100 UNIT/ML 100 UNIT/ML
500 SOLUTION INTRAVENOUS AS NEEDED
Status: DISCONTINUED | OUTPATIENT
Start: 2023-12-08 | End: 2023-12-09 | Stop reason: HOSPADM

## 2023-12-08 RX ORDER — SODIUM CHLORIDE 0.9 % (FLUSH) 0.9 %
20 SYRINGE (ML) INJECTION AS NEEDED
Status: DISCONTINUED | OUTPATIENT
Start: 2023-12-08 | End: 2023-12-09 | Stop reason: HOSPADM

## 2023-12-08 RX ADMIN — Medication 20 ML: at 11:07

## 2023-12-08 RX ADMIN — HEPARIN SODIUM (PORCINE) LOCK FLUSH IV SOLN 100 UNIT/ML 500 UNITS: 100 SOLUTION at 11:07

## 2023-12-09 ENCOUNTER — HOSPITAL ENCOUNTER (EMERGENCY)
Facility: HOSPITAL | Age: 63
Discharge: HOME | End: 2023-12-09
Attending: EMERGENCY MEDICINE
Payer: OTHER GOVERNMENT

## 2023-12-09 VITALS
DIASTOLIC BLOOD PRESSURE: 88 MMHG | BODY MASS INDEX: 30.01 KG/M2 | HEART RATE: 83 BPM | RESPIRATION RATE: 16 BRPM | WEIGHT: 180.12 LBS | SYSTOLIC BLOOD PRESSURE: 161 MMHG | TEMPERATURE: 97.6 F | HEIGHT: 65 IN | OXYGEN SATURATION: 98 %

## 2023-12-09 DIAGNOSIS — C78.7 MALIGNANT NEOPLASM METASTATIC TO LIVER: ICD-10-CM

## 2023-12-09 DIAGNOSIS — R10.9 RIGHT SIDED ABDOMINAL PAIN: Primary | ICD-10-CM

## 2023-12-09 LAB
ALBUMIN SERPL-MCNC: 2.7 G/DL (ref 3.5–5.2)
ALBUMIN/GLOB SERPL: 0.6 G/DL
ALP SERPL-CCNC: 266 U/L (ref 39–117)
ALT SERPL W P-5'-P-CCNC: 21 U/L (ref 1–41)
ANION GAP SERPL CALCULATED.3IONS-SCNC: 16.7 MMOL/L (ref 5–15)
AST SERPL-CCNC: 32 U/L (ref 1–40)
BACTERIA UR QL AUTO: NORMAL /HPF
BASOPHILS # BLD AUTO: 0.03 10*3/MM3 (ref 0–0.2)
BASOPHILS NFR BLD AUTO: 0.5 % (ref 0–1.5)
BILIRUB SERPL-MCNC: 0.6 MG/DL (ref 0–1.2)
BILIRUB UR QL STRIP: NEGATIVE
BUN SERPL-MCNC: 18 MG/DL (ref 8–23)
BUN/CREAT SERPL: 20.9 (ref 7–25)
CALCIUM SPEC-SCNC: 8.9 MG/DL (ref 8.6–10.5)
CHLORIDE SERPL-SCNC: 98 MMOL/L (ref 98–107)
CLARITY UR: CLEAR
CO2 SERPL-SCNC: 20.3 MMOL/L (ref 22–29)
COLOR UR: YELLOW
CREAT SERPL-MCNC: 0.86 MG/DL (ref 0.76–1.27)
D-LACTATE SERPL-SCNC: 2.4 MMOL/L (ref 0.5–2)
DEPRECATED RDW RBC AUTO: 60.8 FL (ref 37–54)
EGFRCR SERPLBLD CKD-EPI 2021: 96.7 ML/MIN/1.73
EOSINOPHIL # BLD AUTO: 0.05 10*3/MM3 (ref 0–0.4)
EOSINOPHIL NFR BLD AUTO: 0.9 % (ref 0.3–6.2)
ERYTHROCYTE [DISTWIDTH] IN BLOOD BY AUTOMATED COUNT: 19.9 % (ref 12.3–15.4)
GLOBULIN UR ELPH-MCNC: 4.2 GM/DL
GLUCOSE SERPL-MCNC: 307 MG/DL (ref 65–99)
GLUCOSE UR STRIP-MCNC: ABNORMAL MG/DL
HCT VFR BLD AUTO: 37.2 % (ref 37.5–51)
HGB BLD-MCNC: 12.3 G/DL (ref 13–17.7)
HGB UR QL STRIP.AUTO: NEGATIVE
HOLD SPECIMEN: NORMAL
HOLD SPECIMEN: NORMAL
HYALINE CASTS UR QL AUTO: NORMAL /LPF
IMM GRANULOCYTES # BLD AUTO: 0.02 10*3/MM3 (ref 0–0.05)
IMM GRANULOCYTES NFR BLD AUTO: 0.4 % (ref 0–0.5)
KETONES UR QL STRIP: ABNORMAL
LEUKOCYTE ESTERASE UR QL STRIP.AUTO: NEGATIVE
LIPASE SERPL-CCNC: 23 U/L (ref 13–60)
LYMPHOCYTES # BLD AUTO: 0.87 10*3/MM3 (ref 0.7–3.1)
LYMPHOCYTES NFR BLD AUTO: 15.8 % (ref 19.6–45.3)
MCH RBC QN AUTO: 27.8 PG (ref 26.6–33)
MCHC RBC AUTO-ENTMCNC: 33.1 G/DL (ref 31.5–35.7)
MCV RBC AUTO: 84 FL (ref 79–97)
MONOCYTES # BLD AUTO: 0.12 10*3/MM3 (ref 0.1–0.9)
MONOCYTES NFR BLD AUTO: 2.2 % (ref 5–12)
NEUTROPHILS NFR BLD AUTO: 4.42 10*3/MM3 (ref 1.7–7)
NEUTROPHILS NFR BLD AUTO: 80.2 % (ref 42.7–76)
NITRITE UR QL STRIP: NEGATIVE
NRBC BLD AUTO-RTO: 0 /100 WBC (ref 0–0.2)
PH UR STRIP.AUTO: 5.5 [PH] (ref 5–8)
PLATELET # BLD AUTO: 337 10*3/MM3 (ref 140–450)
PMV BLD AUTO: 10.2 FL (ref 6–12)
POTASSIUM SERPL-SCNC: 4 MMOL/L (ref 3.5–5.2)
PROT SERPL-MCNC: 6.9 G/DL (ref 6–8.5)
PROT UR QL STRIP: ABNORMAL
RBC # BLD AUTO: 4.43 10*6/MM3 (ref 4.14–5.8)
RBC # UR STRIP: NORMAL /HPF
REF LAB TEST METHOD: NORMAL
SODIUM SERPL-SCNC: 135 MMOL/L (ref 136–145)
SP GR UR STRIP: >1.03 (ref 1–1.03)
SQUAMOUS #/AREA URNS HPF: NORMAL /HPF
UROBILINOGEN UR QL STRIP: ABNORMAL
WBC # UR STRIP: NORMAL /HPF
WBC NRBC COR # BLD AUTO: 5.51 10*3/MM3 (ref 3.4–10.8)
WHOLE BLOOD HOLD COAG: NORMAL
WHOLE BLOOD HOLD SPECIMEN: NORMAL

## 2023-12-09 PROCEDURE — 85025 COMPLETE CBC W/AUTO DIFF WBC: CPT | Performed by: EMERGENCY MEDICINE

## 2023-12-09 PROCEDURE — 81001 URINALYSIS AUTO W/SCOPE: CPT | Performed by: EMERGENCY MEDICINE

## 2023-12-09 PROCEDURE — 25010000002 ONDANSETRON PER 1 MG: Performed by: EMERGENCY MEDICINE

## 2023-12-09 PROCEDURE — 83605 ASSAY OF LACTIC ACID: CPT | Performed by: EMERGENCY MEDICINE

## 2023-12-09 PROCEDURE — 25810000003 LACTATED RINGERS SOLUTION: Performed by: EMERGENCY MEDICINE

## 2023-12-09 PROCEDURE — 96375 TX/PRO/DX INJ NEW DRUG ADDON: CPT

## 2023-12-09 PROCEDURE — 80053 COMPREHEN METABOLIC PANEL: CPT | Performed by: EMERGENCY MEDICINE

## 2023-12-09 PROCEDURE — 25010000002 HYDROMORPHONE 1 MG/ML SOLUTION: Performed by: EMERGENCY MEDICINE

## 2023-12-09 PROCEDURE — 96374 THER/PROPH/DIAG INJ IV PUSH: CPT

## 2023-12-09 PROCEDURE — 25010000002 HEPARIN LOCK FLUSH PER 10 UNITS: Performed by: EMERGENCY MEDICINE

## 2023-12-09 PROCEDURE — 99283 EMERGENCY DEPT VISIT LOW MDM: CPT

## 2023-12-09 PROCEDURE — 83690 ASSAY OF LIPASE: CPT | Performed by: EMERGENCY MEDICINE

## 2023-12-09 RX ORDER — HEPARIN SODIUM (PORCINE) LOCK FLUSH IV SOLN 100 UNIT/ML 100 UNIT/ML
500 SOLUTION INTRAVENOUS ONCE
Status: COMPLETED | OUTPATIENT
Start: 2023-12-09 | End: 2023-12-09

## 2023-12-09 RX ORDER — OXYCODONE HYDROCHLORIDE AND ACETAMINOPHEN 5; 325 MG/1; MG/1
1 TABLET ORAL EVERY 6 HOURS PRN
Qty: 15 TABLET | Refills: 0 | Status: SHIPPED | OUTPATIENT
Start: 2023-12-09

## 2023-12-09 RX ORDER — SODIUM CHLORIDE 0.9 % (FLUSH) 0.9 %
10 SYRINGE (ML) INJECTION AS NEEDED
Status: DISCONTINUED | OUTPATIENT
Start: 2023-12-09 | End: 2023-12-09 | Stop reason: HOSPADM

## 2023-12-09 RX ORDER — ONDANSETRON 2 MG/ML
4 INJECTION INTRAMUSCULAR; INTRAVENOUS ONCE
Status: COMPLETED | OUTPATIENT
Start: 2023-12-09 | End: 2023-12-09

## 2023-12-09 RX ADMIN — HYDROMORPHONE HYDROCHLORIDE 0.5 MG: 1 INJECTION, SOLUTION INTRAMUSCULAR; INTRAVENOUS; SUBCUTANEOUS at 18:12

## 2023-12-09 RX ADMIN — SODIUM CHLORIDE, POTASSIUM CHLORIDE, SODIUM LACTATE AND CALCIUM CHLORIDE 1000 ML: 600; 310; 30; 20 INJECTION, SOLUTION INTRAVENOUS at 18:10

## 2023-12-09 RX ADMIN — ONDANSETRON 4 MG: 2 INJECTION INTRAMUSCULAR; INTRAVENOUS at 18:12

## 2023-12-09 RX ADMIN — HEPARIN 500 UNITS: 100 SYRINGE at 19:55

## 2023-12-10 NOTE — DISCHARGE INSTRUCTIONS
Activity as tolerated.  Drink plenty of fluids.  Ensure adequate hydration.  Ensure adequate nutritional intake as well.  Consider protein drinks such as Ensure or boost.  Take the Percocet prescription as prescribed for pain.  Consider taking a stool softener to avoid constipation.  Continue with your other home medications as previously instructed.  Follow-up with Dr. Pruitt on Tuesday.  If you continue to have abdominal pain discuss if there is a need to have a CT scan performed for further imaging evaluation.  Return to the ER for uncontrolled pain, vomiting, fever greater than 101, rectal bleeding, or any other concerns issues that may arise.

## 2023-12-10 NOTE — ED PROVIDER NOTES
Time: 7:27 PM EST  Date of encounter:  12/9/2023  Independent Historian/Clinical History and Information was obtained by:   {Blank multiple:14452}  Chief Complaint: ***    History is limited by: {Limited History:33212}    History of Present Illness:  Patient is a 63 y.o. 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    Diabetes mellitus     Hyperlipidemia     Hypertension     Liver cancer     Rotator cuff disorder     LEFT    Sleep apnea     Stomach cancer      Past Surgical History:   Procedure Laterality Date    CAROTID ENDARTERECTOMY Right     CHOLECYSTECTOMY      FOOT SURGERY Right     TOE DEBRIDMENT    HERNIA REPAIR      VENTRAL HERNIA    SHOULDER ARTHROSCOPY W/ ROTATOR CUFF REPAIR Left 4/3/2023    Procedure: LEFT SHOULDER ARTHROSCOPIC ROTATOR CUFF DEBRIDEMENT, LABRAL DEBRIDEMENT, BICEPS TENODESIS, SUBACROMIAL DECOMPRESSION;  Surgeon: Chas Patterson MD;  Location: [facility] OR [facility];  Service: Orthopedics;  Laterality: Left;    TUMOR REMOVAL Left     PAROTID GLAND    VASCULAR SURGERY Right     STENT R LEG    VENOUS ACCESS DEVICE (PORT) INSERTION Right 9/26/2023    Procedure: INSERTION VENOUS ACCESS DEVICE;  Surgeon: James Copeland MD;  Location: [facility] OR;  Service: General;  Laterality: Right;     Family History   Problem Relation Age of Onset    Lung cancer Father     Malig Hyperthermia Neg Hx        Home Medications:  Prior to Admission medications    Medication Sig Start Date End Date Taking? Authorizing Provider   apixaban (ELIQUIS) 5 MG tablet tablet Take 1 tablet by mouth 2 (Two) Times a Day.    ProviderTanvir MD   atorvastatin (LIPITOR) 80 MG tablet Take 1 tablet by mouth Every Night.    Tanvir Boateng MD    chlorthalidone (HYGROTON) 25 MG tablet Take 0.5 tablets by mouth Daily.    Tanvir Boateng MD   cholecalciferol (VITAMIN D3) 25 MCG (1000 UT) tablet Take 1 tablet by mouth Daily. LAST DOSE 3/26/23    Tanvir Boateng MD   Dulaglutide 1.5 MG/0.5ML solution pen-injector Inject 1.5 mg under the skin into the appropriate area as directed Every 7 (Seven) Days. THURSDAY    Tanvir Boateng MD   fluticasone-salmeterol (ADVAIR HFA) 230-21 MCG/ACT inhaler Inhale 2 puffs 2 (Two) Times a Day.    Tanvir Boateng MD   gabapentin (NEURONTIN) 600 MG tablet Take 2 tablets by mouth 2 (Two) Times a Day.    Tanvir Boateng MD   glipizide (GLUCOTROL) 10 MG tablet Take 1 tablet by mouth 2 (Two) Times a Day Before Meals.    Tanvir Boateng MD   HYDROcodone-acetaminophen (NORCO) 5-325 MG per tablet Take 1 tablet by mouth Every 6 (Six) Hours As Needed for Severe Pain. 12/5/23   Govind Pruitt MD   insulin NPH (NovoLIN N ReliOn) 100 UNIT/ML injection Inject 52 Units under the skin into the appropriate area as directed 2 (Two) Times a Day Before Meals.    Tanvir Boateng MD   LORazepam (ATIVAN) 1 MG tablet Take 1 tablet by mouth Every 8 (Eight) Hours As Needed for Anxiety. 10/10/23   Govind Pruitt MD   LORazepam (ATIVAN) 1 MG tablet Take 1 tablet by mouth Every 8 (Eight) Hours As Needed for Anxiety.  Patient not taking: Reported on 11/7/2023 10/11/23   Govind Pruitt MD   losartan (COZAAR) 50 MG tablet Take 2 tablets by mouth Daily.    Tanvir Boateng MD   meclizine (ANTIVERT) 25 MG tablet Take 1 tablet by mouth 3 (Three) Times a Day As Needed for Dizziness. 11/21/23   Govind Pruitt MD   metFORMIN (GLUCOPHAGE) 1000 MG tablet Take 1 tablet by mouth 2 (Two) Times a Day With Meals. INST PER ANESTHESIA  PROTOCOL    Tavnir Boateng MD   mirtazapine (REMERON) 15 MG tablet Take 1 tablet by mouth Every Night. 10/3/23   BurkGovind perea MD mirtazapine  "(REMERON) 15 MG tablet Take 1 tablet by mouth Every Night. 10/3/23   Govind Pruitt MD   montelukast (SINGULAIR) 10 MG tablet Take 1 tablet by mouth Every Night.    ProviderTanvir MD   OLANZapine (zyPREXA) 5 MG tablet Take 1 tablet by mouth Every Night. Indications: Nausea and Vomiting caused by Cancer Chemotherapy, if no improvement, call provider 10/3/23   Govind Pruitt MD   OLANZapine (zyPREXA) 5 MG tablet Take 1 tablet by mouth Every Night. 10/3/23   Govind Pruitt MD   Omega-3 Fatty Acids (fish oil) 1000 MG capsule capsule Take  by mouth Daily With Breakfast. LAST DOSE 3/26/23    ProviderTanvir MD   omeprazole (priLOSEC) 40 MG capsule Take 1 capsule by mouth 2 (Two) Times a Day.    Tanvir Boateng MD   ondansetron (ZOFRAN) 8 MG tablet Take 1 tablet by mouth 3 (Three) Times a Day As Needed for Nausea or Vomiting (mail to patient please.). 23   Marisol Castano APRN   potassium chloride (K-DUR,KLOR-CON) 10 MEQ CR tablet Take 1 tablet by mouth Daily. 23   Govind Pruitt MD        Social History:   Social History     Tobacco Use    Smoking status: Former     Years: 25     Types: Cigarettes     Start date:      Quit date:      Years since quittin.9    Smokeless tobacco: Former   Vaping Use    Vaping Use: Never used   Substance Use Topics    Alcohol use: Never    Drug use: Never         Review of Systems:  Review of Systems     ***    Physical Exam:  /88   Pulse 76   Temp 97.6 °F (36.4 °C) (Oral)   Resp 16   Ht 165.1 cm (65\")   Wt 81.7 kg (180 lb 1.9 oz)   SpO2 97%   BMI 29.97 kg/m²     Physical Exam    Vital signs were reviewed under triage note.  General appearance - Patient appears well-developed and well-nourished.  Patient is in no acute distress.  Head - Normocephalic, atraumatic.  Pupils - Equal, round, reactive to light.  Extraocular muscles are intact.  Conjunctiva is clear.  Nasal - Normal inspection.  No " evidence of trauma or epistaxis.  Tympanic membranes - Gray, intact without erythema or retractions.  Oral mucosa - Pink and moist without lesions or erythema.  Uvula is midline.  Chest wall - Atraumatic.  Chest wall is nontender.  There are no vesicular rashes noted.  Neck - Supple.  Trachea was midline.  There is no palpable lymphadenopathy or thyromegaly.  There are no meningeal signs  Lungs - Clear to auscultation and percussion bilaterally.  Heart - Regular rate and rhythm without any murmurs, clicks, or gallops.  Abdomen - Soft.  Bowel sounds are present.  There is no palpable tenderness.  There is no rebound, guarding, or rigidity.  There are no palpable masses.  There are no pulsatile masses.  Back - Spine is straight and midline.  There is no CVA tenderness.  Extremities - Intact x4 with full range of motion.  There is no palpable edema.  Pulses are intact x4 and equal.  Neurologic - Patient is awake, alert, and oriented x3.  Cranial nerves II through XII are grossly intact.  Motor and sensory functions grossly intact.  Cerebellar function was normal.  Integument - There are no rashes.  There are no petechia or purpura lesions noted.  There are no vesicular lesions noted.           ***    Procedures:  Procedures      Medical Decision Making:      Comorbidities that affect care:    {Comorbidities that affect care:06707}    External Notes reviewed:    {External Note review (Optional):76507}      The following orders were placed and all results were independently analyzed by me:  Orders Placed This Encounter   Procedures    [place] Draw    Comprehensive Metabolic Panel    Lipase    Urinalysis With Microscopic If Indicated (No Culture) - Urine, Clean Catch    Lactic Acid, Plasma    CBC Auto Differential    STAT Lactic Acid, Reflex    NPO Diet NPO Type: Strict NPO    Undress & Gown    Insert Peripheral IV    CBC & Differential    Green Top (Gel)    Lavender Top    Gold Top - SST    Light Blue Top        Medications Given in the Emergency Department:  Medications   sodium chloride 0.9 % flush 10 mL (has no administration in time range)   lactated ringers bolus 1,000 mL (1,000 mL Intravenous New Bag 12/9/23 1810)   HYDROmorphone (DILAUDID) injection 0.5 mg (0.5 mg Intravenous Given 12/9/23 1812)   ondansetron (ZOFRAN) injection 4 mg (4 mg Intravenous Given 12/9/23 1812)        ED Course:         ***    Labs:    Lab Results (last 24 hours)       Procedure Component Value Units Date/Time    CBC & Differential [[id number]]  (Abnormal) Collected: 12/09/23 1706    Specimen: Blood Updated: 12/09/23 1719    Narrative:      The following orders were created for panel order CBC & Differential.  Procedure                               Abnormality         Status                     ---------                               -----------         ------                     CBC Auto Differential[[id number]]        Abnormal            Final result                 Please view results for these tests on the individual orders.    Comprehensive Metabolic Panel [[id number]]  (Abnormal) Collected: 12/09/23 1706    Specimen: Blood Updated: 12/09/23 1814     Glucose 307 mg/dL      BUN 18 mg/dL      Creatinine 0.86 mg/dL      Sodium 135 mmol/L      Potassium 4.0 mmol/L      Chloride 98 mmol/L      CO2 20.3 mmol/L      Calcium 8.9 mg/dL      Total Protein 6.9 g/dL      Albumin 2.7 g/dL      ALT (SGPT) 21 U/L      AST (SGOT) 32 U/L      Alkaline Phosphatase 266 U/L      Total Bilirubin 0.6 mg/dL      Globulin 4.2 gm/dL      A/G Ratio 0.6 g/dL      BUN/Creatinine Ratio 20.9     Anion Gap 16.7 mmol/L      eGFR 96.7 mL/min/1.73     Narrative:      GFR Normal >60  Chronic Kidney Disease <60  Kidney Failure <15      Lipase [[id number]]  (Normal) Collected: 12/09/23 1706    Specimen: Blood Updated: 12/09/23 1814     Lipase 23 U/L     Lactic Acid, Plasma [[id number]]  (Abnormal) Collected: 12/09/23 1706    Specimen: Blood Updated: 12/09/23 1803      Lactate 2.4 mmol/L     CBC Auto Differential [[id number]]  (Abnormal) Collected: 12/09/23 1706    Specimen: Blood Updated: 12/09/23 1719     WBC 5.51 10*3/mm3      RBC 4.43 10*6/mm3      Hemoglobin 12.3 g/dL      Hematocrit 37.2 %      MCV 84.0 fL      MCH 27.8 pg      MCHC 33.1 g/dL      RDW 19.9 %      RDW-SD 60.8 fl      MPV 10.2 fL      Platelets 337 10*3/mm3      Neutrophil % 80.2 %      Lymphocyte % 15.8 %      Monocyte % 2.2 %      Eosinophil % 0.9 %      Basophil % 0.5 %      Immature Grans % 0.4 %      Neutrophils, Absolute 4.42 10*3/mm3      Lymphocytes, Absolute 0.87 10*3/mm3      Monocytes, Absolute 0.12 10*3/mm3      Eosinophils, Absolute 0.05 10*3/mm3      Basophils, Absolute 0.03 10*3/mm3      Immature Grans, Absolute 0.02 10*3/mm3      nRBC 0.0 /100 WBC              Imaging:    No Radiology Exams Resulted Within Past 24 Hours      Differential Diagnosis and Discussion:    {Differentials:77243}    {Independent Review of (Optional):58557}    MDM     Amount and/or Complexity of Data Reviewed  Clinical lab tests: reviewed         {Critical Care:24802}    Patient Care Considerations:    {Considerations (Optional):47129}      Consultants/Shared Management Plan:    {Shared Management Plan (Optional):30121}    Social Determinants of Health:    {Social Determinants of Health (Optional):11207}      Disposition and Care Coordination:    {Admission consideration:92564}    {Discharge (Optional):45004}    Final diagnoses:   Right sided abdominal pain   Malignant neoplasm metastatic to liver        ED Disposition       ED Disposition   Discharge    Condition   Stable    Comment   --               This medical record created using voice recognition software.           Collected: 12/09/23 1706    Specimen: Blood Updated: 12/09/23 1719     WBC 5.51 10*3/mm3      RBC 4.43 10*6/mm3      Hemoglobin 12.3 g/dL      Hematocrit 37.2 %      MCV 84.0 fL      MCH 27.8 pg      MCHC 33.1 g/dL      RDW 19.9 %      RDW-SD 60.8 fl      MPV 10.2 fL      Platelets 337 10*3/mm3      Neutrophil % 80.2 %      Lymphocyte % 15.8 %      Monocyte % 2.2 %      Eosinophil % 0.9 %      Basophil % 0.5 %      Immature Grans % 0.4 %      Neutrophils, Absolute 4.42 10*3/mm3      Lymphocytes, Absolute 0.87 10*3/mm3      Monocytes, Absolute 0.12 10*3/mm3      Eosinophils, Absolute 0.05 10*3/mm3      Basophils, Absolute 0.03 10*3/mm3      Immature Grans, Absolute 0.02 10*3/mm3      nRBC 0.0 /100 WBC              Imaging:    No Radiology Exams Resulted Within Past 24 Hours      Differential Diagnosis and Discussion:    Abdominal Pain: Based on the patient's signs and symptoms, I considered abdominal aortic aneurysm, small bowel obstruction, pancreatitis, acute cholecystitis, acute appendecitis, peptic ulcer disease, gastritis, colitis, endocrine disorders, irritable bowel syndrome and other differential diagnosis an etiology of the patient's abdominal pain.  Weakness: Based on the patient's history, signs, and symptoms, the diffential diagnosis includes but is not limited to meningitis, stroke, sepsis, subarachnoid hemorrhage, intracranial bleeding, encephalitis, acute uti, dehydration, MS, myasthenia gravis, Guillan Brandywine, migraine variant, neuromuscular disorders vertigo, electrolyte imbalance, and metabolic disorders.    All labs were reviewed and interpreted by me.    MDM     Amount and/or Complexity of Data Reviewed  Clinical lab tests: reviewed             Patient Care Considerations:    SEPSIS was considered but is NOT present in the emergency department as SIRS criteria is not present.      Consultants/Shared Management Plan:    None    Social Determinants of Health:    Patient is independent, reliable, and  has access to care.       Disposition and Care Coordination:    Discharged: I considered escalation of care by admitting this patient for observation, however the patient has improved and is suitable and  stable for discharge.    I have explained the patient´s condition, diagnoses and treatment plan based on the information available to me at this time. I have answered questions and addressed any concerns. The patient has a good  understanding of the patient´s diagnosis, condition, and treatment plan as can be expected at this point. The vital signs have been stable. The patient´s condition is stable and appropriate for discharge from the emergency department.      The patient will pursue further outpatient evaluation with the primary care physician or other designated or consulting physician as outlined in the discharge instructions. They are agreeable to this plan of care and follow-up instructions have been explained in detail. The patient has received these instructions in written format and have expressed an understanding of the discharge instructions. The patient is aware that any significant change in condition or worsening of symptoms should prompt an immediate return to this or the closest emergency department or call to 911.  I have explained discharge medications and the need for follow up with the patient/caretakers. This was also printed in the discharge instructions. Patient was discharged with the following medications and follow up:      Medication List        Stop      HYDROcodone-acetaminophen 5-325 MG per tablet  Commonly known as: Ev Solorzano, APRDEX  [address] [facility]  [address]  [facility] [address] [phone]      As needed    Govind Pruitt MD  [address] [facility] [facility]  [address]  [facility] [address] [phone]    In 3 days        Final diagnoses:   Right sided abdominal pain   Malignant neoplasm metastatic to liver        ED Disposition       ED Disposition    Discharge    Condition   Stable    Comment   --               This medical record created using voice recognition software.             Jun Bauman DO  12/14/23 1951

## 2023-12-12 DIAGNOSIS — C78.7 MALIGNANT NEOPLASM METASTATIC TO LIVER: ICD-10-CM

## 2023-12-12 DIAGNOSIS — R10.9 RIGHT SIDED ABDOMINAL PAIN: ICD-10-CM

## 2023-12-12 RX ORDER — MECLIZINE HYDROCHLORIDE 25 MG/1
25 TABLET ORAL 3 TIMES DAILY PRN
Qty: 30 TABLET | Refills: 1 | Status: SHIPPED | OUTPATIENT
Start: 2023-12-12 | End: 2023-12-15 | Stop reason: SDUPTHER

## 2023-12-12 RX ORDER — OXYCODONE HYDROCHLORIDE AND ACETAMINOPHEN 5; 325 MG/1; MG/1
1 TABLET ORAL EVERY 6 HOURS PRN
Qty: 120 TABLET | Refills: 0 | Status: SHIPPED | OUTPATIENT
Start: 2023-12-12 | End: 2023-12-15 | Stop reason: SDUPTHER

## 2023-12-12 NOTE — TELEPHONE ENCOUNTER
Patient went to ER Saturday with stomach pain and decreased appetite. Patient requesting refill on Meclizine 25 mg tablet and Oxycodone 5-325 mg tablet (prescribed by ER) Meds need to be [facility] to [facility].

## 2023-12-14 DIAGNOSIS — C78.7 MALIGNANT NEOPLASM METASTATIC TO LIVER: ICD-10-CM

## 2023-12-14 DIAGNOSIS — R10.9 RIGHT SIDED ABDOMINAL PAIN: ICD-10-CM

## 2023-12-14 NOTE — TELEPHONE ENCOUNTER
Caller: Evelyne Ordonezen    Relationship: Self    Best call back number: [phone]     Who are you requesting to speak with (clinical staff, provider,  specific staff member): CLINICAL     What was the call regarding: PT meclizine (ANTIVERT) 25 MG tablet  AND oxyCODONE-acetaminophen (PERCOCET) 5-325 MG per tablet  [facility]     [facility] - [place], [place] - [facility] [address] - [phone]  - [phone] FX

## 2023-12-15 RX ORDER — OXYCODONE HYDROCHLORIDE AND ACETAMINOPHEN 5; 325 MG/1; MG/1
1 TABLET ORAL EVERY 6 HOURS PRN
Qty: 120 TABLET | Refills: 0 | Status: SHIPPED | OUTPATIENT
Start: 2023-12-15

## 2023-12-15 RX ORDER — MECLIZINE HYDROCHLORIDE 25 MG/1
25 TABLET ORAL 3 TIMES DAILY PRN
Qty: 30 TABLET | Refills: 1 | Status: SHIPPED | OUTPATIENT
Start: 2023-12-15

## 2023-12-19 ENCOUNTER — HOSPITAL ENCOUNTER (OUTPATIENT)
Dept: ONCOLOGY | Facility: HOSPITAL | Age: 63
Discharge: HOME | End: 2023-12-19
Admitting: INTERNAL MEDICINE
Payer: OTHER GOVERNMENT

## 2023-12-19 ENCOUNTER — OFFICE VISIT (OUTPATIENT)
Dept: ONCOLOGY | Facility: HOSPITAL | Age: 63
End: 2023-12-19
Payer: OTHER GOVERNMENT

## 2023-12-19 VITALS
SYSTOLIC BLOOD PRESSURE: 129 MMHG | RESPIRATION RATE: 18 BRPM | DIASTOLIC BLOOD PRESSURE: 66 MMHG | HEART RATE: 81 BPM | BODY MASS INDEX: 30.82 KG/M2 | WEIGHT: 185.2 LBS | TEMPERATURE: 97.6 F | OXYGEN SATURATION: 100 %

## 2023-12-19 DIAGNOSIS — Z45.2 PICC (PERIPHERALLY INSERTED CENTRAL CATHETER) IN PLACE: ICD-10-CM

## 2023-12-19 DIAGNOSIS — C16.0 MALIGNANT NEOPLASM OF CARDIA OF STOMACH: Primary | ICD-10-CM

## 2023-12-19 DIAGNOSIS — C16.0 MALIGNANT NEOPLASM OF CARDIA OF STOMACH: ICD-10-CM

## 2023-12-19 DIAGNOSIS — I81 PORTAL VEIN THROMBOSIS: ICD-10-CM

## 2023-12-19 DIAGNOSIS — Z45.2 FITTING AND ADJUSTMENT OF VASCULAR CATHETER: ICD-10-CM

## 2023-12-19 DIAGNOSIS — I82.451 ACUTE DEEP VEIN THROMBOSIS (DVT) OF RIGHT PERONEAL VEIN: Primary | ICD-10-CM

## 2023-12-19 DIAGNOSIS — G89.3 NEOPLASM RELATED PAIN: ICD-10-CM

## 2023-12-19 LAB
ALBUMIN SERPL-MCNC: 2.8 G/DL (ref 3.5–5.2)
ALBUMIN/GLOB SERPL: 0.8 G/DL
ALP SERPL-CCNC: 282 U/L (ref 39–117)
ALT SERPL W P-5'-P-CCNC: 21 U/L (ref 1–41)
ANION GAP SERPL CALCULATED.3IONS-SCNC: 9.2 MMOL/L (ref 5–15)
AST SERPL-CCNC: 44 U/L (ref 1–40)
BASOPHILS # BLD AUTO: 0.05 10*3/MM3 (ref 0–0.2)
BASOPHILS NFR BLD AUTO: 0.5 % (ref 0–1.5)
BILIRUB SERPL-MCNC: 0.3 MG/DL (ref 0–1.2)
BUN SERPL-MCNC: 9 MG/DL (ref 8–23)
BUN/CREAT SERPL: 11.4 (ref 7–25)
CALCIUM SPEC-SCNC: 8.9 MG/DL (ref 8.6–10.5)
CHLORIDE SERPL-SCNC: 102 MMOL/L (ref 98–107)
CO2 SERPL-SCNC: 24.8 MMOL/L (ref 22–29)
CREAT SERPL-MCNC: 0.79 MG/DL (ref 0.76–1.27)
DEPRECATED RDW RBC AUTO: 63.3 FL (ref 37–54)
EGFRCR SERPLBLD CKD-EPI 2021: 99.2 ML/MIN/1.73
EOSINOPHIL # BLD AUTO: 0.25 10*3/MM3 (ref 0–0.4)
EOSINOPHIL NFR BLD AUTO: 2.7 % (ref 0.3–6.2)
ERYTHROCYTE [DISTWIDTH] IN BLOOD BY AUTOMATED COUNT: 19.4 % (ref 12.3–15.4)
GLOBULIN UR ELPH-MCNC: 3.3 GM/DL
GLUCOSE SERPL-MCNC: 133 MG/DL (ref 65–99)
HCT VFR BLD AUTO: 38 % (ref 37.5–51)
HGB BLD-MCNC: 12.3 G/DL (ref 13–17.7)
IMM GRANULOCYTES # BLD AUTO: 0.01 10*3/MM3 (ref 0–0.05)
IMM GRANULOCYTES NFR BLD AUTO: 0.1 % (ref 0–0.5)
LYMPHOCYTES # BLD AUTO: 0.69 10*3/MM3 (ref 0.7–3.1)
LYMPHOCYTES NFR BLD AUTO: 7.4 % (ref 19.6–45.3)
MCH RBC QN AUTO: 28.8 PG (ref 26.6–33)
MCHC RBC AUTO-ENTMCNC: 32.4 G/DL (ref 31.5–35.7)
MCV RBC AUTO: 89 FL (ref 79–97)
MONOCYTES # BLD AUTO: 0.93 10*3/MM3 (ref 0.1–0.9)
MONOCYTES NFR BLD AUTO: 10 % (ref 5–12)
NEUTROPHILS NFR BLD AUTO: 7.41 10*3/MM3 (ref 1.7–7)
NEUTROPHILS NFR BLD AUTO: 79.3 % (ref 42.7–76)
PLATELET # BLD AUTO: 234 10*3/MM3 (ref 140–450)
PMV BLD AUTO: 9.4 FL (ref 6–12)
POTASSIUM SERPL-SCNC: 3.7 MMOL/L (ref 3.5–5.2)
PROT SERPL-MCNC: 6.1 G/DL (ref 6–8.5)
RBC # BLD AUTO: 4.27 10*6/MM3 (ref 4.14–5.8)
SODIUM SERPL-SCNC: 136 MMOL/L (ref 136–145)
WBC NRBC COR # BLD AUTO: 9.34 10*3/MM3 (ref 3.4–10.8)

## 2023-12-19 PROCEDURE — 25010000002 HEPARIN LOCK FLUSH PER 10 UNITS: Performed by: INTERNAL MEDICINE

## 2023-12-19 PROCEDURE — 36591 DRAW BLOOD OFF VENOUS DEVICE: CPT

## 2023-12-19 PROCEDURE — 85025 COMPLETE CBC W/AUTO DIFF WBC: CPT | Performed by: INTERNAL MEDICINE

## 2023-12-19 PROCEDURE — 80053 COMPREHEN METABOLIC PANEL: CPT | Performed by: INTERNAL MEDICINE

## 2023-12-19 RX ORDER — PALONOSETRON 0.05 MG/ML
0.25 INJECTION, SOLUTION INTRAVENOUS ONCE
Status: CANCELLED | OUTPATIENT
Start: 2023-12-20

## 2023-12-19 RX ORDER — SODIUM CHLORIDE 0.9 % (FLUSH) 0.9 %
20 SYRINGE (ML) INJECTION AS NEEDED
Status: DISCONTINUED | OUTPATIENT
Start: 2023-12-19 | End: 2023-12-20 | Stop reason: HOSPADM

## 2023-12-19 RX ORDER — SODIUM CHLORIDE 0.9 % (FLUSH) 0.9 %
20 SYRINGE (ML) INJECTION AS NEEDED
Status: CANCELLED | OUTPATIENT
Start: 2023-12-19

## 2023-12-19 RX ORDER — HEPARIN SODIUM (PORCINE) LOCK FLUSH IV SOLN 100 UNIT/ML 100 UNIT/ML
500 SOLUTION INTRAVENOUS AS NEEDED
Status: DISCONTINUED | OUTPATIENT
Start: 2023-12-19 | End: 2023-12-20 | Stop reason: HOSPADM

## 2023-12-19 RX ORDER — HEPARIN SODIUM (PORCINE) LOCK FLUSH IV SOLN 100 UNIT/ML 100 UNIT/ML
500 SOLUTION INTRAVENOUS AS NEEDED
Status: CANCELLED | OUTPATIENT
Start: 2023-12-20

## 2023-12-19 RX ORDER — FAMOTIDINE 10 MG/ML
20 INJECTION, SOLUTION INTRAVENOUS AS NEEDED
Status: CANCELLED | OUTPATIENT
Start: 2023-12-20

## 2023-12-19 RX ORDER — DIPHENHYDRAMINE HYDROCHLORIDE 50 MG/ML
50 INJECTION INTRAMUSCULAR; INTRAVENOUS AS NEEDED
Status: CANCELLED | OUTPATIENT
Start: 2023-12-20

## 2023-12-19 RX ORDER — DEXTROSE MONOHYDRATE 50 MG/ML
250 INJECTION, SOLUTION INTRAVENOUS ONCE
Status: CANCELLED | OUTPATIENT
Start: 2023-12-20

## 2023-12-19 RX ADMIN — Medication 20 ML: at 09:39

## 2023-12-19 RX ADMIN — HEPARIN SODIUM (PORCINE) LOCK FLUSH IV SOLN 100 UNIT/ML 500 UNITS: 100 SOLUTION at 09:39

## 2023-12-19 NOTE — PROGRESS NOTES
Chief Complaint  Malignant neoplasm of cardia of stomach    Reji Fall MD Blakey, Ev Fan, CHIOMA    Subjective          Kevin José Luis presents to [facility] for metastatic gastric cancer    Mr. 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 Patient started on FOLFOX on 9/27/23.    Interval History  Patient presents for follow up. He is due for cycle 6 of FOLFOX tomorrow. Was seen in the ED on 12/9 for acute abdominal pain that was in his lower right quadrant. Pain improved with oxycodone. Pain has not come back. Fatigue level better. No nausea or vomiting reported. 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 Anemia stable.   No fevers, chills, infections. Moving bowels well.     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     [facility]: [facility] [place] [place] [facility].5 [facility] of right side temporal pain, decreased appetite with 19 pounds of weight loss. With associated melanotic stools.     9/6/23: EGD with Gastric mass biopsy positive for high grade malignant neoplasm consistent with carcinoma (non small cell)    9/27/23: C1D1 mFOLFOX    9/12/23: NM PET: Gastric hypermetabolic mass involving the gastric cardia and fundus with loco-regional sarkis mets and disseminated hepatic metastases.     9/12/23: Liver lesion biopsy: positive for metastatic gastric adenocarcinoma    9/19/23: [facility] NGS: PIK3CA amplification, TP53 R273H, no associated FDA-approved therapies. MS-stable. [facility] NGS: HER2 negative, MSI-stable, BRAF not detected, PDL1 IHC score of 0.  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     9/26/23: C1D1 FOLFOX    10/16/23: Duplex due to RLE edema: Acute right lower extremity deep vein thrombosis noted in the posterior tibial and peroneal. Started on eliquis 5 mg BID.     10/18/23: Completed IV injectafer x 2    11/8/23: C4D1 FOLFOX    1127/23: CT CAP with multiple liver lesions and portal vein thrombosis, unable to be compared to PET from 9/2023 so not able to assess progression. Extensive acute thrombus throughout the portal venous tree.     11/29/23: C5D1 FOLFOX (5-FU bolus and leucovorin dropped with this cycle)     Malignant neoplasm of parotid gland (Resolved)   8/26/2022 Initial Diagnosis    Malignant neoplasm of parotid gland (HCC)     8/26/2022 -  Radiation    RADIATION THERAPY Treatment Details (Noted on 8/26/2022)  Site: Left Head and Neck  Technique: IMRT  Goal: No goal specified  Planned Treatment Start Date: No planned start date specified     Malignant neoplasm of cardia of stomach   9/14/2023 Initial Diagnosis    Malignant neoplasm of cardia of stomach     9/14/2023 Cancer Staged    Staging form: Stomach, AJCC 8th Edition  - Clinical: Stage IVB (pM1) - Signed by Govind Pruitt MD on 9/14/2023     9/15/2023 - 9/15/2023 Chemotherapy    OP CENTRAL VENOUS ACCESS DEVICE Access, Care, and Maintenance (CVAD)     9/27/2023 -  Chemotherapy    OP COLON mFOLFOX6 OXALIplatin / Leucovorin / Fluorouracil     9/29/2023 -  Chemotherapy    OP CENTRAL VENOUS ACCESS DEVICE Access, Care, and Maintenance (CVAD)         Review of Systems   Constitutional:  Positive for fatigue.   Respiratory:  Negative for chest tightness.    Gastrointestinal:  Negative for diarrhea.   Musculoskeletal:  Negative for back pain.   Neurological:  Positive for dizziness.   Psychiatric/Behavioral:  Negative for sleep disturbance.    All other systems reviewed and are negative.    Current Outpatient Medications on File Prior to Visit   Medication Sig Dispense Refill    apixaban  (ELIQUIS) 5 MG tablet tablet Take 1 tablet by mouth 2 (Two) Times a Day.      atorvastatin (LIPITOR) 80 MG tablet Take 1 tablet by mouth Every Night.      chlorthalidone (HYGROTON) 25 MG tablet Take 0.5 tablets by mouth Daily.      cholecalciferol (VITAMIN D3) 25 MCG (1000 UT) tablet Take 1 tablet by mouth Daily. LAST DOSE 3/26/23      Dulaglutide 1.5 MG/0.5ML solution pen-injector Inject 1.5 mg under the skin into the appropriate area as directed Every 7 (Seven) Days. THURSDAY      fluticasone-salmeterol (ADVAIR HFA) 230-21 MCG/ACT inhaler Inhale 2 puffs 2 (Two) Times a Day.      gabapentin (NEURONTIN) 600 MG tablet Take 2 tablets by mouth 2 (Two) Times a Day.      glipizide (GLUCOTROL) 10 MG tablet Take 1 tablet by mouth 2 (Two) Times a Day Before Meals.      insulin NPH (NovoLIN N ReliOn) 100 UNIT/ML injection Inject 52 Units under the skin into the appropriate area as directed 2 (Two) Times a Day Before Meals.      LORazepam (ATIVAN) 1 MG tablet Take 1 tablet by mouth Every 8 (Eight) Hours As Needed for Anxiety. 60 tablet 0    LORazepam (ATIVAN) 1 MG tablet Take 1 tablet by mouth Every 8 (Eight) Hours As Needed for Anxiety. (Patient not taking: Reported on 11/7/2023) 10 tablet 0    losartan (COZAAR) 50 MG tablet Take 2 tablets by mouth Daily.      meclizine (ANTIVERT) 25 MG tablet Take 1 tablet by mouth 3 (Three) Times a Day As Needed for Dizziness. 30 tablet 1    metFORMIN (GLUCOPHAGE) 1000 MG tablet Take 1 tablet by mouth 2 (Two) Times a Day With Meals. INST PER ANESTHESIA  PROTOCOL      mirtazapine (REMERON) 15 MG tablet Take 1 tablet by mouth Every Night. 30 tablet 5    mirtazapine (REMERON) 15 MG tablet Take 1 tablet by mouth Every Night. 7 tablet 0    montelukast (SINGULAIR) 10 MG tablet Take 1 tablet by mouth Every Night.      OLANZapine (zyPREXA) 5 MG tablet Take 1 tablet by mouth Every Night. Indications: Nausea and Vomiting caused by Cancer Chemotherapy, if no improvement, call provider 30 tablet 2     OLANZapine (zyPREXA) 5 MG tablet Take 1 tablet by mouth Every Night. 7 tablet 0    Omega-3 Fatty Acids (fish oil) 1000 MG capsule capsule Take  by mouth Daily With Breakfast. LAST DOSE 3/26/23      omeprazole (priLOSEC) 40 MG capsule Take 1 capsule by mouth 2 (Two) Times a Day.      ondansetron (ZOFRAN) 8 MG tablet Take 1 tablet by mouth 3 (Three) Times a Day As Needed for Nausea or Vomiting (mail to patient please.). 30 tablet 5    oxyCODONE-acetaminophen (PERCOCET) 5-325 MG per tablet Take 1 tablet by mouth Every 6 (Six) Hours As Needed for Moderate Pain. 120 tablet 0    potassium chloride (K-DUR,KLOR-CON) 10 MEQ CR tablet Take 1 tablet by mouth Daily. 14 tablet 0     No current facility-administered medications on file prior to visit.       No Known Allergies  Past Medical History:   Diagnosis Date    Asthma     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    Diabetes mellitus     Hyperlipidemia     Hypertension     Liver cancer     Rotator cuff disorder     LEFT    Sleep apnea     Stomach cancer      Past Surgical History:   Procedure Laterality Date    CAROTID ENDARTERECTOMY Right     CHOLECYSTECTOMY      FOOT SURGERY Right     TOE DEBRIDMENT    HERNIA REPAIR      VENTRAL HERNIA    SHOULDER ARTHROSCOPY W/ ROTATOR CUFF REPAIR Left 4/3/2023    Procedure: LEFT SHOULDER ARTHROSCOPIC ROTATOR CUFF DEBRIDEMENT, LABRAL DEBRIDEMENT, BICEPS TENODESIS, SUBACROMIAL DECOMPRESSION;  Surgeon: Chas Patterson MD;  Location: [facility] OR [facility];  Service: Orthopedics;  Laterality: Left;    TUMOR REMOVAL Left     PAROTID GLAND    VASCULAR SURGERY Right     STENT R LEG    VENOUS ACCESS DEVICE (PORT) INSERTION Right 9/26/2023    Procedure: INSERTION VENOUS ACCESS DEVICE;  Surgeon: James Copeland MD;  Location: [facility] MAIN OR;  Service: General;  Laterality: Right;     Social History     Socioeconomic History    Marital status:    Tobacco Use    Smoking status: Former     Years: 25     Types: Cigarettes     Start  date:      Quit date:      Years since quittin.9    Smokeless tobacco: Former   Vaping Use    Vaping Use: Never used   Substance and Sexual Activity    Alcohol use: Never    Drug use: Never    Sexual activity: Defer     Family History   Problem Relation Age of Onset    Lung cancer Father     Malig Hyperthermia Neg Hx        Objective   Physical Exam  Well appearing patient in no acute distress on RA  Anicteric sclerae, no rash on exposed skin  Respirations non-labored  Awake, alert, and oriented x 4. Speech intact. No gross neurologic deficit  Appropriate mood and affect    Vitals:    23 0957   BP: 129/66   Pulse: 81   Resp: 18   Temp: 97.6 °F (36.4 °C)   TempSrc: Temporal   SpO2: 100%   Weight: 84 kg (185 lb 3.2 oz)   PainSc: 0-No pain                 PHQ-9 Total Score:             Result Review :   The following data was reviewed by: Govind Pruitt MD on 23:  Lab Results   Component Value Date    HGB 12.3 (L) 2023    HCT 38.0 2023    MCV 89.0 2023     2023    WBC 9.34 2023    NEUTROABS 7.41 (H) 2023    LYMPHSABS 0.69 (L) 2023    MONOSABS 0.93 (H) 2023    EOSABS 0.25 2023    BASOSABS 0.05 2023     Lab Results   Component Value Date    GLUCOSE 133 (H) 2023    BUN 9 2023    CREATININE 0.79 2023     2023    K 3.7 2023     2023    CO2 24.8 2023    CALCIUM 8.9 2023    PROTEINTOT 6.1 2023    ALBUMIN 2.8 (L) 2023    BILITOT 0.3 2023    ALKPHOS 282 (H) 2023    AST 44 (H) 2023    ALT 21 2023     Lab Results   Component Value Date    MG 1.7 2023     Labs personally reviewed and anemia improving. Alk phos decreasing. K normal. Na normal. Glucose high.     ED note 23 personally reviewed      CT Chest With Contrast Diagnostic    Addendum Date: 2023    ADDENDUM:  COMPARISON: Other, PET, NM PET/CT SKULL BASE TO MID THIGH,  9/12/2023, 13:08.  Other, MR, MRI ABDOMEN W WO CONTRAST, 9/13/2023, 21:29.  Prior PET-CT from 09/12/2023 and MRI abdomen from 09/13/2023 made available for comparison.   Multiple liver lesions on CT dated 11/27/2023 have overall significantly decreased in size compared to MRI 09/13/2023.  In addition previously noted gastrohepatic adenopathy has also improved.  Direct comparison to prior noncontrast PET-CT is somewhat limited however comparison to MRI is adequate.  Main portal vein and intrahepatic portal vein thrombosis was present on prior MRI from 09/13/2023, however there appears to be increasing thrombosis within mid and downstream main portal vein as well as proximal right posterior intrahepatic branch..   1.  Decreasing hepatic lesions and gastrohepatic adenopathy consistent with partial treatment response compared to outside MRI dated 09/13/2023. 2. Increasing portal venous thrombosis within main and right intrahepatic branches compared to MRI dated 09/13/2023.     HERSON ESTRADA MD       Electronically Signed and Approved By: HERSON ESTRADA MD on 12/18/2023 at 13:13             Result Date: 12/18/2023    1. Multiple new liver lesions since 2022 comparison compatible with disease progression.  There is report of an outside PET-CT from September of 2023 not available for comparison also showing liver metastases.  Recommend attention on follow-up exam. 2. Few prominent marissa hepatic nodes probably representing sarkis metastases. 3. Single small right lower lobe nodule new from 2022, recommend attention on follow-up imaging. 4. Extensive probably acute thrombus throughout the portal venous tree. 5. Trace perihepatic ascites. 6. Other chronic/ancillary findings as above.   Findings communicated with ordering provider Dr. Pruitt over the phone at 9:40 a.m. On 11/27/2023.     HERSON ESTRADA MD       Electronically Signed and Approved By: HERSON ESTRADA MD on 11/27/2023 at 9:56             CT Abdomen Pelvis  With Contrast    Addendum Date: 12/18/2023    ADDENDUM:  COMPARISON: Other, PET, NM PET/CT SKULL BASE TO MID THIGH, 9/12/2023, 13:08.  Other, MR, MRI ABDOMEN W WO CONTRAST, 9/13/2023, 21:29.  Prior PET-CT from 09/12/2023 and MRI abdomen from 09/13/2023 made available for comparison.   Multiple liver lesions on CT dated 11/27/2023 have overall significantly decreased in size compared to MRI 09/13/2023.  In addition previously noted gastrohepatic adenopathy has also improved.  Direct comparison to prior noncontrast PET-CT is somewhat limited however comparison to MRI is adequate.  Main portal vein and intrahepatic portal vein thrombosis was present on prior MRI from 09/13/2023, however there appears to be increasing thrombosis within mid and downstream main portal vein as well as proximal right posterior intrahepatic branch..   1.  Decreasing hepatic lesions and gastrohepatic adenopathy consistent with partial treatment response compared to outside MRI dated 09/13/2023. 2. Increasing portal venous thrombosis within main and right intrahepatic branches compared to MRI dated 09/13/2023.     HERSON ESTRADA MD       Electronically Signed and Approved By: HERSON ESTRADA MD on 12/18/2023 at 13:13             Result Date: 12/18/2023    1. Multiple new liver lesions since 2022 comparison compatible with disease progression.  There is report of an outside PET-CT from September of 2023 not available for comparison also showing liver metastases.  Recommend attention on follow-up exam. 2. Few prominent marissa hepatic nodes probably representing sarkis metastases. 3. Single small right lower lobe nodule new from 2022, recommend attention on follow-up imaging. 4. Extensive probably acute thrombus throughout the portal venous tree. 5. Trace perihepatic ascites. 6. Other chronic/ancillary findings as above.   Findings communicated with ordering provider Dr. Pruitt over the phone at 9:40 a.m. On 11/27/2023.     HERSON ESTRADA MD        Electronically Signed and Approved By: HERSON ESTRADA MD on 11/27/2023 at 9:56             XR Chest PA & Lateral    Result Date: 12/6/2023   No active disease     Chas Doan MD       Electronically Signed and Approved By: Chas Doan MD on 12/06/2023 at 13:05                  Assessment and Plan    Diagnoses and all orders for this visit:    1. Acute deep vein thrombosis (DVT) of right peroneal vein (Primary)    2. Malignant neoplasm of cardia of stomach    3. Portal vein thrombosis    4. Neoplasm related pain    Other orders  -     dextrose (D5W) 5 % infusion 250 mL  -     palonosetron (ALOXI) injection 0.25 mg  -     dexAMETHasone (DECADRON) 12 mg in sodium chloride 0.9 % IVPB  -     OXALIplatin (ELOXATIN) 170 mg in dextrose (D5W) 5 % 284 mL chemo IVPB  -     fluorouracil (ADRUCIL) 4,750 mg in sodium chloride 0.9 % 95 mL chemo infusion - FOR HOME USE  -     Hydrocortisone Sod Suc (PF) (Solu-CORTEF) injection 100 mg  -     diphenhydrAMINE (BENADRYL) injection 50 mg  -     famotidine (PEPCID) injection 20 mg      Metastatic stage IV gastric cancer  Patient with PET scan (9/12/23) with hepatic mets that have been biopsy-proven to be gastric cancer.  HER2 and PD-L1 both negative per Jamie and vivian. Recommend systemic therapy with modified FOLFOX which includes bolus and infusional 5-FU with oxaliplatin.  C1D1 mFOLFOX 9/27/23.  Alk phos decreasing with  treatment.    Post cycle 4 CT imaging with diffuse hepatic mets, [facility] with partial response. Will continue with current treatment. Drop 5-FU bolus and leucovorin starting with C5 due to fatigue.     C6D1 mFOLFOX 12/20/23. Labs appropriate to proceed      Anemia from cancer with iron deficiency  Recently received IV iron while hospitalized. Iron saturation remains low. Repeat Injectafer x 2 completed 10/18/23. Hgb improving as of 10/10/23. Up to 11.2 as of 11/7. Repeat iron labs 11/27/23 with ferritin >1500 and iron sat of 22%. Hold on  further iron replacement.     RLE DVT  Portal vein thrombosis  Duplex on 10/16 for RLE edema: Acute right lower extremity deep vein thrombosis noted in the posterior tibial and peroneal. Provoked from gastric cancer. Started on Eliquis at 5 mg BID with improvement in symptoms. Continue lifelong due to active malignancy.  Anticoagulation for RLE DVT should treat PVT as well as treatment of above malignancy.     Neoplasm related pain  Continue prn oxycodone. Pain improved.     Insomnia  Lorazepam 1 mg PRN      This is an acute or chronic illness that poses a threat to life or bodily function. The above treatment plan involves a high risk of complications and/or mortality of patient management.    I spent 25 minutes caring for Kevin on this date of service. This time includes time spent by me in the following activities:preparing for the visit, reviewing tests, obtaining and/or reviewing a separately obtained history, performing a medically appropriate examination and/or evaluation , counseling and educating the patient/family/caregiver, ordering medications, tests, or procedures, referring and communicating with other health care professionals , documenting information in the medical record, independently interpreting results and communicating that information with the patient/family/caregiver, and care coordination    Patient Follow Up: C6 chemo  Patient was given instructions and counseling regarding his condition or for health maintenance advice. Please see specific information pulled into the AVS if appropriate.

## 2023-12-20 ENCOUNTER — HOSPITAL ENCOUNTER (OUTPATIENT)
Dept: ONCOLOGY | Facility: HOSPITAL | Age: 63
Discharge: HOME | End: 2023-12-20
Admitting: INTERNAL MEDICINE
Payer: OTHER GOVERNMENT

## 2023-12-20 ENCOUNTER — DOCUMENTATION (OUTPATIENT)
Dept: ONCOLOGY | Facility: HOSPITAL | Age: 63
End: 2023-12-20
Payer: OTHER GOVERNMENT

## 2023-12-20 VITALS
DIASTOLIC BLOOD PRESSURE: 60 MMHG | HEART RATE: 72 BPM | RESPIRATION RATE: 18 BRPM | BODY MASS INDEX: 29.69 KG/M2 | HEIGHT: 66 IN | WEIGHT: 184.75 LBS | TEMPERATURE: 97.9 F | SYSTOLIC BLOOD PRESSURE: 120 MMHG | OXYGEN SATURATION: 100 %

## 2023-12-20 DIAGNOSIS — C16.0 MALIGNANT NEOPLASM OF CARDIA OF STOMACH: Primary | ICD-10-CM

## 2023-12-20 PROCEDURE — 25010000002 DEXAMETHASONE SODIUM PHOSPHATE 120 MG/30ML SOLUTION: Performed by: INTERNAL MEDICINE

## 2023-12-20 PROCEDURE — 96413 CHEMO IV INFUSION 1 HR: CPT

## 2023-12-20 PROCEDURE — G0498 CHEMO EXTEND IV INFUS W/PUMP: HCPCS

## 2023-12-20 PROCEDURE — 25010000002 OXALIPLATIN PER 0.5 MG: Performed by: INTERNAL MEDICINE

## 2023-12-20 PROCEDURE — 25010000002 FLUOROURACIL PER 500 MG: Performed by: INTERNAL MEDICINE

## 2023-12-20 PROCEDURE — 96375 TX/PRO/DX INJ NEW DRUG ADDON: CPT

## 2023-12-20 PROCEDURE — 96415 CHEMO IV INFUSION ADDL HR: CPT

## 2023-12-20 PROCEDURE — 0 DEXTROSE 5 % SOLUTION 250 ML FLEX CONT: Performed by: INTERNAL MEDICINE

## 2023-12-20 PROCEDURE — 25010000002 PALONOSETRON PER 25 MCG: Performed by: INTERNAL MEDICINE

## 2023-12-20 PROCEDURE — 0 DEXTROSE 5 % SOLUTION: Performed by: INTERNAL MEDICINE

## 2023-12-20 RX ORDER — DEXTROSE MONOHYDRATE 50 MG/ML
250 INJECTION, SOLUTION INTRAVENOUS ONCE
Status: COMPLETED | OUTPATIENT
Start: 2023-12-20 | End: 2023-12-20

## 2023-12-20 RX ORDER — PALONOSETRON 0.05 MG/ML
0.25 INJECTION, SOLUTION INTRAVENOUS ONCE
Status: COMPLETED | OUTPATIENT
Start: 2023-12-20 | End: 2023-12-20

## 2023-12-20 RX ADMIN — DEXTROSE MONOHYDRATE 250 ML: 50 INJECTION, SOLUTION INTRAVENOUS at 08:23

## 2023-12-20 RX ADMIN — FLUOROURACIL 4750 MG: 50 INJECTION, SOLUTION INTRAVENOUS at 10:57

## 2023-12-20 RX ADMIN — PALONOSETRON HYDROCHLORIDE 0.25 MG: 0.25 INJECTION INTRAVENOUS at 08:23

## 2023-12-20 RX ADMIN — DEXAMETHASONE SODIUM PHOSPHATE 12 MG: 4 INJECTION, SOLUTION INTRA-ARTICULAR; INTRALESIONAL; INTRAMUSCULAR; INTRAVENOUS; SOFT TISSUE at 08:25

## 2023-12-20 RX ADMIN — OXALIPLATIN 170 MG: 5 INJECTION, SOLUTION INTRAVENOUS at 08:50

## 2023-12-20 NOTE — PROGRESS NOTES
Diagnosis: Metastatic gastric cancer to the liver       Reason for Referral: [facility] travel reimbursement     Content of Visit: OSW met with Mr. Ordonez in the medical oncology infusion center this morning to assist him in completing his VA travel reimbursement form for visits attended on 12/19, 12/20, 12/22. OSW will submit Mr. Ordonez's completed form to the [facility] once his visit on 12/22/23 has been attended.   Mr. Ordonez reports his wife is currently at home sick and therefore has his daughter present with him today to assist with transportation. He plans to wear his [employer] suit on Friday to his unhook appointment and handout candy here at the [facility].   Mr. Ordonez reports he received the best [employer] gift of all. He further explained that he received good news yesterday from the oncologist that his tumor is shrinking, about half the size. Joined Mr. Ordonez in celebration of this wonderful news.   OSW provided Mr. Ordonez with a Fall comfort kit through the [employer] today and he is agreeable to completing their survey/speaking with them regarding their support programs and services. OSW support remains available.       Update 12/22/23: Mailed Mr. Ordonez's VA travel reimbursement form to the [facility] this afternoon.     Resources/Referrals Provided: VA travel reimbursement; Fall comfort kit through [employer]

## 2023-12-22 ENCOUNTER — HOSPITAL ENCOUNTER (OUTPATIENT)
Dept: ONCOLOGY | Facility: HOSPITAL | Age: 63
Discharge: HOME | End: 2023-12-22
Admitting: INTERNAL MEDICINE
Payer: OTHER GOVERNMENT

## 2023-12-22 DIAGNOSIS — Z45.2 PICC (PERIPHERALLY INSERTED CENTRAL CATHETER) IN PLACE: ICD-10-CM

## 2023-12-22 DIAGNOSIS — C16.0 MALIGNANT NEOPLASM OF CARDIA OF STOMACH: ICD-10-CM

## 2023-12-22 DIAGNOSIS — Z45.2 FITTING AND ADJUSTMENT OF VASCULAR CATHETER: Primary | ICD-10-CM

## 2023-12-22 PROCEDURE — 25010000002 HEPARIN LOCK FLUSH PER 10 UNITS: Performed by: INTERNAL MEDICINE

## 2023-12-22 RX ORDER — HEPARIN SODIUM (PORCINE) LOCK FLUSH IV SOLN 100 UNIT/ML 100 UNIT/ML
500 SOLUTION INTRAVENOUS AS NEEDED
OUTPATIENT
Start: 2023-12-22

## 2023-12-22 RX ORDER — SODIUM CHLORIDE 0.9 % (FLUSH) 0.9 %
20 SYRINGE (ML) INJECTION AS NEEDED
OUTPATIENT
Start: 2023-12-22

## 2023-12-22 RX ORDER — SODIUM CHLORIDE 0.9 % (FLUSH) 0.9 %
20 SYRINGE (ML) INJECTION AS NEEDED
Status: DISCONTINUED | OUTPATIENT
Start: 2023-12-22 | End: 2023-12-23 | Stop reason: HOSPADM

## 2023-12-22 RX ORDER — HEPARIN SODIUM (PORCINE) LOCK FLUSH IV SOLN 100 UNIT/ML 100 UNIT/ML
500 SOLUTION INTRAVENOUS AS NEEDED
Status: DISCONTINUED | OUTPATIENT
Start: 2023-12-22 | End: 2023-12-23 | Stop reason: HOSPADM

## 2023-12-22 RX ADMIN — Medication 20 ML: at 10:11

## 2023-12-22 RX ADMIN — HEPARIN SODIUM (PORCINE) LOCK FLUSH IV SOLN 100 UNIT/ML 500 UNITS: 100 SOLUTION at 10:11

## 2024-01-02 ENCOUNTER — OFFICE VISIT (OUTPATIENT)
Dept: ONCOLOGY | Facility: HOSPITAL | Age: 64
End: 2024-01-02
Payer: OTHER GOVERNMENT

## 2024-01-02 ENCOUNTER — HOSPITAL ENCOUNTER (OUTPATIENT)
Dept: ONCOLOGY | Facility: HOSPITAL | Age: 64
Discharge: HOME | End: 2024-01-02
Admitting: INTERNAL MEDICINE
Payer: OTHER GOVERNMENT

## 2024-01-02 VITALS
WEIGHT: 184.75 LBS | HEART RATE: 85 BPM | SYSTOLIC BLOOD PRESSURE: 141 MMHG | TEMPERATURE: 98.2 F | DIASTOLIC BLOOD PRESSURE: 63 MMHG | RESPIRATION RATE: 18 BRPM | BODY MASS INDEX: 29.83 KG/M2 | OXYGEN SATURATION: 97 %

## 2024-01-02 DIAGNOSIS — C16.0 MALIGNANT NEOPLASM OF CARDIA OF STOMACH: Primary | ICD-10-CM

## 2024-01-02 DIAGNOSIS — T45.1X5A ANEMIA ASSOCIATED WITH CHEMOTHERAPY: ICD-10-CM

## 2024-01-02 DIAGNOSIS — Z45.2 PICC (PERIPHERALLY INSERTED CENTRAL CATHETER) IN PLACE: ICD-10-CM

## 2024-01-02 DIAGNOSIS — Z45.2 FITTING AND ADJUSTMENT OF VASCULAR CATHETER: ICD-10-CM

## 2024-01-02 DIAGNOSIS — I82.451 ACUTE DEEP VEIN THROMBOSIS (DVT) OF RIGHT PERONEAL VEIN: Primary | ICD-10-CM

## 2024-01-02 DIAGNOSIS — D64.81 ANEMIA ASSOCIATED WITH CHEMOTHERAPY: ICD-10-CM

## 2024-01-02 DIAGNOSIS — C16.0 MALIGNANT NEOPLASM OF CARDIA OF STOMACH: ICD-10-CM

## 2024-01-02 DIAGNOSIS — G89.3 NEOPLASM RELATED PAIN: ICD-10-CM

## 2024-01-02 LAB
ALBUMIN SERPL-MCNC: 3.1 G/DL (ref 3.5–5.2)
ALBUMIN/GLOB SERPL: 0.9 G/DL
ALP SERPL-CCNC: 232 U/L (ref 39–117)
ALT SERPL W P-5'-P-CCNC: 18 U/L (ref 1–41)
ANION GAP SERPL CALCULATED.3IONS-SCNC: 7.6 MMOL/L (ref 5–15)
AST SERPL-CCNC: 35 U/L (ref 1–40)
BASOPHILS # BLD AUTO: 0.06 10*3/MM3 (ref 0–0.2)
BASOPHILS NFR BLD AUTO: 0.8 % (ref 0–1.5)
BILIRUB SERPL-MCNC: 0.5 MG/DL (ref 0–1.2)
BUN SERPL-MCNC: 9 MG/DL (ref 8–23)
BUN/CREAT SERPL: 12.5 (ref 7–25)
CALCIUM SPEC-SCNC: 8.9 MG/DL (ref 8.6–10.5)
CHLORIDE SERPL-SCNC: 101 MMOL/L (ref 98–107)
CO2 SERPL-SCNC: 27.4 MMOL/L (ref 22–29)
CREAT SERPL-MCNC: 0.72 MG/DL (ref 0.76–1.27)
DEPRECATED RDW RBC AUTO: 61 FL (ref 37–54)
EGFRCR SERPLBLD CKD-EPI 2021: 102 ML/MIN/1.73
EOSINOPHIL # BLD AUTO: 0.33 10*3/MM3 (ref 0–0.4)
EOSINOPHIL NFR BLD AUTO: 4.2 % (ref 0.3–6.2)
ERYTHROCYTE [DISTWIDTH] IN BLOOD BY AUTOMATED COUNT: 18.7 % (ref 12.3–15.4)
GLOBULIN UR ELPH-MCNC: 3.4 GM/DL
GLUCOSE SERPL-MCNC: 133 MG/DL (ref 65–99)
HCT VFR BLD AUTO: 38.6 % (ref 37.5–51)
HGB BLD-MCNC: 12.6 G/DL (ref 13–17.7)
IMM GRANULOCYTES # BLD AUTO: 0.01 10*3/MM3 (ref 0–0.05)
IMM GRANULOCYTES NFR BLD AUTO: 0.1 % (ref 0–0.5)
LYMPHOCYTES # BLD AUTO: 1.03 10*3/MM3 (ref 0.7–3.1)
LYMPHOCYTES NFR BLD AUTO: 13.2 % (ref 19.6–45.3)
MCH RBC QN AUTO: 29.1 PG (ref 26.6–33)
MCHC RBC AUTO-ENTMCNC: 32.6 G/DL (ref 31.5–35.7)
MCV RBC AUTO: 89.1 FL (ref 79–97)
MONOCYTES # BLD AUTO: 0.92 10*3/MM3 (ref 0.1–0.9)
MONOCYTES NFR BLD AUTO: 11.8 % (ref 5–12)
NEUTROPHILS NFR BLD AUTO: 5.44 10*3/MM3 (ref 1.7–7)
NEUTROPHILS NFR BLD AUTO: 69.9 % (ref 42.7–76)
PLATELET # BLD AUTO: 200 10*3/MM3 (ref 140–450)
PMV BLD AUTO: 10 FL (ref 6–12)
POTASSIUM SERPL-SCNC: 3.5 MMOL/L (ref 3.5–5.2)
PROT SERPL-MCNC: 6.5 G/DL (ref 6–8.5)
RBC # BLD AUTO: 4.33 10*6/MM3 (ref 4.14–5.8)
SODIUM SERPL-SCNC: 136 MMOL/L (ref 136–145)
WBC NRBC COR # BLD AUTO: 7.79 10*3/MM3 (ref 3.4–10.8)

## 2024-01-02 PROCEDURE — 25010000002 HEPARIN LOCK FLUSH PER 10 UNITS: Performed by: INTERNAL MEDICINE

## 2024-01-02 PROCEDURE — 99214 OFFICE O/P EST MOD 30 MIN: CPT | Performed by: INTERNAL MEDICINE

## 2024-01-02 PROCEDURE — 36591 DRAW BLOOD OFF VENOUS DEVICE: CPT

## 2024-01-02 PROCEDURE — 80053 COMPREHEN METABOLIC PANEL: CPT | Performed by: INTERNAL MEDICINE

## 2024-01-02 PROCEDURE — 85025 COMPLETE CBC W/AUTO DIFF WBC: CPT | Performed by: INTERNAL MEDICINE

## 2024-01-02 RX ORDER — FAMOTIDINE 10 MG/ML
20 INJECTION, SOLUTION INTRAVENOUS AS NEEDED
Status: CANCELLED | OUTPATIENT
Start: 2024-01-03

## 2024-01-02 RX ORDER — SODIUM CHLORIDE 0.9 % (FLUSH) 0.9 %
20 SYRINGE (ML) INJECTION AS NEEDED
Status: CANCELLED | OUTPATIENT
Start: 2024-01-02

## 2024-01-02 RX ORDER — DEXTROSE MONOHYDRATE 50 MG/ML
250 INJECTION, SOLUTION INTRAVENOUS ONCE
Status: CANCELLED | OUTPATIENT
Start: 2024-01-03

## 2024-01-02 RX ORDER — DIPHENHYDRAMINE HYDROCHLORIDE 50 MG/ML
50 INJECTION INTRAMUSCULAR; INTRAVENOUS AS NEEDED
Status: CANCELLED | OUTPATIENT
Start: 2024-01-03

## 2024-01-02 RX ORDER — SODIUM CHLORIDE 0.9 % (FLUSH) 0.9 %
20 SYRINGE (ML) INJECTION AS NEEDED
Status: DISCONTINUED | OUTPATIENT
Start: 2024-01-02 | End: 2024-01-03 | Stop reason: HOSPADM

## 2024-01-02 RX ORDER — HEPARIN SODIUM (PORCINE) LOCK FLUSH IV SOLN 100 UNIT/ML 100 UNIT/ML
500 SOLUTION INTRAVENOUS AS NEEDED
Status: CANCELLED | OUTPATIENT
Start: 2024-01-03

## 2024-01-02 RX ORDER — HEPARIN SODIUM (PORCINE) LOCK FLUSH IV SOLN 100 UNIT/ML 100 UNIT/ML
500 SOLUTION INTRAVENOUS AS NEEDED
Status: DISCONTINUED | OUTPATIENT
Start: 2024-01-02 | End: 2024-01-03 | Stop reason: HOSPADM

## 2024-01-02 RX ORDER — PALONOSETRON 0.05 MG/ML
0.25 INJECTION, SOLUTION INTRAVENOUS ONCE
Status: CANCELLED | OUTPATIENT
Start: 2024-01-03

## 2024-01-02 RX ADMIN — HEPARIN SODIUM (PORCINE) LOCK FLUSH IV SOLN 100 UNIT/ML 500 UNITS: 100 SOLUTION at 09:43

## 2024-01-02 RX ADMIN — Medication 20 ML: at 09:43

## 2024-01-02 NOTE — PROGRESS NOTES
Chief Complaint  Malignant neoplasm of body of stomach    Reji Fall MD Blakey, Ev Fan, CHIOMA    Subjective          Kevin Ordonez presents to [facility] for metastatic gastric cancer    Mr. 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 Patient started on FOLFOX on 9/27/23.    Interval History  Patient presents for follow up. He is due for cycle 7 of FOLFOX tomorrow. Uses oxycodone as needed for abdominal pain at night. Bowel movements controlled. No nausea or vomiting reported. No fevers, chills. Eating well.     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     [facility]: [facility] [place] [place] [facility].5 [facility] of right side temporal pain, decreased appetite with 19 pounds of weight loss. With associated melanotic stools.     9/6/23: EGD with Gastric mass biopsy positive for high grade malignant neoplasm consistent with carcinoma (non small cell)    9/27/23: C1D1 mFOLFOX    9/12/23: NM PET: Gastric hypermetabolic mass involving the gastric cardia and fundus with loco-regional sarkis mets and disseminated hepatic metastases.     9/12/23: Liver lesion biopsy: positive for metastatic gastric adenocarcinoma    9/19/23: [facility] NGS: PIK3CA amplification, TP53 R273H, no associated FDA-approved therapies. MS-stable. [facility] NGS: HER2 negative, MSI-stable, BRAF not detected, PDL1 IHC score of 0. 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     9/26/23: C1D1 FOLFOX    10/16/23: Duplex due to RLE edema: Acute right lower extremity deep vein thrombosis noted in the posterior tibial and peroneal. Started on eliquis 5 mg BID.     10/18/23:  Completed IV injectafer x 2    11/8/23: C4D1 FOLFOX    1127/23: CT CAP with multiple liver lesions and gastrohepatic adenopathy, decreasing from prior and increasing portal vein thrombosis.     11/29/23: C5D1 FOLFOX (5-FU bolus and leucovorin dropped with this cycle)     Malignant neoplasm of parotid gland (Resolved)   8/26/2022 Initial Diagnosis    Malignant neoplasm of parotid gland (HCC)     8/26/2022 -  Radiation    RADIATION THERAPY Treatment Details (Noted on 8/26/2022)  Site: Left Head and Neck  Technique: IMRT  Goal: No goal specified  Planned Treatment Start Date: No planned start date specified     Malignant neoplasm of cardia of stomach   9/14/2023 Initial Diagnosis    Malignant neoplasm of cardia of stomach     9/14/2023 Cancer Staged    Staging form: Stomach, AJCC 8th Edition  - Clinical: Stage IVB (pM1) - Signed by Govind Pruitt MD on 9/14/2023     9/15/2023 - 9/15/2023 Chemotherapy    OP CENTRAL VENOUS ACCESS DEVICE Access, Care, and Maintenance (CVAD)     9/27/2023 -  Chemotherapy    OP COLON mFOLFOX6 OXALIplatin / Leucovorin / Fluorouracil     9/29/2023 -  Chemotherapy    OP CENTRAL VENOUS ACCESS DEVICE Access, Care, and Maintenance (CVAD)         Review of Systems   Constitutional:  Positive for fatigue.   Respiratory:  Negative for chest tightness.    Gastrointestinal:  Negative for diarrhea.   Musculoskeletal:  Negative for back pain.   Neurological:  Positive for dizziness.   Psychiatric/Behavioral:  Negative for sleep disturbance.    All other systems reviewed and are negative.    Current Outpatient Medications on File Prior to Visit   Medication Sig Dispense Refill    apixaban (ELIQUIS) 5 MG tablet tablet Take 1 tablet by mouth 2 (Two) Times a Day.      atorvastatin (LIPITOR) 80 MG tablet Take 1 tablet by mouth Every Night.      chlorthalidone (HYGROTON) 25 MG tablet Take 0.5 tablets by mouth Daily.      cholecalciferol (VITAMIN D3) 25 MCG (1000 UT) tablet Take 1 tablet by mouth  Daily. LAST DOSE 3/26/23      Dulaglutide 1.5 MG/0.5ML solution pen-injector Inject 1.5 mg under the skin into the appropriate area as directed Every 7 (Seven) Days. THURSDAY      fluticasone-salmeterol (ADVAIR HFA) 230-21 MCG/ACT inhaler Inhale 2 puffs 2 (Two) Times a Day.      gabapentin (NEURONTIN) 600 MG tablet Take 2 tablets by mouth 2 (Two) Times a Day.      glipizide (GLUCOTROL) 10 MG tablet Take 1 tablet by mouth 2 (Two) Times a Day Before Meals.      insulin NPH (NovoLIN N ReliOn) 100 UNIT/ML injection Inject 52 Units under the skin into the appropriate area as directed 2 (Two) Times a Day Before Meals.      LORazepam (ATIVAN) 1 MG tablet Take 1 tablet by mouth Every 8 (Eight) Hours As Needed for Anxiety. 60 tablet 0    losartan (COZAAR) 50 MG tablet Take 2 tablets by mouth Daily.      meclizine (ANTIVERT) 25 MG tablet Take 1 tablet by mouth 3 (Three) Times a Day As Needed for Dizziness. 30 tablet 1    metFORMIN (GLUCOPHAGE) 1000 MG tablet Take 1 tablet by mouth 2 (Two) Times a Day With Meals. INST PER ANESTHESIA  PROTOCOL      mirtazapine (REMERON) 15 MG tablet Take 1 tablet by mouth Every Night. 30 tablet 5    mirtazapine (REMERON) 15 MG tablet Take 1 tablet by mouth Every Night. 7 tablet 0    montelukast (SINGULAIR) 10 MG tablet Take 1 tablet by mouth Every Night.      OLANZapine (zyPREXA) 5 MG tablet Take 1 tablet by mouth Every Night. Indications: Nausea and Vomiting caused by Cancer Chemotherapy, if no improvement, call provider 30 tablet 2    OLANZapine (zyPREXA) 5 MG tablet Take 1 tablet by mouth Every Night. 7 tablet 0    Omega-3 Fatty Acids (fish oil) 1000 MG capsule capsule Take  by mouth Daily With Breakfast. LAST DOSE 3/26/23      omeprazole (priLOSEC) 40 MG capsule Take 1 capsule by mouth 2 (Two) Times a Day.      ondansetron (ZOFRAN) 8 MG tablet Take 1 tablet by mouth 3 (Three) Times a Day As Needed for Nausea or Vomiting (mail to patient please.). 30 tablet 5    oxyCODONE-acetaminophen  (PERCOCET) 5-325 MG per tablet Take 1 tablet by mouth Every 6 (Six) Hours As Needed for Moderate Pain. 120 tablet 0    potassium chloride (K-DUR,KLOR-CON) 10 MEQ CR tablet Take 1 tablet by mouth Daily. 14 tablet 0     No current facility-administered medications on file prior to visit.       No Known Allergies  Past Medical History:   Diagnosis Date    Asthma     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    Diabetes mellitus     Hyperlipidemia     Hypertension     Liver cancer     Rotator cuff disorder     LEFT    Sleep apnea     Stomach cancer      Past Surgical History:   Procedure Laterality Date    CAROTID ENDARTERECTOMY Right     CHOLECYSTECTOMY      FOOT SURGERY Right     TOE DEBRIDMENT    HERNIA REPAIR      VENTRAL HERNIA    SHOULDER ARTHROSCOPY W/ ROTATOR CUFF REPAIR Left 4/3/2023    Procedure: LEFT SHOULDER ARTHROSCOPIC ROTATOR CUFF DEBRIDEMENT, LABRAL DEBRIDEMENT, BICEPS TENODESIS, SUBACROMIAL DECOMPRESSION;  Surgeon: Chas Patterson MD;  [place] [facility] [place] [facility] [place]  Service: Orthopedics;  Laterality: Left;    TUMOR REMOVAL Left     PAROTID GLAND    VASCULAR SURGERY Right     STENT R LEG    VENOUS ACCESS DEVICE (PORT) INSERTION Right 2023    Procedure: INSERTION VENOUS ACCESS DEVICE;  Surgeon: James Copeland MD;  Location: [facility] MAIN OR;  Service: General;  Laterality: Right;     Social History     Socioeconomic History    Marital status:    Tobacco Use    Smoking status: Former     Years: 25     Types: Cigarettes     Start date:      Quit date:      Years since quittin.0    Smokeless tobacco: Former   Vaping Use    Vaping Use: Never used   Substance and Sexual Activity    Alcohol use: Never    Drug use: Never    Sexual activity: Defer     Family History   Problem Relation Age of Onset    Lung cancer Father     Malig Hyperthermia Neg Hx        Objective   Physical Exam  Well appearing patient in no acute distress on RA  Anicteric sclerae, no rash on exposed  skin  Respirations non-labored  Awake, alert, and oriented x 4. Speech intact. No gross neurologic deficit  Appropriate mood and affect    Vitals:    01/02/24 1013   BP: 141/63   Pulse: 85   Resp: 18   Temp: 98.2 °F (36.8 °C)   TempSrc: Temporal   SpO2: 97%   Weight: 83.8 kg (184 lb 11.9 oz)   PainSc: 0-No pain                   PHQ-9 Total Score:             Result Review :   The following data was reviewed by: Govind Pruitt MD on 01/02/24:  Lab Results   Component Value Date    HGB 12.6 (L) 01/02/2024    HCT 38.6 01/02/2024    MCV 89.1 01/02/2024     01/02/2024    WBC 7.79 01/02/2024    NEUTROABS 5.44 01/02/2024    LYMPHSABS 1.03 01/02/2024    MONOSABS 0.92 (H) 01/02/2024    EOSABS 0.33 01/02/2024    BASOSABS 0.06 01/02/2024     Lab Results   Component Value Date    GLUCOSE 133 (H) 01/02/2024    BUN 9 01/02/2024    CREATININE 0.72 (L) 01/02/2024     01/02/2024    K 3.5 01/02/2024     01/02/2024    CO2 27.4 01/02/2024    CALCIUM 8.9 01/02/2024    PROTEINTOT 6.5 01/02/2024    ALBUMIN 3.1 (L) 01/02/2024    BILITOT 0.5 01/02/2024    ALKPHOS 232 (H) 01/02/2024    AST 35 01/02/2024    ALT 18 01/02/2024     Lab Results   Component Value Date    MG 1.7 09/21/2023     Labs personally reviewed and anemia improving. Alk phos increasing. K normal. Na normal.     ED note 12/9/23 personally reviewed      XR Chest PA & Lateral    Result Date: 12/6/2023   No active disease     Chas Doan MD       Electronically Signed and Approved By: Chas Doan MD on 12/06/2023 at 13:05                  Assessment and Plan    Diagnoses and all orders for this visit:    1. Acute deep vein thrombosis (DVT) of right peroneal vein (Primary)    2. Malignant neoplasm of cardia of stomach    3. Anemia associated with chemotherapy    4. Neoplasm related pain    Other orders  -     dextrose (D5W) 5 % infusion 250 mL  -     palonosetron (ALOXI) injection 0.25 mg  -     dexAMETHasone (DECADRON) 12 mg in sodium chloride 0.9  % IVPB  -     OXALIplatin (ELOXATIN) 170 mg in dextrose (D5W) 5 % 284 mL chemo IVPB  -     fluorouracil (ADRUCIL) 4,750 mg in sodium chloride 0.9 % 95 mL chemo infusion - FOR HOME USE  -     Hydrocortisone Sod Suc (PF) (Solu-CORTEF) injection 100 mg  -     diphenhydrAMINE (BENADRYL) injection 50 mg  -     famotidine (PEPCID) injection 20 mg        Metastatic stage IV gastric cancer  Patient with PET scan (9/12/23) with hepatic mets that have been biopsy-proven to be gastric cancer.  HER2 and PD-L1 both negative per Jamie and vivian. Recommend systemic therapy with modified FOLFOX which includes bolus and infusional 5-FU with oxaliplatin.  C1D1 mFOLFOX 9/27/23.  Alk phos decreasing with  treatment.    Post cycle 4 CT imaging with diffuse hepatic mets, [facility] with partial [facility]. Will continue with current treatment. 5-FU bolus and leucovorin dropped starting with C5 due to fatigue.     C7D1 mFOLFOX 1/3/24. Labs appropriate to proceed.    Plan for repeat CT scans after cycle 8.     Anemia from cancer with iron deficiency  Recently received IV iron while hospitalized. Iron saturation remains low. Repeat Injectafer x 2 completed 10/18/23. Hgb improving as of 10/10/23. Up to 12.6 as of 1/2/24. Repeat iron labs 11/27/23 with ferritin >1500 and iron sat of 22%. Hold on further iron replacement.     RLE DVT  Portal vein thrombosis  Duplex on 10/16 for RLE edema: Acute right lower extremity deep vein thrombosis noted in the posterior tibial and peroneal. Provoked from gastric cancer. Started on Eliquis at 5 mg BID with improvement in symptoms. Continue lifelong due to active malignancy.  Anticoagulation for RLE DVT should treat PVT as well as treatment of above malignancy.     Neoplasm related pain  Continue prn oxycodone. Pain improved.     Insomnia  Lorazepam 1 mg PRN      This is an acute or chronic illness that poses a threat to life or bodily function. The above treatment plan involves a high  risk of complications and/or mortality of patient management.    I spent 25 minutes caring for Kevin on this date of service. This time includes time spent by me in the following activities:preparing for the visit, reviewing tests, obtaining and/or reviewing a separately obtained history, performing a medically appropriate examination and/or evaluation , counseling and educating the patient/family/caregiver, ordering medications, tests, or procedures, referring and communicating with other health care professionals , documenting information in the medical record, independently interpreting results and communicating that information with the patient/family/caregiver, and care coordination    Patient Follow Up: C8 chemo  Patient was given instructions and counseling regarding his condition or for health maintenance advice. Please see specific information pulled into the AVS if appropriate.

## 2024-01-03 ENCOUNTER — DOCUMENTATION (OUTPATIENT)
Dept: ONCOLOGY | Facility: HOSPITAL | Age: 64
End: 2024-01-03
Payer: OTHER GOVERNMENT

## 2024-01-03 ENCOUNTER — HOSPITAL ENCOUNTER (OUTPATIENT)
Dept: ONCOLOGY | Facility: HOSPITAL | Age: 64
Discharge: HOME | End: 2024-01-03
Admitting: INTERNAL MEDICINE
Payer: OTHER GOVERNMENT

## 2024-01-03 VITALS
SYSTOLIC BLOOD PRESSURE: 123 MMHG | OXYGEN SATURATION: 98 % | HEART RATE: 80 BPM | BODY MASS INDEX: 29.62 KG/M2 | RESPIRATION RATE: 18 BRPM | WEIGHT: 184.3 LBS | DIASTOLIC BLOOD PRESSURE: 65 MMHG | TEMPERATURE: 98.9 F | HEIGHT: 66 IN

## 2024-01-03 DIAGNOSIS — C16.0 MALIGNANT NEOPLASM OF CARDIA OF STOMACH: Primary | ICD-10-CM

## 2024-01-03 PROCEDURE — 0 DEXTROSE 5 % SOLUTION 250 ML FLEX CONT: Performed by: INTERNAL MEDICINE

## 2024-01-03 PROCEDURE — 25010000002 OXALIPLATIN PER 0.5 MG: Performed by: INTERNAL MEDICINE

## 2024-01-03 PROCEDURE — 25010000002 DEXAMETHASONE SODIUM PHOSPHATE 120 MG/30ML SOLUTION: Performed by: INTERNAL MEDICINE

## 2024-01-03 PROCEDURE — 25010000002 FLUOROURACIL PER 500 MG: Performed by: INTERNAL MEDICINE

## 2024-01-03 PROCEDURE — 0 DEXTROSE 5 % SOLUTION: Performed by: INTERNAL MEDICINE

## 2024-01-03 PROCEDURE — 25010000002 PALONOSETRON PER 25 MCG: Performed by: INTERNAL MEDICINE

## 2024-01-03 PROCEDURE — 96375 TX/PRO/DX INJ NEW DRUG ADDON: CPT

## 2024-01-03 PROCEDURE — 96415 CHEMO IV INFUSION ADDL HR: CPT

## 2024-01-03 PROCEDURE — 96413 CHEMO IV INFUSION 1 HR: CPT

## 2024-01-03 PROCEDURE — G0498 CHEMO EXTEND IV INFUS W/PUMP: HCPCS

## 2024-01-03 RX ORDER — DIPHENHYDRAMINE HYDROCHLORIDE 50 MG/ML
50 INJECTION INTRAMUSCULAR; INTRAVENOUS AS NEEDED
Status: DISCONTINUED | OUTPATIENT
Start: 2024-01-03 | End: 2024-01-04 | Stop reason: HOSPADM

## 2024-01-03 RX ORDER — FAMOTIDINE 10 MG/ML
20 INJECTION, SOLUTION INTRAVENOUS AS NEEDED
Status: DISCONTINUED | OUTPATIENT
Start: 2024-01-03 | End: 2024-01-04 | Stop reason: HOSPADM

## 2024-01-03 RX ORDER — PALONOSETRON 0.05 MG/ML
0.25 INJECTION, SOLUTION INTRAVENOUS ONCE
Status: COMPLETED | OUTPATIENT
Start: 2024-01-03 | End: 2024-01-03

## 2024-01-03 RX ORDER — DEXTROSE MONOHYDRATE 50 MG/ML
250 INJECTION, SOLUTION INTRAVENOUS ONCE
Status: COMPLETED | OUTPATIENT
Start: 2024-01-03 | End: 2024-01-03

## 2024-01-03 RX ADMIN — FLUOROURACIL 4750 MG: 50 INJECTION, SOLUTION INTRAVENOUS at 11:47

## 2024-01-03 RX ADMIN — DEXTROSE MONOHYDRATE 250 ML: 50 INJECTION, SOLUTION INTRAVENOUS at 09:14

## 2024-01-03 RX ADMIN — OXALIPLATIN 170 MG: 5 INJECTION, SOLUTION INTRAVENOUS at 09:50

## 2024-01-03 RX ADMIN — DEXAMETHASONE SODIUM PHOSPHATE 12 MG: 4 INJECTION, SOLUTION INTRA-ARTICULAR; INTRALESIONAL; INTRAMUSCULAR; INTRAVENOUS; SOFT TISSUE at 09:15

## 2024-01-03 RX ADMIN — PALONOSETRON HYDROCHLORIDE 0.25 MG: 0.25 INJECTION INTRAVENOUS at 09:14

## 2024-01-03 NOTE — PROGRESS NOTES
Diagnosis: Metastatic gastric cancer to the liver       Reason for Referral: [facility] travel reimbursement     Content of Visit: OSW met with Mr. Ordonez and his spouse in the medical oncology infusion center this morning to assist him in completing his VA travel reimbursement form for visits attended on 1/2/24, 1/3/24, and 1/5/24. OSW will mail Mr. Ordonez's completed form to the [facility] once his visit on 1/5/24 has been attended. Mr. Ordonez reports he has an appointment at the [facility] this afternoon and will plan to complete his reimbursement form for that appointment while he is there.  Mrs. Ordonez reports she is feeling better after being sick for two weeks. Fortunately, nobody else in the household, including Mr. Ordonez, became ill. No additional needs identified at this time. Mr. Ordonez reports he dressed up as Nakita during his appointment on 12/22 and passed out candy to the other patients. Encouraged OSW support remains available.       Update 1/5/24: Mailed Mr. Ordonez's VA travel reimbursement form to the [facility] this afternoon.      Resources/Referrals Provided: VA travel reimbursement

## 2024-01-05 ENCOUNTER — HOSPITAL ENCOUNTER (OUTPATIENT)
Dept: ONCOLOGY | Facility: HOSPITAL | Age: 64
Discharge: HOME | End: 2024-01-05
Payer: OTHER GOVERNMENT

## 2024-01-05 ENCOUNTER — TELEPHONE (OUTPATIENT)
Dept: ONCOLOGY | Facility: HOSPITAL | Age: 64
End: 2024-01-05
Payer: OTHER GOVERNMENT

## 2024-01-05 DIAGNOSIS — C16.0 MALIGNANT NEOPLASM OF CARDIA OF STOMACH: ICD-10-CM

## 2024-01-05 DIAGNOSIS — Z45.2 PICC (PERIPHERALLY INSERTED CENTRAL CATHETER) IN PLACE: ICD-10-CM

## 2024-01-05 DIAGNOSIS — Z45.2 FITTING AND ADJUSTMENT OF VASCULAR CATHETER: Primary | ICD-10-CM

## 2024-01-05 PROCEDURE — 25010000002 HEPARIN LOCK FLUSH PER 10 UNITS: Performed by: INTERNAL MEDICINE

## 2024-01-05 RX ORDER — HEPARIN SODIUM (PORCINE) LOCK FLUSH IV SOLN 100 UNIT/ML 100 UNIT/ML
500 SOLUTION INTRAVENOUS AS NEEDED
Status: DISCONTINUED | OUTPATIENT
Start: 2024-01-05 | End: 2024-01-06 | Stop reason: HOSPADM

## 2024-01-05 RX ORDER — HEPARIN SODIUM (PORCINE) LOCK FLUSH IV SOLN 100 UNIT/ML 100 UNIT/ML
500 SOLUTION INTRAVENOUS AS NEEDED
OUTPATIENT
Start: 2024-01-05

## 2024-01-05 RX ORDER — SODIUM CHLORIDE 0.9 % (FLUSH) 0.9 %
20 SYRINGE (ML) INJECTION AS NEEDED
Status: DISCONTINUED | OUTPATIENT
Start: 2024-01-05 | End: 2024-01-06 | Stop reason: HOSPADM

## 2024-01-05 RX ORDER — SODIUM CHLORIDE 0.9 % (FLUSH) 0.9 %
20 SYRINGE (ML) INJECTION AS NEEDED
OUTPATIENT
Start: 2024-01-05

## 2024-01-05 RX ADMIN — Medication 20 ML: at 10:41

## 2024-01-05 RX ADMIN — HEPARIN SODIUM (PORCINE) LOCK FLUSH IV SOLN 100 UNIT/ML 500 UNITS: 100 SOLUTION at 10:41

## 2024-01-05 NOTE — TELEPHONE ENCOUNTER
Caller: REINA    Relationship: [employer]     Best call back number: [phone]    Who are you requesting to speak with (clinical staff, provider,  specific staff member): CLINICAL    What was the call regarding: NEEDING OFFICE NOTES FOR THE [facility] FROM 9-27 TO CURRENT  REINA STATES THAT THE PATIENT IS USING THERE PUMP AT HOME AND THEY ARE NEEDING RECORDS     PLEASE CALL REINA [phone]

## 2024-01-08 ENCOUNTER — APPOINTMENT (OUTPATIENT)
Dept: CT IMAGING | Facility: HOSPITAL | Age: 64
End: 2024-01-08
Payer: OTHER GOVERNMENT

## 2024-01-08 ENCOUNTER — HOSPITAL ENCOUNTER (EMERGENCY)
Facility: HOSPITAL | Age: 64
Discharge: HOME | End: 2024-01-08
Attending: EMERGENCY MEDICINE | Admitting: EMERGENCY MEDICINE
Payer: OTHER GOVERNMENT

## 2024-01-08 ENCOUNTER — APPOINTMENT (OUTPATIENT)
Dept: GENERAL RADIOLOGY | Facility: HOSPITAL | Age: 64
End: 2024-01-08
Payer: OTHER GOVERNMENT

## 2024-01-08 VITALS
DIASTOLIC BLOOD PRESSURE: 109 MMHG | HEART RATE: 73 BPM | OXYGEN SATURATION: 99 % | RESPIRATION RATE: 20 BRPM | SYSTOLIC BLOOD PRESSURE: 139 MMHG | TEMPERATURE: 98.6 F

## 2024-01-08 DIAGNOSIS — E87.6 HYPOKALEMIA: ICD-10-CM

## 2024-01-08 DIAGNOSIS — I95.1 ORTHOSTATIC HYPOTENSION: Primary | ICD-10-CM

## 2024-01-08 DIAGNOSIS — E83.42 HYPOMAGNESEMIA: ICD-10-CM

## 2024-01-08 LAB
ALBUMIN SERPL-MCNC: 2.3 G/DL (ref 3.5–5.2)
ALBUMIN/GLOB SERPL: 0.6 G/DL
ALP SERPL-CCNC: 171 U/L (ref 39–117)
ALT SERPL W P-5'-P-CCNC: 15 U/L (ref 1–41)
ANION GAP SERPL CALCULATED.3IONS-SCNC: 11.3 MMOL/L (ref 5–15)
AST SERPL-CCNC: 25 U/L (ref 1–40)
BASOPHILS # BLD AUTO: 0.03 10*3/MM3 (ref 0–0.2)
BASOPHILS NFR BLD AUTO: 0.9 % (ref 0–1.5)
BILIRUB SERPL-MCNC: 0.5 MG/DL (ref 0–1.2)
BUN SERPL-MCNC: 10 MG/DL (ref 8–23)
BUN/CREAT SERPL: 15.9 (ref 7–25)
CALCIUM SPEC-SCNC: 7.8 MG/DL (ref 8.6–10.5)
CHLORIDE SERPL-SCNC: 105 MMOL/L (ref 98–107)
CO2 SERPL-SCNC: 19.7 MMOL/L (ref 22–29)
CREAT SERPL-MCNC: 0.63 MG/DL (ref 0.76–1.27)
DEPRECATED RDW RBC AUTO: 54.7 FL (ref 37–54)
EGFRCR SERPLBLD CKD-EPI 2021: 106.2 ML/MIN/1.73
EOSINOPHIL # BLD AUTO: 0.14 10*3/MM3 (ref 0–0.4)
EOSINOPHIL NFR BLD AUTO: 4 % (ref 0.3–6.2)
ERYTHROCYTE [DISTWIDTH] IN BLOOD BY AUTOMATED COUNT: 17.4 % (ref 12.3–15.4)
GLOBULIN UR ELPH-MCNC: 3.6 GM/DL
GLUCOSE SERPL-MCNC: 208 MG/DL (ref 65–99)
HCT VFR BLD AUTO: 34.5 % (ref 37.5–51)
HGB BLD-MCNC: 11.4 G/DL (ref 13–17.7)
HOLD SPECIMEN: NORMAL
HOLD SPECIMEN: NORMAL
IMM GRANULOCYTES # BLD AUTO: 0.01 10*3/MM3 (ref 0–0.05)
IMM GRANULOCYTES NFR BLD AUTO: 0.3 % (ref 0–0.5)
LYMPHOCYTES # BLD AUTO: 1.05 10*3/MM3 (ref 0.7–3.1)
LYMPHOCYTES NFR BLD AUTO: 30.3 % (ref 19.6–45.3)
MAGNESIUM SERPL-MCNC: 1.1 MG/DL (ref 1.6–2.4)
MCH RBC QN AUTO: 28.8 PG (ref 26.6–33)
MCHC RBC AUTO-ENTMCNC: 33 G/DL (ref 31.5–35.7)
MCV RBC AUTO: 87.1 FL (ref 79–97)
MONOCYTES # BLD AUTO: 0.42 10*3/MM3 (ref 0.1–0.9)
MONOCYTES NFR BLD AUTO: 12.1 % (ref 5–12)
NEUTROPHILS NFR BLD AUTO: 1.82 10*3/MM3 (ref 1.7–7)
NEUTROPHILS NFR BLD AUTO: 52.4 % (ref 42.7–76)
NRBC BLD AUTO-RTO: 0 /100 WBC (ref 0–0.2)
PLATELET # BLD AUTO: 146 10*3/MM3 (ref 140–450)
PMV BLD AUTO: 9.6 FL (ref 6–12)
POTASSIUM SERPL-SCNC: 3 MMOL/L (ref 3.5–5.2)
PROT SERPL-MCNC: 5.9 G/DL (ref 6–8.5)
RBC # BLD AUTO: 3.96 10*6/MM3 (ref 4.14–5.8)
SODIUM SERPL-SCNC: 136 MMOL/L (ref 136–145)
TROPONIN T SERPL HS-MCNC: 22 NG/L
TROPONIN T SERPL HS-MCNC: 32 NG/L
WBC NRBC COR # BLD AUTO: 3.47 10*3/MM3 (ref 3.4–10.8)
WHOLE BLOOD HOLD COAG: NORMAL
WHOLE BLOOD HOLD SPECIMEN: NORMAL

## 2024-01-08 PROCEDURE — 93005 ELECTROCARDIOGRAM TRACING: CPT

## 2024-01-08 PROCEDURE — 93005 ELECTROCARDIOGRAM TRACING: CPT | Performed by: EMERGENCY MEDICINE

## 2024-01-08 PROCEDURE — 25810000003 SODIUM CHLORIDE 0.9 % SOLUTION

## 2024-01-08 PROCEDURE — 80053 COMPREHEN METABOLIC PANEL: CPT | Performed by: EMERGENCY MEDICINE

## 2024-01-08 PROCEDURE — 36415 COLL VENOUS BLD VENIPUNCTURE: CPT

## 2024-01-08 PROCEDURE — 84484 ASSAY OF TROPONIN QUANT: CPT | Performed by: EMERGENCY MEDICINE

## 2024-01-08 PROCEDURE — 71045 X-RAY EXAM CHEST 1 VIEW: CPT

## 2024-01-08 PROCEDURE — 25010000002 HEPARIN LOCK FLUSH PER 10 UNITS: Performed by: EMERGENCY MEDICINE

## 2024-01-08 PROCEDURE — 70450 CT HEAD/BRAIN W/O DYE: CPT

## 2024-01-08 PROCEDURE — 84484 ASSAY OF TROPONIN QUANT: CPT

## 2024-01-08 PROCEDURE — 99284 EMERGENCY DEPT VISIT MOD MDM: CPT

## 2024-01-08 PROCEDURE — 83735 ASSAY OF MAGNESIUM: CPT | Performed by: EMERGENCY MEDICINE

## 2024-01-08 PROCEDURE — 85025 COMPLETE CBC W/AUTO DIFF WBC: CPT | Performed by: EMERGENCY MEDICINE

## 2024-01-08 RX ORDER — SODIUM CHLORIDE 0.9 % (FLUSH) 0.9 %
20 SYRINGE (ML) INJECTION AS NEEDED
Status: DISCONTINUED | OUTPATIENT
Start: 2024-01-08 | End: 2024-01-08 | Stop reason: HOSPADM

## 2024-01-08 RX ORDER — POTASSIUM CHLORIDE 750 MG/1
40 CAPSULE, EXTENDED RELEASE ORAL ONCE
Status: COMPLETED | OUTPATIENT
Start: 2024-01-08 | End: 2024-01-08

## 2024-01-08 RX ORDER — SODIUM CHLORIDE 0.9 % (FLUSH) 0.9 %
10 SYRINGE (ML) INJECTION AS NEEDED
Status: DISCONTINUED | OUTPATIENT
Start: 2024-01-08 | End: 2024-01-08 | Stop reason: HOSPADM

## 2024-01-08 RX ORDER — HEPARIN SODIUM (PORCINE) LOCK FLUSH IV SOLN 100 UNIT/ML 100 UNIT/ML
5 SOLUTION INTRAVENOUS AS NEEDED
Status: DISCONTINUED | OUTPATIENT
Start: 2024-01-08 | End: 2024-01-08 | Stop reason: HOSPADM

## 2024-01-08 RX ORDER — SODIUM CHLORIDE 9 MG/ML
40 INJECTION, SOLUTION INTRAVENOUS AS NEEDED
Status: DISCONTINUED | OUTPATIENT
Start: 2024-01-08 | End: 2024-01-08 | Stop reason: HOSPADM

## 2024-01-08 RX ORDER — MECLIZINE HYDROCHLORIDE 25 MG/1
25 TABLET ORAL ONCE
Status: COMPLETED | OUTPATIENT
Start: 2024-01-08 | End: 2024-01-08

## 2024-01-08 RX ORDER — SODIUM CHLORIDE 0.9 % (FLUSH) 0.9 %
10 SYRINGE (ML) INJECTION EVERY 12 HOURS SCHEDULED
Status: DISCONTINUED | OUTPATIENT
Start: 2024-01-08 | End: 2024-01-08 | Stop reason: HOSPADM

## 2024-01-08 RX ADMIN — POTASSIUM CHLORIDE 40 MEQ: 10 CAPSULE, COATED, EXTENDED RELEASE ORAL at 19:20

## 2024-01-08 RX ADMIN — SODIUM CHLORIDE 1000 ML: 9 INJECTION, SOLUTION INTRAVENOUS at 18:12

## 2024-01-08 RX ADMIN — MECLIZINE HYDROCHLORIDE 25 MG: 25 TABLET ORAL at 18:10

## 2024-01-08 RX ADMIN — HEPARIN 500 UNITS: 100 SYRINGE at 20:38

## 2024-01-08 NOTE — ED PROVIDER NOTES
Time: 3:21 PM EST  Date of encounter:  1/8/2024  Independent Historian/Clinical History and Information was obtained by:   Patient    History is limited by: N/A    Chief Complaint   Patient presents with    Dizziness         History of Present Illness:  Patient is a 63 y.o. year old male who presents to the emergency department for evaluation of dizziness for the past 3 days.  Patient denies headache, chest pain, and shortness of air.  Patient states he has a history of vertigo.  Patient has a history of liver and stomach cancer.  Patient denies vomiting.  Patient takes meclizine.  Fall or injury to his head.    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    Diabetes mellitus     Hyperlipidemia     Hypertension     Liver cancer     Rotator cuff disorder     LEFT    Sleep apnea     Stomach cancer      Past Surgical History:   Procedure Laterality Date    CAROTID ENDARTERECTOMY Right     CHOLECYSTECTOMY      FOOT SURGERY Right     TOE DEBRIDMENT    HERNIA REPAIR      VENTRAL HERNIA    SHOULDER ARTHROSCOPY W/ ROTATOR CUFF REPAIR Left 4/3/2023    Procedure: LEFT SHOULDER ARTHROSCOPIC ROTATOR CUFF DEBRIDEMENT, LABRAL DEBRIDEMENT, BICEPS TENODESIS, SUBACROMIAL DECOMPRESSION;  Surgeon: Chas Patterson MD;  [place] [facility] [place] [facility] [place]  Service: Orthopedics;  Laterality: Left;    TUMOR REMOVAL Left     PAROTID GLAND    VASCULAR SURGERY Right     STENT R LEG    VENOUS ACCESS DEVICE (PORT) INSERTION Right 9/26/2023    Procedure: INSERTION VENOUS ACCESS DEVICE;  Surgeon: James Copeland MD;  Location: [facility] OR;  Service: General;  Laterality: Right;     Family History   Problem Relation Age of Onset    Lung cancer Father     Malig Hyperthermia Neg Hx        Home Medications:  Prior to Admission medications    Medication Sig Start Date End  Date Taking? Authorizing Provider   apixaban (ELIQUIS) 5 MG tablet tablet Take 1 tablet by mouth 2 (Two) Times a Day.    Tanvir Boateng MD   chlorthalidone (HYGROTON) 25 MG tablet Take 0.5 tablets by mouth Daily.    Tanvir Boateng MD   cholecalciferol (VITAMIN D3) 25 MCG (1000 UT) tablet Take 1 tablet by mouth Daily. LAST DOSE 3/26/23    Tanvir Boateng MD   Dulaglutide 1.5 MG/0.5ML solution pen-injector Inject 1.5 mg under the skin into the appropriate area as directed Every 7 (Seven) Days. THURSDAY    Tanvir Boateng MD   fluticasone-salmeterol (ADVAIR HFA) 230-21 MCG/ACT inhaler Inhale 2 puffs 2 (Two) Times a Day.    Tanvir Boateng MD   gabapentin (NEURONTIN) 600 MG tablet Take 2 tablets by mouth 2 (Two) Times a Day.    Tanvir Boateng MD   glipizide (GLUCOTROL) 10 MG tablet Take 1 tablet by mouth 2 (Two) Times a Day Before Meals.    Tanvir Boateng MD   insulin NPH (NovoLIN N ReliOn) 100 UNIT/ML injection Inject 52 Units under the skin into the appropriate area as directed 2 (Two) Times a Day Before Meals.    Tanvir Boateng MD   LORazepam (ATIVAN) 1 MG tablet Take 1 tablet by mouth Every 8 (Eight) Hours As Needed for Anxiety. 10/10/23   Govind Pruitt MD   losartan (COZAAR) 50 MG tablet Take 2 tablets by mouth Daily.    Tanvir Boateng MD   meclizine (ANTIVERT) 25 MG tablet Take 1 tablet by mouth 3 (Three) Times a Day As Needed for Dizziness. 12/15/23   Govind Pruitt MD   metFORMIN (GLUCOPHAGE) 1000 MG tablet Take 1 tablet by mouth 2 (Two) Times a Day With Meals. INST PER ANESTHESIA  PROTOCOL    Tanvir Boateng MD   mirtazapine (REMERON) 15 MG tablet Take 1 tablet by mouth Every Night. 10/3/23   Govind Pruitt MD   mirtazapine (REMERON) 15 MG tablet Take 1 tablet by mouth Every Night. 10/3/23   Govind Pruitt MD   montelukast (SINGULAIR) 10 MG tablet Take 1 tablet by mouth Every Night.    Tanvir Boateng MD    OLANZapine (zyPREXA) 5 MG tablet Take 1 tablet by mouth Every Night. Indications: Nausea and Vomiting caused by Cancer Chemotherapy, if no improvement, call provider 10/3/23   Govind Pruitt MD   OLANZapine (zyPREXA) 5 MG tablet Take 1 tablet by mouth Every Night. 10/3/23   Govind Pruitt MD   Omega-3 Fatty Acids (fish oil) 1000 MG capsule capsule Take  by mouth Daily With Breakfast. LAST DOSE 3/26/23    ProviderTanvir MD   omeprazole (priLOSEC) 40 MG capsule Take 1 capsule by mouth 2 (Two) Times a Day.    Tanvir Boateng MD   ondansetron (ZOFRAN) 8 MG tablet Take 1 tablet by mouth 3 (Three) Times a Day As Needed for Nausea or Vomiting (mail to patient please.). 23   Marisol Castano APRN   oxyCODONE-acetaminophen (PERCOCET) 5-325 MG per tablet Take 1 tablet by mouth Every 6 (Six) Hours As Needed for Moderate Pain. 12/15/23   Govind Pruitt MD   potassium chloride (K-DUR,KLOR-CON) 10 MEQ CR tablet Take 1 tablet by mouth Daily. 23   Govind Pruitt MD        Social History:   Social History     Tobacco Use    Smoking status: Former     Years: 25     Types: Cigarettes     Start date:      Quit date:      Years since quittin.0    Smokeless tobacco: Former   Vaping Use    Vaping Use: Never used   Substance Use Topics    Alcohol use: Never    Drug use: Never         Review of Systems:  Review of Systems   Constitutional:  Negative for chills and fever.   HENT:  Negative for congestion, rhinorrhea and sore throat.    Eyes:  Negative for photophobia, pain and visual disturbance.   Respiratory:  Negative for apnea, cough, chest tightness and shortness of breath.    Cardiovascular:  Negative for chest pain and palpitations.   Gastrointestinal:  Negative for abdominal pain, diarrhea, nausea and vomiting.   Genitourinary:  Negative for difficulty urinating and dysuria.   Musculoskeletal:  Negative for joint swelling and myalgias.   Skin:  Negative  for color change.   Neurological:  Positive for dizziness. Negative for seizures and headaches.   Psychiatric/Behavioral: Negative.     All other systems reviewed and are negative.       Physical Exam:  BP (!) 139/109   Pulse 73   Temp 98.6 °F (37 °C) (Oral)   Resp 20   SpO2 99%         Physical Exam  Vitals and nursing note reviewed.   Constitutional:       General: He is not in acute distress.     Appearance: Normal appearance. He is not toxic-appearing.   HENT:      Head: Normocephalic and atraumatic.      Jaw: There is normal jaw occlusion.   Eyes:      General: Lids are normal.      Extraocular Movements: Extraocular movements intact.      Conjunctiva/sclera: Conjunctivae normal.      Pupils: Pupils are equal, round, and reactive to light.   Cardiovascular:      Rate and Rhythm: Normal rate and regular rhythm.      Pulses: Normal pulses.      Heart sounds: Normal heart sounds.   Pulmonary:      Effort: Pulmonary effort is normal. No respiratory distress.      Breath sounds: Normal breath sounds. No wheezing or rhonchi.   Abdominal:      General: Abdomen is flat. There is no distension.      Palpations: Abdomen is soft.      Tenderness: There is no abdominal tenderness. There is no guarding or rebound.   Musculoskeletal:         General: Normal range of motion.      Cervical back: Normal range of motion and neck supple.      Right lower leg: No edema.      Left lower leg: No edema.   Skin:     General: Skin is warm and dry.      Coloration: Skin is not cyanotic.   Neurological:      Mental Status: He is alert and oriented to person, place, and time. Mental status is at baseline.   Psychiatric:         Attention and Perception: Attention and perception normal.         Mood and Affect: Mood normal.                      Procedures:  Procedures      Medical Decision Making:      Comorbidities that affect care:    Hypertension, diabetes, asthma    External Notes reviewed:          The following orders were placed  and all results were independently analyzed by me:  Orders Placed This Encounter   Procedures    XR Chest 1 View    CT Head Without Contrast    [place] Draw    Comprehensive Metabolic Panel    Single High Sensitivity Troponin T    Magnesium    Urinalysis With Microscopic If Indicated (No Culture) - Urine, Clean Catch    CBC Auto Differential    Single High Sensitivity Troponin T    NPO Diet NPO Type: Strict NPO    Undress & Gown    Continuous Pulse Oximetry    Vital Signs    Orthostatic Blood Pressure    Oxygen Therapy- Nasal Cannula; Titrate 1-6 LPM Per SpO2; 90 - 95%    POC Glucose Once    ECG 12 Lead ED Triage Standing Order; Weak / Dizzy / AMS    Insert Peripheral IV    Fall Precautions    CBC & Differential    Green Top (Gel)    Lavender Top    Gold Top - SST    Light Blue Top       Medications Given in the Emergency Department:  Medications   sodium chloride 0.9 % flush 10 mL (has no administration in time range)   sodium chloride 0.9 % bolus 1,000 mL (0 mL Intravenous Stopped 1/8/24 1919)   meclizine (ANTIVERT) tablet 25 mg (25 mg Oral Given 1/8/24 1810)   potassium chloride (MICRO-K/KLOR-CON) CR capsule (40 mEq Oral Given 1/8/24 1920)        ED Course:    The patient was initially evaluated in the triage area where orders were placed. The patient was later dispositioned by Daniel Espinoza PA-C.      The patient was advised to stay for completion of workup which includes but is not limited to communication of labs and radiological results, reassessment and plan. The patient was advised that leaving prior to disposition by a provider could result in critical findings that are not communicated to the patient.     ED Course as of 01/08/24 2021 Mon Jan 08, 2024   1524 --- PROVIDER IN TRIAGE NOTE ---    The patient was evaluated by Kailyn johnson in triage. Orders were placed and the patient is currently awaiting disposition.    [AJ]      ED Course User Index  [AJ] Kailyn Arreola PA-C        Labs:    Lab Results (last 24 hours)       Procedure Component Value Units Date/Time    CBC & Differential [[id number]]  (Abnormal) Collected: 01/08/24 1731    Specimen: Blood Updated: 01/08/24 1745    Narrative:      The following orders were created for panel order CBC & Differential.  Procedure                               Abnormality         Status                     ---------                               -----------         ------                     CBC Auto Differential[[id number]]        Abnormal            Final result                 Please view results for these tests on the individual orders.    Comprehensive Metabolic Panel [[id number]]  (Abnormal) Collected: 01/08/24 1731    Specimen: Blood Updated: 01/08/24 1802     Glucose 208 mg/dL      BUN 10 mg/dL      Creatinine 0.63 mg/dL      Sodium 136 mmol/L      Potassium 3.0 mmol/L      Chloride 105 mmol/L      CO2 19.7 mmol/L      Calcium 7.8 mg/dL      Total Protein 5.9 g/dL      Albumin 2.3 g/dL      ALT (SGPT) 15 U/L      AST (SGOT) 25 U/L      Alkaline Phosphatase 171 U/L      Total Bilirubin 0.5 mg/dL      Globulin 3.6 gm/dL      A/G Ratio 0.6 g/dL      BUN/Creatinine Ratio 15.9     Anion Gap 11.3 mmol/L      eGFR 106.2 mL/min/1.73     Narrative:      GFR Normal >60  Chronic Kidney Disease <60  Kidney Failure <15      Single High Sensitivity Troponin T [[id number]]  (Abnormal) Collected: 01/08/24 1731    Specimen: Blood Updated: 01/08/24 1802     HS Troponin T 32 ng/L     Narrative:      High Sensitive Troponin T Reference Range:  <14.0 ng/L- Negative Female for AMI  <22.0 ng/L- Negative Male for AMI  >=14 - Abnormal Female indicating possible myocardial injury.  >=22 - Abnormal Male indicating possible myocardial injury.   Clinicians would have to utilize clinical acumen, EKG, Troponin, and serial changes to determine if it is an Acute Myocardial Infarction or myocardial injury due to an underlying chronic condition.         Magnesium  [[id number]]  (Abnormal) Collected: 01/08/24 1731    Specimen: Blood Updated: 01/08/24 1802     Magnesium 1.1 mg/dL     CBC Auto Differential [[id number]]  (Abnormal) Collected: 01/08/24 1731    Specimen: Blood Updated: 01/08/24 1745     WBC 3.47 10*3/mm3      RBC 3.96 10*6/mm3      Hemoglobin 11.4 g/dL      Hematocrit 34.5 %      MCV 87.1 fL      MCH 28.8 pg      MCHC 33.0 g/dL      RDW 17.4 %      RDW-SD 54.7 fl      MPV 9.6 fL      Platelets 146 10*3/mm3      Neutrophil % 52.4 %      Lymphocyte % 30.3 %      Monocyte % 12.1 %      Eosinophil % 4.0 %      Basophil % 0.9 %      Immature Grans % 0.3 %      Neutrophils, Absolute 1.82 10*3/mm3      Lymphocytes, Absolute 1.05 10*3/mm3      Monocytes, Absolute 0.42 10*3/mm3      Eosinophils, Absolute 0.14 10*3/mm3      Basophils, Absolute 0.03 10*3/mm3      Immature Grans, Absolute 0.01 10*3/mm3      nRBC 0.0 /100 WBC     Single High Sensitivity Troponin T [[id number]]  (Abnormal) Collected: 01/08/24 1920    Specimen: Blood Updated: 01/08/24 1947     HS Troponin T 22 ng/L     Narrative:      High Sensitive Troponin T Reference Range:  <14.0 ng/L- Negative Female for AMI  <22.0 ng/L- Negative Male for AMI  >=14 - Abnormal Female indicating possible myocardial injury.  >=22 - Abnormal Male indicating possible myocardial injury.   Clinicians would have to utilize clinical acumen, EKG, Troponin, and serial changes to determine if it is an Acute Myocardial Infarction or myocardial injury due to an underlying chronic condition.                  Imaging:    CT Head Without Contrast    Result Date: 1/8/2024  PROCEDURE: CT HEAD WO CONTRAST  COMPARISON:  [facility], CT, CT HEAD WO CONTRAST, 9/21/2023, 19:25.4 INDICATIONS: Dizziness, persistent/recurrent, cardiac or vascular cause suspected  PROTOCOL:   Standard imaging protocol performed    RADIATION:   DLP: 1531mGy*cm   MA and/or KV was adjusted to minimize radiation dose.     TECHNIQUE: After obtaining the patient's  consent, CT images were obtained without non-ionic intravenous contrast material.  FINDINGS:  No midline shift.  Ventricles and sulci appear within normal limits for patient's age.  There is calcific atherosclerosis of the of the vertebrobasilar and cavernous portions of the internal carotid arteries.  No definite acute hemorrhage.  No extra-axial collection.  No definite mass or significant edema.  No definite findings of an acute infarction.  Paranasal sinuses and mastoid air cells appear clear.  No definite acute calvarial abnormality.        1. No definite CT findings of acute intracranial abnormality. 2. Suspected mild chronic small vessel ischemic changes.     CHASITY WOOD MD       Electronically Signed and Approved By: CHASITY WOOD MD on 1/08/2024 at 16:48             XR Chest 1 View    Result Date: 1/8/2024  PROCEDURE: XR CHEST 1 VW  COMPARISON: [facility], CR, XR CHEST PA AND LATERAL, 12/06/2023, 12:45.  INDICATIONS: Weak/Dizzy/AMS triage protocol- dizziness and weakness  FINDINGS:  Right internal jugular Port-A-Cath is unchanged.  Heart size and pulmonary vessels are within normal limits.  Lungs are clear bilaterally.  No pleural effusion.  No pneumothorax.  Bony structures unremarkable.        1. No acute cardiopulmonary disease.       MARJ BERNABE MD       Electronically Signed and Approved By: MARJ BERNABE MD on 1/08/2024 at 14:39                Differential Diagnosis and Discussion:      Dizziness: Based on the patient's history, signs, and symptoms, the diffential diagnosis includes but is not limited to meningitis, stroke, sepsis, subarachnoid hemorrhage, intracranial bleeding, encephalitis, vertigo, electrolyte imbalance, and metabolic disorders.    All labs were reviewed and interpreted by me.  All X-rays impressions were independently interpreted by me.  EKG was interpreted by supervising attending.  CT scan radiology impression was interpreted by me.    MDM     Amount  and/or Complexity of Data Reviewed  Decide to obtain previous medical records or to obtain history from someone other than the patient: yes       Patient had evidence of hypokalemia and hypomagnesia.  I gave the patient an oral run of potassium in the ED.  Patient states he feels much better after receiving a liter of IV fluids.  Patient got up and walked to the restroom and stated he was not dizzy.  I emphasized the importance of staying hydrated at home.  I instructed the patient to follow-up with PCP regarding serial magnesium and potassium lab draws.  I instruct the patient to return to the ED if develops new or worsening symptoms.  Patient states he understands and agrees with plan of care.          Patient Care Considerations:          Consultants/Shared Management Plan:    None    Social Determinants of Health:    Patient is independent, reliable, and has access to care.       Disposition and Care Coordination:    Discharged: I considered escalation of care by admitting this patient to the hospital, however the patient has improved and is suitable and stable for discharge.    I have explained the patient´s condition, diagnoses and treatment plan based on the information available to me at this time. I have answered questions and addressed any concerns. The patient has a good  understanding of the patient´s diagnosis, condition, and treatment plan as can be expected at this point. The vital signs have been stable. The patient´s condition is stable and appropriate for discharge from the emergency department.      The patient will pursue further outpatient evaluation with the primary care physician or other designated or consulting physician as outlined in the discharge instructions. They are agreeable to this plan of care and follow-up instructions have been explained in detail. The patient has received these instructions in written format and have expressed an understanding of the discharge instructions. The  patient is aware that any significant change in condition or worsening of symptoms should prompt an immediate return to this or the closest emergency department or call to 911.  I have explained discharge medications and the need for follow up with the patient/caretakers. This was also printed in the discharge instructions. Patient was discharged with the following medications and follow up:      Medication List      No changes were made to your prescriptions during this visit.      Ev Peck, APRN  [address] [address]  [phone]    Go in 2 days  Serial lab draw       Final diagnoses:   Orthostatic hypotension   Hypokalemia   Hypomagnesemia        ED Disposition       ED Disposition   Discharge    Condition   Stable    Comment   --               This medical record created using voice recognition software.             Daniel Espinoza PA-C  01/08/24 2021

## 2024-01-09 LAB
QT INTERVAL: 433 MS
QTC INTERVAL: 495 MS

## 2024-01-10 ENCOUNTER — HOSPITAL ENCOUNTER (OUTPATIENT)
Dept: ONCOLOGY | Facility: HOSPITAL | Age: 64
Discharge: HOME | End: 2024-01-10

## 2024-01-16 ENCOUNTER — OFFICE VISIT (OUTPATIENT)
Dept: ONCOLOGY | Facility: HOSPITAL | Age: 64
End: 2024-01-16
Payer: OTHER GOVERNMENT

## 2024-01-16 ENCOUNTER — HOSPITAL ENCOUNTER (OUTPATIENT)
Dept: ONCOLOGY | Facility: HOSPITAL | Age: 64
Discharge: HOME | End: 2024-01-16
Admitting: INTERNAL MEDICINE
Payer: OTHER GOVERNMENT

## 2024-01-16 VITALS
BODY MASS INDEX: 30.47 KG/M2 | OXYGEN SATURATION: 100 % | TEMPERATURE: 98.2 F | HEIGHT: 66 IN | RESPIRATION RATE: 18 BRPM | SYSTOLIC BLOOD PRESSURE: 147 MMHG | DIASTOLIC BLOOD PRESSURE: 76 MMHG | HEART RATE: 78 BPM | WEIGHT: 189.6 LBS

## 2024-01-16 DIAGNOSIS — G89.3 NEOPLASM RELATED PAIN: ICD-10-CM

## 2024-01-16 DIAGNOSIS — I81 PORTAL VEIN THROMBOSIS: ICD-10-CM

## 2024-01-16 DIAGNOSIS — Z45.2 PICC (PERIPHERALLY INSERTED CENTRAL CATHETER) IN PLACE: ICD-10-CM

## 2024-01-16 DIAGNOSIS — C16.0 MALIGNANT NEOPLASM OF CARDIA OF STOMACH: Primary | ICD-10-CM

## 2024-01-16 DIAGNOSIS — T45.1X5A ANEMIA ASSOCIATED WITH CHEMOTHERAPY: ICD-10-CM

## 2024-01-16 DIAGNOSIS — E86.0 DEHYDRATION: ICD-10-CM

## 2024-01-16 DIAGNOSIS — D64.81 ANEMIA ASSOCIATED WITH CHEMOTHERAPY: ICD-10-CM

## 2024-01-16 DIAGNOSIS — Z45.2 FITTING AND ADJUSTMENT OF VASCULAR CATHETER: ICD-10-CM

## 2024-01-16 LAB
ALBUMIN SERPL-MCNC: 2.9 G/DL (ref 3.5–5.2)
ALBUMIN/GLOB SERPL: 0.9 G/DL
ALP SERPL-CCNC: 230 U/L (ref 39–117)
ALT SERPL W P-5'-P-CCNC: 20 U/L (ref 1–41)
ANION GAP SERPL CALCULATED.3IONS-SCNC: 12.6 MMOL/L (ref 5–15)
AST SERPL-CCNC: 49 U/L (ref 1–40)
BASOPHILS # BLD AUTO: 0.06 10*3/MM3 (ref 0–0.2)
BASOPHILS NFR BLD AUTO: 1.2 % (ref 0–1.5)
BILIRUB SERPL-MCNC: 0.3 MG/DL (ref 0–1.2)
BUN SERPL-MCNC: 8 MG/DL (ref 8–23)
BUN/CREAT SERPL: 11.3 (ref 7–25)
CALCIUM SPEC-SCNC: 9 MG/DL (ref 8.6–10.5)
CHLORIDE SERPL-SCNC: 105 MMOL/L (ref 98–107)
CO2 SERPL-SCNC: 21.4 MMOL/L (ref 22–29)
CREAT SERPL-MCNC: 0.71 MG/DL (ref 0.76–1.27)
DEPRECATED RDW RBC AUTO: 57.5 FL (ref 37–54)
EGFRCR SERPLBLD CKD-EPI 2021: 102.5 ML/MIN/1.73
EOSINOPHIL # BLD AUTO: 0.21 10*3/MM3 (ref 0–0.4)
EOSINOPHIL NFR BLD AUTO: 4.3 % (ref 0.3–6.2)
ERYTHROCYTE [DISTWIDTH] IN BLOOD BY AUTOMATED COUNT: 17.5 % (ref 12.3–15.4)
GLOBULIN UR ELPH-MCNC: 3.2 GM/DL
GLUCOSE SERPL-MCNC: 146 MG/DL (ref 65–99)
HCT VFR BLD AUTO: 35.4 % (ref 37.5–51)
HGB BLD-MCNC: 11.5 G/DL (ref 13–17.7)
IMM GRANULOCYTES # BLD AUTO: 0 10*3/MM3 (ref 0–0.05)
IMM GRANULOCYTES NFR BLD AUTO: 0 % (ref 0–0.5)
LYMPHOCYTES # BLD AUTO: 1.03 10*3/MM3 (ref 0.7–3.1)
LYMPHOCYTES NFR BLD AUTO: 21.3 % (ref 19.6–45.3)
MCH RBC QN AUTO: 29.3 PG (ref 26.6–33)
MCHC RBC AUTO-ENTMCNC: 32.5 G/DL (ref 31.5–35.7)
MCV RBC AUTO: 90.1 FL (ref 79–97)
MONOCYTES # BLD AUTO: 0.55 10*3/MM3 (ref 0.1–0.9)
MONOCYTES NFR BLD AUTO: 11.4 % (ref 5–12)
NEUTROPHILS NFR BLD AUTO: 2.98 10*3/MM3 (ref 1.7–7)
NEUTROPHILS NFR BLD AUTO: 61.8 % (ref 42.7–76)
PLATELET # BLD AUTO: 253 10*3/MM3 (ref 140–450)
PMV BLD AUTO: 9.2 FL (ref 6–12)
POTASSIUM SERPL-SCNC: 3.7 MMOL/L (ref 3.5–5.2)
PROT SERPL-MCNC: 6.1 G/DL (ref 6–8.5)
RBC # BLD AUTO: 3.93 10*6/MM3 (ref 4.14–5.8)
SODIUM SERPL-SCNC: 139 MMOL/L (ref 136–145)
WBC NRBC COR # BLD AUTO: 4.83 10*3/MM3 (ref 3.4–10.8)

## 2024-01-16 PROCEDURE — 25010000002 HEPARIN LOCK FLUSH PER 10 UNITS: Performed by: INTERNAL MEDICINE

## 2024-01-16 PROCEDURE — 80053 COMPREHEN METABOLIC PANEL: CPT | Performed by: INTERNAL MEDICINE

## 2024-01-16 PROCEDURE — 85025 COMPLETE CBC W/AUTO DIFF WBC: CPT | Performed by: INTERNAL MEDICINE

## 2024-01-16 PROCEDURE — 36591 DRAW BLOOD OFF VENOUS DEVICE: CPT

## 2024-01-16 RX ORDER — DEXTROSE MONOHYDRATE 50 MG/ML
250 INJECTION, SOLUTION INTRAVENOUS ONCE
Status: CANCELLED | OUTPATIENT
Start: 2024-01-17

## 2024-01-16 RX ORDER — SODIUM CHLORIDE 0.9 % (FLUSH) 0.9 %
20 SYRINGE (ML) INJECTION AS NEEDED
Status: CANCELLED | OUTPATIENT
Start: 2024-01-16

## 2024-01-16 RX ORDER — SODIUM CHLORIDE 0.9 % (FLUSH) 0.9 %
20 SYRINGE (ML) INJECTION AS NEEDED
Status: DISCONTINUED | OUTPATIENT
Start: 2024-01-16 | End: 2024-01-17 | Stop reason: HOSPADM

## 2024-01-16 RX ORDER — DIPHENHYDRAMINE HYDROCHLORIDE 50 MG/ML
50 INJECTION INTRAMUSCULAR; INTRAVENOUS AS NEEDED
Status: CANCELLED | OUTPATIENT
Start: 2024-01-17

## 2024-01-16 RX ORDER — HEPARIN SODIUM (PORCINE) LOCK FLUSH IV SOLN 100 UNIT/ML 100 UNIT/ML
500 SOLUTION INTRAVENOUS AS NEEDED
Status: DISCONTINUED | OUTPATIENT
Start: 2024-01-16 | End: 2024-01-17 | Stop reason: HOSPADM

## 2024-01-16 RX ORDER — FAMOTIDINE 10 MG/ML
20 INJECTION, SOLUTION INTRAVENOUS AS NEEDED
Status: CANCELLED | OUTPATIENT
Start: 2024-01-17

## 2024-01-16 RX ORDER — HEPARIN SODIUM (PORCINE) LOCK FLUSH IV SOLN 100 UNIT/ML 100 UNIT/ML
500 SOLUTION INTRAVENOUS AS NEEDED
Status: CANCELLED | OUTPATIENT
Start: 2024-01-17

## 2024-01-16 RX ORDER — PALONOSETRON 0.05 MG/ML
0.25 INJECTION, SOLUTION INTRAVENOUS ONCE
Status: CANCELLED | OUTPATIENT
Start: 2024-01-17

## 2024-01-16 RX ADMIN — HEPARIN SODIUM (PORCINE) LOCK FLUSH IV SOLN 100 UNIT/ML 500 UNITS: 100 SOLUTION at 08:38

## 2024-01-16 RX ADMIN — Medication 20 ML: at 08:38

## 2024-01-16 NOTE — PROGRESS NOTES
Chief Complaint  Gastric cancer follow up    Reji Fall MD Blakey, Ev Fan, CHIOMA Long          Kevin José Luis presents to [facility] for metastatic gastric cancer    Mr. 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 Patient started on FOLFOX on 9/27/23.    Interval History  Patient presents for follow up. He is due for cycle 8 of FOLFOX tomorrow. Not much pain. Did get dizzy and lightheaded with last cycle of chemotherapy. Went to ED on 1/8/24 and was given fluids with improvement. Has been drinking more water recently. Does have cold induced neuropathy in fingers for a few days after the chemo but it does resolve. Bowel movements controlled. No nausea or vomiting reported. No fevers, chills.     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     [facility]: [facility] [place] [place] [facility] 2.5 weeks of right side temporal pain, decreased appetite with 19 pounds of weight loss. With associated melanotic stools.     9/6/23: EGD with Gastric mass biopsy positive for high grade malignant neoplasm consistent with carcinoma (non small cell)    9/27/23: C1D1 mFOLFOX    9/12/23: NM PET: Gastric hypermetabolic mass involving the gastric cardia and fundus with loco-regional sarkis mets and disseminated hepatic metastases.     9/12/23: Liver lesion biopsy: positive for metastatic gastric adenocarcinoma    9/19/23: [facility] NGS: PIK3CA amplification, TP53 R273H, no associated FDA-approved therapies. MS-stable. [facility] NGS: HER2 negative, MSI-stable, BRAF not detected, PDL1 IHC score of 0. 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      9/26/23: C1D1 FOLFOX    10/16/23: Duplex due to RLE edema: Acute right lower extremity deep vein thrombosis noted in the posterior tibial and peroneal. Started on eliquis 5 mg BID.     10/18/23: Completed IV injectafer x 2    11/8/23: C4D1 FOLFOX    1127/23: CT CAP with multiple liver lesions and gastrohepatic adenopathy, decreasing from prior and increasing portal vein thrombosis.     11/29/23: C5D1 FOLFOX (5-FU bolus and leucovorin dropped with this cycle)     Malignant neoplasm of parotid gland (Resolved)   8/26/2022 Initial Diagnosis    Malignant neoplasm of parotid gland (HCC)     8/26/2022 -  Radiation    RADIATION THERAPY Treatment Details (Noted on 8/26/2022)  Site: Left Head and Neck  Technique: IMRT  Goal: No goal specified  Planned Treatment Start Date: No planned start date specified     Malignant neoplasm of cardia of stomach   9/14/2023 Initial Diagnosis    Malignant neoplasm of cardia of stomach     9/14/2023 Cancer Staged    Staging form: Stomach, AJCC 8th Edition  - Clinical: Stage IVB (pM1) - Signed by Govind Pruitt MD on 9/14/2023     9/15/2023 - 9/15/2023 Chemotherapy    OP CENTRAL VENOUS ACCESS DEVICE Access, Care, and Maintenance (CVAD)     9/27/2023 -  Chemotherapy    OP COLON mFOLFOX6 OXALIplatin / Leucovorin / Fluorouracil     9/29/2023 -  Chemotherapy    OP CENTRAL VENOUS ACCESS DEVICE Access, Care, and Maintenance (CVAD)         Review of Systems   Constitutional:  Positive for fatigue. Negative for appetite change, diaphoresis, fever, unexpected weight gain and unexpected weight loss.   HENT:  Negative for hearing loss, mouth sores, sore throat, swollen glands, trouble swallowing and voice change.    Eyes:  Negative for blurred vision.   Respiratory:  Negative for cough, chest tightness, shortness of breath and wheezing.    Cardiovascular:  Negative for chest pain and palpitations.   Gastrointestinal:  Negative for abdominal pain, blood in stool, constipation, diarrhea,  nausea and vomiting.   Endocrine: Negative for cold intolerance and heat intolerance.   Genitourinary:  Negative for difficulty urinating, dysuria, frequency, hematuria and urinary incontinence.   Musculoskeletal:  Negative for arthralgias, back pain and myalgias.   Skin:  Negative for rash, skin lesions and wound.   Neurological:  Positive for dizziness. Negative for seizures, weakness, numbness and headache.   Hematological:  Does not bruise/bleed easily.   Psychiatric/Behavioral:  Negative for sleep disturbance and depressed mood. The patient is not nervous/anxious.    All other systems reviewed and are negative.    Current Outpatient Medications on File Prior to Visit   Medication Sig Dispense Refill    apixaban (ELIQUIS) 5 MG tablet tablet Take 1 tablet by mouth 2 (Two) Times a Day.      chlorthalidone (HYGROTON) 25 MG tablet Take 0.5 tablets by mouth Daily.      cholecalciferol (VITAMIN D3) 25 MCG (1000 UT) tablet Take 1 tablet by mouth Daily. LAST DOSE 3/26/23      Dulaglutide 1.5 MG/0.5ML solution pen-injector Inject 1.5 mg under the skin into the appropriate area as directed Every 7 (Seven) Days. THURSDAY      fluticasone-salmeterol (ADVAIR HFA) 230-21 MCG/ACT inhaler Inhale 2 puffs 2 (Two) Times a Day.      gabapentin (NEURONTIN) 600 MG tablet Take 2 tablets by mouth 2 (Two) Times a Day.      glipizide (GLUCOTROL) 10 MG tablet Take 1 tablet by mouth 2 (Two) Times a Day Before Meals.      insulin NPH (NovoLIN N ReliOn) 100 UNIT/ML injection Inject 52 Units under the skin into the appropriate area as directed 2 (Two) Times a Day Before Meals.      LORazepam (ATIVAN) 1 MG tablet Take 1 tablet by mouth Every 8 (Eight) Hours As Needed for Anxiety. 60 tablet 0    losartan (COZAAR) 50 MG tablet Take 2 tablets by mouth Daily.      meclizine (ANTIVERT) 25 MG tablet Take 1 tablet by mouth 3 (Three) Times a Day As Needed for Dizziness. 30 tablet 1    metFORMIN (GLUCOPHAGE) 1000 MG tablet Take 1 tablet by mouth 2  (Two) Times a Day With Meals. INST PER ANESTHESIA  PROTOCOL      mirtazapine (REMERON) 15 MG tablet Take 1 tablet by mouth Every Night. 30 tablet 5    mirtazapine (REMERON) 15 MG tablet Take 1 tablet by mouth Every Night. 7 tablet 0    montelukast (SINGULAIR) 10 MG tablet Take 1 tablet by mouth Every Night.      OLANZapine (zyPREXA) 5 MG tablet Take 1 tablet by mouth Every Night. Indications: Nausea and Vomiting caused by Cancer Chemotherapy, if no improvement, call provider 30 tablet 2    OLANZapine (zyPREXA) 5 MG tablet Take 1 tablet by mouth Every Night. 7 tablet 0    Omega-3 Fatty Acids (fish oil) 1000 MG capsule capsule Take  by mouth Daily With Breakfast. LAST DOSE 3/26/23      omeprazole (priLOSEC) 40 MG capsule Take 1 capsule by mouth 2 (Two) Times a Day.      ondansetron (ZOFRAN) 8 MG tablet Take 1 tablet by mouth 3 (Three) Times a Day As Needed for Nausea or Vomiting (mail to patient please.). 30 tablet 5    oxyCODONE-acetaminophen (PERCOCET) 5-325 MG per tablet Take 1 tablet by mouth Every 6 (Six) Hours As Needed for Moderate Pain. 120 tablet 0    potassium chloride (K-DUR,KLOR-CON) 10 MEQ CR tablet Take 1 tablet by mouth Daily. 14 tablet 0     Current Facility-Administered Medications on File Prior to Visit   Medication Dose Route Frequency Provider Last Rate Last Admin    heparin injection 500 Units  500 Units Intravenous PRN Govind Pruitt MD   500 Units at 01/16/24 0838    sodium chloride 0.9 % flush 20 mL  20 mL Intravenous PRN Govind Pruitt MD   20 mL at 01/16/24 0838       No Known Allergies  Past Medical History:   Diagnosis Date    Asthma     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    Diabetes mellitus     Hyperlipidemia     Hypertension     Liver cancer     Rotator cuff disorder     LEFT    Sleep apnea     Stomach cancer      Past Surgical History:   Procedure Laterality Date    CAROTID ENDARTERECTOMY Right     CHOLECYSTECTOMY      FOOT SURGERY Right     TOE  "DEBRIDMENT    HERNIA REPAIR      VENTRAL HERNIA    SHOULDER ARTHROSCOPY W/ ROTATOR CUFF REPAIR Left 4/3/2023    Procedure: LEFT SHOULDER ARTHROSCOPIC ROTATOR CUFF DEBRIDEMENT, LABRAL DEBRIDEMENT, BICEPS TENODESIS, SUBACROMIAL DECOMPRESSION;  Surgeon: Chas Patterson MD;  [place] [facility] [place] [facility] [place]  Service: [place]opedics;  Laterality: Left;    TUMOR REMOVAL Left     PAROTID GLAND    VASCULAR SURGERY Right     STENT R LEG    VENOUS ACCESS DEVICE (PORT) INSERTION Right 2023    Procedure: INSERTION VENOUS ACCESS DEVICE;  Surgeon: James Copeland MD;  Location: [facility] MAIN OR;  Service: General;  Laterality: Right;     Social History     Socioeconomic History    Marital status:    Tobacco Use    Smoking status: Former     Years: 25     Types: Cigarettes     Start date:      Quit date:      Years since quittin.0    Smokeless tobacco: Former   Vaping Use    Vaping Use: Never used   Substance and Sexual Activity    Alcohol use: Never    Drug use: Never    Sexual activity: Defer     Family History   Problem Relation Age of Onset    Lung cancer Father     Malig Hyperthermia Neg Hx        Objective   Physical Exam  Well appearing patient in no acute distress on RA  Anicteric sclerae, no rash on exposed skin  Respirations non-labored  Awake, alert, and oriented x 4. Speech intact. No gross neurologic deficit  Appropriate mood and affect    Vitals:    24 0858   BP: 147/76   Pulse: 78   Resp: 18   Temp: 98.2 °F (36.8 °C)   TempSrc: Temporal   SpO2: 100%   Weight: 86 kg (189 lb 9.5 oz)   Height: 167.6 cm (65.98\")   PainSc: 0-No pain         ECOG score: 0         PHQ-9 Total Score: 0           Result Review :   The following data was reviewed by: Govind Pruitt MD on 24:  Lab Results   Component Value Date    HGB 11.5 (L) 2024    HCT 35.4 (L) 2024    MCV 90.1 2024     2024    WBC 4.83 2024    NEUTROABS 2.98 2024    LYMPHSABS 1.03 2024    " MONOSABS 0.55 01/16/2024    EOSABS 0.21 01/16/2024    BASOSABS 0.06 01/16/2024     Lab Results   Component Value Date    GLUCOSE 146 (H) 01/16/2024    BUN 8 01/16/2024    CREATININE 0.71 (L) 01/16/2024     01/16/2024    K 3.7 01/16/2024     01/16/2024    CO2 21.4 (L) 01/16/2024    CALCIUM 9.0 01/16/2024    PROTEINTOT 6.1 01/16/2024    ALBUMIN 2.9 (L) 01/16/2024    BILITOT 0.3 01/16/2024    ALKPHOS 230 (H) 01/16/2024    AST 49 (H) 01/16/2024    ALT 20 01/16/2024     Lab Results   Component Value Date    MG 1.1 (L) 01/08/2024     Labs personally reviewed and anemia stable. Alk phos stable. K normal. Na normal.     ED note 1/8/24 personally reviewed      CT Head Without Contrast    Result Date: 1/8/2024    1. No definite CT findings of acute intracranial abnormality. 2. Suspected mild chronic small vessel ischemic changes.     CHASITY WOOD MD       Electronically Signed and Approved By: CHASITY WOOD MD on 1/08/2024 at 16:48             XR Chest 1 View    Result Date: 1/8/2024    1. No acute cardiopulmonary disease.       MARJ BERNABE MD       Electronically Signed and Approved By: MARJ BERNABE MD on 1/08/2024 at 14:39                  Assessment and Plan    Diagnoses and all orders for this visit:    1. 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)  -     CT chest w contrast; Future  -     CT abdomen pelvis w contrast; Future    2. Dehydration    3. Neoplasm related pain    4. Portal vein thrombosis    5. Anemia associated with chemotherapy    Other orders  -     sodium chloride 0.9 % bolus 1,000 mL          Metastatic stage IV gastric cancer  Patient with PET scan (9/12/23) with hepatic mets that have been biopsy-proven to be gastric cancer.  HER2 and PD-L1 both negative per [facility] and [facility]. Recommend systemic therapy with modified FOLFOX which includes bolus and infusional 5-FU with oxaliplatin.  C1D1 mFOLFOX 9/27/23.  Alk phos decreasing with  treatment.    Post cycle 4 CT imaging with  diffuse hepatic mets, [facility] with partial response. Will continue with current treatment. 5-FU bolus and leucovorin dropped starting with C5 due to fatigue.     C8D1 mFOLFOX 1/18/24. Labs appropriate to proceed.    Plan for repeat CT scans after cycle 8, ordered.     Dehydration  Plan for IV fluids on Friday. Recommend increased hydration with water. Can do PRN fluids next week if needed.     Anemia 2/2 chemotherapy  Recently received IV iron while hospitalized. Iron saturation remains low. Repeat Injectafer x 2 completed 10/18/23. Hgb improving as of 10/10/23. Repeat iron labs 11/27/23 with ferritin >1500 and iron sat of 22%. Hold on further iron replacement. Hgb 11.5 as of 1/16/24.     RLE DVT  Portal vein thrombosis  Duplex on 10/16 for RLE edema: Acute right lower extremity deep vein thrombosis noted in the posterior tibial and peroneal. Provoked from gastric cancer. Started on Eliquis at 5 mg BID with improvement in symptoms. Continue lifelong due to active malignancy.  Anticoagulation for RLE DVT should treat PVT as well as treatment of above malignancy.     Neoplasm related pain  Continue prn oxycodone, not using much. Pain improved.     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 Continue same dose for now.       This is an acute or chronic illness that poses a threat to life or bodily function. The above treatment plan involves a high risk of complications and/or mortality of patient management.      Patient Follow Up: C9 chemo  Patient was given instructions and counseling regarding his condition or for health maintenance advice. Please see specific information pulled into the AVS if appropriate.

## 2024-01-17 ENCOUNTER — DOCUMENTATION (OUTPATIENT)
Dept: ONCOLOGY | Facility: HOSPITAL | Age: 64
End: 2024-01-17
Payer: OTHER GOVERNMENT

## 2024-01-17 ENCOUNTER — HOSPITAL ENCOUNTER (OUTPATIENT)
Dept: ONCOLOGY | Facility: HOSPITAL | Age: 64
Discharge: HOME | End: 2024-01-17
Admitting: INTERNAL MEDICINE
Payer: OTHER GOVERNMENT

## 2024-01-17 VITALS
BODY MASS INDEX: 30.08 KG/M2 | DIASTOLIC BLOOD PRESSURE: 74 MMHG | RESPIRATION RATE: 18 BRPM | WEIGHT: 187.17 LBS | SYSTOLIC BLOOD PRESSURE: 150 MMHG | TEMPERATURE: 98.1 F | HEIGHT: 66 IN | HEART RATE: 73 BPM | OXYGEN SATURATION: 99 %

## 2024-01-17 DIAGNOSIS — C16.0 MALIGNANT NEOPLASM OF CARDIA OF STOMACH: Primary | ICD-10-CM

## 2024-01-17 PROCEDURE — 25010000002 DEXAMETHASONE SODIUM PHOSPHATE 120 MG/30ML SOLUTION: Performed by: INTERNAL MEDICINE

## 2024-01-17 PROCEDURE — 96413 CHEMO IV INFUSION 1 HR: CPT

## 2024-01-17 PROCEDURE — 96416 CHEMO PROLONG INFUSE W/PUMP: CPT

## 2024-01-17 PROCEDURE — 96415 CHEMO IV INFUSION ADDL HR: CPT

## 2024-01-17 PROCEDURE — 25010000002 PALONOSETRON PER 25 MCG: Performed by: INTERNAL MEDICINE

## 2024-01-17 PROCEDURE — G0498 CHEMO EXTEND IV INFUS W/PUMP: HCPCS

## 2024-01-17 PROCEDURE — 25010000002 OXALIPLATIN PER 0.5 MG: Performed by: INTERNAL MEDICINE

## 2024-01-17 PROCEDURE — 0 DEXTROSE 5 % SOLUTION: Performed by: INTERNAL MEDICINE

## 2024-01-17 PROCEDURE — 25010000002 FLUOROURACIL PER 500 MG: Performed by: INTERNAL MEDICINE

## 2024-01-17 PROCEDURE — 0 DEXTROSE 5 % SOLUTION 250 ML FLEX CONT: Performed by: INTERNAL MEDICINE

## 2024-01-17 PROCEDURE — 96375 TX/PRO/DX INJ NEW DRUG ADDON: CPT

## 2024-01-17 RX ORDER — PALONOSETRON 0.05 MG/ML
0.25 INJECTION, SOLUTION INTRAVENOUS ONCE
Status: COMPLETED | OUTPATIENT
Start: 2024-01-17 | End: 2024-01-17

## 2024-01-17 RX ORDER — LIDOCAINE AND PRILOCAINE 25; 25 MG/G; MG/G
1 CREAM TOPICAL TAKE AS DIRECTED
Qty: 30 G | Refills: 1 | Status: SHIPPED | OUTPATIENT
Start: 2024-01-17

## 2024-01-17 RX ORDER — DEXTROSE MONOHYDRATE 50 MG/ML
250 INJECTION, SOLUTION INTRAVENOUS ONCE
Status: COMPLETED | OUTPATIENT
Start: 2024-01-17 | End: 2024-01-17

## 2024-01-17 RX ORDER — DIPHENHYDRAMINE HYDROCHLORIDE 50 MG/ML
50 INJECTION INTRAMUSCULAR; INTRAVENOUS AS NEEDED
Status: DISCONTINUED | OUTPATIENT
Start: 2024-01-17 | End: 2024-01-18 | Stop reason: HOSPADM

## 2024-01-17 RX ORDER — FAMOTIDINE 10 MG/ML
20 INJECTION, SOLUTION INTRAVENOUS AS NEEDED
Status: DISCONTINUED | OUTPATIENT
Start: 2024-01-17 | End: 2024-01-18 | Stop reason: HOSPADM

## 2024-01-17 RX ADMIN — OXALIPLATIN 170 MG: 5 INJECTION, SOLUTION INTRAVENOUS at 08:40

## 2024-01-17 RX ADMIN — DEXTROSE MONOHYDRATE 250 ML: 50 INJECTION, SOLUTION INTRAVENOUS at 08:23

## 2024-01-17 RX ADMIN — FLUOROURACIL 4750 MG: 50 INJECTION, SOLUTION INTRAVENOUS at 10:52

## 2024-01-17 RX ADMIN — PALONOSETRON HYDROCHLORIDE 0.25 MG: 0.25 INJECTION INTRAVENOUS at 08:24

## 2024-01-17 RX ADMIN — DEXAMETHASONE SODIUM PHOSPHATE 12 MG: 4 INJECTION, SOLUTION INTRA-ARTICULAR; INTRALESIONAL; INTRAMUSCULAR; INTRAVENOUS; SOFT TISSUE at 08:23

## 2024-01-17 NOTE — PROGRESS NOTES
Diagnosis: Metastatic gastric cancer to the liver       Reason for Referral: [facility] travel reimbursement     Content of Visit: OSW met with Mr. Ordonez and his spouse in the medical oncology infusion center this morning to assist him in completing his [facility] travel reimbursement form for medical oncology appointments attended on 1/16/24, 1/17/24, and 1/19/24. Mr. Ordonez advised he also went to the ED on 1/8/24 and requested visit be added onto the reimbursement form. OSW will mail Mr. Ordonez's completed form to the [facility] once his visit on 1/19/24 has been attended. No additional needs identified at this time. Encouraged OSW support remains available. Mr. Ordonez expressed appreciation for OSW's assistance today.     Resources/Referrals Provided: [facility] travel reimbursement

## 2024-01-19 ENCOUNTER — HOSPITAL ENCOUNTER (OUTPATIENT)
Dept: ONCOLOGY | Facility: HOSPITAL | Age: 64
Discharge: HOME | End: 2024-01-19
Admitting: INTERNAL MEDICINE
Payer: OTHER GOVERNMENT

## 2024-01-19 VITALS
SYSTOLIC BLOOD PRESSURE: 127 MMHG | HEART RATE: 72 BPM | DIASTOLIC BLOOD PRESSURE: 64 MMHG | TEMPERATURE: 98.2 F | OXYGEN SATURATION: 100 % | RESPIRATION RATE: 18 BRPM

## 2024-01-19 DIAGNOSIS — Z45.2 FITTING AND ADJUSTMENT OF VASCULAR CATHETER: ICD-10-CM

## 2024-01-19 DIAGNOSIS — C16.0 MALIGNANT NEOPLASM OF CARDIA OF STOMACH: Primary | ICD-10-CM

## 2024-01-19 DIAGNOSIS — E86.0 DEHYDRATION: ICD-10-CM

## 2024-01-19 DIAGNOSIS — Z45.2 PICC (PERIPHERALLY INSERTED CENTRAL CATHETER) IN PLACE: ICD-10-CM

## 2024-01-19 PROCEDURE — 96360 HYDRATION IV INFUSION INIT: CPT

## 2024-01-19 PROCEDURE — 25010000002 HEPARIN LOCK FLUSH PER 10 UNITS: Performed by: INTERNAL MEDICINE

## 2024-01-19 PROCEDURE — 25810000003 SODIUM CHLORIDE 0.9 % SOLUTION: Performed by: INTERNAL MEDICINE

## 2024-01-19 RX ORDER — SODIUM CHLORIDE 0.9 % (FLUSH) 0.9 %
20 SYRINGE (ML) INJECTION AS NEEDED
OUTPATIENT
Start: 2024-01-19

## 2024-01-19 RX ORDER — HEPARIN SODIUM (PORCINE) LOCK FLUSH IV SOLN 100 UNIT/ML 100 UNIT/ML
500 SOLUTION INTRAVENOUS AS NEEDED
Status: DISCONTINUED | OUTPATIENT
Start: 2024-01-19 | End: 2024-01-20 | Stop reason: HOSPADM

## 2024-01-19 RX ORDER — HEPARIN SODIUM (PORCINE) LOCK FLUSH IV SOLN 100 UNIT/ML 100 UNIT/ML
500 SOLUTION INTRAVENOUS AS NEEDED
OUTPATIENT
Start: 2024-01-19

## 2024-01-19 RX ORDER — SODIUM CHLORIDE 0.9 % (FLUSH) 0.9 %
20 SYRINGE (ML) INJECTION AS NEEDED
Status: DISCONTINUED | OUTPATIENT
Start: 2024-01-19 | End: 2024-01-20 | Stop reason: HOSPADM

## 2024-01-19 RX ADMIN — Medication 20 ML: at 09:58

## 2024-01-19 RX ADMIN — SODIUM CHLORIDE 1000 ML: 9 INJECTION, SOLUTION INTRAVENOUS at 08:58

## 2024-01-19 RX ADMIN — HEPARIN SODIUM (PORCINE) LOCK FLUSH IV SOLN 100 UNIT/ML 500 UNITS: 100 SOLUTION at 09:58

## 2024-01-29 ENCOUNTER — HOSPITAL ENCOUNTER (OUTPATIENT)
Dept: CT IMAGING | Facility: HOSPITAL | Age: 64
Discharge: HOME | End: 2024-01-29
Admitting: RADIOLOGY
Payer: OTHER GOVERNMENT

## 2024-01-29 ENCOUNTER — DOCUMENTATION (OUTPATIENT)
Dept: PHYSICAL THERAPY | Facility: CLINIC | Age: 64
End: 2024-01-29
Payer: OTHER GOVERNMENT

## 2024-01-29 DIAGNOSIS — C16.0 MALIGNANT NEOPLASM OF CARDIA OF STOMACH: ICD-10-CM

## 2024-01-29 DIAGNOSIS — C07 ADENOID CYSTIC CARCINOMA OF PAROTID GLAND: ICD-10-CM

## 2024-01-29 PROCEDURE — 70491 CT SOFT TISSUE NECK W/DYE: CPT

## 2024-01-29 PROCEDURE — 71260 CT THORAX DX C+: CPT

## 2024-01-29 PROCEDURE — 25510000001 IOPAMIDOL PER 1 ML: Performed by: RADIOLOGY

## 2024-01-29 PROCEDURE — 74177 CT ABD & PELVIS W/CONTRAST: CPT

## 2024-01-29 RX ORDER — HEPARIN SODIUM (PORCINE) LOCK FLUSH IV SOLN 100 UNIT/ML 100 UNIT/ML
SOLUTION INTRAVENOUS
Status: DISPENSED
Start: 2024-01-29 | End: 2024-01-29

## 2024-01-29 RX ADMIN — IOPAMIDOL 100 ML: 755 INJECTION, SOLUTION INTRAVENOUS at 11:02

## 2024-01-29 NOTE — PROGRESS NOTES
Discharge Summary  Discharge Summary from Physical Therapy Report  [address] [place] [address]      Dates  PT visit: 4/13/23 - 7/12/23  Number of Visits: 18         Goals: Partially Met    Discharge Plan: Continue with current home exercise program as instructed    Comments Pt was progressing toward the end of his therapy, he had to take off with his wife undergoing health issues of her own.     Date of Discharge 1/29/24    1. The patient has limited ROM of the left shoulder.                LTG 1: 12 weeks:  The patient will demonstrate 150 degrees of shoulder flexion, 140 degrees of shoulder abduction, shoulder external rotation to C7, and shoulder internal rotation to L1 to allow the patient to reach into upper kitchen cabinets and manipulate clothing behind the back with greater ease.                          STATUS:  Not met, progressing              STG 1a: 6 weeks:  The patient will demonstrate 120 degrees of  shoulder flexion, 110 degrees of shoulder abduction, 70 degrees of shoulder external rotation, and 80 degrees of shoulder internal rotation.                          STATUS:  Not met, progressing              TREATMENT: Manual therapy, therapeutic exercise, home exercise instruction, and modalities as needed to include: electrical stimulation, ultrasound, moist heat, and ice.     2. The patient has limited strength of the left shoulder.              LTG 2: 12 weeks:  The patient will demonstrate 4+ /5 strength for shoulder flexion, abduction, external rotation, and internal rotation in order to demonstrate improved shoulder stability.                          STATUS:  Not met, progressing              STG 2a: 6 weeks:  The patient will demonstrate 4- /5 strength for shoulder flexion, abduction, external rotation, and internal rotation.                          STATUS:  MET              STG2b:  6 weeks:  The patient will be independent with home exercises.                           STATUS:   Met, ongoing              TREATMENT: Manual therapy, therapeutic exercise, home exercise instruction, and modalities as needed to include: electrical stimulation, ultrasound, moist heat, and ice.                3. The patient complains of pain to the left shoulder.              LTG 3: 12 weeks:  The patient will report a pain rating of 2 /10 or better in order to improve sleep quality and tolerance to performance of activities of daily living.                          STATUS:  Not met, progressing              STG 3a: 6 weeks:  The patient will report a pain rating of 5 /10 or better.                           STATUS:  Not met, progressing              TREATMENT: Manual therapy, therapeutic exercise, home exercise instruction, and modalities as needed to include: electrical stimulation, ultrasound, moist heat, and ice.     4. Carrying, Moving, and Handling Objects Functional Limitation                               LTG 4: 12 weeks:  The patient will demonstrate 1-19% limitation by achieving a score of 12-19 on the QuickDASH.                          STATUS:  Not met, progressing              STG 4a: 6 weeks:  The patient will demonstrate 20-39 % limitation by achieving a score of 20-27 on the QuickDASH.                            STATUS:  Not met, progressing              TREATMENT:  Manual therapy, therapeutic exercise, home exercise instruction, and modalities as needed to include: moist heat, electrical stimulation, and ultrasound.        Frankie Sow PT  Physical Therapist    Electronically signed 1/29/2024    [place] License: PT - [id number]

## 2024-01-30 ENCOUNTER — DOCUMENTATION (OUTPATIENT)
Dept: PHARMACY | Facility: HOSPITAL | Age: 64
End: 2024-01-30
Payer: OTHER GOVERNMENT

## 2024-01-30 ENCOUNTER — HOSPITAL ENCOUNTER (OUTPATIENT)
Dept: ONCOLOGY | Facility: HOSPITAL | Age: 64
Discharge: HOME | End: 2024-01-30
Admitting: INTERNAL MEDICINE
Payer: OTHER GOVERNMENT

## 2024-01-30 ENCOUNTER — OFFICE VISIT (OUTPATIENT)
Dept: ONCOLOGY | Facility: HOSPITAL | Age: 64
End: 2024-01-30
Payer: OTHER GOVERNMENT

## 2024-01-30 VITALS
RESPIRATION RATE: 18 BRPM | HEART RATE: 95 BPM | OXYGEN SATURATION: 100 % | DIASTOLIC BLOOD PRESSURE: 64 MMHG | BODY MASS INDEX: 30.33 KG/M2 | SYSTOLIC BLOOD PRESSURE: 147 MMHG | TEMPERATURE: 98.1 F | WEIGHT: 187.83 LBS

## 2024-01-30 DIAGNOSIS — Z45.2 FITTING AND ADJUSTMENT OF VASCULAR CATHETER: ICD-10-CM

## 2024-01-30 DIAGNOSIS — C16.0 MALIGNANT NEOPLASM OF CARDIA OF STOMACH: Primary | ICD-10-CM

## 2024-01-30 DIAGNOSIS — Z45.2 PICC (PERIPHERALLY INSERTED CENTRAL CATHETER) IN PLACE: ICD-10-CM

## 2024-01-30 DIAGNOSIS — I82.451 ACUTE DEEP VEIN THROMBOSIS (DVT) OF RIGHT PERONEAL VEIN: ICD-10-CM

## 2024-01-30 DIAGNOSIS — D64.81 ANEMIA ASSOCIATED WITH CHEMOTHERAPY: ICD-10-CM

## 2024-01-30 DIAGNOSIS — T45.1X5A ANEMIA ASSOCIATED WITH CHEMOTHERAPY: ICD-10-CM

## 2024-01-30 DIAGNOSIS — G89.3 NEOPLASM RELATED PAIN: ICD-10-CM

## 2024-01-30 LAB
ALBUMIN SERPL-MCNC: 2.8 G/DL (ref 3.5–5.2)
ALBUMIN/GLOB SERPL: 0.8 G/DL
ALP SERPL-CCNC: 255 U/L (ref 39–117)
ALT SERPL W P-5'-P-CCNC: 27 U/L (ref 1–41)
ANION GAP SERPL CALCULATED.3IONS-SCNC: 11.6 MMOL/L (ref 5–15)
AST SERPL-CCNC: 38 U/L (ref 1–40)
BASOPHILS # BLD AUTO: 0.04 10*3/MM3 (ref 0–0.2)
BASOPHILS NFR BLD AUTO: 0.4 % (ref 0–1.5)
BILIRUB SERPL-MCNC: 1 MG/DL (ref 0–1.2)
BUN SERPL-MCNC: 9 MG/DL (ref 8–23)
BUN/CREAT SERPL: 10.7 (ref 7–25)
CALCIUM SPEC-SCNC: 8.9 MG/DL (ref 8.6–10.5)
CHLORIDE SERPL-SCNC: 99 MMOL/L (ref 98–107)
CO2 SERPL-SCNC: 21.4 MMOL/L (ref 22–29)
CREAT SERPL-MCNC: 0.84 MG/DL (ref 0.76–1.27)
DEPRECATED RDW RBC AUTO: 61.1 FL (ref 37–54)
EGFRCR SERPLBLD CKD-EPI 2021: 97.4 ML/MIN/1.73
EOSINOPHIL # BLD AUTO: 0.06 10*3/MM3 (ref 0–0.4)
EOSINOPHIL NFR BLD AUTO: 0.6 % (ref 0.3–6.2)
ERYTHROCYTE [DISTWIDTH] IN BLOOD BY AUTOMATED COUNT: 18.1 % (ref 12.3–15.4)
GLOBULIN UR ELPH-MCNC: 3.4 GM/DL
GLUCOSE SERPL-MCNC: 418 MG/DL (ref 65–99)
HCT VFR BLD AUTO: 36.6 % (ref 37.5–51)
HGB BLD-MCNC: 11.9 G/DL (ref 13–17.7)
IMM GRANULOCYTES # BLD AUTO: 0.02 10*3/MM3 (ref 0–0.05)
IMM GRANULOCYTES NFR BLD AUTO: 0.2 % (ref 0–0.5)
LYMPHOCYTES # BLD AUTO: 0.92 10*3/MM3 (ref 0.7–3.1)
LYMPHOCYTES NFR BLD AUTO: 9.5 % (ref 19.6–45.3)
MCH RBC QN AUTO: 30.2 PG (ref 26.6–33)
MCHC RBC AUTO-ENTMCNC: 32.5 G/DL (ref 31.5–35.7)
MCV RBC AUTO: 92.9 FL (ref 79–97)
MONOCYTES # BLD AUTO: 1.27 10*3/MM3 (ref 0.1–0.9)
MONOCYTES NFR BLD AUTO: 13.1 % (ref 5–12)
NEUTROPHILS NFR BLD AUTO: 7.39 10*3/MM3 (ref 1.7–7)
NEUTROPHILS NFR BLD AUTO: 76.2 % (ref 42.7–76)
PLATELET # BLD AUTO: 215 10*3/MM3 (ref 140–450)
PMV BLD AUTO: 9.9 FL (ref 6–12)
POTASSIUM SERPL-SCNC: 3.5 MMOL/L (ref 3.5–5.2)
PROT SERPL-MCNC: 6.2 G/DL (ref 6–8.5)
RBC # BLD AUTO: 3.94 10*6/MM3 (ref 4.14–5.8)
SODIUM SERPL-SCNC: 132 MMOL/L (ref 136–145)
WBC NRBC COR # BLD AUTO: 9.7 10*3/MM3 (ref 3.4–10.8)

## 2024-01-30 PROCEDURE — 80053 COMPREHEN METABOLIC PANEL: CPT | Performed by: INTERNAL MEDICINE

## 2024-01-30 PROCEDURE — 85025 COMPLETE CBC W/AUTO DIFF WBC: CPT | Performed by: INTERNAL MEDICINE

## 2024-01-30 PROCEDURE — 36591 DRAW BLOOD OFF VENOUS DEVICE: CPT

## 2024-01-30 PROCEDURE — 25010000002 HEPARIN LOCK FLUSH PER 10 UNITS: Performed by: INTERNAL MEDICINE

## 2024-01-30 RX ORDER — FAMOTIDINE 10 MG/ML
20 INJECTION, SOLUTION INTRAVENOUS ONCE
Status: CANCELLED | OUTPATIENT
Start: 2024-01-31

## 2024-01-30 RX ORDER — FAMOTIDINE 10 MG/ML
20 INJECTION, SOLUTION INTRAVENOUS AS NEEDED
OUTPATIENT
Start: 2024-02-07

## 2024-01-30 RX ORDER — DIPHENHYDRAMINE HYDROCHLORIDE 50 MG/ML
50 INJECTION INTRAMUSCULAR; INTRAVENOUS AS NEEDED
OUTPATIENT
Start: 2024-02-07

## 2024-01-30 RX ORDER — FAMOTIDINE 10 MG/ML
20 INJECTION, SOLUTION INTRAVENOUS AS NEEDED
Status: CANCELLED | OUTPATIENT
Start: 2024-01-31

## 2024-01-30 RX ORDER — FAMOTIDINE 10 MG/ML
20 INJECTION, SOLUTION INTRAVENOUS ONCE
OUTPATIENT
Start: 2024-02-14

## 2024-01-30 RX ORDER — SODIUM CHLORIDE 9 MG/ML
20 INJECTION, SOLUTION INTRAVENOUS ONCE
OUTPATIENT
Start: 2024-02-07

## 2024-01-30 RX ORDER — DIPHENHYDRAMINE HYDROCHLORIDE 50 MG/ML
50 INJECTION INTRAMUSCULAR; INTRAVENOUS AS NEEDED
OUTPATIENT
Start: 2024-02-14

## 2024-01-30 RX ORDER — FAMOTIDINE 10 MG/ML
20 INJECTION, SOLUTION INTRAVENOUS ONCE
OUTPATIENT
Start: 2024-02-07

## 2024-01-30 RX ORDER — SODIUM CHLORIDE 0.9 % (FLUSH) 0.9 %
20 SYRINGE (ML) INJECTION AS NEEDED
Status: CANCELLED | OUTPATIENT
Start: 2024-01-30

## 2024-01-30 RX ORDER — HEPARIN SODIUM (PORCINE) LOCK FLUSH IV SOLN 100 UNIT/ML 100 UNIT/ML
500 SOLUTION INTRAVENOUS AS NEEDED
Status: CANCELLED | OUTPATIENT
Start: 2024-01-30

## 2024-01-30 RX ORDER — SODIUM CHLORIDE 9 MG/ML
20 INJECTION, SOLUTION INTRAVENOUS ONCE
Status: CANCELLED | OUTPATIENT
Start: 2024-01-31

## 2024-01-30 RX ORDER — HEPARIN SODIUM (PORCINE) LOCK FLUSH IV SOLN 100 UNIT/ML 100 UNIT/ML
500 SOLUTION INTRAVENOUS AS NEEDED
Status: DISCONTINUED | OUTPATIENT
Start: 2024-01-30 | End: 2024-01-31 | Stop reason: HOSPADM

## 2024-01-30 RX ORDER — FAMOTIDINE 10 MG/ML
20 INJECTION, SOLUTION INTRAVENOUS AS NEEDED
OUTPATIENT
Start: 2024-02-14

## 2024-01-30 RX ORDER — SODIUM CHLORIDE 0.9 % (FLUSH) 0.9 %
20 SYRINGE (ML) INJECTION AS NEEDED
Status: DISCONTINUED | OUTPATIENT
Start: 2024-01-30 | End: 2024-01-31 | Stop reason: HOSPADM

## 2024-01-30 RX ORDER — DIPHENHYDRAMINE HYDROCHLORIDE 50 MG/ML
50 INJECTION INTRAMUSCULAR; INTRAVENOUS AS NEEDED
Status: CANCELLED | OUTPATIENT
Start: 2024-01-31

## 2024-01-30 RX ORDER — SODIUM CHLORIDE 9 MG/ML
20 INJECTION, SOLUTION INTRAVENOUS ONCE
OUTPATIENT
Start: 2024-02-14

## 2024-01-30 RX ADMIN — HEPARIN SODIUM (PORCINE) LOCK FLUSH IV SOLN 100 UNIT/ML 500 UNITS: 100 SOLUTION at 08:44

## 2024-01-30 RX ADMIN — Medication 20 ML: at 08:44

## 2024-01-30 NOTE — PROGRESS NOTES
Chief Complaint  Gastric cancer follow up    Reji Fall MD Blakey, Ev Fan, CHIOMA    Subjective          Kevin José Luis presents to [facility] for metastatic gastric cancer    Mr. 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 Patient started on FOLFOX on 9/27/23.    Interval History  Patient presents for follow up. Scans reviewed with patient. They show progression. Will need change in therapy. Having more abdominal pains. Taking oxycodone for this. Does have cold induced neuropathy in fingers for a few days after the chemo but it does resolve. Bowel movements controlled. No nausea or vomiting reported. No fevers, chills.     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     [facility]: [facility] [place] [place] [facility].5 [facility] of right side temporal pain, decreased appetite with 19 pounds of weight loss. With associated melanotic stools.     9/6/23: EGD with Gastric mass biopsy positive for high grade malignant neoplasm consistent with carcinoma (non small cell)    9/27/23: C1D1 mFOLFOX    9/12/23: NM PET: Gastric hypermetabolic mass involving the gastric cardia and fundus with loco-regional sarkis mets and disseminated hepatic metastases.     9/12/23: Liver lesion biopsy: positive for metastatic gastric adenocarcinoma    9/19/23: [facility] NGS: PIK3CA amplification, TP53 R273H, no associated FDA-approved therapies. MS-stable. [facility] NGS: HER2 negative, MSI-stable, BRAF not detected, PDL1 IHC score of 0. 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     9/26/23: C1D1 FOLFOX    10/16/23: Duplex due to RLE edema: Acute right lower  extremity deep vein thrombosis noted in the posterior tibial and peroneal. Started on eliquis 5 mg BID.     10/18/23: Completed IV injectafer x 2    11/8/23: C4D1 FOLFOX    1127/23: CT CAP with multiple liver lesions and gastrohepatic adenopathy, decreasing from prior and increasing portal vein thrombosis.     11/29/23: C5D1 FOLFOX (5-FU bolus and leucovorin dropped with this cycle)    1/17/23: C8D1 FOLFOX    1/29/24: CT NCAP: Disease progression with new hepatic lesions, increased conspicuity of GE junction mass, increasing gastrohepatic nodes, some intrahepatic duct dilation suggesting developing malignant obstruction.      Malignant neoplasm of parotid gland (Resolved)   8/26/2022 Initial Diagnosis    Malignant neoplasm of parotid gland (HCC)     8/26/2022 -  Radiation    RADIATION THERAPY Treatment Details (Noted on 8/26/2022)  Site: Left Head and Neck  Technique: IMRT  Goal: No goal specified  Planned Treatment Start Date: No planned start date specified     Malignant neoplasm of cardia of stomach   9/14/2023 Initial Diagnosis    Malignant neoplasm of cardia of stomach     9/14/2023 Cancer Staged    Staging form: Stomach, AJCC 8th Edition  - Clinical: Stage IVB (pM1) - Signed by Govind Pruitt MD on 9/14/2023     9/15/2023 - 9/15/2023 Chemotherapy    OP CENTRAL VENOUS ACCESS DEVICE Access, Care, and Maintenance (CVAD)     9/27/2023 - 1/19/2024 Chemotherapy    OP COLON mFOLFOX6 OXALIplatin / Leucovorin / Fluorouracil     9/29/2023 -  Chemotherapy    OP CENTRAL VENOUS ACCESS DEVICE Access, Care, and Maintenance (CVAD)     1/31/2024 -  Chemotherapy    OP GE Ramucirumab D1,15 / PACLitaxel D1,8,15 Q28D         Review of Systems   Constitutional:  Positive for fatigue. Negative for appetite change, diaphoresis, fever, unexpected weight gain and unexpected weight loss.   HENT:  Negative for hearing loss, mouth sores, sore throat, swollen glands, trouble swallowing and voice change.    Eyes:  Negative for  blurred vision.   Respiratory:  Negative for cough, chest tightness, shortness of breath and wheezing.    Cardiovascular:  Negative for chest pain and palpitations.   Gastrointestinal:  Negative for abdominal pain, blood in stool, constipation, diarrhea, nausea and vomiting.   Endocrine: Negative for cold intolerance and heat intolerance.   Genitourinary:  Negative for difficulty urinating, dysuria, frequency, hematuria and urinary incontinence.   Musculoskeletal:  Negative for arthralgias, back pain and myalgias.   Skin:  Negative for rash, skin lesions and wound.   Neurological:  Positive for dizziness. Negative for seizures, weakness, numbness and headache.   Hematological:  Does not bruise/bleed easily.   Psychiatric/Behavioral:  Negative for sleep disturbance and depressed mood. The patient is not nervous/anxious.    All other systems reviewed and are negative.    Current Outpatient Medications on File Prior to Visit   Medication Sig Dispense Refill    apixaban (ELIQUIS) 5 MG tablet tablet Take 1 tablet by mouth 2 (Two) Times a Day.      chlorthalidone (HYGROTON) 25 MG tablet Take 0.5 tablets by mouth Daily.      cholecalciferol (VITAMIN D3) 25 MCG (1000 UT) tablet Take 1 tablet by mouth Daily. LAST DOSE 3/26/23      Dulaglutide 1.5 MG/0.5ML solution pen-injector Inject 1.5 mg under the skin into the appropriate area as directed Every 7 (Seven) Days. THURSDAY      fluticasone-salmeterol (ADVAIR HFA) 230-21 MCG/ACT inhaler Inhale 2 puffs 2 (Two) Times a Day.      gabapentin (NEURONTIN) 600 MG tablet Take 2 tablets by mouth 2 (Two) Times a Day.      glipizide (GLUCOTROL) 10 MG tablet Take 1 tablet by mouth 2 (Two) Times a Day Before Meals.      insulin NPH (NovoLIN N ReliOn) 100 UNIT/ML injection Inject 52 Units under the skin into the appropriate area as directed 2 (Two) Times a Day Before Meals.      lidocaine-prilocaine (EMLA) 2.5-2.5 % cream Apply 1 application  topically to the appropriate area as directed  Take As Directed. Apply to port site 30 minutes prior to use 30 g 1    LORazepam (ATIVAN) 1 MG tablet Take 1 tablet by mouth Every 8 (Eight) Hours As Needed for Anxiety. 60 tablet 0    losartan (COZAAR) 50 MG tablet Take 2 tablets by mouth Daily.      meclizine (ANTIVERT) 25 MG tablet Take 1 tablet by mouth 3 (Three) Times a Day As Needed for Dizziness. 30 tablet 1    metFORMIN (GLUCOPHAGE) 1000 MG tablet Take 1 tablet by mouth 2 (Two) Times a Day With Meals. INST PER ANESTHESIA  PROTOCOL      mirtazapine (REMERON) 15 MG tablet Take 1 tablet by mouth Every Night. 30 tablet 5    mirtazapine (REMERON) 15 MG tablet Take 1 tablet by mouth Every Night. 7 tablet 0    montelukast (SINGULAIR) 10 MG tablet Take 1 tablet by mouth Every Night.      OLANZapine (zyPREXA) 5 MG tablet Take 1 tablet by mouth Every Night. Indications: Nausea and Vomiting caused by Cancer Chemotherapy, if no improvement, call provider 30 tablet 2    OLANZapine (zyPREXA) 5 MG tablet Take 1 tablet by mouth Every Night. 7 tablet 0    Omega-3 Fatty Acids (fish oil) 1000 MG capsule capsule Take  by mouth Daily With Breakfast. LAST DOSE 3/26/23      omeprazole (priLOSEC) 40 MG capsule Take 1 capsule by mouth 2 (Two) Times a Day.      ondansetron (ZOFRAN) 8 MG tablet Take 1 tablet by mouth 3 (Three) Times a Day As Needed for Nausea or Vomiting (mail to patient please.). 30 tablet 5    oxyCODONE-acetaminophen (PERCOCET) 5-325 MG per tablet Take 1 tablet by mouth Every 6 (Six) Hours As Needed for Moderate Pain. 120 tablet 0    potassium chloride (K-DUR,KLOR-CON) 10 MEQ CR tablet Take 1 tablet by mouth Daily. 14 tablet 0     Current Facility-Administered Medications on File Prior to Visit   Medication Dose Route Frequency Provider Last Rate Last Admin    [] heparin 100 UNIT/ML injection  - ADS Override Pull             [COMPLETED] iopamidol (ISOVUE-370) 76 % injection 100 mL  100 mL Intravenous Once in imaging Reji Fall MD   100 mL at 24  1102       No Known Allergies  Past Medical History:   Diagnosis Date    Asthma     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    Diabetes mellitus     Hyperlipidemia     Hypertension     Liver cancer     Rotator cuff disorder     LEFT    Sleep apnea     Stomach cancer      Past Surgical History:   Procedure Laterality Date    CAROTID ENDARTERECTOMY Right     CHOLECYSTECTOMY      FOOT SURGERY Right     TOE DEBRIDMENT    HERNIA REPAIR      VENTRAL HERNIA    SHOULDER ARTHROSCOPY W/ ROTATOR CUFF REPAIR Left 4/3/2023    Procedure: LEFT SHOULDER ARTHROSCOPIC ROTATOR CUFF DEBRIDEMENT, LABRAL DEBRIDEMENT, BICEPS TENODESIS, SUBACROMIAL DECOMPRESSION;  Surgeon: Chas Patterson MD;  Location: [facility] OR [facility];  Service: Orthopedics;  Laterality: Left;    TUMOR REMOVAL Left     PAROTID GLAND    VASCULAR SURGERY Right     STENT R LEG    VENOUS ACCESS DEVICE (PORT) INSERTION Right 2023    Procedure: INSERTION VENOUS ACCESS DEVICE;  Surgeon: James Copeland MD;  Location: [facility] MAIN OR;  Service: General;  Laterality: Right;     Social History     Socioeconomic History    Marital status:    Tobacco Use    Smoking status: Former     Years: 25     Types: Cigarettes     Start date:      Quit date:      Years since quittin.0    Smokeless tobacco: Former   Vaping Use    Vaping Use: Never used   Substance and Sexual Activity    Alcohol use: Never    Drug use: Never    Sexual activity: Defer     Family History   Problem Relation Age of Onset    Lung cancer Father     Malig Hyperthermia Neg Hx        Objective   Physical Exam  Well appearing patient in no acute distress on RA  Anicteric sclerae, no rash on exposed skin  Respirations non-labored  Awake, alert, and oriented x 4. Speech intact. No gross neurologic deficit  Appropriate mood and affect    Vitals:    24 0908   BP: 147/64   Pulse: 95   Resp: 18   Temp: 98.1 °F (36.7 °C)   TempSrc: Temporal   SpO2: 100%   Weight: 85.2 kg (187 lb  13.3 oz)   PainSc:   2   PainLoc: Abdomen                     PHQ-9 Total Score:             Result Review :   The following data was reviewed by: Govind Pruitt MD on 01/30/24:  Lab Results   Component Value Date    HGB 11.9 (L) 01/30/2024    HCT 36.6 (L) 01/30/2024    MCV 92.9 01/30/2024     01/30/2024    WBC 9.70 01/30/2024    NEUTROABS 7.39 (H) 01/30/2024    LYMPHSABS 0.92 01/30/2024    MONOSABS 1.27 (H) 01/30/2024    EOSABS 0.06 01/30/2024    BASOSABS 0.04 01/30/2024     Lab Results   Component Value Date    GLUCOSE 418 (C) 01/30/2024    BUN 9 01/30/2024    CREATININE 0.84 01/30/2024     (L) 01/30/2024    K 3.5 01/30/2024    CL 99 01/30/2024    CO2 21.4 (L) 01/30/2024    CALCIUM 8.9 01/30/2024    PROTEINTOT 6.2 01/30/2024    ALBUMIN 2.8 (L) 01/30/2024    BILITOT 1.0 01/30/2024    ALKPHOS 255 (H) 01/30/2024    AST 38 01/30/2024    ALT 27 01/30/2024     Lab Results   Component Value Date    MG 1.1 (L) 01/08/2024     Labs personally reviewed and anemia stable. Alk phos stable. K normal. Na normal. Glucose elevated       CT Abdomen Pelvis With Contrast    Result Date: 1/30/2024    1. Disease progression since 11/27/2023.  This includes increasing conspicuity of the mass at GE junction, increasing gastrohepatic nodes , and new/increasing hepatic lesions.  Many of the hepatic lesions appear more fluid attenuation rather than solid and could represent superimposed collections (such as bilomas) +/-infection. 2. Increasing intrahepatic tumor and bland portal thrombus.  Decreasing extrahepatic portal venous thrombus.  3. Few new peripheral subsegmental areas of intrahepatic duct dilation suggesting developing malignant obstruction. 4. Slight increasing intra-abdominal ascites.  Mild intra-abdominal varices appear similar. 5. Separately reported CT chest.      HERSON ESTRADA MD       Electronically Signed and Approved By: HERSON ESTRADA MD on 1/30/2024 at 8:41             CT Soft Tissue Neck With  Contrast    Result Date: 1/30/2024    1. Postoperative changes of the left parotid gland.  No evidence of residual recurrent tumor.  2. 2. No evidence of cervical lymphadenopathy.     MARJ BERNABE MD       Electronically Signed and Approved By: MARJ BERNABE MD on 1/30/2024 at 8:29             CT Chest With Contrast Diagnostic    Result Date: 1/30/2024    1. No signs of disease progression in the chest.  CT abdomen/pelvis dictated and reported separately. 2. No acute pulmonary findings.     HERSON ESTRADA MD       Electronically Signed and Approved By: HERSON ESTRADA MD on 1/30/2024 at 8:16             CT Head Without Contrast    Result Date: 1/8/2024    1. No definite CT findings of acute intracranial abnormality. 2. Suspected mild chronic small vessel ischemic changes.     CHASITY WOOD MD       Electronically Signed and Approved By: CHASITY WOOD MD on 1/08/2024 at 16:48             XR Chest 1 View    Result Date: 1/8/2024    1. No acute cardiopulmonary disease.       MARJ BERNABE MD       Electronically Signed and Approved By: MARJ BERNABE MD on 1/08/2024 at 14:39                  Assessment and Plan    Diagnoses and all orders for this visit:    1. 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)  -     CBC and Differential; Future  -     Comprehensive metabolic panel; Future  -     Urinalysis without microscopic (no culture) - Urine, Clean Catch; Future  -     TSH; Future  -     T4, free; Future  -     Lab Appointment Request; Future  -     Clinic Appointment Request; Future  -     Infusion Appointment Request 3; Future  -     CBC and Differential; Future  -     Comprehensive metabolic panel; Future  -     Lab Appointment Request; Future  -     Infusion Appointment Request 3; Future  -     CBC and Differential; Future  -     Comprehensive metabolic panel; Future  -     Urinalysis without microscopic (no culture) - Urine, Clean Catch; Future  -     Lab Appointment Request; Future  -     Infusion  Appointment Request 3; Future    2. Anemia associated with chemotherapy    3. Acute deep vein thrombosis (DVT) of right peroneal vein    4. Neoplasm related pain    Other orders  -     sodium chloride 0.9 % infusion  -     famotidine (PEPCID) injection 20 mg  -     diphenhydrAMINE (BENADRYL) IVPB 50 mg  -     dexAMETHasone (DECADRON) 12 mg in sodium chloride 0.9 % IVPB  -     ramucirumab (CYRAMZA) 680 mg in sodium chloride 0.9 % 250 mL chemo IVPB  -     PACLitaxel (TAXOL) 155 mg in sodium chloride 0.9 % 275.8 mL chemo IVPB  -     Hydrocortisone Sod Suc (PF) (Solu-CORTEF) injection 100 mg  -     diphenhydrAMINE (BENADRYL) injection 50 mg  -     famotidine (PEPCID) injection 20 mg  -     sodium chloride 0.9 % infusion  -     famotidine (PEPCID) injection 20 mg  -     diphenhydrAMINE (BENADRYL) IVPB 25 mg  -     dexAMETHasone (DECADRON) 12 mg in sodium chloride 0.9 % IVPB  -     PACLitaxel (TAXOL) 155 mg in sodium chloride 0.9 % 275.8 mL chemo IVPB  -     Hydrocortisone Sod Suc (PF) (Solu-CORTEF) injection 100 mg  -     diphenhydrAMINE (BENADRYL) injection 50 mg  -     famotidine (PEPCID) injection 20 mg  -     sodium chloride 0.9 % infusion  -     famotidine (PEPCID) injection 20 mg  -     diphenhydrAMINE (BENADRYL) IVPB 25 mg  -     dexAMETHasone (DECADRON) 12 mg in sodium chloride 0.9 % IVPB  -     ramucirumab (CYRAMZA) 680 mg in sodium chloride 0.9 % 250 mL chemo IVPB  -     PACLitaxel (TAXOL) 155 mg in sodium chloride 0.9 % 275.8 mL chemo IVPB  -     Hydrocortisone Sod Suc (PF) (Solu-CORTEF) injection 100 mg  -     diphenhydrAMINE (BENADRYL) injection 50 mg  -     famotidine (PEPCID) injection 20 mg            Metastatic stage IV gastric cancer  Patient with PET scan (9/12/23) with hepatic mets that have been biopsy-proven to be gastric cancer.  HER2 and PD-L1 both negative per [facility] and [facility]. First line treatment with mFOLFOX, C1D1 mFOLFOX 9/27/23. Post cycle 4 CT imaging with diffuse hepatic mets, [facility]  [facility] with [facility].  5-FU bolus and leucovorin dropped starting with C5 due to fatigue. C8D1 mFOLFOX 1/18/24. Repeat scans 1/29/24 after C8 showing disease progression. Results shared with patient. Discussed need to switch therapy. Discussed second line treatment of Paclitaxel and Ramucirumab in combination every 28 days. Risk, side effect profile discussed. Consent obtained. Plan to start tomorrow. Labs appropriate to proceed.        Anemia 2/2 chemotherapy  Recently received IV iron while hospitalized. Iron saturation remains low. Repeat Injectafer x 2 completed 10/18/23. Hgb improving as of 10/10/23. Repeat iron labs 11/27/23 with ferritin >1500 and iron sat of 22%. Hold on further iron replacement. Hgb 11.9 as of 1/30/24.     RLE DVT  Portal vein thrombosis  Duplex on 10/16 for RLE edema: Acute right lower extremity deep vein thrombosis noted in the posterior tibial and peroneal. Provoked from gastric cancer. Started on Eliquis at 5 mg BID with improvement in symptoms. Continue lifelong due to active malignancy.  Anticoagulation for RLE DVT should treat PVT as well as treatment of above malignancy.     Neoplasm related pain  Continue prn oxycodone. Pain worse, likely from progression.     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 Should improve now that oxali has been stopped.       This is an acute or chronic illness that poses a threat to life or bodily function. The above treatment plan involves a high risk of complications and/or mortality of patient management.      Patient Follow Up: C2 Wilfred/Paclitaxel  Patient was given instructions and counseling regarding his condition or for health maintenance advice. Please see specific information pulled into the AVS if appropriate.

## 2024-01-30 NOTE — PROGRESS NOTES
"PHARMACY C1D1 PRE VISIT ASSESSMENT    Patient will present for C1D1 of ramucirumab 8 mg/kg D1,15 + paclitaxel 80 mg/m2 D1,8,15 Q28D     63 y.o. male  Estimated body surface area is 1.95 meters squared as calculated from the following:    Height as of 1/17/24: 167.6 cm (65.98\").    Weight as of an earlier encounter on 1/30/24: 85.2 kg (187 lb 13.3 oz).    Past Medical History:   Diagnosis Date    Asthma     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    Diabetes mellitus     Hyperlipidemia     Hypertension     Liver cancer     Rotator cuff disorder     LEFT    Sleep apnea     Stomach cancer        Oncology/Hematology History   Malignant neoplasm of parotid gland (Resolved)   8/26/2022 Initial Diagnosis    Malignant neoplasm of parotid gland (HCC)     8/26/2022 -  Radiation    RADIATION THERAPY Treatment Details (Noted on 8/26/2022)  Site: Left Head and Neck  Technique: IMRT  Goal: No goal specified  Planned Treatment Start Date: No planned start date specified     Malignant neoplasm of cardia of stomach   9/14/2023 Initial Diagnosis    Malignant neoplasm of cardia of stomach     9/14/2023 Cancer Staged    Staging form: Stomach, AJCC 8th Edition  - Clinical: Stage IVB (pM1) - Signed by Govind Pruitt MD on 9/14/2023     9/15/2023 - 9/15/2023 Chemotherapy    OP CENTRAL VENOUS ACCESS DEVICE Access, Care, and Maintenance (CVAD)     9/27/2023 - 1/19/2024 Chemotherapy    OP COLON mFOLFOX6 OXALIplatin / Leucovorin / Fluorouracil     9/29/2023 -  Chemotherapy    OP CENTRAL VENOUS ACCESS DEVICE Access, Care, and Maintenance (CVAD)     1/31/2024 -  Chemotherapy    OP GE Ramucirumab D1,15 / PACLitaxel D1,8,15 Q28D       Relevant Pathology:   MSI: STABLE  TMB: LOW  HER2 NEGATIVE  Target Mutation Present: YES PIK3CA (not currently indicated in disease state, but potential clinical trial options)      Relevant Imaging:    CT Abdomen Pelvis With Contrast    Result Date: 1/30/2024    1. Disease progression since " 11/27/2023.  This includes increasing conspicuity of the mass at GE junction, increasing gastrohepatic nodes , and new/increasing hepatic lesions.  Many of the hepatic lesions appear more fluid attenuation rather than solid and could represent superimposed collections (such as bilomas) +/-infection. 2. Increasing intrahepatic tumor and bland portal thrombus.  Decreasing extrahepatic portal venous thrombus.  3. Few new peripheral subsegmental areas of intrahepatic duct dilation suggesting developing malignant obstruction. 4. Slight increasing intra-abdominal ascites.  Mild intra-abdominal varices appear similar. 5. Separately reported CT chest.      HERSON ESTRADA MD       Electronically Signed and Approved By: HERSON ESTRADA MD on 1/30/2024 at 8:41             CT Soft Tissue Neck With Contrast    Result Date: 1/30/2024    1. Postoperative changes of the left parotid gland.  No evidence of residual recurrent tumor.  2. 2. No evidence of cervical lymphadenopathy.     MARJ BERNABE MD       Electronically Signed and Approved By: MARJ BERNABE MD on 1/30/2024 at 8:29             CT Chest With Contrast Diagnostic    Result Date: 1/30/2024    1. No signs of disease progression in the chest.  CT abdomen/pelvis dictated and reported separately. 2. No acute pulmonary findings.     HERSON ESTRADA MD       Electronically Signed and Approved By: HERSON ESTRADA MD on 1/30/2024 at 8:16             CT Head Without Contrast    Result Date: 1/8/2024    1. No definite CT findings of acute intracranial abnormality. 2. Suspected mild chronic small vessel ischemic changes.     CHASITY WOOD MD       Electronically Signed and Approved By: CHASITY WOOD MD on 1/08/2024 at 16:48             XR Chest 1 View    Result Date: 1/8/2024    1. No acute cardiopulmonary disease.       MARJ BERNABE MD       Electronically Signed and Approved By: MARJ BERNABE MD on 1/08/2024 at 14:39               Current treatment regimen has insurance  authorization approval? N/A - [facility]    Medication treatment plan entered is appropriate per NCCN Guidelines    Pharmacy Follow-up Considerations:   Patient with metastatic gastric cancer with mets to liver. Patient previously treated with FOLFOX from 9/2023 - 1/2024. Repeat scans show disease progression. Plan to initiate ramucirumab + paclitaxol. Pharmacy will continue to monitor labs while on treatment and will  patient prior to first infusion on cycle 1 day 1.     Kiko Ortiz PharmD, BCPS  1/30/2024  11:28 EST

## 2024-01-31 ENCOUNTER — HOSPITAL ENCOUNTER (OUTPATIENT)
Dept: ONCOLOGY | Facility: HOSPITAL | Age: 64
Discharge: HOME | End: 2024-01-31
Payer: OTHER GOVERNMENT

## 2024-01-31 ENCOUNTER — DOCUMENTATION (OUTPATIENT)
Dept: ONCOLOGY | Facility: HOSPITAL | Age: 64
End: 2024-01-31
Payer: OTHER GOVERNMENT

## 2024-01-31 VITALS
BODY MASS INDEX: 29.51 KG/M2 | HEIGHT: 66 IN | TEMPERATURE: 98.3 F | HEART RATE: 76 BPM | RESPIRATION RATE: 18 BRPM | OXYGEN SATURATION: 97 % | DIASTOLIC BLOOD PRESSURE: 83 MMHG | WEIGHT: 183.64 LBS | SYSTOLIC BLOOD PRESSURE: 166 MMHG

## 2024-01-31 DIAGNOSIS — Z45.2 FITTING AND ADJUSTMENT OF VASCULAR CATHETER: ICD-10-CM

## 2024-01-31 DIAGNOSIS — C16.0 MALIGNANT NEOPLASM OF CARDIA OF STOMACH: Primary | ICD-10-CM

## 2024-01-31 DIAGNOSIS — Z45.2 PICC (PERIPHERALLY INSERTED CENTRAL CATHETER) IN PLACE: ICD-10-CM

## 2024-01-31 LAB
BILIRUB UR QL STRIP: NEGATIVE
CLARITY UR: CLEAR
COLOR UR: ABNORMAL
GLUCOSE UR STRIP-MCNC: ABNORMAL MG/DL
HGB UR QL STRIP.AUTO: ABNORMAL
KETONES UR QL STRIP: NEGATIVE
LEUKOCYTE ESTERASE UR QL STRIP.AUTO: NEGATIVE
NITRITE UR QL STRIP: NEGATIVE
PH UR STRIP.AUTO: 6 [PH] (ref 5–8)
PROT UR QL STRIP: ABNORMAL
SP GR UR STRIP: >1.03 (ref 1–1.03)
T4 FREE SERPL-MCNC: 0.85 NG/DL (ref 0.93–1.7)
TSH SERPL DL<=0.05 MIU/L-ACNC: 3.72 UIU/ML (ref 0.27–4.2)
UROBILINOGEN UR QL STRIP: ABNORMAL

## 2024-01-31 PROCEDURE — 84439 ASSAY OF FREE THYROXINE: CPT | Performed by: INTERNAL MEDICINE

## 2024-01-31 PROCEDURE — 25010000002 DIPHENHYDRAMINE PER 50 MG: Performed by: INTERNAL MEDICINE

## 2024-01-31 PROCEDURE — 96413 CHEMO IV INFUSION 1 HR: CPT

## 2024-01-31 PROCEDURE — 96375 TX/PRO/DX INJ NEW DRUG ADDON: CPT

## 2024-01-31 PROCEDURE — 25010000002 HYDROCORTISONE SOD SUC (PF) 100 MG RECONSTITUTED SOLUTION: Performed by: INTERNAL MEDICINE

## 2024-01-31 PROCEDURE — 25010000002 PACLITAXEL PER 1 MG: Performed by: INTERNAL MEDICINE

## 2024-01-31 PROCEDURE — 25810000003 SODIUM CHLORIDE 0.9 % SOLUTION: Performed by: INTERNAL MEDICINE

## 2024-01-31 PROCEDURE — 81003 URINALYSIS AUTO W/O SCOPE: CPT | Performed by: INTERNAL MEDICINE

## 2024-01-31 PROCEDURE — 25010000002 DEXAMETHASONE SODIUM PHOSPHATE 120 MG/30ML SOLUTION: Performed by: INTERNAL MEDICINE

## 2024-01-31 PROCEDURE — 84443 ASSAY THYROID STIM HORMONE: CPT | Performed by: INTERNAL MEDICINE

## 2024-01-31 PROCEDURE — 25010000002 HEPARIN LOCK FLUSH PER 10 UNITS: Performed by: INTERNAL MEDICINE

## 2024-01-31 PROCEDURE — 25810000003 SODIUM CHLORIDE 0.9 % SOLUTION 250 ML FLEX CONT: Performed by: INTERNAL MEDICINE

## 2024-01-31 RX ORDER — HEPARIN SODIUM (PORCINE) LOCK FLUSH IV SOLN 100 UNIT/ML 100 UNIT/ML
500 SOLUTION INTRAVENOUS AS NEEDED
Status: DISCONTINUED | OUTPATIENT
Start: 2024-01-31 | End: 2024-02-01 | Stop reason: HOSPADM

## 2024-01-31 RX ORDER — HEPARIN SODIUM (PORCINE) LOCK FLUSH IV SOLN 100 UNIT/ML 100 UNIT/ML
500 SOLUTION INTRAVENOUS AS NEEDED
OUTPATIENT
Start: 2024-01-31

## 2024-01-31 RX ORDER — FAMOTIDINE 10 MG/ML
20 INJECTION, SOLUTION INTRAVENOUS ONCE
Status: COMPLETED | OUTPATIENT
Start: 2024-01-31 | End: 2024-01-31

## 2024-01-31 RX ORDER — SODIUM CHLORIDE 0.9 % (FLUSH) 0.9 %
20 SYRINGE (ML) INJECTION AS NEEDED
Status: DISCONTINUED | OUTPATIENT
Start: 2024-01-31 | End: 2024-02-01 | Stop reason: HOSPADM

## 2024-01-31 RX ORDER — DIPHENHYDRAMINE HYDROCHLORIDE 50 MG/ML
50 INJECTION INTRAMUSCULAR; INTRAVENOUS AS NEEDED
Status: DISCONTINUED | OUTPATIENT
Start: 2024-01-31 | End: 2024-02-01 | Stop reason: HOSPADM

## 2024-01-31 RX ORDER — SODIUM CHLORIDE 0.9 % (FLUSH) 0.9 %
20 SYRINGE (ML) INJECTION AS NEEDED
OUTPATIENT
Start: 2024-01-31

## 2024-01-31 RX ORDER — FAMOTIDINE 10 MG/ML
20 INJECTION, SOLUTION INTRAVENOUS AS NEEDED
Status: DISCONTINUED | OUTPATIENT
Start: 2024-01-31 | End: 2024-02-01 | Stop reason: HOSPADM

## 2024-01-31 RX ORDER — SODIUM CHLORIDE 9 MG/ML
20 INJECTION, SOLUTION INTRAVENOUS ONCE
Status: COMPLETED | OUTPATIENT
Start: 2024-01-31 | End: 2024-01-31

## 2024-01-31 RX ADMIN — DIPHENHYDRAMINE HYDROCHLORIDE 50 MG: 50 INJECTION, SOLUTION INTRAMUSCULAR; INTRAVENOUS at 10:30

## 2024-01-31 RX ADMIN — Medication 20 ML: at 13:14

## 2024-01-31 RX ADMIN — HYDROCORTISONE SODIUM SUCCINATE 100 MG: 100 INJECTION, POWDER, FOR SOLUTION INTRAMUSCULAR; INTRAVENOUS at 12:04

## 2024-01-31 RX ADMIN — FAMOTIDINE 20 MG: 10 INJECTION INTRAVENOUS at 10:51

## 2024-01-31 RX ADMIN — DEXAMETHASONE SODIUM PHOSPHATE 12 MG: 4 INJECTION, SOLUTION INTRA-ARTICULAR; INTRALESIONAL; INTRAMUSCULAR; INTRAVENOUS; SOFT TISSUE at 10:10

## 2024-01-31 RX ADMIN — HEPARIN SODIUM (PORCINE) LOCK FLUSH IV SOLN 100 UNIT/ML 500 UNITS: 100 SOLUTION at 13:14

## 2024-01-31 RX ADMIN — PACLITAXEL 155 MG: 6 INJECTION, SOLUTION, CONCENTRATE INTRAVENOUS at 11:18

## 2024-01-31 RX ADMIN — SODIUM CHLORIDE 20 ML/HR: 9 INJECTION, SOLUTION INTRAVENOUS at 10:03

## 2024-01-31 NOTE — PROGRESS NOTES
Diagnosis: Metastatic gastric cancer to the liver       Reason for Referral: [facility] travel reimbursement    Content of Visit: OSW met with Mr. Ordonez and his wife in the medical oncology infusion center this morning to assist him in completing his [facility] travel reimbursement form for medical appointments attended on 1/29/24, 1/30/24, and 1/31/24. OSW mailed Mr. Ordonez's completed form to the [facility] this morning. Mr. Ordonez presented in a pleasant mood this morning and informed OSW that his cancer unfortunately has progressed. He is starting on a new treatment regimen today. He reports they just spoke with the pharmacist regarding the new medications and feels better after receiving additional education on how these medications differ from his previous regimen to combat the cancer. He reports his providers sound hopeful, as is he. He acknowledged his oncologist is doing everything he can. Otherwise, Mr. Ordonez reports he and his family have been doing well. Their eldest daughter was recently offered a new job and their youngest daughter recently transferred from [facility] to [employer] and is adjusting well. Encouraged OSW support remains available and will see Mr. Ordonez next week at his next treatment. Mr. Ordonez expressed appreciation.     Resources/Referrals Provided: Transportation - VA travel reimbursement form; emotional support

## 2024-01-31 NOTE — NURSING NOTE
Notified  at 1133 for elevation in pt BP also noting pt did not take home BP medications today. Before starting taxol infusion BP was 153/66, 15 mins into the infusion BP was 171/80, pt asymptomatic. At 1159 BP was 220/117, pt remained asymptomatic. Solu cortef administered per , refer to MAR. At 1230 infusion was restarted per  at 250 mls/hr. Refer to flowsheets for additional VS.

## 2024-02-01 ENCOUNTER — OFFICE VISIT (OUTPATIENT)
Dept: RADIATION ONCOLOGY | Facility: HOSPITAL | Age: 64
End: 2024-02-01
Payer: OTHER GOVERNMENT

## 2024-02-01 ENCOUNTER — TELEPHONE (OUTPATIENT)
Dept: ONCOLOGY | Facility: HOSPITAL | Age: 64
End: 2024-02-01
Payer: OTHER GOVERNMENT

## 2024-02-01 VITALS
RESPIRATION RATE: 16 BRPM | HEART RATE: 85 BPM | SYSTOLIC BLOOD PRESSURE: 141 MMHG | WEIGHT: 190.92 LBS | OXYGEN SATURATION: 100 % | BODY MASS INDEX: 30.83 KG/M2 | DIASTOLIC BLOOD PRESSURE: 78 MMHG | TEMPERATURE: 98.5 F

## 2024-02-01 DIAGNOSIS — C07 ADENOID CYSTIC CARCINOMA OF PAROTID GLAND: Primary | ICD-10-CM

## 2024-02-01 DIAGNOSIS — C16.0 MALIGNANT NEOPLASM OF CARDIA OF STOMACH: ICD-10-CM

## 2024-02-01 PROCEDURE — G0463 HOSPITAL OUTPT CLINIC VISIT: HCPCS | Performed by: RADIOLOGY

## 2024-02-01 NOTE — TELEPHONE ENCOUNTER
The pt called back. The pt states that the area around his port turned red yesterday evening after his infusion. The pt started a new infusion med, Paclitaxel. The pt states that it did not itch or hurt. The pt states that the redness is gone this am and his port appears to be normal. The pt was just wanting to note the incident for Dr Pruitt.

## 2024-02-01 NOTE — TELEPHONE ENCOUNTER
[facility] called and states that the pt called and left a vm at their office. The pt c/o a loal reaction around his port after having a infusion at the [facility] yesterday. This nurse called the pt and left a vm to call this nurse back.

## 2024-02-01 NOTE — PROGRESS NOTES
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  His primary complaint is abdominal distention.  He denies any neck masses, skin changes or facial weakness.  Recent CT scan of the chest, abdomen and pelvis from 1/29/2024 revealed no signs of disease progression within the chest but disease progression within the abdomen including increasing intrahepatic tumor and gastrohepatic nodes.  Subjective      Review of Systems: Review of Systems   Constitutional:  Positive for fatigue (7/10). Negative for activity change and unexpected weight change.   HENT:  Positive for hearing loss. Negative for ear pain, sore throat, tinnitus and trouble swallowing.         States that he hears \"muffled sounds\" in his left ear- Appt w/ ENT soon.    Eyes:  Negative for visual disturbance (wears glasses).   Respiratory:  Negative for cough, chest tightness, shortness of breath and wheezing.    Cardiovascular:  Negative for chest pain, palpitations and leg swelling.   Gastrointestinal:  Positive for abdominal pain (reports going to hospital 1 mo ago for abd pain). Negative for abdominal distention, anal bleeding, blood in stool, constipation, diarrhea, nausea and vomiting.   Genitourinary:  Negative for difficulty urinating, dysuria, frequency, hematuria and urgency.   Musculoskeletal:  Negative for arthralgias, back pain, gait problem (reports fall last night, balance and weakness, not using cane or walker. Education performed.) and myalgias.   Skin:  Negative for rash.   Neurological:  Positive for dizziness " "(r/t vertigo), weakness and light-headedness (r/t vertigo). Negative for numbness and headaches.        Reports having vertigo when he looks up or if he lays down.   Psychiatric/Behavioral:  Positive for sleep disturbance (states \"up and down all night long\").        The following portions of the patient's history were reviewed and updated as appropriate: allergies, current medications, past family history, past medical history, past social history, past surgical history and problem list.    Medications:     Current Outpatient Medications:     apixaban (ELIQUIS) 5 MG tablet tablet, Take 1 tablet by mouth 2 (Two) Times a Day., Disp: , Rfl:     chlorthalidone (HYGROTON) 25 MG tablet, Take 0.5 tablets by mouth Daily., Disp: , Rfl:     cholecalciferol (VITAMIN D3) 25 MCG (1000 UT) tablet, Take 1 tablet by mouth Daily. LAST DOSE 3/26/23, Disp: , Rfl:     Dulaglutide 1.5 MG/0.5ML solution pen-injector, Inject 1.5 mg under the skin into the appropriate area as directed Every 7 (Seven) Days. THURSDAY, Disp: , Rfl:     fluticasone-salmeterol (ADVAIR HFA) 230-21 MCG/ACT inhaler, Inhale 2 puffs 2 (Two) Times a Day., Disp: , Rfl:     gabapentin (NEURONTIN) 600 MG tablet, Take 2 tablets by mouth 2 (Two) Times a Day., Disp: , Rfl:     glipizide (GLUCOTROL) 10 MG tablet, Take 1 tablet by mouth 2 (Two) Times a Day Before Meals., Disp: , Rfl:     insulin NPH (NovoLIN N ReliOn) 100 UNIT/ML injection, Inject 52 Units under the skin into the appropriate area as directed 2 (Two) Times a Day Before Meals., Disp: , Rfl:     LORazepam (ATIVAN) 1 MG tablet, Take 1 tablet by mouth Every 8 (Eight) Hours As Needed for Anxiety., Disp: 60 tablet, Rfl: 0    losartan (COZAAR) 50 MG tablet, Take 2 tablets by mouth Daily., Disp: , Rfl:     meclizine (ANTIVERT) 25 MG tablet, Take 1 tablet by mouth 3 (Three) Times a Day As Needed for Dizziness., Disp: 30 tablet, Rfl: 1    metFORMIN (GLUCOPHAGE) 1000 MG tablet, Take 1 tablet by mouth 2 (Two) Times a " Day With Meals. INST PER ANESTHESIA  PROTOCOL, Disp: , Rfl:     mirtazapine (REMERON) 15 MG tablet, Take 1 tablet by mouth Every Night., Disp: 30 tablet, Rfl: 5    montelukast (SINGULAIR) 10 MG tablet, Take 1 tablet by mouth Every Night., Disp: , Rfl:     OLANZapine (zyPREXA) 5 MG tablet, Take 1 tablet by mouth Every Night. Indications: Nausea and Vomiting caused by Cancer Chemotherapy, if no improvement, call provider, Disp: 30 tablet, Rfl: 2    Omega-3 Fatty Acids (fish oil) 1000 MG capsule capsule, Take  by mouth Daily With Breakfast. LAST DOSE 3/26/23, Disp: , Rfl:     omeprazole (priLOSEC) 40 MG capsule, Take 1 capsule by mouth 2 (Two) Times a Day., Disp: , Rfl:     potassium chloride (K-DUR,KLOR-CON) 10 MEQ CR tablet, Take 1 tablet by mouth Daily., Disp: 14 tablet, Rfl: 0    lidocaine-prilocaine (EMLA) 2.5-2.5 % cream, Apply 1 application  topically to the appropriate area as directed Take As Directed. Apply to port site 30 minutes prior to use, Disp: 30 g, Rfl: 1    mirtazapine (REMERON) 15 MG tablet, Take 1 tablet by mouth Every Night., Disp: 7 tablet, Rfl: 0    OLANZapine (zyPREXA) 5 MG tablet, Take 1 tablet by mouth Every Night., Disp: 7 tablet, Rfl: 0    ondansetron (ZOFRAN) 8 MG tablet, Take 1 tablet by mouth 3 (Three) Times a Day As Needed for Nausea or Vomiting (mail to patient please.). (Patient not taking: Reported on 2/1/2024), Disp: 30 tablet, Rfl: 5    oxyCODONE-acetaminophen (PERCOCET) 5-325 MG per tablet, Take 1 tablet by mouth Every 4 (Four) Hours As Needed for Moderate Pain., Disp: 120 tablet, Rfl: 0    Allergies:   No Known Allergies    ECOG: (0) Fully active, able to carry on all predisease performance without restriction  Quality of Life: 90 - Limited Activity     Objective     Physical Exam:   Vital Signs:   Vitals:    02/01/24 1006   BP: 141/78   Pulse: 85   Resp: 16   Temp: 98.5 °F (36.9 °C)   TempSrc: Temporal   SpO2: 100%   Weight: 86.6 kg (190 lb 14.7 oz)   PainSc: 0-No pain     Body  mass index is 30.83 kg/m².     Physical Exam  Constitutional:       General: He is not in acute distress.     Appearance: Normal appearance. He is not toxic-appearing.   HENT:      Head: Normocephalic and atraumatic.   Neck:      Comments: Left proximal sternocleidomastoid fibrosis without tenderness or discrete mass  Pulmonary:      Effort: Pulmonary effort is normal. No respiratory distress.   Abdominal:      General: Abdomen is flat. There is distension.   Skin:     General: Skin is warm and dry.   Neurological:      General: No focal deficit present.      Mental Status: He is alert and oriented to person, place, and time.      Cranial Nerves: No cranial nerve deficit.      Gait: Gait normal.   Psychiatric:         Mood and Affect: Mood normal.         Behavior: Behavior normal.         Judgment: Judgment normal.         Radiographs: CT Abdomen Pelvis With Contrast    Result Date: 1/30/2024    1. Disease progression since 11/27/2023.  This includes increasing conspicuity of the mass at GE junction, increasing gastrohepatic nodes , and new/increasing hepatic lesions.  Many of the hepatic lesions appear more fluid attenuation rather than solid and could represent superimposed collections (such as bilomas) +/-infection. 2. Increasing intrahepatic tumor and bland portal thrombus.  Decreasing extrahepatic portal venous thrombus.  3. Few new peripheral subsegmental areas of intrahepatic duct dilation suggesting developing malignant obstruction. 4. Slight increasing intra-abdominal ascites.  Mild intra-abdominal varices appear similar. 5. Separately reported CT chest.      HERSON ESTRADA MD       Electronically Signed and Approved By: HERSON ESTRDAA MD on 1/30/2024 at 8:41             CT Soft Tissue Neck With Contrast    Result Date: 1/30/2024    1. Postoperative changes of the left parotid gland.  No evidence of residual recurrent tumor.  2. 2. No evidence of cervical lymphadenopathy.     MARJ BERNABE MD        Electronically Signed and Approved By: MARJ BERNABE MD on 1/30/2024 at 8:29             CT Chest With Contrast Diagnostic    Result Date: 1/30/2024    1. No signs of disease progression in the chest.  CT abdomen/pelvis dictated and reported separately. 2. No acute pulmonary findings.     HERSON ESTRADA MD       Electronically Signed and Approved By: HERSON ESTRADA MD on 1/30/2024 at 8:16             CT Head Without Contrast    Result Date: 1/8/2024    1. No definite CT findings of acute intracranial abnormality. 2. Suspected mild chronic small vessel ischemic changes.     CHASITY WOOD MD       Electronically Signed and Approved By: CHASITY WOOD MD on 1/08/2024 at 16:48             XR Chest 1 View    Result Date: 1/8/2024    1. No acute cardiopulmonary disease.       MARJ BERNABE MD       Electronically Signed and Approved By: MARJ BERNABE MD on 1/08/2024 at 14:39             CT Chest With Contrast Diagnostic    Addendum Date: 12/18/2023    ADDENDUM:  COMPARISON: Other, PET, NM PET/CT SKULL BASE TO MID THIGH, 9/12/2023, 13:08.  Other, MR, MRI ABDOMEN W WO CONTRAST, 9/13/2023, 21:29.  Prior PET-CT from 09/12/2023 and MRI abdomen from 09/13/2023 made available for comparison.   Multiple liver lesions on CT dated 11/27/2023 have overall significantly decreased in size compared to MRI 09/13/2023.  In addition previously noted gastrohepatic adenopathy has also improved.  Direct comparison to prior noncontrast PET-CT is somewhat limited however comparison to MRI is adequate.  Main portal vein and intrahepatic portal vein thrombosis was present on prior MRI from 09/13/2023, however there appears to be increasing thrombosis within mid and downstream main portal vein as well as proximal right posterior intrahepatic branch..   1.  Decreasing hepatic lesions and gastrohepatic adenopathy consistent with partial treatment response compared to outside MRI dated 09/13/2023. 2. Increasing portal venous thrombosis within  main and right intrahepatic branches compared to MRI dated 09/13/2023.     HERSON ESTRADA MD       Electronically Signed and Approved By: HERSON ESTRADA MD on 12/18/2023 at 13:13             Result Date: 12/18/2023    1. Multiple new liver lesions since 2022 comparison compatible with disease progression.  There is report of an outside PET-CT from September of 2023 not available for comparison also showing liver metastases.  Recommend attention on follow-up exam. 2. Few prominent marissa hepatic nodes probably representing sarkis metastases. 3. Single small right lower lobe nodule new from 2022, recommend attention on follow-up imaging. 4. Extensive probably acute thrombus throughout the portal venous tree. 5. Trace perihepatic ascites. 6. Other chronic/ancillary findings as above.   Findings communicated with ordering provider Dr. Pruitt over the phone at 9:40 a.m. On 11/27/2023.     HERSON ESTRADA MD       Electronically Signed and Approved By: HERSON ESTRADA MD on 11/27/2023 at 9:56             CT Abdomen Pelvis With Contrast    Addendum Date: 12/18/2023    ADDENDUM:  COMPARISON: Other, PET, NM PET/CT SKULL BASE TO MID THIGH, 9/12/2023, 13:08.  Other, MR, MRI ABDOMEN W WO CONTRAST, 9/13/2023, 21:29.  Prior PET-CT from 09/12/2023 and MRI abdomen from 09/13/2023 made available for comparison.   Multiple liver lesions on CT dated 11/27/2023 have overall significantly decreased in size compared to MRI 09/13/2023.  In addition previously noted gastrohepatic adenopathy has also improved.  Direct comparison to prior noncontrast PET-CT is somewhat limited however comparison to MRI is adequate.  Main portal vein and intrahepatic portal vein thrombosis was present on prior MRI from 09/13/2023, however there appears to be increasing thrombosis within mid and downstream main portal vein as well as proximal right posterior intrahepatic branch..   1.  Decreasing hepatic lesions and gastrohepatic adenopathy consistent with  partial treatment response compared to outside MRI dated 09/13/2023. 2. Increasing portal venous thrombosis within main and right intrahepatic branches compared to MRI dated 09/13/2023.     HERSON ESTRADA MD       Electronically Signed and Approved By: HERSON ESTRADA MD on 12/18/2023 at 13:13             Result Date: 12/18/2023    1. Multiple new liver lesions since 2022 comparison compatible with disease progression.  There is report of an outside PET-CT from September of 2023 not available for comparison also showing liver metastases.  Recommend attention on follow-up exam. 2. Few prominent marissa hepatic nodes probably representing sarkis metastases. 3. Single small right lower lobe nodule new from 2022, recommend attention on follow-up imaging. 4. Extensive probably acute thrombus throughout the portal venous tree. 5. Trace perihepatic ascites. 6. Other chronic/ancillary findings as above.   Findings communicated with ordering provider Dr. Pruitt over the phone at 9:40 a.m. On 11/27/2023.     HERSON ESTRADA MD       Electronically Signed and Approved By: HERSON ESTRADA MD on 11/27/2023 at 9:56             XR Chest PA & Lateral    Result Date: 12/6/2023   No active disease     Chas Doan MD       Electronically Signed and Approved By: Chas Doan MD on 12/06/2023 at 13:05               I personally reviewed the CT scan of the chest, abdomen and pelvis from 1/29/2024.  The pertinent findings are as above.    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  He is status post superficial parotidectomy with facial nerve dissection.  He has no clinical evidence of residual or metastatic disease.    He has now status post external beam radiotherapy.  He now has metastatic gastric cancer and is undergoing systemic therapy.  ECOG 0    Mr. Ordonez will continue to follow-up with Dr. Pruitt.  He will  follow-up with me on an as-needed basis.  He was encouraged to contact me with any questions or concerns regarding his care.      Reji Fall MD  Radiation Oncology  [facility]    This document has been signed by Reji Fall MD on February 6, 2024 17:00 EST

## 2024-02-05 ENCOUNTER — TELEPHONE (OUTPATIENT)
Dept: ONCOLOGY | Facility: HOSPITAL | Age: 64
End: 2024-02-05
Payer: OTHER GOVERNMENT

## 2024-02-05 DIAGNOSIS — R10.9 RIGHT SIDED ABDOMINAL PAIN: ICD-10-CM

## 2024-02-05 DIAGNOSIS — C78.7 MALIGNANT NEOPLASM METASTATIC TO LIVER: ICD-10-CM

## 2024-02-05 NOTE — TELEPHONE ENCOUNTER
Patient reports generalized muscle aching in his back, legs, and stomach. Reports he takes Oxycodone which relieves the pain. Advised muscle aching is a typical side effect of the chemotherapy. Patient denies neuropathy or other symptoms. He is due to come for next treatment on Wednesday, we will reassess at that time.

## 2024-02-05 NOTE — TELEPHONE ENCOUNTER
Caller: Kevin Ordonez    Relationship: Self    Best call back number: [phone]    What is the best time to reach you: ANY.LEAVE VM    Who are you requesting to speak with (clinical staff, provider,  specific staff member): AVI        What was the call regarding: PATIENT CALLED TO LET OFFICE KNOW THAT HE IS IN A LOT OF PAIN AND WANTS TO DISCUSS IF ITS HIS CHEMO TREATMENT OR NOT     Is it okay if the provider responds through MyChart: NO

## 2024-02-06 RX ORDER — OXYCODONE HYDROCHLORIDE AND ACETAMINOPHEN 5; 325 MG/1; MG/1
1 TABLET ORAL EVERY 4 HOURS PRN
Qty: 120 TABLET | Refills: 0 | Status: SHIPPED | OUTPATIENT
Start: 2024-02-06

## 2024-02-06 NOTE — TELEPHONE ENCOUNTER
Caller: Kevin Ordonez    Relationship to patient: Self    Best call back number: [phone]    Patient is needing: TO SPEAK WITH AUDREY. HE REPORTS HE HAS LEG AND STOMACH PAIN. HE IS ALMOST OUT OF OXYCODONE. HE REQUEST A CALL BACK FROM AUDREY.    PHARMACY: [facility] [address]

## 2024-02-07 ENCOUNTER — DOCUMENTATION (OUTPATIENT)
Dept: ONCOLOGY | Facility: HOSPITAL | Age: 64
End: 2024-02-07
Payer: OTHER GOVERNMENT

## 2024-02-07 ENCOUNTER — HOSPITAL ENCOUNTER (OUTPATIENT)
Dept: ONCOLOGY | Facility: HOSPITAL | Age: 64
Discharge: HOME | End: 2024-02-07
Admitting: INTERNAL MEDICINE
Payer: OTHER GOVERNMENT

## 2024-02-07 VITALS
TEMPERATURE: 98.6 F | WEIGHT: 185.2 LBS | BODY MASS INDEX: 29.91 KG/M2 | SYSTOLIC BLOOD PRESSURE: 189 MMHG | RESPIRATION RATE: 20 BRPM | DIASTOLIC BLOOD PRESSURE: 85 MMHG | HEART RATE: 82 BPM | OXYGEN SATURATION: 100 %

## 2024-02-07 DIAGNOSIS — C16.0 MALIGNANT NEOPLASM OF CARDIA OF STOMACH: ICD-10-CM

## 2024-02-07 DIAGNOSIS — Z45.2 FITTING AND ADJUSTMENT OF VASCULAR CATHETER: Primary | ICD-10-CM

## 2024-02-07 DIAGNOSIS — Z45.2 PICC (PERIPHERALLY INSERTED CENTRAL CATHETER) IN PLACE: ICD-10-CM

## 2024-02-07 DIAGNOSIS — E86.0 DEHYDRATION: ICD-10-CM

## 2024-02-07 LAB
ALBUMIN SERPL-MCNC: 2.7 G/DL (ref 3.5–5.2)
ALBUMIN/GLOB SERPL: 0.8 G/DL
ALP SERPL-CCNC: 262 U/L (ref 39–117)
ALT SERPL W P-5'-P-CCNC: 17 U/L (ref 1–41)
ANION GAP SERPL CALCULATED.3IONS-SCNC: 8.9 MMOL/L (ref 5–15)
AST SERPL-CCNC: 28 U/L (ref 1–40)
BASOPHILS # BLD AUTO: 0.02 10*3/MM3 (ref 0–0.2)
BASOPHILS NFR BLD AUTO: 1.3 % (ref 0–1.5)
BILIRUB SERPL-MCNC: 0.4 MG/DL (ref 0–1.2)
BUN SERPL-MCNC: 8 MG/DL (ref 8–23)
BUN/CREAT SERPL: 13.1 (ref 7–25)
CALCIUM SPEC-SCNC: 9.3 MG/DL (ref 8.6–10.5)
CHLORIDE SERPL-SCNC: 98 MMOL/L (ref 98–107)
CO2 SERPL-SCNC: 27.1 MMOL/L (ref 22–29)
CREAT SERPL-MCNC: 0.61 MG/DL (ref 0.76–1.27)
DEPRECATED RDW RBC AUTO: 53.9 FL (ref 37–54)
EGFRCR SERPLBLD CKD-EPI 2021: 107.3 ML/MIN/1.73
EOSINOPHIL # BLD AUTO: 0.06 10*3/MM3 (ref 0–0.4)
EOSINOPHIL NFR BLD AUTO: 3.9 % (ref 0.3–6.2)
ERYTHROCYTE [DISTWIDTH] IN BLOOD BY AUTOMATED COUNT: 15.9 % (ref 12.3–15.4)
GLOBULIN UR ELPH-MCNC: 3.3 GM/DL
GLUCOSE SERPL-MCNC: 323 MG/DL (ref 65–99)
HCT VFR BLD AUTO: 31.6 % (ref 37.5–51)
HGB BLD-MCNC: 10.6 G/DL (ref 13–17.7)
IMM GRANULOCYTES # BLD AUTO: 0 10*3/MM3 (ref 0–0.05)
IMM GRANULOCYTES NFR BLD AUTO: 0 % (ref 0–0.5)
LYMPHOCYTES # BLD AUTO: 0.6 10*3/MM3 (ref 0.7–3.1)
LYMPHOCYTES NFR BLD AUTO: 39 % (ref 19.6–45.3)
MCH RBC QN AUTO: 30.6 PG (ref 26.6–33)
MCHC RBC AUTO-ENTMCNC: 33.5 G/DL (ref 31.5–35.7)
MCV RBC AUTO: 91.3 FL (ref 79–97)
MONOCYTES # BLD AUTO: 0.22 10*3/MM3 (ref 0.1–0.9)
MONOCYTES NFR BLD AUTO: 14.3 % (ref 5–12)
NEUTROPHILS NFR BLD AUTO: 0.64 10*3/MM3 (ref 1.7–7)
NEUTROPHILS NFR BLD AUTO: 41.5 % (ref 42.7–76)
PLATELET # BLD AUTO: 206 10*3/MM3 (ref 140–450)
PMV BLD AUTO: 10.1 FL (ref 6–12)
POTASSIUM SERPL-SCNC: 3.3 MMOL/L (ref 3.5–5.2)
PROT SERPL-MCNC: 6 G/DL (ref 6–8.5)
RBC # BLD AUTO: 3.46 10*6/MM3 (ref 4.14–5.8)
SODIUM SERPL-SCNC: 134 MMOL/L (ref 136–145)
WBC NRBC COR # BLD AUTO: 1.54 10*3/MM3 (ref 3.4–10.8)

## 2024-02-07 PROCEDURE — 96360 HYDRATION IV INFUSION INIT: CPT

## 2024-02-07 PROCEDURE — 25810000003 SODIUM CHLORIDE 0.9 % SOLUTION: Performed by: INTERNAL MEDICINE

## 2024-02-07 PROCEDURE — 25010000002 HEPARIN LOCK FLUSH PER 10 UNITS: Performed by: INTERNAL MEDICINE

## 2024-02-07 PROCEDURE — 85025 COMPLETE CBC W/AUTO DIFF WBC: CPT | Performed by: INTERNAL MEDICINE

## 2024-02-07 PROCEDURE — 80053 COMPREHEN METABOLIC PANEL: CPT | Performed by: INTERNAL MEDICINE

## 2024-02-07 RX ORDER — POTASSIUM CHLORIDE 20 MEQ/1
20 TABLET, EXTENDED RELEASE ORAL DAILY
Qty: 14 TABLET | Refills: 1 | Status: SHIPPED | OUTPATIENT
Start: 2024-02-07

## 2024-02-07 RX ORDER — HEPARIN SODIUM (PORCINE) LOCK FLUSH IV SOLN 100 UNIT/ML 100 UNIT/ML
500 SOLUTION INTRAVENOUS AS NEEDED
Status: DISCONTINUED | OUTPATIENT
Start: 2024-02-07 | End: 2024-02-08 | Stop reason: HOSPADM

## 2024-02-07 RX ORDER — SODIUM CHLORIDE 0.9 % (FLUSH) 0.9 %
20 SYRINGE (ML) INJECTION AS NEEDED
OUTPATIENT
Start: 2024-02-07

## 2024-02-07 RX ORDER — HEPARIN SODIUM (PORCINE) LOCK FLUSH IV SOLN 100 UNIT/ML 100 UNIT/ML
500 SOLUTION INTRAVENOUS AS NEEDED
OUTPATIENT
Start: 2024-02-07

## 2024-02-07 RX ORDER — SODIUM CHLORIDE 0.9 % (FLUSH) 0.9 %
20 SYRINGE (ML) INJECTION AS NEEDED
Status: DISCONTINUED | OUTPATIENT
Start: 2024-02-07 | End: 2024-02-08 | Stop reason: HOSPADM

## 2024-02-07 RX ADMIN — SODIUM CHLORIDE 1000 ML: 9 INJECTION, SOLUTION INTRAVENOUS at 09:59

## 2024-02-07 RX ADMIN — Medication 20 ML: at 10:59

## 2024-02-07 RX ADMIN — HEPARIN SODIUM (PORCINE) LOCK FLUSH IV SOLN 100 UNIT/ML 500 UNITS: 100 SOLUTION at 10:59

## 2024-02-07 NOTE — PROGRESS NOTES
Diagnosis: Metastatic gastric cancer to the liver       Reason for Referral: VA travel reimbursement     Content of Visit: OSW met with Mr. Ordonez and his spouse in the medical oncology infusion center this morning to assist him in completing his VA travel reimbursement form for medical appointments attended on 24 and 24. OSW mailed Mr. Ordonez's completed form to the [facility] today.  Mr. Ordonez reports having a rough week, not feeling well, experiencing significant pain, etc. He's been informed by the medical staff that these symptoms are due to the infusion treatment. His treatment is being held today due to his lab values and he is receiving IVFs. Mr. Ordonez reports the VA is also denying him to have his spouse approved as his caregiver, reporting he doesn't need one. Mr. Ordonez explains he relies on his spouse to assist him with all ADLs, transferring in and out of bed and to the restroom, assisting with showering, meal preparation/cooking, transferring in and out of the vehicle, driving him to his medical appointments, etc. He is working on completing paperwork to appeal the denial and they have 45 days to make a decision. Mr. Ordonez expressed frustrations and concerns that he may be  by the time the VA makes their final decision. Provided Mr. Ordonez with emotional support throughout encounter, utilizing empathy and active listening. He reports he's been speaking with the SW at the [facility], Lisette CLARK. OSW will reach out to her to see if there's anything we can do on our end to help advocate for Mr. Ordonez's caregiver approval. Additionally, OSW discussed opportunity to see if the VA will approve a palliative care referral to [facility] to help manage his reported symptoms and pain. Mr. Ordonez is agreeable to this referral if the VA will approve.   OSW contacted RICHARD Knox at the [facility] this morning. She reports she's waiting to receive paperwork back from Mr. Ordonez to  forward to the VA caregiver supervisor. She confirmed if the oncologist wishes to provide a letter of support, this can be faxed to her as well ([phone]). OSW will coordinate with the oncologist to create this letter of support. In regards to palliative care, Lisette reports they [facility] the [facility], but they won't manage Mr. Ordonez's pain medications. OSW contacted \[facility]\"" and inquired if they ever see patients with VA as their payer source. They believe they do and instructed OSW to fax over a referral for their billing team to review. OSW successfully faxed this to \[facility]\"" this afternoon. Requested they contact me back to confirm whether or not they're able to see Mr. Ordonez. OSW support remains available.     Update 2/8/24: OSW successfully faxed the completed letter of support signed by the medical oncologist for Mr. Ordonez's caregiver approval to RICHARD Villarreal at the [facility] this morning. OSW support remains available.    Resources/Referrals Provided: VA travel reimbursement form, emotional support, palliative care referral, care coordination of VA caregiver approval

## 2024-02-12 ENCOUNTER — TELEPHONE (OUTPATIENT)
Dept: ONCOLOGY | Facility: HOSPITAL | Age: 64
End: 2024-02-12
Payer: OTHER GOVERNMENT

## 2024-02-14 ENCOUNTER — DOCUMENTATION (OUTPATIENT)
Dept: ONCOLOGY | Facility: HOSPITAL | Age: 64
End: 2024-02-14
Payer: OTHER GOVERNMENT

## 2024-02-14 ENCOUNTER — HOSPITAL ENCOUNTER (OUTPATIENT)
Dept: ONCOLOGY | Facility: HOSPITAL | Age: 64
Discharge: HOME | End: 2024-02-14
Admitting: INTERNAL MEDICINE
Payer: OTHER GOVERNMENT

## 2024-02-14 VITALS
BODY MASS INDEX: 31.5 KG/M2 | WEIGHT: 195.99 LBS | TEMPERATURE: 97.6 F | HEIGHT: 66 IN | HEART RATE: 72 BPM | RESPIRATION RATE: 18 BRPM | OXYGEN SATURATION: 100 % | DIASTOLIC BLOOD PRESSURE: 72 MMHG | SYSTOLIC BLOOD PRESSURE: 145 MMHG

## 2024-02-14 DIAGNOSIS — Z45.2 FITTING AND ADJUSTMENT OF VASCULAR CATHETER: ICD-10-CM

## 2024-02-14 DIAGNOSIS — Z45.2 PICC (PERIPHERALLY INSERTED CENTRAL CATHETER) IN PLACE: ICD-10-CM

## 2024-02-14 DIAGNOSIS — E86.0 DEHYDRATION: Primary | ICD-10-CM

## 2024-02-14 DIAGNOSIS — C16.0 MALIGNANT NEOPLASM OF CARDIA OF STOMACH: ICD-10-CM

## 2024-02-14 LAB
ALBUMIN SERPL-MCNC: 2.7 G/DL (ref 3.5–5.2)
ALBUMIN/GLOB SERPL: 0.8 G/DL
ALP SERPL-CCNC: 279 U/L (ref 39–117)
ALT SERPL W P-5'-P-CCNC: 18 U/L (ref 1–41)
ANION GAP SERPL CALCULATED.3IONS-SCNC: 8.5 MMOL/L (ref 5–15)
AST SERPL-CCNC: 34 U/L (ref 1–40)
BASOPHILS # BLD AUTO: 0.06 10*3/MM3 (ref 0–0.2)
BASOPHILS NFR BLD AUTO: 1.3 % (ref 0–1.5)
BILIRUB SERPL-MCNC: 0.7 MG/DL (ref 0–1.2)
BILIRUB UR QL STRIP: NEGATIVE
BUN SERPL-MCNC: 14 MG/DL (ref 8–23)
BUN/CREAT SERPL: 15.1 (ref 7–25)
CALCIUM SPEC-SCNC: 8.7 MG/DL (ref 8.6–10.5)
CHLORIDE SERPL-SCNC: 103 MMOL/L (ref 98–107)
CLARITY UR: CLEAR
CO2 SERPL-SCNC: 24.5 MMOL/L (ref 22–29)
COLOR UR: YELLOW
CREAT SERPL-MCNC: 0.93 MG/DL (ref 0.76–1.27)
DEPRECATED RDW RBC AUTO: 54.8 FL (ref 37–54)
EGFRCR SERPLBLD CKD-EPI 2021: 91.7 ML/MIN/1.73
EOSINOPHIL # BLD AUTO: 0.14 10*3/MM3 (ref 0–0.4)
EOSINOPHIL NFR BLD AUTO: 3 % (ref 0.3–6.2)
ERYTHROCYTE [DISTWIDTH] IN BLOOD BY AUTOMATED COUNT: 16.2 % (ref 12.3–15.4)
GLOBULIN UR ELPH-MCNC: 3.4 GM/DL
GLUCOSE SERPL-MCNC: 79 MG/DL (ref 65–99)
GLUCOSE UR STRIP-MCNC: ABNORMAL MG/DL
HCT VFR BLD AUTO: 32.2 % (ref 37.5–51)
HGB BLD-MCNC: 10.6 G/DL (ref 13–17.7)
HGB UR QL STRIP.AUTO: NEGATIVE
IMM GRANULOCYTES # BLD AUTO: 0.02 10*3/MM3 (ref 0–0.05)
IMM GRANULOCYTES NFR BLD AUTO: 0.4 % (ref 0–0.5)
KETONES UR QL STRIP: NEGATIVE
LEUKOCYTE ESTERASE UR QL STRIP.AUTO: NEGATIVE
LYMPHOCYTES # BLD AUTO: 1.12 10*3/MM3 (ref 0.7–3.1)
LYMPHOCYTES NFR BLD AUTO: 24 % (ref 19.6–45.3)
MCH RBC QN AUTO: 30.4 PG (ref 26.6–33)
MCHC RBC AUTO-ENTMCNC: 32.9 G/DL (ref 31.5–35.7)
MCV RBC AUTO: 92.3 FL (ref 79–97)
MONOCYTES # BLD AUTO: 1.07 10*3/MM3 (ref 0.1–0.9)
MONOCYTES NFR BLD AUTO: 23 % (ref 5–12)
NEUTROPHILS NFR BLD AUTO: 2.25 10*3/MM3 (ref 1.7–7)
NEUTROPHILS NFR BLD AUTO: 48.3 % (ref 42.7–76)
NITRITE UR QL STRIP: NEGATIVE
PH UR STRIP.AUTO: 6.5 [PH] (ref 5–8)
PLATELET # BLD AUTO: 255 10*3/MM3 (ref 140–450)
PMV BLD AUTO: 10.3 FL (ref 6–12)
POTASSIUM SERPL-SCNC: 3.3 MMOL/L (ref 3.5–5.2)
PROT SERPL-MCNC: 6.1 G/DL (ref 6–8.5)
PROT UR QL STRIP: ABNORMAL
RBC # BLD AUTO: 3.49 10*6/MM3 (ref 4.14–5.8)
SODIUM SERPL-SCNC: 136 MMOL/L (ref 136–145)
SP GR UR STRIP: 1.01 (ref 1–1.03)
UROBILINOGEN UR QL STRIP: ABNORMAL
WBC NRBC COR # BLD AUTO: 4.66 10*3/MM3 (ref 3.4–10.8)

## 2024-02-14 PROCEDURE — 25010000002 PACLITAXEL PER 1 MG: Performed by: INTERNAL MEDICINE

## 2024-02-14 PROCEDURE — 25010000002 DIPHENHYDRAMINE PER 50 MG: Performed by: INTERNAL MEDICINE

## 2024-02-14 PROCEDURE — 96361 HYDRATE IV INFUSION ADD-ON: CPT

## 2024-02-14 PROCEDURE — 25010000002 DEXAMETHASONE SODIUM PHOSPHATE 120 MG/30ML SOLUTION: Performed by: INTERNAL MEDICINE

## 2024-02-14 PROCEDURE — 96417 CHEMO IV INFUS EACH ADDL SEQ: CPT

## 2024-02-14 PROCEDURE — 96375 TX/PRO/DX INJ NEW DRUG ADDON: CPT

## 2024-02-14 PROCEDURE — 81003 URINALYSIS AUTO W/O SCOPE: CPT | Performed by: INTERNAL MEDICINE

## 2024-02-14 PROCEDURE — 25010000002 RAMUCIRUMAB 500 MG/50ML SOLUTION 50 ML VIAL: Performed by: INTERNAL MEDICINE

## 2024-02-14 PROCEDURE — 25010000002 RAMUCIRUMAB 100 MG/10ML SOLUTION 10 ML VIAL: Performed by: INTERNAL MEDICINE

## 2024-02-14 PROCEDURE — 85025 COMPLETE CBC W/AUTO DIFF WBC: CPT | Performed by: INTERNAL MEDICINE

## 2024-02-14 PROCEDURE — 25810000003 SODIUM CHLORIDE 0.9 % SOLUTION: Performed by: INTERNAL MEDICINE

## 2024-02-14 PROCEDURE — 80053 COMPREHEN METABOLIC PANEL: CPT | Performed by: INTERNAL MEDICINE

## 2024-02-14 PROCEDURE — 25010000002 HEPARIN LOCK FLUSH PER 10 UNITS: Performed by: INTERNAL MEDICINE

## 2024-02-14 PROCEDURE — 25810000003 SODIUM CHLORIDE 0.9 % SOLUTION 250 ML FLEX CONT: Performed by: INTERNAL MEDICINE

## 2024-02-14 PROCEDURE — 96413 CHEMO IV INFUSION 1 HR: CPT

## 2024-02-14 RX ORDER — FAMOTIDINE 10 MG/ML
20 INJECTION, SOLUTION INTRAVENOUS AS NEEDED
Status: DISCONTINUED | OUTPATIENT
Start: 2024-02-14 | End: 2024-02-15 | Stop reason: HOSPADM

## 2024-02-14 RX ORDER — SODIUM CHLORIDE 9 MG/ML
20 INJECTION, SOLUTION INTRAVENOUS ONCE
Status: COMPLETED | OUTPATIENT
Start: 2024-02-14 | End: 2024-02-14

## 2024-02-14 RX ORDER — HEPARIN SODIUM (PORCINE) LOCK FLUSH IV SOLN 100 UNIT/ML 100 UNIT/ML
500 SOLUTION INTRAVENOUS AS NEEDED
OUTPATIENT
Start: 2024-02-14

## 2024-02-14 RX ORDER — SODIUM CHLORIDE 0.9 % (FLUSH) 0.9 %
20 SYRINGE (ML) INJECTION AS NEEDED
OUTPATIENT
Start: 2024-02-14

## 2024-02-14 RX ORDER — HEPARIN SODIUM (PORCINE) LOCK FLUSH IV SOLN 100 UNIT/ML 100 UNIT/ML
500 SOLUTION INTRAVENOUS AS NEEDED
Status: DISCONTINUED | OUTPATIENT
Start: 2024-02-14 | End: 2024-02-15 | Stop reason: HOSPADM

## 2024-02-14 RX ORDER — DIPHENHYDRAMINE HYDROCHLORIDE 50 MG/ML
50 INJECTION INTRAMUSCULAR; INTRAVENOUS AS NEEDED
Status: DISCONTINUED | OUTPATIENT
Start: 2024-02-14 | End: 2024-02-15 | Stop reason: HOSPADM

## 2024-02-14 RX ORDER — FAMOTIDINE 10 MG/ML
20 INJECTION, SOLUTION INTRAVENOUS ONCE
Status: COMPLETED | OUTPATIENT
Start: 2024-02-14 | End: 2024-02-14

## 2024-02-14 RX ORDER — SODIUM CHLORIDE 0.9 % (FLUSH) 0.9 %
20 SYRINGE (ML) INJECTION AS NEEDED
Status: DISCONTINUED | OUTPATIENT
Start: 2024-02-14 | End: 2024-02-15 | Stop reason: HOSPADM

## 2024-02-14 RX ADMIN — SODIUM CHLORIDE 20 ML/HR: 9 INJECTION, SOLUTION INTRAVENOUS at 10:40

## 2024-02-14 RX ADMIN — SODIUM CHLORIDE 1000 ML: 9 INJECTION, SOLUTION INTRAVENOUS at 09:30

## 2024-02-14 RX ADMIN — PACLITAXEL 155 MG: 6 INJECTION, SOLUTION INTRAVENOUS at 12:49

## 2024-02-14 RX ADMIN — DEXAMETHASONE SODIUM PHOSPHATE 12 MG: 4 INJECTION, SOLUTION INTRA-ARTICULAR; INTRALESIONAL; INTRAMUSCULAR; INTRAVENOUS; SOFT TISSUE at 10:46

## 2024-02-14 RX ADMIN — DIPHENHYDRAMINE HYDROCHLORIDE 25 MG: 50 INJECTION, SOLUTION INTRAMUSCULAR; INTRAVENOUS at 11:02

## 2024-02-14 RX ADMIN — Medication 20 ML: at 13:56

## 2024-02-14 RX ADMIN — HEPARIN SODIUM (PORCINE) LOCK FLUSH IV SOLN 100 UNIT/ML 500 UNITS: 100 SOLUTION at 13:56

## 2024-02-14 RX ADMIN — SODIUM CHLORIDE 700 MG: 9 INJECTION, SOLUTION INTRAVENOUS at 11:31

## 2024-02-14 RX ADMIN — FAMOTIDINE 20 MG: 10 INJECTION INTRAVENOUS at 10:43

## 2024-02-15 ENCOUNTER — HOSPITAL ENCOUNTER (OUTPATIENT)
Dept: ONCOLOGY | Facility: HOSPITAL | Age: 64
Discharge: HOME | End: 2024-02-15
Payer: OTHER GOVERNMENT

## 2024-02-15 VITALS
OXYGEN SATURATION: 100 % | HEART RATE: 80 BPM | DIASTOLIC BLOOD PRESSURE: 68 MMHG | SYSTOLIC BLOOD PRESSURE: 127 MMHG | TEMPERATURE: 97.2 F

## 2024-02-15 DIAGNOSIS — C16.0 MALIGNANT NEOPLASM OF CARDIA OF STOMACH: Primary | ICD-10-CM

## 2024-02-15 PROCEDURE — 25010000002 PEGFILGRASTIM-CBQV 6 MG/0.6ML SOLUTION AUTO-INJECTOR: Performed by: INTERNAL MEDICINE

## 2024-02-15 PROCEDURE — 96372 THER/PROPH/DIAG INJ SC/IM: CPT

## 2024-02-15 RX ADMIN — PEGFILGRASTIM-CBQV 6 MG: 6 INJECTION, SOLUTION SUBCUTANEOUS at 14:19

## 2024-02-16 ENCOUNTER — TELEPHONE (OUTPATIENT)
Dept: ONCOLOGY | Facility: HOSPITAL | Age: 64
End: 2024-02-16
Payer: OTHER GOVERNMENT

## 2024-02-16 RX ORDER — FUROSEMIDE 20 MG/1
20 TABLET ORAL DAILY
Qty: 7 TABLET | Refills: 1 | Status: SHIPPED | OUTPATIENT
Start: 2024-02-16 | End: 2024-02-16 | Stop reason: SDUPTHER

## 2024-02-16 RX ORDER — FUROSEMIDE 20 MG/1
20 TABLET ORAL DAILY
Qty: 7 TABLET | Refills: 1 | Status: ON HOLD | OUTPATIENT
Start: 2024-02-16

## 2024-02-17 ENCOUNTER — HOSPITAL ENCOUNTER (INPATIENT)
Facility: HOSPITAL | Age: 64
LOS: 6 days | Discharge: HOSPICE | End: 2024-02-23
Attending: EMERGENCY MEDICINE | Admitting: HOSPITALIST
Payer: OTHER GOVERNMENT

## 2024-02-17 ENCOUNTER — APPOINTMENT (OUTPATIENT)
Dept: GENERAL RADIOLOGY | Facility: HOSPITAL | Age: 64
End: 2024-02-17
Payer: OTHER GOVERNMENT

## 2024-02-17 ENCOUNTER — APPOINTMENT (OUTPATIENT)
Dept: CT IMAGING | Facility: HOSPITAL | Age: 64
End: 2024-02-17
Payer: OTHER GOVERNMENT

## 2024-02-17 DIAGNOSIS — D72.829 LEUKOCYTOSIS, UNSPECIFIED TYPE: ICD-10-CM

## 2024-02-17 DIAGNOSIS — C16.0 MALIGNANT NEOPLASM OF CARDIA OF STOMACH: ICD-10-CM

## 2024-02-17 DIAGNOSIS — R41.82 ALTERED MENTAL STATUS, UNSPECIFIED ALTERED MENTAL STATUS TYPE: Primary | ICD-10-CM

## 2024-02-17 DIAGNOSIS — E72.20 HYPERAMMONEMIA: ICD-10-CM

## 2024-02-17 DIAGNOSIS — R13.10 DYSPHAGIA, UNSPECIFIED TYPE: ICD-10-CM

## 2024-02-17 DIAGNOSIS — E16.2 HYPOGLYCEMIA: ICD-10-CM

## 2024-02-17 DIAGNOSIS — R26.2 DIFFICULTY IN WALKING: ICD-10-CM

## 2024-02-17 LAB
ALBUMIN SERPL-MCNC: 2.7 G/DL (ref 3.5–5.2)
ALBUMIN/GLOB SERPL: 0.6 G/DL
ALP SERPL-CCNC: 322 U/L (ref 39–117)
ALT SERPL W P-5'-P-CCNC: 25 U/L (ref 1–41)
AMMONIA BLD-SCNC: 75 UMOL/L (ref 16–60)
ANION GAP SERPL CALCULATED.3IONS-SCNC: 12.5 MMOL/L (ref 5–15)
ANION GAP SERPL CALCULATED.3IONS-SCNC: 18.5 MMOL/L (ref 5–15)
AST SERPL-CCNC: 67 U/L (ref 1–40)
BACTERIA UR QL AUTO: NORMAL /HPF
BASOPHILS # BLD MANUAL: 0.21 10*3/MM3 (ref 0–0.2)
BASOPHILS NFR BLD MANUAL: 1 % (ref 0–1.5)
BILIRUB SERPL-MCNC: 0.8 MG/DL (ref 0–1.2)
BILIRUB UR QL STRIP: NEGATIVE
BUN SERPL-MCNC: 10 MG/DL (ref 8–23)
BUN SERPL-MCNC: 8 MG/DL (ref 8–23)
BUN/CREAT SERPL: 11.3 (ref 7–25)
BUN/CREAT SERPL: 12.7 (ref 7–25)
CALCIUM SPEC-SCNC: 8.4 MG/DL (ref 8.6–10.5)
CALCIUM SPEC-SCNC: 9 MG/DL (ref 8.6–10.5)
CHLORIDE SERPL-SCNC: 101 MMOL/L (ref 98–107)
CHLORIDE SERPL-SCNC: 101 MMOL/L (ref 98–107)
CK SERPL-CCNC: 202 U/L (ref 20–200)
CLARITY UR: CLEAR
CLUMPED PLATELETS: PRESENT
CO2 SERPL-SCNC: 21.5 MMOL/L (ref 22–29)
CO2 SERPL-SCNC: 25.5 MMOL/L (ref 22–29)
COLOR UR: YELLOW
CREAT SERPL-MCNC: 0.71 MG/DL (ref 0.76–1.27)
CREAT SERPL-MCNC: 0.79 MG/DL (ref 0.76–1.27)
D-LACTATE SERPL-SCNC: 2.8 MMOL/L (ref 0.5–2)
D-LACTATE SERPL-SCNC: 3.2 MMOL/L (ref 0.5–2)
D-LACTATE SERPL-SCNC: 3.3 MMOL/L (ref 0.5–2)
DEPRECATED RDW RBC AUTO: 55.6 FL (ref 37–54)
EGFRCR SERPLBLD CKD-EPI 2021: 102.5 ML/MIN/1.73
EGFRCR SERPLBLD CKD-EPI 2021: 99.2 ML/MIN/1.73
ERYTHROCYTE [DISTWIDTH] IN BLOOD BY AUTOMATED COUNT: 16.3 % (ref 12.3–15.4)
GLOBULIN UR ELPH-MCNC: 4.5 GM/DL
GLUCOSE BLDC GLUCOMTR-MCNC: 100 MG/DL (ref 70–99)
GLUCOSE BLDC GLUCOMTR-MCNC: 101 MG/DL (ref 70–99)
GLUCOSE BLDC GLUCOMTR-MCNC: 109 MG/DL (ref 70–99)
GLUCOSE BLDC GLUCOMTR-MCNC: 120 MG/DL (ref 70–99)
GLUCOSE BLDC GLUCOMTR-MCNC: 128 MG/DL (ref 70–99)
GLUCOSE BLDC GLUCOMTR-MCNC: 128 MG/DL (ref 70–99)
GLUCOSE BLDC GLUCOMTR-MCNC: 134 MG/DL (ref 70–99)
GLUCOSE BLDC GLUCOMTR-MCNC: 135 MG/DL (ref 70–99)
GLUCOSE BLDC GLUCOMTR-MCNC: 147 MG/DL (ref 70–99)
GLUCOSE BLDC GLUCOMTR-MCNC: 37 MG/DL (ref 70–99)
GLUCOSE BLDC GLUCOMTR-MCNC: 42 MG/DL (ref 70–99)
GLUCOSE BLDC GLUCOMTR-MCNC: 43 MG/DL (ref 70–99)
GLUCOSE BLDC GLUCOMTR-MCNC: 69 MG/DL (ref 70–99)
GLUCOSE BLDC GLUCOMTR-MCNC: 73 MG/DL (ref 70–99)
GLUCOSE BLDC GLUCOMTR-MCNC: 88 MG/DL (ref 70–99)
GLUCOSE BLDC GLUCOMTR-MCNC: 94 MG/DL (ref 70–99)
GLUCOSE BLDC GLUCOMTR-MCNC: 94 MG/DL (ref 70–99)
GLUCOSE BLDC GLUCOMTR-MCNC: 97 MG/DL (ref 70–99)
GLUCOSE SERPL-MCNC: 146 MG/DL (ref 65–99)
GLUCOSE SERPL-MCNC: 85 MG/DL (ref 65–99)
GLUCOSE UR STRIP-MCNC: ABNORMAL MG/DL
HCT VFR BLD AUTO: 38.1 % (ref 37.5–51)
HGB BLD-MCNC: 12.4 G/DL (ref 13–17.7)
HGB UR QL STRIP.AUTO: ABNORMAL
HOLD SPECIMEN: NORMAL
HOLD SPECIMEN: NORMAL
HYALINE CASTS UR QL AUTO: NORMAL /LPF
INR PPP: 1.17 (ref 0.86–1.15)
KETONES UR QL STRIP: NEGATIVE
L PNEUMO1 AG UR QL IA: NEGATIVE
LEUKOCYTE ESTERASE UR QL STRIP.AUTO: NEGATIVE
LYMPHOCYTES # BLD MANUAL: 1.06 10*3/MM3 (ref 0.7–3.1)
LYMPHOCYTES NFR BLD MANUAL: 1 % (ref 5–12)
MAGNESIUM SERPL-MCNC: 1.2 MG/DL (ref 1.6–2.4)
MAGNESIUM SERPL-MCNC: 1.2 MG/DL (ref 1.6–2.4)
MCH RBC QN AUTO: 30 PG (ref 26.6–33)
MCHC RBC AUTO-ENTMCNC: 32.5 G/DL (ref 31.5–35.7)
MCV RBC AUTO: 92 FL (ref 79–97)
MONOCYTES # BLD: 0.21 10*3/MM3 (ref 0.1–0.9)
NEUTROPHILS # BLD AUTO: 19.64 10*3/MM3 (ref 1.7–7)
NEUTROPHILS NFR BLD MANUAL: 93 % (ref 42.7–76)
NEUTS VAC BLD QL SMEAR: NORMAL
NITRITE UR QL STRIP: NEGATIVE
PH UR STRIP.AUTO: 6.5 [PH] (ref 5–8)
PHOSPHATE SERPL-MCNC: 2.7 MG/DL (ref 2.5–4.5)
PLAT MORPH BLD: NORMAL
PLATELET # BLD AUTO: 222 10*3/MM3 (ref 140–450)
PMV BLD AUTO: 11.4 FL (ref 6–12)
POTASSIUM SERPL-SCNC: 2.9 MMOL/L (ref 3.5–5.2)
POTASSIUM SERPL-SCNC: 4.3 MMOL/L (ref 3.5–5.2)
PROCALCITONIN SERPL-MCNC: 0.34 NG/ML (ref 0–0.25)
PROT SERPL-MCNC: 7.2 G/DL (ref 6–8.5)
PROT UR QL STRIP: ABNORMAL
PROTHROMBIN TIME: 15.2 SECONDS (ref 11.8–14.9)
QT INTERVAL: 410 MS
QTC INTERVAL: 526 MS
RBC # BLD AUTO: 4.14 10*6/MM3 (ref 4.14–5.8)
RBC # UR STRIP: NORMAL /HPF
RBC MORPH BLD: NORMAL
REF LAB TEST METHOD: NORMAL
S PNEUM AG SPEC QL LA: NEGATIVE
SCAN SLIDE: NORMAL
SMALL PLATELETS BLD QL SMEAR: ADEQUATE
SODIUM SERPL-SCNC: 139 MMOL/L (ref 136–145)
SODIUM SERPL-SCNC: 141 MMOL/L (ref 136–145)
SP GR UR STRIP: 1.01 (ref 1–1.03)
SQUAMOUS #/AREA URNS HPF: NORMAL /HPF
T4 FREE SERPL-MCNC: 1.08 NG/DL (ref 0.93–1.7)
TSH SERPL DL<=0.05 MIU/L-ACNC: 2.75 UIU/ML (ref 0.27–4.2)
UROBILINOGEN UR QL STRIP: ABNORMAL
VARIANT LYMPHS NFR BLD MANUAL: 1 % (ref 19.6–45.3)
VARIANT LYMPHS NFR BLD MANUAL: 4 % (ref 0–5)
WBC # UR STRIP: NORMAL /HPF
WBC MORPH BLD: NORMAL
WBC NRBC COR # BLD AUTO: 21.12 10*3/MM3 (ref 3.4–10.8)
WHOLE BLOOD HOLD COAG: NORMAL
WHOLE BLOOD HOLD SPECIMEN: NORMAL

## 2024-02-17 PROCEDURE — 99291 CRITICAL CARE FIRST HOUR: CPT

## 2024-02-17 PROCEDURE — 36415 COLL VENOUS BLD VENIPUNCTURE: CPT

## 2024-02-17 PROCEDURE — 25010000002 MAGNESIUM SULFATE 2 GM/50ML SOLUTION: Performed by: HOSPITALIST

## 2024-02-17 PROCEDURE — 83605 ASSAY OF LACTIC ACID: CPT | Performed by: NURSE PRACTITIONER

## 2024-02-17 PROCEDURE — 83735 ASSAY OF MAGNESIUM: CPT | Performed by: HOSPITALIST

## 2024-02-17 PROCEDURE — 87040 BLOOD CULTURE FOR BACTERIA: CPT | Performed by: NURSE PRACTITIONER

## 2024-02-17 PROCEDURE — 25010000002 POTASSIUM CHLORIDE 10 MEQ/100ML SOLUTION: Performed by: NURSE PRACTITIONER

## 2024-02-17 PROCEDURE — 82550 ASSAY OF CK (CPK): CPT | Performed by: EMERGENCY MEDICINE

## 2024-02-17 PROCEDURE — 25010000002 AMPICILLIN-SULBACTAM PER 1.5 G: Performed by: NURSE PRACTITIONER

## 2024-02-17 PROCEDURE — 84100 ASSAY OF PHOSPHORUS: CPT | Performed by: HOSPITALIST

## 2024-02-17 PROCEDURE — 82948 REAGENT STRIP/BLOOD GLUCOSE: CPT

## 2024-02-17 PROCEDURE — 85025 COMPLETE CBC W/AUTO DIFF WBC: CPT | Performed by: EMERGENCY MEDICINE

## 2024-02-17 PROCEDURE — 82948 REAGENT STRIP/BLOOD GLUCOSE: CPT | Performed by: EMERGENCY MEDICINE

## 2024-02-17 PROCEDURE — 80053 COMPREHEN METABOLIC PANEL: CPT | Performed by: EMERGENCY MEDICINE

## 2024-02-17 PROCEDURE — 81001 URINALYSIS AUTO W/SCOPE: CPT | Performed by: EMERGENCY MEDICINE

## 2024-02-17 PROCEDURE — 25010000002 KETOROLAC TROMETHAMINE PER 15 MG: Performed by: STUDENT IN AN ORGANIZED HEALTH CARE EDUCATION/TRAINING PROGRAM

## 2024-02-17 PROCEDURE — 99291 CRITICAL CARE FIRST HOUR: CPT | Performed by: INTERNAL MEDICINE

## 2024-02-17 PROCEDURE — 82948 REAGENT STRIP/BLOOD GLUCOSE: CPT | Performed by: HOSPITALIST

## 2024-02-17 PROCEDURE — 85610 PROTHROMBIN TIME: CPT | Performed by: EMERGENCY MEDICINE

## 2024-02-17 PROCEDURE — 82140 ASSAY OF AMMONIA: CPT | Performed by: EMERGENCY MEDICINE

## 2024-02-17 PROCEDURE — 25810000003 DEXTROSE-NACL PER 500 ML: Performed by: EMERGENCY MEDICINE

## 2024-02-17 PROCEDURE — 25010000002 MAGNESIUM SULFATE IN D5W 1G/100ML (PREMIX) 1-5 GM/100ML-% SOLUTION: Performed by: HOSPITALIST

## 2024-02-17 PROCEDURE — 71045 X-RAY EXAM CHEST 1 VIEW: CPT

## 2024-02-17 PROCEDURE — 87899 AGENT NOS ASSAY W/OPTIC: CPT | Performed by: NURSE PRACTITIONER

## 2024-02-17 PROCEDURE — 99223 1ST HOSP IP/OBS HIGH 75: CPT | Performed by: HOSPITALIST

## 2024-02-17 PROCEDURE — 93005 ELECTROCARDIOGRAM TRACING: CPT | Performed by: EMERGENCY MEDICINE

## 2024-02-17 PROCEDURE — 84439 ASSAY OF FREE THYROXINE: CPT | Performed by: EMERGENCY MEDICINE

## 2024-02-17 PROCEDURE — 84443 ASSAY THYROID STIM HORMONE: CPT | Performed by: EMERGENCY MEDICINE

## 2024-02-17 PROCEDURE — 83735 ASSAY OF MAGNESIUM: CPT | Performed by: EMERGENCY MEDICINE

## 2024-02-17 PROCEDURE — 70450 CT HEAD/BRAIN W/O DYE: CPT

## 2024-02-17 PROCEDURE — 74176 CT ABD & PELVIS W/O CONTRAST: CPT

## 2024-02-17 PROCEDURE — 87449 NOS EACH ORGANISM AG IA: CPT | Performed by: NURSE PRACTITIONER

## 2024-02-17 PROCEDURE — 85007 BL SMEAR W/DIFF WBC COUNT: CPT | Performed by: EMERGENCY MEDICINE

## 2024-02-17 PROCEDURE — 84145 PROCALCITONIN (PCT): CPT | Performed by: NURSE PRACTITIONER

## 2024-02-17 PROCEDURE — 25010000002 HYDRALAZINE PER 20 MG: Performed by: HOSPITALIST

## 2024-02-17 PROCEDURE — 74018 RADEX ABDOMEN 1 VIEW: CPT

## 2024-02-17 RX ORDER — ROSUVASTATIN CALCIUM 40 MG/1
40 TABLET, COATED ORAL NIGHTLY
COMMUNITY
End: 2024-02-23 | Stop reason: HOSPADM

## 2024-02-17 RX ORDER — DEXTROSE MONOHYDRATE 25 G/50ML
25 INJECTION, SOLUTION INTRAVENOUS ONCE
Status: DISCONTINUED | OUTPATIENT
Start: 2024-02-17 | End: 2024-02-23 | Stop reason: HOSPADM

## 2024-02-17 RX ORDER — MAGNESIUM SULFATE HEPTAHYDRATE 40 MG/ML
2 INJECTION, SOLUTION INTRAVENOUS
Qty: 100 ML | Refills: 0 | Status: DISPENSED | OUTPATIENT
Start: 2024-02-17 | End: 2024-02-17

## 2024-02-17 RX ORDER — DEXTROSE MONOHYDRATE 100 MG/ML
75 INJECTION, SOLUTION INTRAVENOUS CONTINUOUS
Status: DISCONTINUED | OUTPATIENT
Start: 2024-02-17 | End: 2024-02-17

## 2024-02-17 RX ORDER — SODIUM CHLORIDE 0.9 % (FLUSH) 0.9 %
10 SYRINGE (ML) INJECTION AS NEEDED
Status: DISCONTINUED | OUTPATIENT
Start: 2024-02-17 | End: 2024-02-17

## 2024-02-17 RX ORDER — SODIUM CHLORIDE 0.9 % (FLUSH) 0.9 %
10 SYRINGE (ML) INJECTION EVERY 12 HOURS SCHEDULED
Status: DISCONTINUED | OUTPATIENT
Start: 2024-02-17 | End: 2024-02-23 | Stop reason: HOSPADM

## 2024-02-17 RX ORDER — MAGNESIUM SULFATE 1 G/100ML
2 INJECTION INTRAVENOUS ONCE
Status: COMPLETED | OUTPATIENT
Start: 2024-02-17 | End: 2024-02-17

## 2024-02-17 RX ORDER — DEXTROSE MONOHYDRATE 25 G/50ML
INJECTION, SOLUTION INTRAVENOUS
Status: COMPLETED
Start: 2024-02-17 | End: 2024-02-17

## 2024-02-17 RX ORDER — POTASSIUM CHLORIDE 1.5 G/1.58G
40 POWDER, FOR SOLUTION ORAL ONCE
Status: COMPLETED | OUTPATIENT
Start: 2024-02-17 | End: 2024-02-17

## 2024-02-17 RX ORDER — MAGNESIUM OXIDE 400 MG/1
400 TABLET ORAL DAILY
COMMUNITY
End: 2024-02-23 | Stop reason: HOSPADM

## 2024-02-17 RX ORDER — LORAZEPAM 1 MG/1
1 TABLET ORAL EVERY 8 HOURS PRN
COMMUNITY
End: 2024-02-23 | Stop reason: HOSPADM

## 2024-02-17 RX ORDER — NICOTINE POLACRILEX 4 MG
15 LOZENGE BUCCAL
Status: DISCONTINUED | OUTPATIENT
Start: 2024-02-17 | End: 2024-02-23 | Stop reason: HOSPADM

## 2024-02-17 RX ORDER — AMOXICILLIN 250 MG
2 CAPSULE ORAL 2 TIMES DAILY
Status: DISCONTINUED | OUTPATIENT
Start: 2024-02-17 | End: 2024-02-17

## 2024-02-17 RX ORDER — KETOROLAC TROMETHAMINE 30 MG/ML
15 INJECTION, SOLUTION INTRAMUSCULAR; INTRAVENOUS ONCE
Status: COMPLETED | OUTPATIENT
Start: 2024-02-18 | End: 2024-02-17

## 2024-02-17 RX ORDER — SODIUM CHLORIDE 0.9 % (FLUSH) 0.9 %
10 SYRINGE (ML) INJECTION AS NEEDED
Status: DISCONTINUED | OUTPATIENT
Start: 2024-02-17 | End: 2024-02-23 | Stop reason: HOSPADM

## 2024-02-17 RX ORDER — BISACODYL 10 MG
10 SUPPOSITORY, RECTAL RECTAL DAILY PRN
Status: DISCONTINUED | OUTPATIENT
Start: 2024-02-17 | End: 2024-02-18

## 2024-02-17 RX ORDER — BUDESONIDE AND FORMOTEROL FUMARATE DIHYDRATE 160; 4.5 UG/1; UG/1
1 AEROSOL RESPIRATORY (INHALATION)
Status: DISCONTINUED | OUTPATIENT
Start: 2024-02-17 | End: 2024-02-23 | Stop reason: HOSPADM

## 2024-02-17 RX ORDER — ALBUTEROL SULFATE 90 UG/1
2 AEROSOL, METERED RESPIRATORY (INHALATION) EVERY 4 HOURS PRN
COMMUNITY
End: 2024-02-23 | Stop reason: HOSPADM

## 2024-02-17 RX ORDER — DEXTROSE AND SODIUM CHLORIDE 5; .9 G/100ML; G/100ML
75 INJECTION, SOLUTION INTRAVENOUS CONTINUOUS
Status: DISCONTINUED | OUTPATIENT
Start: 2024-02-17 | End: 2024-02-17

## 2024-02-17 RX ORDER — POTASSIUM CHLORIDE 20 MEQ/1
20 TABLET, EXTENDED RELEASE ORAL DAILY
COMMUNITY
End: 2024-02-23 | Stop reason: HOSPADM

## 2024-02-17 RX ORDER — LORAZEPAM 0.5 MG/1
1 TABLET ORAL EVERY 8 HOURS PRN
Status: DISCONTINUED | OUTPATIENT
Start: 2024-02-17 | End: 2024-02-23 | Stop reason: HOSPADM

## 2024-02-17 RX ORDER — LACTULOSE 10 G/15ML
30 SOLUTION ORAL 3 TIMES DAILY
Status: DISCONTINUED | OUTPATIENT
Start: 2024-02-17 | End: 2024-02-17

## 2024-02-17 RX ORDER — DEXTROSE MONOHYDRATE 25 G/50ML
25 INJECTION, SOLUTION INTRAVENOUS
Status: DISCONTINUED | OUTPATIENT
Start: 2024-02-17 | End: 2024-02-23 | Stop reason: HOSPADM

## 2024-02-17 RX ORDER — LOSARTAN POTASSIUM 50 MG/1
100 TABLET ORAL DAILY
Status: DISCONTINUED | OUTPATIENT
Start: 2024-02-17 | End: 2024-02-23 | Stop reason: HOSPADM

## 2024-02-17 RX ORDER — PANTOPRAZOLE SODIUM 40 MG/1
40 TABLET, DELAYED RELEASE ORAL
Status: DISCONTINUED | OUTPATIENT
Start: 2024-02-18 | End: 2024-02-23 | Stop reason: HOSPADM

## 2024-02-17 RX ORDER — HYDRALAZINE HYDROCHLORIDE 20 MG/ML
10 INJECTION INTRAMUSCULAR; INTRAVENOUS EVERY 6 HOURS PRN
Status: DISCONTINUED | OUTPATIENT
Start: 2024-02-17 | End: 2024-02-23 | Stop reason: HOSPADM

## 2024-02-17 RX ORDER — POTASSIUM CHLORIDE 7.45 MG/ML
10 INJECTION INTRAVENOUS
Status: COMPLETED | OUTPATIENT
Start: 2024-02-17 | End: 2024-02-18

## 2024-02-17 RX ORDER — POLYETHYLENE GLYCOL 3350 17 G/17G
17 POWDER, FOR SOLUTION ORAL DAILY PRN
Status: DISCONTINUED | OUTPATIENT
Start: 2024-02-17 | End: 2024-02-17

## 2024-02-17 RX ORDER — GABAPENTIN 300 MG/1
600 CAPSULE ORAL EVERY 12 HOURS SCHEDULED
Status: DISCONTINUED | OUTPATIENT
Start: 2024-02-17 | End: 2024-02-23 | Stop reason: HOSPADM

## 2024-02-17 RX ORDER — AMOXICILLIN 250 MG
2 CAPSULE ORAL 2 TIMES DAILY
Status: DISCONTINUED | OUTPATIENT
Start: 2024-02-17 | End: 2024-02-18

## 2024-02-17 RX ORDER — CHLORTHALIDONE 25 MG/1
12.5 TABLET ORAL DAILY
Status: DISCONTINUED | OUTPATIENT
Start: 2024-02-17 | End: 2024-02-23 | Stop reason: HOSPADM

## 2024-02-17 RX ORDER — DEXTROSE MONOHYDRATE 25 G/50ML
50 INJECTION, SOLUTION INTRAVENOUS ONCE
Status: COMPLETED | OUTPATIENT
Start: 2024-02-17 | End: 2024-02-17

## 2024-02-17 RX ORDER — TIOTROPIUM BROMIDE INHALATION SPRAY 3.12 UG/1
2 SPRAY, METERED RESPIRATORY (INHALATION) DAILY
COMMUNITY
End: 2024-02-23 | Stop reason: HOSPADM

## 2024-02-17 RX ORDER — ONDANSETRON 2 MG/ML
4 INJECTION INTRAMUSCULAR; INTRAVENOUS EVERY 6 HOURS PRN
Status: DISCONTINUED | OUTPATIENT
Start: 2024-02-17 | End: 2024-02-23 | Stop reason: HOSPADM

## 2024-02-17 RX ORDER — POLYETHYLENE GLYCOL 3350 17 G/17G
17 POWDER, FOR SOLUTION ORAL DAILY PRN
Status: DISCONTINUED | OUTPATIENT
Start: 2024-02-17 | End: 2024-02-18

## 2024-02-17 RX ORDER — SODIUM CHLORIDE 9 MG/ML
40 INJECTION, SOLUTION INTRAVENOUS AS NEEDED
Status: DISCONTINUED | OUTPATIENT
Start: 2024-02-17 | End: 2024-02-23 | Stop reason: HOSPADM

## 2024-02-17 RX ORDER — FUROSEMIDE 20 MG/1
20 TABLET ORAL DAILY
Status: DISCONTINUED | OUTPATIENT
Start: 2024-02-17 | End: 2024-02-23 | Stop reason: HOSPADM

## 2024-02-17 RX ORDER — MIRTAZAPINE 15 MG/1
15 TABLET, FILM COATED ORAL NIGHTLY
Status: DISCONTINUED | OUTPATIENT
Start: 2024-02-17 | End: 2024-02-23 | Stop reason: HOSPADM

## 2024-02-17 RX ORDER — IPRATROPIUM BROMIDE AND ALBUTEROL SULFATE 2.5; .5 MG/3ML; MG/3ML
3 SOLUTION RESPIRATORY (INHALATION) EVERY 4 HOURS PRN
Status: DISCONTINUED | OUTPATIENT
Start: 2024-02-17 | End: 2024-02-23 | Stop reason: HOSPADM

## 2024-02-17 RX ORDER — DEXTROSE MONOHYDRATE 25 G/50ML
25 INJECTION, SOLUTION INTRAVENOUS ONCE
Status: COMPLETED | OUTPATIENT
Start: 2024-02-17 | End: 2024-02-17

## 2024-02-17 RX ORDER — LACTULOSE 10 G/15ML
30 SOLUTION ORAL 3 TIMES DAILY
Status: DISCONTINUED | OUTPATIENT
Start: 2024-02-17 | End: 2024-02-18

## 2024-02-17 RX ORDER — OXYCODONE HYDROCHLORIDE AND ACETAMINOPHEN 5; 325 MG/1; MG/1
1 TABLET ORAL EVERY 4 HOURS PRN
Status: DISCONTINUED | OUTPATIENT
Start: 2024-02-17 | End: 2024-02-23 | Stop reason: HOSPADM

## 2024-02-17 RX ORDER — DEXTROSE MONOHYDRATE 100 MG/ML
100 INJECTION, SOLUTION INTRAVENOUS CONTINUOUS
Status: DISCONTINUED | OUTPATIENT
Start: 2024-02-17 | End: 2024-02-18

## 2024-02-17 RX ORDER — FAMOTIDINE 10 MG/ML
20 INJECTION, SOLUTION INTRAVENOUS ONCE
Status: COMPLETED | OUTPATIENT
Start: 2024-02-18 | End: 2024-02-17

## 2024-02-17 RX ORDER — MONTELUKAST SODIUM 10 MG/1
10 TABLET ORAL NIGHTLY
Status: DISCONTINUED | OUTPATIENT
Start: 2024-02-17 | End: 2024-02-17

## 2024-02-17 RX ORDER — BISACODYL 5 MG/1
5 TABLET, DELAYED RELEASE ORAL DAILY PRN
Status: DISCONTINUED | OUTPATIENT
Start: 2024-02-17 | End: 2024-02-17

## 2024-02-17 RX ORDER — NITROGLYCERIN 0.4 MG/1
0.4 TABLET SUBLINGUAL
Status: DISCONTINUED | OUTPATIENT
Start: 2024-02-17 | End: 2024-02-23 | Stop reason: HOSPADM

## 2024-02-17 RX ORDER — ONDANSETRON 4 MG/1
4 TABLET, ORALLY DISINTEGRATING ORAL EVERY 6 HOURS PRN
Status: DISCONTINUED | OUTPATIENT
Start: 2024-02-17 | End: 2024-02-23 | Stop reason: HOSPADM

## 2024-02-17 RX ORDER — OLANZAPINE 5 MG/1
5 TABLET ORAL NIGHTLY
Status: DISCONTINUED | OUTPATIENT
Start: 2024-02-17 | End: 2024-02-20

## 2024-02-17 RX ORDER — BISACODYL 10 MG
10 SUPPOSITORY, RECTAL RECTAL DAILY PRN
Status: DISCONTINUED | OUTPATIENT
Start: 2024-02-17 | End: 2024-02-17

## 2024-02-17 RX ORDER — IBUPROFEN 600 MG/1
1 TABLET ORAL
Status: DISCONTINUED | OUTPATIENT
Start: 2024-02-17 | End: 2024-02-23 | Stop reason: HOSPADM

## 2024-02-17 RX ADMIN — Medication 10 ML: at 21:41

## 2024-02-17 RX ADMIN — KETOROLAC TROMETHAMINE 15 MG: 30 INJECTION, SOLUTION INTRAMUSCULAR; INTRAVENOUS at 23:58

## 2024-02-17 RX ADMIN — HYDRALAZINE HYDROCHLORIDE 10 MG: 20 INJECTION, SOLUTION INTRAMUSCULAR; INTRAVENOUS at 18:13

## 2024-02-17 RX ADMIN — DEXTROSE MONOHYDRATE 50 ML: 25 INJECTION, SOLUTION INTRAVENOUS at 11:28

## 2024-02-17 RX ADMIN — AMPICILLIN AND SULBACTAM 3 G: 2; 1 INJECTION, POWDER, FOR SOLUTION INTRAVENOUS at 22:28

## 2024-02-17 RX ADMIN — LACTULOSE 30 G: 20 SOLUTION ORAL at 21:40

## 2024-02-17 RX ADMIN — LACTULOSE 30 G: 20 SOLUTION ORAL at 17:31

## 2024-02-17 RX ADMIN — DEXTROSE MONOHYDRATE 25 G: 25 INJECTION, SOLUTION INTRAVENOUS at 13:15

## 2024-02-17 RX ADMIN — MAGNESIUM SULFATE HEPTAHYDRATE 2 G: 10 INJECTION, SOLUTION INTRAVENOUS at 17:31

## 2024-02-17 RX ADMIN — DEXTROSE MONOHYDRATE 125 ML/HR: 100 INJECTION, SOLUTION INTRAVENOUS at 13:59

## 2024-02-17 RX ADMIN — POTASSIUM CHLORIDE 40 MEQ: 1.5 POWDER, FOR SOLUTION ORAL at 21:40

## 2024-02-17 RX ADMIN — POTASSIUM CHLORIDE 10 MEQ: 7.46 INJECTION, SOLUTION INTRAVENOUS at 21:26

## 2024-02-17 RX ADMIN — DOCUSATE SODIUM 50MG AND SENNOSIDES 8.6MG 2 TABLET: 8.6; 5 TABLET, FILM COATED ORAL at 21:41

## 2024-02-17 RX ADMIN — POTASSIUM CHLORIDE 10 MEQ: 7.46 INJECTION, SOLUTION INTRAVENOUS at 23:47

## 2024-02-17 RX ADMIN — DEXTROSE AND SODIUM CHLORIDE 75 ML/HR: 5; 900 INJECTION, SOLUTION INTRAVENOUS at 11:35

## 2024-02-17 RX ADMIN — DEXTROSE MONOHYDRATE 200 ML/HR: 100 INJECTION, SOLUTION INTRAVENOUS at 18:13

## 2024-02-17 RX ADMIN — FAMOTIDINE 20 MG: 10 INJECTION INTRAVENOUS at 23:58

## 2024-02-17 RX ADMIN — AMPICILLIN AND SULBACTAM 3 G: 2; 1 INJECTION, POWDER, FOR SOLUTION INTRAVENOUS at 15:40

## 2024-02-17 RX ADMIN — DEXTROSE MONOHYDRATE 25 G: 25 INJECTION, SOLUTION INTRAVENOUS at 14:57

## 2024-02-17 RX ADMIN — DEXTROSE MONOHYDRATE 25 G: 25 INJECTION, SOLUTION INTRAVENOUS at 09:11

## 2024-02-17 RX ADMIN — POTASSIUM CHLORIDE 10 MEQ: 7.46 INJECTION, SOLUTION INTRAVENOUS at 22:28

## 2024-02-17 RX ADMIN — Medication 10 ML: at 12:36

## 2024-02-17 RX ADMIN — MAGNESIUM SULFATE HEPTAHYDRATE 2 G: 2 INJECTION, SOLUTION INTRAVENOUS at 12:36

## 2024-02-17 NOTE — H&P
[facility]  Date: 2024   Patient Name: Kevin Ordonez  : 1960  MRN: [id number]  Primary Care Physician:  Ev Peck APRN  Date of admission: 2024    Subjective altered mental status  Subjective     Chief Complaint: Altered mental status    HPI: Patient is a 63-year-old man with a past medical history of diabetes, hypertension, hyperlipidemia, obstructive sleep apnea, COPD, neoplasm of the parotid gland, neoplasm of the stomach, gastritis, GERD. Patient is undergoing chemotherapy.      Today, he presents to the ER with weakness, fall, altered mental status.  His wife found him down in the kitchen today.  He has not been feeling well yesterday and has been experiencing more pain.  The patient was unresponsive when picked up by the EMS his blood sugar was 49.     On arrival to the ED, patient temperature 95.5, pulse of 112, respiratory rate of 14, blood pressure 149/117?,  And saturating 95% on room air.  Patient is very lethargic but arousable.  He does not stay awake though.    On imaging, patient CT of his head without contrast shows no acute abnormality.  Chest x-ray shows: Minimal left basilar scarring or atelectasis.    Patient CK is 202, CO2 was 21.5, anion gap is 18.5, patient's glucose was 69, then 147, then 37, is now improved to 73.  Patient's ammonia level is 75.  White blood cell count is 21.12.    According to his wife, patient had abdominal swelling, he started Lasix.  Bilateral leg swelling    According to the records,The pt had his infusion on 24 and his Udenyca inj on 2/15/24.  Patient is on Eliquis.    Personal History     Past Medical History:  Past Medical History:   Diagnosis Date    Asthma     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    Diabetes mellitus     Hyperlipidemia     Hypertension     Liver cancer     Rotator cuff disorder     LEFT    Sleep apnea     Stomach cancer        Past Surgical History:  Past  Surgical History:   Procedure Laterality Date    CAROTID ENDARTERECTOMY Right     CHOLECYSTECTOMY      FOOT SURGERY Right     TOE DEBRIDMENT    HERNIA REPAIR      VENTRAL HERNIA    SHOULDER ARTHROSCOPY W/ ROTATOR CUFF REPAIR Left 4/3/2023    Procedure: LEFT SHOULDER ARTHROSCOPIC ROTATOR CUFF DEBRIDEMENT, LABRAL DEBRIDEMENT, BICEPS TENODESIS, SUBACROMIAL DECOMPRESSION;  Surgeon: Chas Patterson MD;  [place] [facility] [place] [facility] [place]  Service: Orthopedics;  Laterality: Left;    TUMOR REMOVAL Left     PAROTID GLAND    VASCULAR SURGERY Right     STENT R LEG    VENOUS ACCESS DEVICE (PORT) INSERTION Right 2023    Procedure: INSERTION VENOUS ACCESS DEVICE;  Surgeon: James Copeland MD;  Location: [facility] MAIN OR;  Service: General;  Laterality: Right;       Family History:   Family History   Problem Relation Age of Onset    Lung cancer Father     Malig Hyperthermia Neg Hx        Social History:   Social History     Socioeconomic History    Marital status:    Tobacco Use    Smoking status: Former     Years: 25     Types: Cigarettes     Start date:      Quit date:      Years since quittin.1    Smokeless tobacco: Former   Vaping Use    Vaping Use: Never used   Substance and Sexual Activity    Alcohol use: Never    Drug use: Never    Sexual activity: Defer       Home Medications:  Dulaglutide, LORazepam, OLANZapine, apixaban, chlorthalidone, cholecalciferol, fish oil, fluticasone-salmeterol, furosemide, gabapentin, glipizide, insulin NPH, lidocaine-prilocaine, losartan, meclizine, metFORMIN, mirtazapine, montelukast, omeprazole, ondansetron, oxyCODONE-acetaminophen, and potassium chloride    Allergies:  No Known Allergies    Review of Systems   All systems were reviewed and negative except for: Unable to get    Objective   Objective     Vitals:   Temp:  [95.5 °F (35.3 °C)] 95.5 °F (35.3 °C)  Heart Rate:  [112] 112  Resp:  [14] 14  BP: (149)/(117) 149/117    Physical Exam    Constitutional: Extremely  somnolent   Eyes: Pupils equal, sclerae anicteric, no conjunctival injection   HENT: NCAT, mucous membranes moist   Neck: Supple, no thyromegaly, no lymphadenopathy, trachea midline   Respiratory: Clear to auscultation bilaterally, nonlabored respirations    Cardiovascular: RRR, no murmurs, rubs, or gallops, palpable pedal pulses bilaterally   Gastrointestinal: Distended   Musculoskeletal: No bilateral ankle edema, no clubbing or cyanosis to extremities   Psychiatric: Unable to get   Neurologic: Unable to get   Skin: No rashes     Result Review    Result Review:  I have personally reviewed the results from the time of this admission to 2/17/2024 12:29 EST and agree with these findings:  [x]  Laboratory  [x]  Microbiology  []  Radiology  []  EKG/Telemetry   []  Cardiology/Vascular   [x]  Pathology  [x]  Old records  []  Other:      Assessment & Plan   Assessment / Plan   Acute issues:  #1 altered mental status secondary to elevated ammonia and polypharmacy  -Patient may need to be started on lactulose to lower ammonia.  -Hold sedating drugs    #2 hypoglycemia/hypothermia  -Patient's blood glucose is improving with D5 drip  -Warming blanket if needed    #3 leukocytosis secondary to colony-stimulating injection    #4 mild rhabdomyolysis-IV fluids    #5 found down    #6 low magnesium replete    Chronic issues:  #1 metastatic gastric cancer to the liver patient is undergoing chemotherapy  -Patient on complex pain regimen.  He follows with palliative care and oncology.  Will hold pain medication until patient becomes more alert and then reintroduce slowly.    #2 insulin-dependent diabetes  -Cover with low intensity insulin sliding scale because of #2.  Will adjust once patient is able to tolerate oral intake and blood sugars consistently improved.    #3 hypertension  -Restart oral medication in the future.  For now as needed hydralazine IV.    #4 BRIDGET  -As patient is more alert, will order CPAP at night    #5 COPD  -As  needed DuoNeb.    #6 GERD  -Once patient can take orals we will start PPI    Patient will need ICU level care.    DVT prophylaxis:  Mechanical DVT prophylaxis orders are present.        CODE STATUS:    Level Of Support Discussed With: Patient  Code Status (Patient has no pulse and is not breathing): CPR (Attempt to Resuscitate)  Medical Interventions (Patient has pulse or is breathing): Full Support    Admission Status:  I believe this patient meets inpatient status.    Electronically signed by Edgar Serrano DO, 02/17/24, 12:29 PM EST.

## 2024-02-17 NOTE — ED PROVIDER NOTES
Time: 8:42 AM EST  Date of encounter:  2/17/2024  Independent Historian/Clinical History and Information was obtained by:   Patient and Family    History is limited by: Altered Mental Status    Chief Complaint: Weakness, fall, altered mental status.      History of Present Illness:  Patient is a 63 y.o. year old male who presents to the emergency department for evaluation of weakness, fall, altered mental status.  This patient has a history of metastatic gastric cancer for which she is undergoing chemotherapy.  The patient presents to the emergency department after his wife found him on the kitchen floor today.  The patient had not been feeling well and also had been having pain and has been prescribed pain medication.  The patient was reportedly unresponsive and had a blood sugar of 49 mg/dL via EMS.  He had no other physical complaints or signs of trauma.  The patient presents to the emergency department lethargic; however, he is arousable but disoriented.    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    Diabetes mellitus     Hyperlipidemia     Hypertension     Liver cancer     Rotator cuff disorder     LEFT    Sleep apnea     Stomach cancer      Past Surgical History:   Procedure Laterality Date    CAROTID ENDARTERECTOMY Right     CHOLECYSTECTOMY      FOOT SURGERY Right     TOE DEBRIDMENT    HERNIA REPAIR      VENTRAL HERNIA    SHOULDER ARTHROSCOPY W/ ROTATOR CUFF REPAIR Left 4/3/2023    Procedure: LEFT SHOULDER ARTHROSCOPIC ROTATOR CUFF DEBRIDEMENT, LABRAL DEBRIDEMENT, BICEPS TENODESIS, SUBACROMIAL DECOMPRESSION;  Surgeon: Chas Patterson MD;  [place] [facility] [place] [facility] [place]  Service: Orthopedics;  Laterality: Left;    TUMOR REMOVAL Left     PAROTID GLAND    VASCULAR SURGERY Right     STENT R LEG    VENOUS ACCESS DEVICE (PORT)  INSERTION Right 9/26/2023    Procedure: INSERTION VENOUS ACCESS DEVICE;  Surgeon: James Copeland MD;  Location: [facility] MAIN OR;  Service: General;  Laterality: Right;     Family History   Problem Relation Age of Onset    Lung cancer Father     Malig Hyperthermia Neg Hx        Home Medications:  Prior to Admission medications    Medication Sig Start Date End Date Taking? Authorizing Provider   apixaban (ELIQUIS) 5 MG tablet tablet Take 1 tablet by mouth 2 (Two) Times a Day.    Tanvir Boateng MD   chlorthalidone (HYGROTON) 25 MG tablet Take 0.5 tablets by mouth Daily.    Tanvir Boateng MD   cholecalciferol (VITAMIN D3) 25 MCG (1000 UT) tablet Take 1 tablet by mouth Daily. LAST DOSE 3/26/23    Tanvir Boateng MD   Dulaglutide 1.5 MG/0.5ML solution pen-injector Inject 1.5 mg under the skin into the appropriate area as directed Every 7 (Seven) Days. THURSDAY    Tanvir Boateng MD   fluticasone-salmeterol (ADVAIR HFA) 230-21 MCG/ACT inhaler Inhale 2 puffs 2 (Two) Times a Day.    Tanvir Boateng MD   furosemide (LASIX) 20 MG tablet Take 1 tablet by mouth Daily. 2/16/24   Govind Pruitt MD   gabapentin (NEURONTIN) 600 MG tablet Take 2 tablets by mouth 2 (Two) Times a Day.    Tanvir Boateng MD   glipizide (GLUCOTROL) 10 MG tablet Take 1 tablet by mouth 2 (Two) Times a Day Before Meals.    Tanvir Boateng MD   insulin NPH (NovoLIN N ReliOn) 100 UNIT/ML injection Inject 52 Units under the skin into the appropriate area as directed 2 (Two) Times a Day Before Meals.    Tanvir Boateng MD   lidocaine-prilocaine (EMLA) 2.5-2.5 % cream Apply 1 application  topically to the appropriate area as directed Take As Directed. Apply to port site 30 minutes prior to use 1/17/24   Govind Pruitt MD   LORazepam (ATIVAN) 1 MG tablet Take 1 tablet by mouth Every 8 (Eight) Hours As Needed for Anxiety. 10/10/23   Govind Pruitt MD   losartan (COZAAR) 50 MG tablet Take  2 tablets by mouth Daily.    ProviderTanvir MD   meclizine (ANTIVERT) 25 MG tablet Take 1 tablet by mouth 3 (Three) Times a Day As Needed for Dizziness. 12/15/23   Govind Pruitt MD   metFORMIN (GLUCOPHAGE) 1000 MG tablet Take 1 tablet by mouth 2 (Two) Times a Day With Meals. INST PER ANESTHESIA  PROTOCOL    Tanvir Boateng MD   mirtazapine (REMERON) 15 MG tablet Take 1 tablet by mouth Every Night. 10/3/23   Govind Pruitt MD   mirtazapine (REMERON) 15 MG tablet Take 1 tablet by mouth Every Night. 10/3/23   Govind Pruitt MD   montelukast (SINGULAIR) 10 MG tablet Take 1 tablet by mouth Every Night.    ProviderTanvir MD   OLANZapine (zyPREXA) 5 MG tablet Take 1 tablet by mouth Every Night. Indications: Nausea and Vomiting caused by Cancer Chemotherapy, if no improvement, call provider 10/3/23   Govind Pruitt MD   OLANZapine (zyPREXA) 5 MG tablet Take 1 tablet by mouth Every Night. 10/3/23   Govind Pruitt MD   Omega-3 Fatty Acids (fish oil) 1000 MG capsule capsule Take  by mouth Daily With Breakfast. LAST DOSE 3/26/23    Tanvir Boateng MD   omeprazole (priLOSEC) 40 MG capsule Take 1 capsule by mouth 2 (Two) Times a Day.    Tanvir Boateng MD   ondansetron (ZOFRAN) 8 MG tablet Take 1 tablet by mouth 3 (Three) Times a Day As Needed for Nausea or Vomiting (mail to patient please.).  Patient not taking: Reported on 2/1/2024 9/20/23   Marisol Castano APRN   oxyCODONE-acetaminophen (PERCOCET) 5-325 MG per tablet Take 1 tablet by mouth Every 4 (Four) Hours As Needed for Moderate Pain. 2/6/24   Govind Pruitt MD   potassium chloride (K-DUR,KLOR-CON) 10 MEQ CR tablet Take 1 tablet by mouth Daily. 11/7/23   Govind Pruitt MD   potassium chloride (K-DUR,KLOR-CON) 20 MEQ CR tablet Take 1 tablet by mouth Daily. 2/7/24   Govind Pruitt MD        Social History:   Social History     Tobacco Use    Smoking status: Former  "    Years: 25     Types: Cigarettes     Start date:      Quit date:      Years since quittin.1    Smokeless tobacco: Former   Vaping Use    Vaping Use: Never used   Substance Use Topics    Alcohol use: Never    Drug use: Never         Review of Systems:  Review of Systems   Unable to perform ROS: Mental status change        Physical Exam:  /57 (BP Location: Left arm, Patient Position: Lying)   Pulse 102   Temp 97.6 °F (36.4 °C) (Oral)   Resp 17   Ht 167.6 cm (66\")   Wt 89.5 kg (197 lb 5 oz)   SpO2 97%   BMI 31.85 kg/m²     Physical Exam  Vitals and nursing note reviewed.   Constitutional:       General: He is not in acute distress.     Appearance: He is ill-appearing. He is not toxic-appearing.      Comments: The patient is lethargic but he does respond to verbal and tactile stimulus.   HENT:      Head: Normocephalic and atraumatic.      Right Ear: External ear normal.      Left Ear: External ear normal.      Nose: Nose normal.      Mouth/Throat:      Mouth: Mucous membranes are moist.      Pharynx: Oropharynx is clear.   Eyes:      General: No scleral icterus.     Extraocular Movements: Extraocular movements intact.      Pupils: Pupils are equal, round, and reactive to light.      Comments: Pupils are 2 mm and reactive bilaterally.   Cardiovascular:      Rate and Rhythm: Normal rate and regular rhythm.      Pulses: Normal pulses.      Heart sounds: Normal heart sounds.   Pulmonary:      Effort: Pulmonary effort is normal. No respiratory distress.      Breath sounds: Normal breath sounds.   Abdominal:      General: Abdomen is flat. Bowel sounds are normal.      Palpations: Abdomen is soft.      Tenderness: There is no abdominal tenderness.   Musculoskeletal:         General: Normal range of motion.      Cervical back: Normal range of motion and neck supple.   Skin:     General: Skin is warm and dry.      Capillary Refill: Capillary refill takes less than 2 seconds.   Neurological:      " Mental Status: He is disoriented.      Motor: Weakness present.      Comments: Patient is lethargic, disoriented, has generalized weakness without any focal signs of weakness                  Procedures:  Procedures      Medical Decision Making:      Comorbidities that affect care:    Cancer    External Notes reviewed:    Previous Clinic Note: Oncology clinic note reveals that the patient did receive a Udenyca injection on 2/15 and also had his infusion of Cyramza on 2/14.      The following orders were placed and all results were independently analyzed by me:  Orders Placed This Encounter   Procedures    Respiratory Culture - Sputum, Cough    Legionella Antigen, Urine - Urine, Urine, Clean Catch    S. Pneumo Ag Urine or CSF - Urine, Urine, Clean Catch    Blood Culture - Blood,    Blood Culture - Blood,    XR Chest 1 View    CT Head Without Contrast    CT Abdomen Pelvis Without Contrast    XR Abdomen KUB    Smock Draw    Comprehensive Metabolic Panel    Urinalysis With Culture If Indicated - Urine, Clean Catch    Protime-INR    TSH    T4, Free    Magnesium    Ammonia    CK    CBC Auto Differential    Scan Slide    Manual Differential    Urinalysis, Microscopic Only - Urine, Clean Catch    Basic Metabolic Panel    Magnesium    Phosphorus    Lactic Acid, Plasma    Procalcitonin    Basic Metabolic Panel    STAT Lactic Acid, Reflex    NPO Diet NPO Type: Strict NPO    Vital Signs    Vital Signs Every Hour and Per Hospital Policy Based on Patient Condition    Telemetry - Place Orders & Notify Provider of Results When Patient Experiences Acute Chest Pain, Dysrhythmia or Respiratory Distress    Continuous Pulse Oximetry    Height & Weight    Daily Weights    Intake & Output    Oral Care - Patient Not on NPPV & Not Intubated    Target Arousal Level RASS 0 to -1    Use Mobility Guidelines for Advancement of Activity    Saline Lock & Maintain IV Access    Place Sequential Compression Device    Maintain Sequential Compression  Device    Apply warming blanket    Nursing Dysphagia Screening (Complete Prior to Giving Anything By Mouth)    Nasogastric Tube Insertion    Jayden and Document Tube Depth (in cm)    Verify Tube Placement Upon Insertion & As Needed    Elevate Head Of Bed 30-45 Degrees    No Glucose Monitoring Needed at This Time    Feeding Tube Insertion Per Protocol    Nasogastric Tube Insertion    Insert Indwelling Urinary Catheter    Assess Need for Indwelling Urinary Catheter - Follow Removal Protocol    Urinary Catheter Care    Code Status and Medical Interventions:    Hospitalist (on-call MD unless specified)    Inpatient Pulmonology Consult    Inpatient Palliative Care Team Consult    Inpatient Case Management  Consult    Inpatient Nutrition Consult    Inpatient Consult to Nutrition Services    Diet, Tube Feeding Tube Feeding Formula: Peptamen 1.5; Tube Feeding Type: Continuous; Continuous Tube Feeding Start Rate (mL/hr): 25; Then Advance Rate By (mL/hr): 25; Every __ Hours: 4; To Goal Rate of (mL/hr): 40    Oxygen Therapy- Nasal Cannula; Titrate 1-6 LPM Per SpO2; 90 - 95%    POC Glucose Q1H    POC Glucose Once    POC Glucose 4x Daily Before Meals & at Bedtime    POC Glucose Once    POC Glucose Once    POC Glucose Once    POC Glucose Once    POC Glucose Once    POC Glucose Once    ECG 12 Lead Rhythm Change    Insert Peripheral IV    Insert Peripheral IV    Inpatient Admission    Restraints Non-Violent or Non-Self Destructive    Aspiration Precautions    Green Top (Gel)    Lavender Top    Gold Top - SST    Light Blue Top    CBC & Differential    CBC & Differential       Medications Given in the Emergency Department:  Medications   dextrose (D50W) (25 g/50 mL) IV injection 25 g (25 g Intravenous Not Given 2/17/24 1310)   hydrALAZINE (APRESOLINE) injection 10 mg (10 mg Intravenous Given 2/17/24 1813)   nitroglycerin (NITROSTAT) SL tablet 0.4 mg (has no administration in time range)   sodium chloride 0.9 % flush 10  mL (10 mL Intravenous Given 2/17/24 1236)   sodium chloride 0.9 % flush 10 mL (has no administration in time range)   sodium chloride 0.9 % infusion 40 mL (has no administration in time range)   ondansetron ODT (ZOFRAN-ODT) disintegrating tablet 4 mg (has no administration in time range)     Or   ondansetron (ZOFRAN) injection 4 mg (has no administration in time range)   magnesium sulfate 2g/50 mL (PREMIX) infusion (2 g Intravenous New Bag 2/17/24 1236)   furosemide (LASIX) tablet 20 mg ( Oral Dose Auto Held 2/25/24 0900)   LORazepam (ATIVAN) tablet 1 mg ( Oral Held by provider 2/17/24 1249)   mirtazapine (REMERON) tablet 15 mg ( Oral Dose Auto Held 2/25/24 2100)   OLANZapine (zyPREXA) tablet 5 mg ( Oral Dose Auto Held 2/25/24 2100)   oxyCODONE-acetaminophen (PERCOCET) 5-325 MG per tablet 1 tablet ( Oral Held by provider 2/17/24 1249)   apixaban (ELIQUIS) tablet 5 mg ( Oral Dose Auto Held 2/25/24 2100)   chlorthalidone (HYGROTON) tablet 12.5 mg ( Oral Dose Auto Held 2/25/24 0900)   budesonide-formoterol (SYMBICORT) 160-4.5 MCG/ACT inhaler 1 puff ( Inhalation Dose Auto Held 2/25/24 2130)   gabapentin (NEURONTIN) capsule 600 mg ( Oral Dose Auto Held 2/25/24 2100)   losartan (COZAAR) tablet 100 mg ( Oral Dose Auto Held 2/25/24 0900)   pantoprazole (PROTONIX) EC tablet 40 mg ( Oral Dose Auto Held 2/26/24 0600)   ipratropium-albuterol (DUO-NEB) nebulizer solution 3 mL (has no administration in time range)   dextrose (GLUTOSE) oral gel 15 g (has no administration in time range)   dextrose (D50W) (25 g/50 mL) IV injection 25 g (25 g Intravenous Given 2/17/24 6607)   glucagon (GLUCAGEN) injection 1 mg (has no administration in time range)   dextrose 10 % infusion (150 mL/hr Intravenous Rate/Dose Change 2/17/24 1903)   ampicillin-sulbactam 3 g/100 mL 0.9% NS IVPB (3 g Intravenous New Bag 2/17/24 7035)   sennosides-docusate (PERICOLACE) 8.6-50 MG per tablet 2 tablet (has no administration in time range)     And   polyethylene  glycol (MIRALAX) packet 17 g (has no administration in time range)     And   bisacodyl (DULCOLAX) suppository 10 mg (has no administration in time range)   lactulose (CHRONULAC) 10 GM/15ML solution 30 g (has no administration in time range)   dextrose (D50W) (25 g/50 mL) IV injection 25 g (25 g Intravenous Given 2/17/24 0911)   dextrose (D50W) (25 g/50 mL) IV injection 50 mL (50 mL Intravenous Given 2/17/24 1128)   magnesium sulfate in D5W 1g/100mL (PREMIX) (2 g Intravenous New Bag 2/17/24 1731)        ED Course:       EKG: Sinus rhythm with a rate of 99 bpm  Normal P wave and peer interval  Right bundle branch block and left axis deviation  Nonspecific ST changes.    Labs:    Lab Results (last 24 hours)       Procedure Component Value Units Date/Time    CBC & Differential [[id number]]  (Abnormal) Collected: 02/17/24 0832    Specimen: Blood Updated: 02/17/24 0958    Narrative:      The following orders were created for panel order CBC & Differential.  Procedure                               Abnormality         Status                     ---------                               -----------         ------                     CBC Auto Differential[[id number]]        Abnormal            Final result               Scan Slide[[id number]]                                       Final result                 Please view results for these tests on the individual orders.    Comprehensive Metabolic Panel [[id number]]  (Abnormal) Collected: 02/17/24 0832    Specimen: Blood Updated: 02/17/24 1007     Glucose 85 mg/dL      BUN 10 mg/dL      Creatinine 0.79 mg/dL      Sodium 141 mmol/L      Potassium 4.3 mmol/L      Comment: Slight hemolysis detected by analyzer. Result may be falsely elevated.        Chloride 101 mmol/L      CO2 21.5 mmol/L      Calcium 9.0 mg/dL      Total Protein 7.2 g/dL      Albumin 2.7 g/dL      ALT (SGPT) 25 U/L      AST (SGOT) 67 U/L      Comment: Slight hemolysis detected by analyzer. Result may be falsely  elevated.        Alkaline Phosphatase 322 U/L      Total Bilirubin 0.8 mg/dL      Globulin 4.5 gm/dL      A/G Ratio 0.6 g/dL      BUN/Creatinine Ratio 12.7     Anion Gap 18.5 mmol/L      eGFR 99.2 mL/min/1.73     Narrative:      GFR Normal >60  Chronic Kidney Disease <60  Kidney Failure <15      Protime-INR [[id number]]  (Abnormal) Collected: 02/17/24 0832    Specimen: Blood Updated: 02/17/24 0913     Protime 15.2 Seconds      INR 1.17    Narrative:      Suggested Therapeutic Ranges For Oral Anticoagulant Therapy:  Level of Therapy                      INR Target Range  Standard Dose                            2.0-3.0  High Dose                                2.5-3.5  Patients not receiving anticoagulant  Therapy Normal Range                     0.86-1.15    TSH [[id number]]  (Normal) Collected: 02/17/24 0832    Specimen: Blood Updated: 02/17/24 0926     TSH 2.750 uIU/mL     T4, Free [[id number]]  (Normal) Collected: 02/17/24 0832    Specimen: Blood Updated: 02/17/24 0926     Free T4 1.08 ng/dL     Narrative:      Results may be falsely increased if patient taking Biotin.      Magnesium [[id number]]  (Abnormal) Collected: 02/17/24 0832    Specimen: Blood Updated: 02/17/24 1007     Magnesium 1.2 mg/dL     CK [[id number]]  (Abnormal) Collected: 02/17/24 0832    Specimen: Blood Updated: 02/17/24 0920     Creatine Kinase 202 U/L     CBC Auto Differential [[id number]]  (Abnormal) Collected: 02/17/24 0832    Specimen: Blood Updated: 02/17/24 0957     WBC 21.12 10*3/mm3      RBC 4.14 10*6/mm3      Hemoglobin 12.4 g/dL      Hematocrit 38.1 %      MCV 92.0 fL      MCH 30.0 pg      MCHC 32.5 g/dL      RDW 16.3 %      RDW-SD 55.6 fl      MPV 11.4 fL      Platelets 222 10*3/mm3     Scan Slide [[id number]] Collected: 02/17/24 0832    Specimen: Blood Updated: 02/17/24 0958     Vacuolated Neutrophils Slight/1+     Platelet Estimate Adequate     Clumped Platelets Present     Scan Slide --     Comment: See Manual Differential Results        Manual Differential [[id number]]  (Abnormal) Collected: 02/17/24 0832    Specimen: Blood Updated: 02/17/24 0957     Neutrophil % 93.0 %      Lymphocyte % 1.0 %      Monocyte % 1.0 %      Basophil % 1.0 %      Atypical Lymphocyte % 4.0 %      Neutrophils Absolute 19.64 10*3/mm3      Lymphocytes Absolute 1.06 10*3/mm3      Monocytes Absolute 0.21 10*3/mm3      Basophils Absolute 0.21 10*3/mm3      RBC Morphology Normal     WBC Morphology Normal     Platelet Morphology Normal    POC Glucose Q1H [[id number]]  (Abnormal) Collected: 02/17/24 0837    Specimen: Blood Updated: 02/17/24 0838     Glucose 69 mg/dL      Comment: Serial Number: [id number]:  [id number]       Ammonia [[id number]]  (Abnormal) Collected: 02/17/24 0917    Specimen: Blood Updated: 02/17/24 0944     Ammonia 75 umol/L     POC Glucose Q1H [[id number]]  (Abnormal) Collected: 02/17/24 0937    Specimen: Blood Updated: 02/17/24 0939     Glucose 147 mg/dL      Comment: Serial Number: [id number]:  [id number]       Urinalysis With Culture If Indicated - Urine, Clean Catch [[id number]]  (Abnormal) Collected: 02/17/24 0949    Specimen: Urine, Clean Catch Updated: 02/17/24 1012     Color, UA Yellow     Appearance, UA Clear     pH, UA 6.5     Specific Gravity, UA 1.008     Glucose,  mg/dL (1+)     Ketones, UA Negative     Bilirubin, UA Negative     Blood, UA Trace     Protein,  mg/dL (2+)     Leuk Esterase, UA Negative     Nitrite, UA Negative     Urobilinogen, UA 0.2 E.U./dL    Narrative:      In absence of clinical symptoms, the presence of pyuria, bacteria, and/or nitrites on the urinalysis result does not correlate with infection.    Urinalysis, Microscopic Only - Urine, Clean Catch [[id number]] Collected: 02/17/24 0949    Specimen: Urine, Clean Catch Updated: 02/17/24 1012     RBC, UA 0-2 /HPF      WBC, UA 0-2 /HPF      Comment: Urine culture not indicated.        Bacteria, UA None Seen /HPF      Squamous Epithelial Cells, UA 0-2 /HPF       "Hyaline Casts, UA 0-2 /LPF      Methodology Automated Microscopy    POC Glucose Q1H [[id number]]  (Abnormal) Collected: 02/17/24 1122    Specimen: Blood Updated: 02/17/24 1126     Glucose 37 mg/dL      Comment: Serial Number: [id number]:  [id number]       POC Glucose Once [[id number]]  (Normal) Collected: 02/17/24 1159    Specimen: Blood Updated: 02/17/24 1201     Glucose 73 mg/dL      Comment: Serial Number: [id number]:  [id number]       Magnesium [[id number]]  (Abnormal) Collected: 02/17/24 1239    Specimen: Blood Updated: 02/17/24 1319     Magnesium 1.2 mg/dL     Phosphorus [[id number]]  (Normal) Collected: 02/17/24 1239    Specimen: Blood Updated: 02/17/24 1319     Phosphorus 2.7 mg/dL     Procalcitonin [[id number]]  (Abnormal) Collected: 02/17/24 1239    Specimen: Blood Updated: 02/17/24 1547     Procalcitonin 0.34 ng/mL     Narrative:      As a Marker for Sepsis (Non-Neonates):    1. <0.5 ng/mL represents a low risk of severe sepsis and/or septic shock.  2. >2 ng/mL represents a high risk of severe sepsis and/or septic shock.    As a Marker for Lower Respiratory Tract Infections that require antibiotic therapy:    PCT on Admission    Antibiotic Therapy       6-12 Hrs later    >0.5                Strongly Recommended  >0.25 - <0.5        Recommended  0.1 - 0.25          Discouraged              Remeasure/reassess PCT  <0.1                Strongly Discouraged     Remeasure/reassess PCT    As 28 day mortality risk marker: \"Change in Procalcitonin Result\" (>80% or <=80%) if Day 0 (or Day 1) and Day 4 values are available. Refer to [email or web address]://[email or web address]-[email or web address]    Change in PCT <=80%  A decrease of PCT levels below or equal to 80% defines a positive change in PCT test result representing a higher risk for 28-day all-cause mortality of patients diagnosed with severe sepsis for septic shock.    Change in PCT >80%  A decrease of PCT levels of more than 80% defines a negative change in PCT result " representing a lower risk for 28-day all-cause mortality of patients diagnosed with severe sepsis or septic shock.    This test is Prognostic not Diagnostic, if elevated correlate with clinical findings before administering antibiotic treatment.        POC Glucose Q1H [[id number]]  (Abnormal) Collected: 02/17/24 1313    Specimen: Blood Updated: 02/17/24 1409     Glucose 43 mg/dL      Comment: Serial Number: [id number]:  [id number]       POC Glucose Once [[id number]]  (Abnormal) Collected: 02/17/24 1337    Specimen: Blood Updated: 02/17/24 1409     Glucose 101 mg/dL      Comment: Serial Number: [id number]:  [id number]       POC Glucose Q1H [[id number]]  (Normal) Collected: 02/17/24 1404    Specimen: Blood Updated: 02/17/24 1409     Glucose 88 mg/dL      Comment: Serial Number: [id number]:  [id number]       POC Glucose Once [[id number]]  (Abnormal) Collected: 02/17/24 1455    Specimen: Blood Updated: 02/17/24 1515     Glucose 42 mg/dL      Comment: Serial Number: [id number]:  [id number]       POC Glucose Q1H [[id number]]  (Abnormal) Collected: 02/17/24 1515    Specimen: Blood Updated: 02/17/24 1517     Glucose 134 mg/dL      Comment: Serial Number: [id number]:  [id number]       Legionella Antigen, Urine - Urine, Urine, Clean Catch [[id number]]  (Normal) Collected: 02/17/24 1546    Specimen: Urine, Clean Catch Updated: 02/17/24 1658     LEGIONELLA ANTIGEN, URINE Negative    S. Pneumo Ag Urine or CSF - Urine, Urine, Clean Catch [[id number]]  (Normal) Collected: 02/17/24 1546    Specimen: Urine, Clean Catch Updated: 02/17/24 1658     Strep Pneumo Ag Negative    POC Glucose Once [[id number]]  (Abnormal) Collected: 02/17/24 1557    Specimen: Blood Updated: 02/17/24 1559     Glucose 100 mg/dL      Comment: Serial Number: [id number]:  [id number]       POC Glucose Once [[id number]]  (Normal) Collected: 02/17/24 1632    Specimen: Blood Updated: 02/17/24 1633     Glucose 94 mg/dL      Comment: Serial  Number: [id number]:  [id number]       Lactic Acid, Plasma [[id number]]  (Abnormal) Collected: 02/17/24 1710    Specimen: Blood from Arm, Left Updated: 02/17/24 1752     Lactate 3.3 mmol/L     Blood Culture - Blood, Arm, Left [[id number]] Collected: 02/17/24 1716    Specimen: Blood from Arm, Left Updated: 02/17/24 1716    POC Glucose 4x Daily Before Meals & at Bedtime [[id number]]  (Normal) Collected: 02/17/24 1727    Specimen: Blood Updated: 02/17/24 1728     Glucose 94 mg/dL      Comment: Serial Number: [id number]:  [id number]       POC Glucose Once [[id number]]  (Normal) Collected: 02/17/24 1813    Specimen: Blood Updated: 02/17/24 1814     Glucose 97 mg/dL      Comment: Serial Number: [id number]:  [id number]       POC Glucose Once [[id number]]  (Abnormal) Collected: 02/17/24 1857    Specimen: Blood Updated: 02/17/24 1858     Glucose 109 mg/dL      Comment: Serial Number: [id number]:  [id number]       Basic Metabolic Panel [[id number]] Collected: 02/17/24 1956    Specimen: Blood from Arm, Left Updated: 02/17/24 2013    STAT Lactic Acid, Reflex [[id number]] Collected: 02/17/24 1956    Specimen: Blood Updated: 02/17/24 2013    POC Glucose Q1H [[id number]]  (Abnormal) Collected: 02/17/24 2007    Specimen: Blood Updated: 02/17/24 2008     Glucose 128 mg/dL      Comment: Serial Number: [id number]:  [id number]                Imaging:    XR Abdomen KUB    Result Date: 2/17/2024  PROCEDURE: XR ABDOMEN KUB  COMPARISON: [facility], CT, CT ABDOMEN PELVIS W CONTRAST, 1/29/2024, 10:46.  INDICATIONS: ng tube placement  FINDINGS:  An NG tube has been placed with the tip in the proximal to mid gastric body.  Visualized bowel gas pattern is normal.        1. NG tube in proximal to mid gastric body     Kdoy Payton M.D.       Electronically Signed and Approved By: Kody Payton M.D. on 2/17/2024 at 16:30             CT Head Without Contrast    Result Date: 2/17/2024  PROCEDURE: CT HEAD WO  CONTRAST  COMPARISON:  [facility], CT, CT HEAD WO CONTRAST, 1/08/2024, 16:28. INDICATIONS: Altered mental status  PROTOCOL:   Standard imaging protocol performed    RADIATION:   DLP: 1145.2 mGy*cm   MA and/or KV was adjusted to minimize radiation dose.     TECHNIQUE: After obtaining the patient's consent, CT images were obtained without non-ionic intravenous contrast material.  FINDINGS:  No focal brain lesion, mass or intracranial hemorrhage is identified.  No focal brain lesion, mass or intracranial hemorrhage is identified.  There is mild to moderate diffuse cerebral and cerebellar atrophy.  The ventricles and cisterns are normal in size and configuration.  Visualized extracranial soft tissues appear normal.  No focal calvarial abnormality is seen.        1. No acute intracranial abnormality     Kody Payton M.D.       Electronically Signed and Approved By: Kody Payton M.D. on 2/17/2024 at 9:38             XR Chest 1 View    Result Date: 2/17/2024  PROCEDURE: XR CHEST 1 VW  COMPARISON: [facility], CR, XR CHEST 1 VW, 1/08/2024, 14:17.  [facility], CT, CT CHEST W CONTRAST DIAGNOSTIC, 1/29/2024, 10:46.  INDICATIONS: Altered mental status weakness  FINDINGS:  Minimal linear density in the left lung base is consistent with scarring or atelectasis.  An apparent focus of density in the mid right lung is favored to represent confluency artifact.  The cardiac and mediastinal silhouettes appear normal.        1. Minimal left basilar scarring or atelectasis       Kody Payton M.D.       Electronically Signed and Approved By: Kody Payton M.D. on 2/17/2024 at 9:03                Differential Diagnosis and Discussion:    Altered Mental Status: Based on the patient's signs and symptoms, differential diagnosis includes but is not limited to meningitis, stroke, sepsis, subarachnoid hemorrhage, intracranial bleeding, encephalitis, and metabolic encephalopathy.    All labs were  reviewed and interpreted by me.  EKG was interpreted by me.    MDM     Amount and/or Complexity of Data Reviewed  Clinical lab tests: reviewed  Tests in the radiology section of CPT®: reviewed         Critical Care Note: Total Critical Care time of 50 minutes. Total critical care time documented does not include time spent on separately billed procedures for services of nurses or physician assistants. I personally saw and examined the patient. I have reviewed all diagnostic interpretations and treatment plans as written. I was present for the key portions of any procedures performed and the inclusive time noted in any critical care statement. Critical care time includes patient management by me, time spent at the patients bedside,  time to review lab and imaging results, discussing patient care, documentation in the medical record, and time spent with family or caregiver.        Patient Care Considerations:    MRI: I considered ordering an MRI however this can be performed as an inpatient.      Consultants/Shared Management Plan:    Hospitalist: I have discussed the case with hospitalist who agrees to accept the patient for admission.    Social Determinants of Health:    Patient is unable to carry out activities of daily life. Escalation of care is necessary.       Disposition and Care Coordination:    Admit:   Through independent evaluation of the patient's history, physical, and imperical data, the patient meets criteria for inpatient admission to the hospital.        Final diagnoses:   Altered mental status, unspecified altered mental status type   Hypoglycemia   Leukocytosis, unspecified type   Hyperammonemia        ED Disposition       ED Disposition   Decision to Admit    Condition   --    Comment   Level of Care: Critical Care [6]   Diagnosis: Hypoglycemia [950663]   Admitting Physician: SOMMER MAHMOOD [[id number]]   Attending Physician: SOMMER MAHMOOD [[id number]]   Certification: I Certify That Inpatient Hospital  Services Are Medically Necessary For Greater Than 2 Midnights                 This medical record created using voice recognition software.             Tim Stewart, DO  02/17/24 2038

## 2024-02-17 NOTE — CASE MANAGEMENT/SOCIAL WORK
Discharge Planning Assessment   Sadie     Patient Name: Kevin Ordonez  MRN: [id number]  Today's Date: 2/17/2024    Admit Date: 2/17/2024        Discharge Needs Assessment       Row Name 02/17/24 1142       Living Environment    People in Home spouse;child(joce), adult    Current Living Arrangements home    Potentially Unsafe Housing Conditions none    In the past 12 months has the electric, gas, oil, or water company threatened to shut off services in your home? No    Primary Care Provided by spouse/significant other    Provides Primary Care For no one    Quality of Family Relationships supportive       Resource/Environmental Concerns    Resource/Environmental Concerns none    Transportation Concerns none       Transportation Needs    In the past 12 months, has lack of transportation kept you from medical appointments or from getting medications? no    In the past 12 months, has lack of transportation kept you from meetings, work, or from getting things needed for daily living? No       Food Insecurity    Within the past 12 months, you worried that your food would run out before you got the money to buy more. Never true    Within the past 12 months, the food you bought just didn't last and you didn't have money to get more. Never true       Transition Planning    Patient/Family Anticipates Transition to home with family    Patient/Family Anticipated Services at Transition none    Transportation Anticipated family or friend will provide       Discharge Needs Assessment    Readmission Within the Last 30 Days no previous admission in last 30 days    Equipment Currently Used at Home walker, rolling;wheelchair;other (see comments)  scooter    Concerns to be Addressed no discharge needs identified    Anticipated Changes Related to Illness none    Equipment Needed After Discharge none                   Discharge Plan    No documentation.                 Continued Care and Services - Admitted Since 2/17/2024     Coordination has not been started for this encounter.          Demographic Summary       Row Name 02/17/24 1136       General Information    Arrived From home    Referral Source emergency department    Preferred Language English                   Functional Status       Row Name 02/17/24 1137       Physical Activity    On average, how many days per week do you engage in moderate to strenuous exercise (like a brisk walk)? 1 day    On average, how many minutes do you engage in exercise at this level? 60 min    Number of minutes of exercise per week 60       Assessment of Health Literacy    How often do you have someone help you read hospital materials? Occasionally    How often do you have problems learning about your medical condition because of difficulty understanding written information? Occasionally    How often do you have a problem understanding what is told to you about your medical condition? Occasionally    How confident are you filling out medical forms by yourself? Somewhat       Functional Status, IADL    Medications completely dependent    Meal Preparation completely dependent    Housekeeping completely dependent    Laundry completely dependent    Shopping completely dependent       Mental Status Summary    Recent Changes in Mental Status/Cognitive Functioning memory (recent)       Employment/    Employment Status retired                   Psychosocial    No documentation.                  Abuse/Neglect       Row Name 02/17/24 1140       Personal Safety    Feels Unsafe at Home or Work/School other (see comments)  pt unable to answer at this time.    Feels Threatened by Someone other (see comments)    Does Anyone Try to Keep You From Having Contact with Others or Doing Things Outside Your Home? other (see comments)    Physical Signs of Abuse Present no                   Legal       Row Name 02/17/24 1141       Financial Resource Strain    How hard is it for you to pay for the very basics like  food, housing, medical care, and heating? Not hard       Financial/Legal    Source of Income pension/intermediate       Legal    Criminal Activity/Legal Involvement none                   Substance Abuse       Row Name 02/17/24 1141       Substance Use    Substance Use Status never used       Family Member Substance Use (#4)    Previous Substance Use Treatment none                   Patient Forms    No documentation.                 Assessment done in ED. This pt is on chemo, receives home health and palliative care. No discharge needs identified at this time.     Leena Deleon, MSW, CSW

## 2024-02-17 NOTE — CONSULTS
Pulmonary / Critical Care Consult Note      Patient Name: Kevin Ordonez  : 1960  MRN: [id number]  Primary Care Physician:  Ev Peck APRN  Referring Physician: Edgar Serrano DO  Date of admission: 2024    Subjective   Subjective     Reason for Consult/ Chief Complaint: Altered mental status/hypoglycemia    HPI:  Kevin Ordonez is a 63 y.o. male with history of diabetes, hypertension, hyperlipidemia, DVT of right lower extremity on Eliquis, gastric cancer undergoing chemotherapy, presented to the ED after being found down in his kitchen.  EMS was called and found the patient to have decreased blood glucose of 40s.  He was treated in the field.  When the patient arrived to the ED blood glucose had improved to 69 but later decreased to the 30s.  Blood pressure was adequate and he is on room air.  Was somnolent, CT head was negative for any acute abnormalities.  Laboratory findings showed WBC 21,000, CK2 02, magnesium 1.2, INR 1.1, ammonia 75.  In the ED he was treated with IV dextrose, given IV magnesium.  Our service was called for critical care management.  On exam, patient is somnolent but does wake up and follow commands, he is on D5 half-normal saline however is still having decreased blood glucose, unable to eat or drink anything at this time.    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    Diabetes mellitus     Hyperlipidemia     Hypertension     Liver cancer     Rotator cuff disorder     LEFT    Sleep apnea     Stomach cancer        Past Surgical History:   Procedure Laterality Date    CAROTID ENDARTERECTOMY Right     CHOLECYSTECTOMY      FOOT SURGERY Right     TOE DEBRIDMENT    HERNIA REPAIR      VENTRAL HERNIA    SHOULDER ARTHROSCOPY W/ ROTATOR CUFF REPAIR Left 4/3/2023    Procedure: LEFT SHOULDER ARTHROSCOPIC ROTATOR CUFF  DEBRIDEMENT, LABRAL DEBRIDEMENT, BICEPS TENODESIS, SUBACROMIAL DECOMPRESSION;  Surgeon: Chas Patterson MD;  Location: [facility] OR [facility];  Service: Orthopedics;  Laterality: Left;    TUMOR REMOVAL Left     PAROTID GLAND    VASCULAR SURGERY Right     STENT R LEG    VENOUS ACCESS DEVICE (PORT) INSERTION Right 9/26/2023    Procedure: INSERTION VENOUS ACCESS DEVICE;  Surgeon: James Copeland MD;  Location: [facility] MAIN OR;  Service: General;  Laterality: Right;       Family History: family history includes Lung cancer in his father. Otherwise pertinent FHx was reviewed and not pertinent to current issue.    Social History:  reports that he quit smoking about 24 years ago. His smoking use included cigarettes. He started smoking about 50 years ago. He has quit using smokeless tobacco. He reports that he does not drink alcohol and does not use drugs.    Home Medications:  Dulaglutide, LORazepam, OLANZapine, apixaban, chlorthalidone, cholecalciferol, fish oil, fluticasone-salmeterol, furosemide, gabapentin, glipizide, insulin NPH, lidocaine-prilocaine, losartan, meclizine, metFORMIN, mirtazapine, montelukast, omeprazole, ondansetron, oxyCODONE-acetaminophen, and potassium chloride    Allergies:  No Known Allergies    Objective    Objective     Vitals:   Temp:  [95.5 °F (35.3 °C)] 95.5 °F (35.3 °C)  Heart Rate:  [112] 112  Resp:  [14] 14  BP: (149)/(117) 149/117    Physical Exam:    Vital Signs Reviewed   General: Somnolent, critically ill, on room air   HEENT:  PERRL, EOMI. dry oral mucosa  Neck:  Supple, no JVD, no thyromegaly  Chest:  good aeration, clear to auscultation bilaterally, tympanic to percussion bilaterally, no work of breathing noted  CV: RRR, no MGR, pulses 2+, equal.  Abd:  Soft, NT, ND, + BS, no HSM  EXT:  no clubbing, no cyanosis, no edema  Neuro:  A&Ox1, CN grossly intact, no focal deficits.  Skin: No rashes or lesions noted        Result Review    Result Review:  I have personally reviewed the results  from the time of this admission to 2/17/2024 15:00 EST and agree with these findings:  [x]  Laboratory  [x]  Microbiology  [x]  Radiology  []  EKG/Telemetry   []  Cardiology/Vascular   []  Pathology  [x]  Old records  []  Other:  Most notable findings include:   CT Head Without Contrast    Result Date: 2/17/2024    1. No acute intracranial abnormality     Kody Payton M.D.       Electronically Signed and Approved By: Kody Payton M.D. on 2/17/2024 at 9:38             XR Chest 1 View    Result Date: 2/17/2024    1. Minimal left basilar scarring or atelectasis       Kody Payton M.D.       Electronically Signed and Approved By: Kody Payton M.D. on 2/17/2024 at 9:03                Assessment & Plan   Assessment / Plan     Active Hospital Problems:  Active Hospital Problems    Diagnosis     **Hypoglycemia      Impression:  Severe hypoglycemia  Altered mental status  Hepatic encephalopathy  Mechanical fall  Gastric cancer with metastasis to liver  History of DVT on Eliquis  Hypomagnesia   Type 2 diabetes  Hypertension  Hyperlipidemia    Plan:  -Monitor in ICU  -Will add D10, increase to 200 until NG tube can be placed  -Initiate tube feeds per dietitian  -Initiate lactulose 30 g 3 times daily  -Aspiration precautions  -Check renal panel this afternoon to evaluate sodium level  -May need to initiate normal saline  -Will check blood culture, MRSA PCR, procalcitonin  -Urinalysis okay  -Chest x-ray reviewed  -Will add Unasyn for possible aspiration  -Check lactic acid  -Will check CT scan of abdomen pelvis to evaluate liver metastasis/possible ascites  -Trend renal function, monitor and replace electrolytes, replace IV magnesium  -Will need to resume Eliquis tomorrow once NG tube is placed, history of DVT  -Evaluate home medications tomorrow    DVT prophylaxis:  Medical and mechanical DVT prophylaxis orders are present.         Code Status and Medical Interventions:   Ordered at: 02/17/24 1151     Level Of Support  Discussed With:    Patient     Code Status (Patient has no pulse and is not breathing):    CPR (Attempt to Resuscitate)     Medical Interventions (Patient has pulse or is breathing):    Full Support      I, CHIOMA Dolan, spent 16 minutes critical care time .  Electronically signed by CHIOMA Galeano, 02/17/24, 3:00 PM EST.      The patient is critically ill in the ICU with severe hyperglycemia, altered mental status, hepatic encephalopathy, history of diabetes mellitus on insulin at home, gastric cancer with metastasis to liver, history of DVT on Eliquis. Multidisciplinary bedside critical care rounds were performed with nursing staff, respiratory therapy, pharmacy, nutritional services, social work. I have personally reviewed the chart, labs and any pertinent imaging available. We have spent 34 minutes of critical care time, excluding procedures, in the care of this patient. I, Dr Mcdonough, spent 17 mins of critical care time according to split shared critical care billing guidelines.     Electronically signed by Les Mcdonough MD, 02/17/24, 4:35 PM EST.

## 2024-02-18 LAB
ANION GAP SERPL CALCULATED.3IONS-SCNC: 11.2 MMOL/L (ref 5–15)
BASOPHILS # BLD MANUAL: 0.18 10*3/MM3 (ref 0–0.2)
BASOPHILS NFR BLD MANUAL: 1 % (ref 0–1.5)
BUN SERPL-MCNC: 7 MG/DL (ref 8–23)
BUN/CREAT SERPL: 10.4 (ref 7–25)
CALCIUM SPEC-SCNC: 8.3 MG/DL (ref 8.6–10.5)
CHLORIDE SERPL-SCNC: 102 MMOL/L (ref 98–107)
CO2 SERPL-SCNC: 23.8 MMOL/L (ref 22–29)
CREAT SERPL-MCNC: 0.67 MG/DL (ref 0.76–1.27)
D-LACTATE SERPL-SCNC: 2.5 MMOL/L (ref 0.5–2)
D-LACTATE SERPL-SCNC: 2.5 MMOL/L (ref 0.5–2)
D-LACTATE SERPL-SCNC: 2.6 MMOL/L (ref 0.5–2)
D-LACTATE SERPL-SCNC: 2.7 MMOL/L (ref 0.5–2)
D-LACTATE SERPL-SCNC: 2.7 MMOL/L (ref 0.5–2)
D-LACTATE SERPL-SCNC: 2.9 MMOL/L (ref 0.5–2)
DEPRECATED RDW RBC AUTO: 52.6 FL (ref 37–54)
EGFRCR SERPLBLD CKD-EPI 2021: 104.3 ML/MIN/1.73
EOSINOPHIL # BLD MANUAL: 0.36 10*3/MM3 (ref 0–0.4)
EOSINOPHIL NFR BLD MANUAL: 2 % (ref 0.3–6.2)
ERYTHROCYTE [DISTWIDTH] IN BLOOD BY AUTOMATED COUNT: 16 % (ref 12.3–15.4)
GLUCOSE BLDC GLUCOMTR-MCNC: 118 MG/DL (ref 70–99)
GLUCOSE BLDC GLUCOMTR-MCNC: 119 MG/DL (ref 70–99)
GLUCOSE BLDC GLUCOMTR-MCNC: 129 MG/DL (ref 70–99)
GLUCOSE BLDC GLUCOMTR-MCNC: 132 MG/DL (ref 70–99)
GLUCOSE BLDC GLUCOMTR-MCNC: 134 MG/DL (ref 70–99)
GLUCOSE BLDC GLUCOMTR-MCNC: 135 MG/DL (ref 70–99)
GLUCOSE BLDC GLUCOMTR-MCNC: 136 MG/DL (ref 70–99)
GLUCOSE BLDC GLUCOMTR-MCNC: 139 MG/DL (ref 70–99)
GLUCOSE BLDC GLUCOMTR-MCNC: 145 MG/DL (ref 70–99)
GLUCOSE BLDC GLUCOMTR-MCNC: 148 MG/DL (ref 70–99)
GLUCOSE BLDC GLUCOMTR-MCNC: 148 MG/DL (ref 70–99)
GLUCOSE BLDC GLUCOMTR-MCNC: 169 MG/DL (ref 70–99)
GLUCOSE BLDC GLUCOMTR-MCNC: 169 MG/DL (ref 70–99)
GLUCOSE SERPL-MCNC: 139 MG/DL (ref 65–99)
HCT VFR BLD AUTO: 29.7 % (ref 37.5–51)
HGB BLD-MCNC: 9.8 G/DL (ref 13–17.7)
LYMPHOCYTES # BLD MANUAL: 0.36 10*3/MM3 (ref 0.7–3.1)
LYMPHOCYTES NFR BLD MANUAL: 2 % (ref 5–12)
MAGNESIUM SERPL-MCNC: 1.7 MG/DL (ref 1.6–2.4)
MCH RBC QN AUTO: 29.5 PG (ref 26.6–33)
MCHC RBC AUTO-ENTMCNC: 33 G/DL (ref 31.5–35.7)
MCV RBC AUTO: 89.5 FL (ref 79–97)
METAMYELOCYTES NFR BLD MANUAL: 3 % (ref 0–0)
MONOCYTES # BLD: 0.36 10*3/MM3 (ref 0.1–0.9)
NEUTROPHILS # BLD AUTO: 16.4 10*3/MM3 (ref 1.7–7)
NEUTROPHILS NFR BLD MANUAL: 80 % (ref 42.7–76)
NEUTS BAND NFR BLD MANUAL: 10 % (ref 0–5)
PHOSPHATE SERPL-MCNC: 1.8 MG/DL (ref 2.5–4.5)
PLAT MORPH BLD: NORMAL
PLATELET # BLD AUTO: 175 10*3/MM3 (ref 140–450)
PMV BLD AUTO: 11.6 FL (ref 6–12)
POTASSIUM SERPL-SCNC: 3.7 MMOL/L (ref 3.5–5.2)
RBC # BLD AUTO: 3.32 10*6/MM3 (ref 4.14–5.8)
RBC MORPH BLD: NORMAL
SODIUM SERPL-SCNC: 137 MMOL/L (ref 136–145)
VARIANT LYMPHS NFR BLD MANUAL: 2 % (ref 19.6–45.3)
WBC MORPH BLD: NORMAL
WBC NRBC COR # BLD AUTO: 18.22 10*3/MM3 (ref 3.4–10.8)

## 2024-02-18 PROCEDURE — 25010000002 HYDRALAZINE PER 20 MG: Performed by: HOSPITALIST

## 2024-02-18 PROCEDURE — 25010000002 POTASSIUM CHLORIDE 10 MEQ/100ML SOLUTION: Performed by: NURSE PRACTITIONER

## 2024-02-18 PROCEDURE — 25010000002 DIAZEPAM PER 5 MG: Performed by: STUDENT IN AN ORGANIZED HEALTH CARE EDUCATION/TRAINING PROGRAM

## 2024-02-18 PROCEDURE — 82948 REAGENT STRIP/BLOOD GLUCOSE: CPT

## 2024-02-18 PROCEDURE — 83735 ASSAY OF MAGNESIUM: CPT | Performed by: HOSPITALIST

## 2024-02-18 PROCEDURE — 25010000002 AMPICILLIN-SULBACTAM PER 1.5 G: Performed by: NURSE PRACTITIONER

## 2024-02-18 PROCEDURE — 25010000002 MAGNESIUM SULFATE IN D5W 1G/100ML (PREMIX) 1-5 GM/100ML-% SOLUTION: Performed by: INTERNAL MEDICINE

## 2024-02-18 PROCEDURE — 82948 REAGENT STRIP/BLOOD GLUCOSE: CPT | Performed by: EMERGENCY MEDICINE

## 2024-02-18 PROCEDURE — 83605 ASSAY OF LACTIC ACID: CPT | Performed by: NURSE PRACTITIONER

## 2024-02-18 PROCEDURE — 99233 SBSQ HOSP IP/OBS HIGH 50: CPT | Performed by: FAMILY MEDICINE

## 2024-02-18 PROCEDURE — 25810000003 SODIUM CHLORIDE 0.9 % SOLUTION: Performed by: INTERNAL MEDICINE

## 2024-02-18 PROCEDURE — 85007 BL SMEAR W/DIFF WBC COUNT: CPT | Performed by: HOSPITALIST

## 2024-02-18 PROCEDURE — 80048 BASIC METABOLIC PNL TOTAL CA: CPT | Performed by: HOSPITALIST

## 2024-02-18 PROCEDURE — 99291 CRITICAL CARE FIRST HOUR: CPT | Performed by: INTERNAL MEDICINE

## 2024-02-18 PROCEDURE — 25010000002 MORPHINE PER 10 MG: Performed by: STUDENT IN AN ORGANIZED HEALTH CARE EDUCATION/TRAINING PROGRAM

## 2024-02-18 PROCEDURE — 85025 COMPLETE CBC W/AUTO DIFF WBC: CPT | Performed by: HOSPITALIST

## 2024-02-18 PROCEDURE — 25810000003 DEXTROSE-NACL PER 500 ML: Performed by: INTERNAL MEDICINE

## 2024-02-18 PROCEDURE — 25010000002 HALOPERIDOL LACTATE PER 5 MG: Performed by: STUDENT IN AN ORGANIZED HEALTH CARE EDUCATION/TRAINING PROGRAM

## 2024-02-18 PROCEDURE — 84100 ASSAY OF PHOSPHORUS: CPT | Performed by: HOSPITALIST

## 2024-02-18 RX ORDER — FENTANYL/ROPIVACAINE/NS/PF 2-625MCG/1
15 PLASTIC BAG, INJECTION (ML) EPIDURAL
Status: COMPLETED | OUTPATIENT
Start: 2024-02-18 | End: 2024-02-18

## 2024-02-18 RX ORDER — POLYETHYLENE GLYCOL 3350 17 G/17G
17 POWDER, FOR SOLUTION ORAL DAILY PRN
Status: DISCONTINUED | OUTPATIENT
Start: 2024-02-18 | End: 2024-02-23 | Stop reason: HOSPADM

## 2024-02-18 RX ORDER — DEXMEDETOMIDINE HYDROCHLORIDE 4 UG/ML
.2-1.5 INJECTION, SOLUTION INTRAVENOUS
Status: DISCONTINUED | OUTPATIENT
Start: 2024-02-19 | End: 2024-02-19

## 2024-02-18 RX ORDER — DEXTROSE AND SODIUM CHLORIDE 5; .9 G/100ML; G/100ML
100 INJECTION, SOLUTION INTRAVENOUS CONTINUOUS
Status: DISCONTINUED | OUTPATIENT
Start: 2024-02-18 | End: 2024-02-20

## 2024-02-18 RX ORDER — FAMOTIDINE 10 MG/ML
20 INJECTION, SOLUTION INTRAVENOUS 2 TIMES DAILY
Status: DISCONTINUED | OUTPATIENT
Start: 2024-02-18 | End: 2024-02-19

## 2024-02-18 RX ORDER — DIAZEPAM 5 MG/ML
1.25 INJECTION, SOLUTION INTRAMUSCULAR; INTRAVENOUS ONCE
Status: COMPLETED | OUTPATIENT
Start: 2024-02-18 | End: 2024-02-18

## 2024-02-18 RX ORDER — MORPHINE SULFATE 2 MG/ML
1 INJECTION, SOLUTION INTRAMUSCULAR; INTRAVENOUS ONCE
Status: COMPLETED | OUTPATIENT
Start: 2024-02-18 | End: 2024-02-18

## 2024-02-18 RX ORDER — BISACODYL 10 MG
10 SUPPOSITORY, RECTAL RECTAL DAILY PRN
Status: DISCONTINUED | OUTPATIENT
Start: 2024-02-18 | End: 2024-02-23 | Stop reason: HOSPADM

## 2024-02-18 RX ORDER — LACTULOSE 10 G/15ML
30 SOLUTION ORAL 3 TIMES DAILY
Status: DISCONTINUED | OUTPATIENT
Start: 2024-02-18 | End: 2024-02-23 | Stop reason: HOSPADM

## 2024-02-18 RX ORDER — HALOPERIDOL 5 MG/ML
1 INJECTION INTRAMUSCULAR ONCE
Status: COMPLETED | OUTPATIENT
Start: 2024-02-19 | End: 2024-02-18

## 2024-02-18 RX ORDER — AMOXICILLIN 250 MG
2 CAPSULE ORAL 2 TIMES DAILY
Status: DISCONTINUED | OUTPATIENT
Start: 2024-02-18 | End: 2024-02-23 | Stop reason: HOSPADM

## 2024-02-18 RX ORDER — MAGNESIUM SULFATE 1 G/100ML
1 INJECTION INTRAVENOUS
Qty: 200 ML | Refills: 0 | Status: COMPLETED | OUTPATIENT
Start: 2024-02-18 | End: 2024-02-18

## 2024-02-18 RX ADMIN — POTASSIUM PHOSPHATE, MONOBASIC POTASSIUM PHOSPHATE, DIBASIC 15 MMOL: 224; 236 INJECTION, SOLUTION, CONCENTRATE INTRAVENOUS at 09:59

## 2024-02-18 RX ADMIN — AMPICILLIN AND SULBACTAM 3 G: 2; 1 INJECTION, POWDER, FOR SOLUTION INTRAVENOUS at 15:40

## 2024-02-18 RX ADMIN — DIAZEPAM 1.25 MG: 5 INJECTION, SOLUTION INTRAMUSCULAR; INTRAVENOUS at 04:17

## 2024-02-18 RX ADMIN — AMPICILLIN AND SULBACTAM 3 G: 2; 1 INJECTION, POWDER, FOR SOLUTION INTRAVENOUS at 09:27

## 2024-02-18 RX ADMIN — DEXTROSE MONOHYDRATE 100 ML/HR: 100 INJECTION, SOLUTION INTRAVENOUS at 04:35

## 2024-02-18 RX ADMIN — POTASSIUM CHLORIDE 10 MEQ: 7.46 INJECTION, SOLUTION INTRAVENOUS at 00:56

## 2024-02-18 RX ADMIN — DOCUSATE SODIUM 50MG AND SENNOSIDES 8.6MG 2 TABLET: 8.6; 5 TABLET, FILM COATED ORAL at 08:14

## 2024-02-18 RX ADMIN — AMPICILLIN AND SULBACTAM 3 G: 2; 1 INJECTION, POWDER, FOR SOLUTION INTRAVENOUS at 03:56

## 2024-02-18 RX ADMIN — HYDRALAZINE HYDROCHLORIDE 10 MG: 20 INJECTION, SOLUTION INTRAMUSCULAR; INTRAVENOUS at 11:24

## 2024-02-18 RX ADMIN — LACTULOSE 30 G: 20 SOLUTION ORAL at 14:48

## 2024-02-18 RX ADMIN — DEXTROSE AND SODIUM CHLORIDE 100 ML/HR: 5; 900 INJECTION, SOLUTION INTRAVENOUS at 09:05

## 2024-02-18 RX ADMIN — POTASSIUM PHOSPHATE, MONOBASIC POTASSIUM PHOSPHATE, DIBASIC 15 MMOL: 224; 236 INJECTION, SOLUTION, CONCENTRATE INTRAVENOUS at 07:51

## 2024-02-18 RX ADMIN — MAGNESIUM SULFATE 1 G: 1 INJECTION INTRAVENOUS at 11:23

## 2024-02-18 RX ADMIN — DEXTROSE AND SODIUM CHLORIDE 100 ML/HR: 5; 900 INJECTION, SOLUTION INTRAVENOUS at 18:00

## 2024-02-18 RX ADMIN — MAGNESIUM SULFATE 1 G: 1 INJECTION INTRAVENOUS at 13:37

## 2024-02-18 RX ADMIN — LACTULOSE 30 G: 20 SOLUTION ORAL at 21:44

## 2024-02-18 RX ADMIN — HALOPERIDOL LACTATE 1 MG: 5 INJECTION, SOLUTION INTRAMUSCULAR at 23:30

## 2024-02-18 RX ADMIN — Medication 10 ML: at 21:44

## 2024-02-18 RX ADMIN — FAMOTIDINE 20 MG: 10 INJECTION INTRAVENOUS at 21:44

## 2024-02-18 RX ADMIN — AMPICILLIN AND SULBACTAM 3 G: 2; 1 INJECTION, POWDER, FOR SOLUTION INTRAVENOUS at 22:08

## 2024-02-18 RX ADMIN — Medication 10 ML: at 08:15

## 2024-02-18 RX ADMIN — MORPHINE SULFATE 1 MG: 2 INJECTION, SOLUTION INTRAMUSCULAR; INTRAVENOUS at 20:08

## 2024-02-18 RX ADMIN — HYDRALAZINE HYDROCHLORIDE 10 MG: 20 INJECTION, SOLUTION INTRAMUSCULAR; INTRAVENOUS at 21:44

## 2024-02-18 RX ADMIN — MORPHINE SULFATE 1 MG: 2 INJECTION, SOLUTION INTRAMUSCULAR; INTRAVENOUS at 02:07

## 2024-02-18 RX ADMIN — LACTULOSE 30 G: 20 SOLUTION ORAL at 08:12

## 2024-02-18 NOTE — CONSULTS
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  Hx of Gastric CA with mets to liver. Receiving Chemotherapy. Recent Udenyca injection 2/14/24.  Ammonia elevated ammonia level being treated with lactulose 30 ml, 3 x daily. Pt noted with abdominal swelling (possible ascites) and lower extremity edema.  Pt has order for NG Feeding Tube.  Current feeding ordered Peptamen 1.5 @ 40 ml/hr.  Due to site of cancer, pt may tolerate a peptide based formula better.    Recommend change feeding to Impact Peptide 1.5 @ 55 ml/hr x 22 hours  Flush tube with 40 ml free water every hours x 22 hours.  Monitor tolerance.  Pt may need a Jejunal feeding.       Anthropometrics        Current Height, Weight Height: 167.6 cm (66\")  Weight: 91.8 kg (202 lb 6.1 oz)   Current BMI Body mass index is 32.67 kg/m².   BMI Classification Obese Class I   % %   Adjusted Body Weight (ABW) 75 kg (165 lbs)   Weight Hx  Wt Readings from Last 30 Encounters:   02/18/24 0600 91.8 kg (202 lb 6.1 oz)   02/17/24 1152 89.5 kg (197 lb 5 oz)   02/17/24 0841 89.5 kg (197 lb 5 oz)   02/14/24 0848 88.9 kg (195 lb 15.8 oz)   02/07/24 0700 84 kg (185 lb 3.2 oz)   02/01/24 1006 86.6 kg (190 lb 14.7 oz)   01/31/24 0814 83.3 kg (183 lb 10.3 oz)   01/30/24 0908 85.2 kg (187 lb 13.3 oz)   01/17/24 0819 84.9 kg (187 lb 2.7 oz)   01/16/24 0858 86 kg (189 lb 9.5 " oz)   01/03/24 0900 83.6 kg (184 lb 4.9 oz)   01/02/24 1013 83.8 kg (184 lb 11.9 oz)   12/20/23 0804 83.8 kg (184 lb 11.9 oz)   12/19/23 0957 84 kg (185 lb 3.2 oz)   12/09/23 1609 81.7 kg (180 lb 1.9 oz)   12/06/23 0807 84.1 kg (185 lb 6.5 oz)   11/27/23 1400 86.6 kg (190 lb 14.7 oz)   11/08/23 0800 87.7 kg (193 lb 5.5 oz)   11/07/23 1410 87 kg (191 lb 12.8 oz)   10/25/23 0806 85.1 kg (187 lb 9.8 oz)   10/24/23 0950 84.9 kg (187 lb 2.7 oz)   10/18/23 1342 80.6 kg (177 lb 11.1 oz)   10/16/23 1250 84.1 kg (185 lb 6.5 oz)   10/16/23 1248 84.1 kg (185 lb 6.5 oz)   10/11/23 0700 87.2 kg (192 lb 4.8 oz)   10/10/23 1343 86.6 kg (190 lb 14.7 oz)   09/27/23 0802 91 kg (200 lb 9.9 oz)   09/21/23 2255 88.2 kg (194 lb 7.1 oz)   09/21/23 1902 86.2 kg (190 lb 0.6 oz)   09/20/23 1051 85.5 kg (188 lb 7.9 oz)   09/15/23 1531 89 kg (196 lb 3.4 oz)   08/22/23 0934 95.7 kg (210 lb 15.7 oz)   07/31/23 0920 95.7 kg (210 lb 15.7 oz)   06/27/23 1032 99.3 kg (219 lb)          Wt Change Observation Wt increase 5.8 kg, (6.7%) in 1 month  Wt increase 4.8 kg, (5.2%) in 3 months  Wt decrease 3.9 kg, (4%) in 6 months     Estimated/Assessed Needs  Estimated Needs based on: Adjusted Body Weight       Energy Requirements 25-30 kcal/kg   EST Needs (kcal/day) 5587-0518       Protein Requirements 1.0-1.2 g/kg   EST Daily Needs (g/day) 75-90       Fluid Requirements 25 ml/kg    Estimated Needs (mL/day) 1875 (7-8 cups)     Labs/Medications         Pertinent Labs Reviewed.   Results from last 7 days   Lab Units 02/18/24  0218 02/17/24  1956 02/17/24  0832 02/14/24  0900   SODIUM mmol/L 137 139 141 136   POTASSIUM mmol/L 3.7 2.9* 4.3 3.3*   CHLORIDE mmol/L 102 101 101 103   CO2 mmol/L 23.8 25.5 21.5* 24.5   BUN mg/dL 7* 8 10 14   CREATININE mg/dL 0.67* 0.71* 0.79 0.93   CALCIUM mg/dL 8.3* 8.4* 9.0 8.7   BILIRUBIN mg/dL  --   --  0.8 0.7   ALK PHOS U/L  --   --  322* 279*   ALT (SGPT) U/L  --   --  25 18   AST (SGOT) U/L  --   --  67* 34   GLUCOSE mg/dL  "139* 146* 85 79     Results from last 7 days   Lab Units 02/18/24  0218 02/17/24  1239 02/17/24  1239 02/17/24  0832   MAGNESIUM mg/dL 1.7  --  1.2* 1.2*   PHOSPHORUS mg/dL 1.8*   < > 2.7  --    HEMOGLOBIN g/dL 9.8*  --   --  12.4*   HEMATOCRIT % 29.7*  --   --  38.1    < > = values in this interval not displayed.     No results found for: \"COVID19\"  No results found for: \"HGBA1C\"      Pertinent Medications Reviewed.     Malnutrition Severity Assessment              Nutrition Diagnosis         Nutrition Dx Problem 1 Inadequate oral Intake related to Inability to consume sufficient energy as evidenced by NPO.     Nutrition Intervention           Current Nutrition Orders & Evaluation of Intake       Current PO Diet NPO Diet NPO Type: Strict NPO   Supplement Orders Placed This Encounter      Diet, Tube Feeding Tube Feeding Formula: Peptamen 1.5; Tube Feeding Type: Continuous; Continuous Tube Feeding Start Rate (mL/hr): 25; Then Advance Rate By (mL/hr): 25; Every __ Hours: 4; To Goal Rate of (mL/hr): 40           Nutrition Intervention/Prescription        Impact Peptide 1.5 @ 55 ml/hr x 22 hours via NG Tube infused per continuous pump  Flush NG Tube with 40 ml free water /hr x 22 hours        Medical Nutrition Therapy/Nutrition Education          Learner     Readiness N/A  N/A     Method     Response N/A  N/A     Monitor/Evaluation        Monitor Per protocol, Pertinent labs, EN delivery/tolerance, GI status, POC/GOC, RFS     Nutrition Discharge Plan         To be determined     Electronically signed by:  Kavita Curran RD  02/18/24 08:59 EST    "

## 2024-02-18 NOTE — PROGRESS NOTES
[facility]   Hospitalist Progress Note  Date: 2024  Patient Name: Kevin Ordonez  : 1960  MRN: [id number]  Date of admission: 2024      Subjective   Subjective     Chief Complaint: Follow-up altered mental status    Summary:Kevin Ordonez is a 63 y.o. 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 presents to the ER with weakness, fall, altered mental status.  His wife found him down in the kitchen on the date of presentation.  He has not been feeling well the day prior to presentation and has been experiencing more pain.  The patient was unresponsive when picked up by the EMS his blood sugar was 49. On arrival to the ED, patient temperature 95.5, pulse of 112, respiratory rate of 14, blood pressure 149/117?,  And saturating 95% on room air.  Patient is very lethargic but arousable falling back to sleep. On imaging, patient CT of his head without contrast shows no acute abnormality.  Chest x-ray shows: Minimal left basilar scarring or atelectasis. Patient CK is 202, CO2 was 21.5, anion gap is 18.5, blood glucose labile and trending lower despite hypoglycemia protocol.  Patient's ammonia level is 75.  White blood cell count is 21.12. According to his wife, patient had abdominal swelling, he started Lasix.  Hospitalist service contacted for admission for further evaluation and treatment.  Pulmonology critical care consulted.  Patient started on D5 drip as well as lactulose and empiric antibiotics.    Interval Followup: Patient lying in bed appears very confused but more awake.  Slurring his words repeating requests to get up and go to the restroom.  Patient has a Lehman catheter which has been placed for urinary retention.  Patient has had multiple bowel movements due to lactulose per nurse at the bedside.  Patient  reminded that he can use the restroom will take care of it.  Nursing concerned patient remains too confused and weak to get out of bed at this time and agree.  Afebrile overnight.  Sinus rhythm-110s on telemetry review.  Blood pressure within normal limits.  Satting well on room air.  Blood sugars stable.  Lactate remaining elevated.  White blood cell count improved.  Hemoglobin trending down sharply.  No signs of bleeding.  No other issues per nursing.    Review of Systems  Unable to obtain secondary to altered mental status    Objective   Objective     Vitals:   Temp:  [98.2 °F (36.8 °C)-98.8 °F (37.1 °C)] 98.2 °F (36.8 °C)  Heart Rate:  [] 113  BP: (112-176)/(57-92) 161/83  Physical Exam   General: well-developed appearing stated age in no acute distress  HEENT: Normocephalic atraumatic moist membranes pupils equal round reactive light, no scleral icterus no conjunctival injection  Cardiovascular: regular cardiac 2+ bilateral lower extremity edema appreciated  Pulmonary bibasilar crackles no wheezes or rhonchi symmetric chest expansion, unlabored, no conversational dyspnea appreciated  Gastrointestinal: Soft nontender lightly distended positive bowel sounds all 4 quadrants no rebound or guarding  Musculoskeletal: No clubbing cyanosis, warm and well-perfused, calves soft symmetric nontender bilaterally  Skin: Clean dry without rashes  Neuro: Patient is slurring his words but this appears to have more to do with mental status than motor issues no sensorimotor deficits appreciated bilateral upper and lower extremities though patient not able to cooperate with exam  Psych: Patient is awake but very confused not able to cooperate with exam slurring his words perseverating not responding to internal stimuli  : Lehman catheter draining clear urine no bladder distention no suprapubic tenderness    Result Review    Result Review:  I have personally reviewed these results and agree with these findings:  [x]   Laboratory  LAB RESULTS:      Lab 02/18/24  1651 02/18/24  1337 02/18/24  1145 02/18/24  0747 02/18/24  0601 02/18/24  0218 02/17/24  1710 02/17/24  1239 02/17/24  0832 02/14/24  0900   WBC  --   --   --   --   --  18.22*  --   --  21.12* 4.66   HEMOGLOBIN  --   --   --   --   --  9.8*  --   --  12.4* 10.6*   HEMATOCRIT  --   --   --   --   --  29.7*  --   --  38.1 32.2*   PLATELETS  --   --   --   --   --  175  --   --  222 255   NEUTROS ABS  --   --   --   --   --  16.40*  --   --  19.64* 2.25   IMMATURE GRANS (ABS)  --   --   --   --   --   --   --   --   --  0.02   LYMPHS ABS  --   --   --   --   --   --   --   --   --  1.12   MONOS ABS  --   --   --   --   --   --   --   --   --  1.07*   EOS ABS  --   --   --   --   --  0.36  --   --   --  0.14   MCV  --   --   --   --   --  89.5  --   --  92.0 92.3   PROCALCITONIN  --   --   --   --   --   --   --  0.34*  --   --    LACTATE 2.7* 2.7* 2.9* 2.5* 2.5* 2.6*   < >  --   --   --    PROTIME  --   --   --   --   --   --   --   --  15.2*  --     < > = values in this interval not displayed.         Lab 02/18/24  0218 02/17/24  1956 02/17/24  1239 02/17/24  0832 02/14/24  0900   SODIUM 137 139  --  141 136   POTASSIUM 3.7 2.9*  --  4.3 3.3*   CHLORIDE 102 101  --  101 103   CO2 23.8 25.5  --  21.5* 24.5   ANION GAP 11.2 12.5  --  18.5* 8.5   BUN 7* 8  --  10 14   CREATININE 0.67* 0.71*  --  0.79 0.93   EGFR 104.3 102.5  --  99.2 91.7   GLUCOSE 139* 146*  --  85 79   CALCIUM 8.3* 8.4*  --  9.0 8.7   MAGNESIUM 1.7  --  1.2* 1.2*  --    PHOSPHORUS 1.8*  --  2.7  --   --    TSH  --   --   --  2.750  --          Lab 02/17/24  0832 02/14/24  0900   TOTAL PROTEIN 7.2 6.1   ALBUMIN 2.7* 2.7*   GLOBULIN 4.5 3.4   ALT (SGPT) 25 18   AST (SGOT) 67* 34   BILIRUBIN 0.8 0.7   ALK PHOS 322* 279*         Lab 02/17/24  0832   PROTIME 15.2*   INR 1.17*                 Brief Urine Lab Results  (Last result in the past 365 days)        Color   Clarity   Blood   Leuk Est   Nitrite   Protein    CREAT   Urine HCG        02/17/24 0949 Yellow   Clear   Trace   Negative   Negative   100 mg/dL (2+)                 Microbiology Results (last 10 days)       Procedure Component Value - Date/Time    Blood Culture - Blood, Arm, Left [[id number]]  (Normal) Collected: 02/17/24 1716    Lab Status: Preliminary result Specimen: Blood from Arm, Left Updated: 02/18/24 1717     Blood Culture No growth at 24 hours    Legionella Antigen, Urine - Urine, Urine, Clean Catch [[id number]]  (Normal) Collected: 02/17/24 1546    Lab Status: Final result Specimen: Urine, Clean Catch Updated: 02/17/24 1658     LEGIONELLA ANTIGEN, URINE Negative    S. Pneumo Ag Urine or CSF - Urine, Urine, Clean Catch [[id number]]  (Normal) Collected: 02/17/24 1546    Lab Status: Final result Specimen: Urine, Clean Catch Updated: 02/17/24 1658     Strep Pneumo Ag Negative            [x]  Microbiology  [x]  Radiology  CT Abdomen Pelvis Without Contrast    Result Date: 2/17/2024   1. Markedly technically degraded exam as above. 2. Diffuse colonic diverticulosis without focal diverticulitis.  However, there is some equivocal wall thickening in the transverse colon and could reflect mild colitis. 3.  Poorly defined low attenuation changes in the liver may reflect metastatic disease. 4. Trace amount of free fluid in the abdomen and pelvis.  There is also a small left effusion. 5. Enlarged gastrohepatic ligament lymph node compatible with metastatic disease.      IKER PATRICIA MD       Electronically Signed and Approved By: IKER PATRICIA MD on 2/17/2024 at 21:46             XR Abdomen KUB    Result Date: 2/17/2024    1. NG tube in proximal to mid gastric body     Kody Payton M.D.       Electronically Signed and Approved By: Kody Payton M.D. on 2/17/2024 at 16:30             CT Head Without Contrast    Result Date: 2/17/2024    1. No acute intracranial abnormality     Kody Payton M.D.       Electronically Signed and Approved By: Kody Payton M.D. on  2/17/2024 at 9:38             XR Chest 1 View    Result Date: 2/17/2024    1. Minimal left basilar scarring or atelectasis       Kody Payton M.D.       Electronically Signed and Approved By: Kody Payton M.D. on 2/17/2024 at 9:03              [x]  EKG/Telemetry   []  Cardiology/Vascular   []  Pathology  []  Old records  [x]  Other:  Scheduled Meds:ampicillin-sulbactam, 3 g, Intravenous, Q6H  [Held by provider] apixaban, 5 mg, Oral, BID  [Held by provider] budesonide-formoterol, 1 puff, Inhalation, BID - RT  [Held by provider] chlorthalidone, 12.5 mg, Oral, Daily  dextrose, 25 g, Intravenous, Once  [Held by provider] furosemide, 20 mg, Oral, Daily  [Held by provider] gabapentin, 600 mg, Oral, Q12H  lactulose, 30 g, Oral, TID  [Held by provider] losartan, 100 mg, Oral, Daily  [Held by provider] mirtazapine, 15 mg, Oral, Nightly  [Held by provider] OLANZapine, 5 mg, Oral, Nightly  [Held by provider] pantoprazole, 40 mg, Oral, Q AM  senna-docusate sodium, 2 tablet, Oral, BID  sodium chloride, 10 mL, Intravenous, Q12H      Continuous Infusions:dextrose 5 % and sodium chloride 0.9 %, 100 mL/hr, Last Rate: 100 mL/hr (02/18/24 0905)      PRN Meds:.  senna-docusate sodium **AND** polyethylene glycol **AND** [DISCONTINUED] bisacodyl **AND** bisacodyl    dextrose    dextrose    glucagon (human recombinant)    hydrALAZINE    ipratropium-albuterol    [Held by provider] LORazepam    nitroglycerin    ondansetron ODT **OR** ondansetron    [Held by provider] oxyCODONE-acetaminophen    sodium chloride    sodium chloride      Assessment & Plan   Assessment / Plan     Assessment/Plan:  Acute metabolic encephalopathy suspect due to hepatic encephalopathy versus polypharmacy versus hypoglycemia  Hepatic encephalopathy  Hypoglycemia  Leukocytosis  Concern for aspiration  Mild rhabdomyolysis  Hypomagnesemia  Hypokalemia  Hypophosphatemia  Elevated lactate  Acute normocytic anemia  Gastric cancer with mets to liver  Insulin-dependent  diabetes mellitus hypoglycemia  History of hypertension  Obstructive sleep apnea  COPD not acute exacerbation  GERD  Acute urinary retention  Fall        Patient admitted for further evaluation and treatment  Pulmonology critical care consulted thank you for assistance  Continue regular reorientation  Get swallow eval  Keep n.p.o. till cleared by speech  Patient is pulled to NG tube we will hold off on tube feeds at this time  Continue lactulose titrate to 3-4 soft bowel movements daily  Hold sedating medications  Continue D5 drip  Hold long-acting insulin  Monitor leukocytosis  Continue ampicillin-sulbactam  Repeat total CK in a.m.  Continue to monitor electrolytes replace as needed  Suspect elevated lactate due to decreased liver function due to hepatic mets  Continue to monitor hemoglobin transfuse to keep hemoglobin greater than 7  Hold long-acting insulin at this time  If blood sugars remain greater than 180 plan to discontinue D5 drip and monitor blood sugars closely  Continue as needed hydralazine  Continue supplemental oxygen as needed  Restart CPAP once patient more alert and cooperative  Start Pepcid twice daily  Continue Lehman catheter until more alert and cooperative then will attempt voiding trial  Will consult physical therapy and Occupational Therapy once more alert and cooperative  Further inpatient orders recommendations pending clinical course     Discussed plan with bedside RN as well as pulmonology care team.    Disposition: Pending further stabilization and improvement in mental status    DVT prophylaxis:  Medical and mechanical DVT prophylaxis orders are present.        CODE STATUS:   Level Of Support Discussed With: Patient  Code Status (Patient has no pulse and is not breathing): CPR (Attempt to Resuscitate)  Medical Interventions (Patient has pulse or is breathing): Full Support

## 2024-02-18 NOTE — PROGRESS NOTES
[facility]     Progress Note    Patient Name: Kevin Ordonez  : 1960  MRN: [id number]  Primary Care Physician:  Ev Peck APRN  Date of admission: 2024    Subjective   Subjective       Chief Complaint:   Patient is critically ill in ICU with severe hypoglycemia, altered mental status, hepatic encephalopathy, mechanical fall, gastric cancer with metastasis to liver, DVT on Eliquis.    Over last 24 hours, patient was admitted to ICU on D10 drip with hypoglycemia, had high ammonia.  Was started on lactulose.  NG tube was placed which he pulled overnight twice.  CT scan of the abdomen and pelvis was done.    Overnight, no acute events.     This morning,  Mentation is improved.  He is able to swallow some.  Currently blood sugar is in 100s with D10 drip running.  Has some abdominal discomfort.  No nausea or vomiting.  No significant diarrhea.  Did have bowel movement overnight.      Review of Systems  General:  Fatigue, No Fever  HEENT: No dysphagia, No Visual Changes, no rhinorrhea  Respiratory:  No cough,+Dyspnea, No Pleuritic Pain, + wheezing, no hemoptysis  Cardiovascular: Denies chest pain, denies palpitations,+JACK, No Chest Pressure  Gastrointestinal:  Abdominal Pain, Nausea, No Vomiting, No Diarrhea  Genitourinary:  No Dysuria, No Frequency, No Hesitancy  Musculoskeletal: No muscle pain or swelling  Endocrine:  No Heat Intolerance, No Cold Intolerance  Neurologic: Improved confusion, no Dysarthria, No Headaches  Skin:  No Rash, No Open Wounds      Objective   Objective     Vitals:   Temp:  [95.5 °F (35.3 °C)-98.8 °F (37.1 °C)] 98.2 °F (36.8 °C)  Heart Rate:  [] 98  Resp:  [14-17] 17  BP: (121-182)/() 165/72  Physical Exam   Vital Signs Reviewed  WDWN, Alert, NAD  HEENT:  PERRL, EOMI.  OP, nares clear, no sinus tenderness  Neck:  Supple, No JVD, no thyromegaly  Lymph: no axillary, cervical, supraclavicular lymphadenopathy noted bilaterally  Chest:  poor aeration, diminished  breath sounds, resonant to percussion b/l, no increased work of breathing noted  CV: RRR, no MGR, pulses 2+, equal  Abd:  Soft, NT, ND, + BS, no HSM  EXT:  no clubbing, no cyanosis, No BLE edema  Neuro:  A&Ox3, CN grossly intact, no focal deficits  Skin: No rashes or lesions noted      Result Review    Result Review:  I have personally reviewed the results from the time of this admission to 2/18/2024 06:41 EST and agree with these findings:  [x]  Laboratory  [x]  Microbiology  [x]  Radiology  [x]  EKG/Telemetry   [x]  Cardiology/Vascular   []  Pathology  []  Old records  []  Other:  Most notable findings include:         Lab 02/18/24  0218 02/17/24  1956 02/17/24  1239 02/17/24  0832 02/14/24  0900   WBC 18.22*  --   --  21.12* 4.66   HEMOGLOBIN 9.8*  --   --  12.4* 10.6*   HEMATOCRIT 29.7*  --   --  38.1 32.2*   PLATELETS 175  --   --  222 255   SODIUM 137 139  --  141 136   POTASSIUM 3.7 2.9*  --  4.3 3.3*   CHLORIDE 102 101  --  101 103   CO2 23.8 25.5  --  21.5* 24.5   BUN 7* 8  --  10 14   CREATININE 0.67* 0.71*  --  0.79 0.93   GLUCOSE 139* 146*  --  85 79   CALCIUM 8.3* 8.4*  --  9.0 8.7   PHOSPHORUS 1.8*  --  2.7  --   --    TOTAL PROTEIN  --   --   --  7.2 6.1   ALBUMIN  --   --   --  2.7* 2.7*   GLOBULIN  --   --   --  4.5 3.4     CT Abdomen Pelvis Without Contrast    Result Date: 2/17/2024  PROCEDURE: CT ABDOMEN PELVIS WO CONTRAST  COMPARISON: [facility], CT, CT ABDOMEN PELVIS W CONTRAST, 1/29/2024, 10:46.   INDICATIONS: Gastric cancer.  Observation of malignant neoplasm.  TECHNIQUE: CT images were created without intravenous contrast.   PROTOCOL:   Standard imaging protocol performed    RADIATION:   DLP: 751 mGy*cm   Automated exposure control was utilized to minimize radiation dose.  FINDINGS:  Exam is degraded by motion, the lack of contrast, and streak artifact from the patient's arms.  There is a new small left pleural effusion.  There is some minimal associated atelectasis in the left  lower lobe.  NG tube is present in the stomach.  There is atherosclerotic disease without an aortic aneurysm.  Gallbladder is surgically absent.  No renal or ureteral stones.  No hydronephrosis.  The adrenal glands, spleen, and pancreas are grossly normal.  The liver is poorly characterized in the absence of contrast.  However, there are multifocal areas of low attenuation change that could reflect masses.  For example, an area of abnormality in the dome of the liver measures at least 3.7 cm.  The urinary bladder is decompressed by a Lehamn catheter.  Prostate unremarkable.  Along redundant sigmoid colon loop is present.  There is fairly diffuse colonic diverticulosis.  Definite focal acute diverticulitis is not identified.  There is some equivocal wall thickening in the transverse colon neck could reflect segmental colitis.  The appendix is normal.  No small bowel obstruction is seen.  The stomach is grossly normal within the limits of the study.  There is a small amount of free fluid in the abdomen and pelvis.  A large sarkis mass in the gastrohepatic ligament measures about 4.3 x 3.2 cm today, slightly larger than on the prior study.  Several other lymph nodes seen on the prior study are poorly characterized currently due to technical factors.  Patient is status post mesh repair of the ventral abdominal wall.  No clearly suspicious osseous lesions.      Impression:  1. Markedly technically degraded exam as above. 2. Diffuse colonic diverticulosis without focal diverticulitis.  However, there is some equivocal wall thickening in the transverse colon and could reflect mild colitis. 3.  Poorly defined low attenuation changes in the liver may reflect metastatic disease. 4. Trace amount of free fluid in the abdomen and pelvis.  There is also a small left effusion. 5. Enlarged gastrohepatic ligament lymph node compatible with metastatic disease.      IKER PATRICIA MD       Electronically Signed and Approved By: IKER  HARSHAD AREVALO on 2/17/2024 at 21:46             CT Abdomen Pelvis With Contrast    Result Date: 1/30/2024  PROCEDURE: CT ABDOMEN PELVIS W CONTRAST  COMPARISON: [facility], CT, CT ABDOMEN PELVIS W CONTRAST, 11/27/2023, 9:02.  INDICATIONS: restaging scan/LIVER STAGE 4 STOMACH STAGE 4 CANCER/STILL ON CHEMO  TECHNIQUE: After obtaining the patient's consent, CT images were created with non-ionic intravenous contrast material.   PROTOCOL:   Standard imaging protocol performed    RADIATION:   DLP: 1892.8mGy*cm   Automated exposure control was utilized to minimize radiation dose. CONTRAST: 100cc  FINDINGS:  CT chest dictated and reported separately.  Unchanged size and contour of the liver.  Multiple new and increasing low-density lesions throughout the liver, for example measuring 4.2 x 2.8 centimeter within right hepatic lobe axial image 20 series 2. There appears to be a combination of both increasing solid hypoenhancing liver lesions as well as multiple more fluid density lesions throughout.  Low-density branching areas for example in the right hepatic lobe image 29 series 2 suggestive of small subsegmental areas of intrahepatic duct dilation.  Redemonstrated extensive heterogeneous intrahepatic portal thrombus which appears increasing within right hepatic lobe example image 49 series 2. Heterogeneity in probable enhancement suggest combination of bland and tumor thrombus.  Slight increasing prominence of the caudate lobe measuring up to 6.9 centimeter previously 6.3 centimeter.  Decreasing now eccentric nonocclusive thrombus within main portal vein similar central mesenteric and perigastric varices.   Gallbladder surgically absent.  No extrahepatic duct dilation.  Mildly atrophic pancreas without active inflammation.  Spleen is unremarkable.  No adrenal nodule.  Symmetric renal size, contour and enhancement.  No hydronephrosis.  Circumaortic left renal vein.  Urinary bladder and prostate grossly  unremarkable.  Slightly more conspicuous enhancing 2.6 centimeter mass centered at GE junction image 31 series 2 previously measuring 1.6 centimeter.  Increasing gastrohepatic nodes example 1.3 centimeter short axis node image 41 and 2.8 x 3.8 centimeter lymph node image 50. Colonic diverticulosis without signs of bowel obstruction or active inflammation.  Normal appendix.  Diffuse aortic atherosclerotic disease with mild fusiform ectasia of infrarenal abdominal aorta measuring up to 2.9 centimeter.  Major visceral branches appear patent.  Trace perihepatic, pericolic gutter and pelvic ascites, overall slightly increasing.  No organized/drainable collection.  No free air.  Prior ventral hernia mesh repair.  Small fat containing periumbilical hernia.  Degenerative related changes in the lumbar spine without acute displaced fracture or aggressive lesion.       Impression:   1. Disease progression since 11/27/2023.  This includes increasing conspicuity of the mass at GE junction, increasing gastrohepatic nodes , and new/increasing hepatic lesions.  Many of the hepatic lesions appear more fluid attenuation rather than solid and could represent superimposed collections (such as bilomas) +/-infection. 2. Increasing intrahepatic tumor and bland portal thrombus.  Decreasing extrahepatic portal venous thrombus.  3. Few new peripheral subsegmental areas of intrahepatic duct dilation suggesting developing malignant obstruction. 4. Slight increasing intra-abdominal ascites.  Mild intra-abdominal varices appear similar. 5. Separately reported CT chest.      HERSON ESTRADA MD       Electronically Signed and Approved By: HERSON ESTRADA MD on 1/30/2024 at 8:41                Assessment & Plan   Assessment / Plan     Brief Patient Summary:  Kevin Ordonez is a 63 y.o. male   Severe hypoglycemia  Altered mental status  Hepatic encephalopathy  Mechanical fall  Gastric cancer with metastasis to liver  History of DVT on  Eliquis  Hypomagnesia   Type 2 diabetes  Hypertension  Hyperlipidemia    Active Hospital Problems:  Active Hospital Problems    Diagnosis     **Hypoglycemia      Plan:   -Hypoglycemia is improved.  Discontinue D10.  Switch to D5 half-normal saline at 100 cc/h.  -He pulled out NG tube twice overnight.  -Bedside swallow eval.  Start diet if he passes swallow eval.  -Continue lactulose 30 g 3 times daily  -Aspiration precautions  -Check renal panel this afternoon to evaluate sodium level  -May need to initiate normal saline  -Will check blood culture, MRSA PCR, mildly elevated procalcitonin  -Urinalysis okay  -Chest x-ray reviewed  -Continue IV Unasyn for possible aspiration  -Serum lactate is persistently high.  -CT abdomen and pelvis with possible diverticular disease with diverticulitis, liver mets with stomach cancer  -Trend renal function, monitor and replace electrolytes, replace IV magnesium  -Resume Eliquis.   -Resume home dose Symbicort twice daily  Continue with pantoprazole 40 mg once daily.    DVT prophylaxis:  Medical and mechanical DVT prophylaxis orders are present.        CODE STATUS:   Level Of Support Discussed With: Patient  Code Status (Patient has no pulse and is not breathing): CPR (Attempt to Resuscitate)  Medical Interventions (Patient has pulse or is breathing): Full Support    The patient is critically ill in the ICU with severe hyperglycemia, altered mental status, hepatic encephalopathy, history of diabetes mellitus on insulin at home, gastric cancer with metastasis to liver, history of DVT on Eliquis. Multidisciplinary bedside critical care rounds were performed with nursing staff, respiratory therapy, pharmacy, nutritional services, social work. I have personally reviewed the chart, labs and any pertinent imaging available. We have spent 32 minutes of critical care time, excluding procedures, in the care of this patient.       Electronically signed by Les Mcdonough MD, 2/18/2024, 06:41  EST.

## 2024-02-19 LAB
ALBUMIN SERPL-MCNC: 2.3 G/DL (ref 3.5–5.2)
ALP SERPL-CCNC: 262 U/L (ref 39–117)
ALT SERPL W P-5'-P-CCNC: 19 U/L (ref 1–41)
AMMONIA BLD-SCNC: 23 UMOL/L (ref 16–60)
ANION GAP SERPL CALCULATED.3IONS-SCNC: 11 MMOL/L (ref 5–15)
AST SERPL-CCNC: 35 U/L (ref 1–40)
BASOPHILS # BLD AUTO: 0.06 10*3/MM3 (ref 0–0.2)
BASOPHILS NFR BLD AUTO: 0.6 % (ref 0–1.5)
BILIRUB CONJ SERPL-MCNC: 0.2 MG/DL (ref 0–0.3)
BILIRUB INDIRECT SERPL-MCNC: 0.6 MG/DL
BILIRUB SERPL-MCNC: 0.8 MG/DL (ref 0–1.2)
BUN SERPL-MCNC: 8 MG/DL (ref 8–23)
BUN/CREAT SERPL: 10.7 (ref 7–25)
CALCIUM SPEC-SCNC: 8.2 MG/DL (ref 8.6–10.5)
CHLORIDE SERPL-SCNC: 104 MMOL/L (ref 98–107)
CK SERPL-CCNC: 74 U/L (ref 20–200)
CO2 SERPL-SCNC: 23 MMOL/L (ref 22–29)
CREAT SERPL-MCNC: 0.75 MG/DL (ref 0.76–1.27)
D-LACTATE SERPL-SCNC: 3 MMOL/L (ref 0.5–2)
D-LACTATE SERPL-SCNC: 3.3 MMOL/L (ref 0.5–2)
DEPRECATED RDW RBC AUTO: 54.7 FL (ref 37–54)
EGFRCR SERPLBLD CKD-EPI 2021: 100.8 ML/MIN/1.73
EOSINOPHIL # BLD AUTO: 0.07 10*3/MM3 (ref 0–0.4)
EOSINOPHIL NFR BLD AUTO: 0.7 % (ref 0.3–6.2)
ERYTHROCYTE [DISTWIDTH] IN BLOOD BY AUTOMATED COUNT: 16.3 % (ref 12.3–15.4)
GLUCOSE BLDC GLUCOMTR-MCNC: 181 MG/DL (ref 70–99)
GLUCOSE BLDC GLUCOMTR-MCNC: 182 MG/DL (ref 70–99)
GLUCOSE BLDC GLUCOMTR-MCNC: 190 MG/DL (ref 70–99)
GLUCOSE SERPL-MCNC: 190 MG/DL (ref 65–99)
HCT VFR BLD AUTO: 30.3 % (ref 37.5–51)
HGB BLD-MCNC: 9.9 G/DL (ref 13–17.7)
IMM GRANULOCYTES # BLD AUTO: 1.2 10*3/MM3 (ref 0–0.05)
IMM GRANULOCYTES NFR BLD AUTO: 11.7 % (ref 0–0.5)
LYMPHOCYTES # BLD AUTO: 0.78 10*3/MM3 (ref 0.7–3.1)
LYMPHOCYTES NFR BLD AUTO: 7.6 % (ref 19.6–45.3)
MAGNESIUM SERPL-MCNC: 1.5 MG/DL (ref 1.6–2.4)
MCH RBC QN AUTO: 29.7 PG (ref 26.6–33)
MCHC RBC AUTO-ENTMCNC: 32.7 G/DL (ref 31.5–35.7)
MCV RBC AUTO: 91 FL (ref 79–97)
MONOCYTES # BLD AUTO: 0.49 10*3/MM3 (ref 0.1–0.9)
MONOCYTES NFR BLD AUTO: 4.8 % (ref 5–12)
NEUTROPHILS NFR BLD AUTO: 7.69 10*3/MM3 (ref 1.7–7)
NEUTROPHILS NFR BLD AUTO: 74.6 % (ref 42.7–76)
NRBC BLD AUTO-RTO: 0 /100 WBC (ref 0–0.2)
PHOSPHATE SERPL-MCNC: 2.6 MG/DL (ref 2.5–4.5)
PLATELET # BLD AUTO: 141 10*3/MM3 (ref 140–450)
PMV BLD AUTO: 12 FL (ref 6–12)
POTASSIUM SERPL-SCNC: 3.7 MMOL/L (ref 3.5–5.2)
PROT SERPL-MCNC: 5.4 G/DL (ref 6–8.5)
RBC # BLD AUTO: 3.33 10*6/MM3 (ref 4.14–5.8)
SODIUM SERPL-SCNC: 138 MMOL/L (ref 136–145)
WBC NRBC COR # BLD AUTO: 10.29 10*3/MM3 (ref 3.4–10.8)

## 2024-02-19 PROCEDURE — 82550 ASSAY OF CK (CPK): CPT | Performed by: FAMILY MEDICINE

## 2024-02-19 PROCEDURE — 84100 ASSAY OF PHOSPHORUS: CPT | Performed by: INTERNAL MEDICINE

## 2024-02-19 PROCEDURE — 94799 UNLISTED PULMONARY SVC/PX: CPT

## 2024-02-19 PROCEDURE — 25010000002 MAGNESIUM SULFATE 2 GM/50ML SOLUTION: Performed by: NURSE PRACTITIONER

## 2024-02-19 PROCEDURE — 83605 ASSAY OF LACTIC ACID: CPT | Performed by: NURSE PRACTITIONER

## 2024-02-19 PROCEDURE — 82948 REAGENT STRIP/BLOOD GLUCOSE: CPT | Performed by: NURSE PRACTITIONER

## 2024-02-19 PROCEDURE — 80076 HEPATIC FUNCTION PANEL: CPT | Performed by: FAMILY MEDICINE

## 2024-02-19 PROCEDURE — 82140 ASSAY OF AMMONIA: CPT | Performed by: INTERNAL MEDICINE

## 2024-02-19 PROCEDURE — 85025 COMPLETE CBC W/AUTO DIFF WBC: CPT | Performed by: HOSPITALIST

## 2024-02-19 PROCEDURE — 25010000002 AMPICILLIN-SULBACTAM PER 1.5 G: Performed by: NURSE PRACTITIONER

## 2024-02-19 PROCEDURE — 99233 SBSQ HOSP IP/OBS HIGH 50: CPT | Performed by: INTERNAL MEDICINE

## 2024-02-19 PROCEDURE — 80048 BASIC METABOLIC PNL TOTAL CA: CPT | Performed by: INTERNAL MEDICINE

## 2024-02-19 PROCEDURE — 94760 N-INVAS EAR/PLS OXIMETRY 1: CPT

## 2024-02-19 PROCEDURE — 82948 REAGENT STRIP/BLOOD GLUCOSE: CPT

## 2024-02-19 PROCEDURE — 92610 EVALUATE SWALLOWING FUNCTION: CPT

## 2024-02-19 PROCEDURE — 83735 ASSAY OF MAGNESIUM: CPT | Performed by: HOSPITALIST

## 2024-02-19 PROCEDURE — 25010000002 HYDRALAZINE PER 20 MG: Performed by: HOSPITALIST

## 2024-02-19 PROCEDURE — 25810000003 DEXTROSE-NACL PER 500 ML: Performed by: INTERNAL MEDICINE

## 2024-02-19 PROCEDURE — 99233 SBSQ HOSP IP/OBS HIGH 50: CPT | Performed by: FAMILY MEDICINE

## 2024-02-19 RX ORDER — NOREPINEPHRINE BITARTRATE 0.03 MG/ML
.02-.3 INJECTION, SOLUTION INTRAVENOUS
Status: DISCONTINUED | OUTPATIENT
Start: 2024-02-19 | End: 2024-02-19 | Stop reason: SDUPTHER

## 2024-02-19 RX ORDER — MAGNESIUM SULFATE HEPTAHYDRATE 40 MG/ML
2 INJECTION, SOLUTION INTRAVENOUS
Status: COMPLETED | OUTPATIENT
Start: 2024-02-19 | End: 2024-02-19

## 2024-02-19 RX ORDER — NOREPINEPHRINE BITARTRATE 0.03 MG/ML
INJECTION, SOLUTION INTRAVENOUS
Status: COMPLETED
Start: 2024-02-19 | End: 2024-02-19

## 2024-02-19 RX ORDER — NOREPINEPHRINE BITARTRATE 0.03 MG/ML
.02-.3 INJECTION, SOLUTION INTRAVENOUS
Status: DISCONTINUED | OUTPATIENT
Start: 2024-02-19 | End: 2024-02-19

## 2024-02-19 RX ORDER — ENOXAPARIN SODIUM 100 MG/ML
1 INJECTION SUBCUTANEOUS ONCE
Status: DISCONTINUED | OUTPATIENT
Start: 2024-02-19 | End: 2024-02-19

## 2024-02-19 RX ADMIN — AMPICILLIN AND SULBACTAM 3 G: 2; 1 INJECTION, POWDER, FOR SOLUTION INTRAVENOUS at 21:44

## 2024-02-19 RX ADMIN — Medication 10 ML: at 21:44

## 2024-02-19 RX ADMIN — AMPICILLIN AND SULBACTAM 3 G: 2; 1 INJECTION, POWDER, FOR SOLUTION INTRAVENOUS at 15:54

## 2024-02-19 RX ADMIN — HYDRALAZINE HYDROCHLORIDE 10 MG: 20 INJECTION, SOLUTION INTRAMUSCULAR; INTRAVENOUS at 21:44

## 2024-02-19 RX ADMIN — MAGNESIUM SULFATE HEPTAHYDRATE 2 G: 2 INJECTION, SOLUTION INTRAVENOUS at 08:24

## 2024-02-19 RX ADMIN — DEXMEDETOMIDINE HYDROCHLORIDE 0.4 MCG/KG/HR: 4 INJECTION, SOLUTION INTRAVENOUS at 00:06

## 2024-02-19 RX ADMIN — NOREPINEPHRINE BITARTRATE 0.02 MCG/KG/MIN: 0.03 INJECTION, SOLUTION INTRAVENOUS at 01:48

## 2024-02-19 RX ADMIN — LACTULOSE 30 G: 20 SOLUTION ORAL at 15:51

## 2024-02-19 RX ADMIN — DEXTROSE AND SODIUM CHLORIDE 100 ML/HR: 5; 900 INJECTION, SOLUTION INTRAVENOUS at 14:16

## 2024-02-19 RX ADMIN — FAMOTIDINE 20 MG: 10 INJECTION INTRAVENOUS at 08:25

## 2024-02-19 RX ADMIN — BUDESONIDE AND FORMOTEROL FUMARATE DIHYDRATE 1 PUFF: 160; 4.5 AEROSOL RESPIRATORY (INHALATION) at 18:46

## 2024-02-19 RX ADMIN — Medication 10 ML: at 09:02

## 2024-02-19 RX ADMIN — AMPICILLIN AND SULBACTAM 3 G: 2; 1 INJECTION, POWDER, FOR SOLUTION INTRAVENOUS at 05:10

## 2024-02-19 RX ADMIN — LACTULOSE 30 G: 20 SOLUTION ORAL at 08:59

## 2024-02-19 RX ADMIN — APIXABAN 5 MG: 5 TABLET, FILM COATED ORAL at 21:43

## 2024-02-19 RX ADMIN — AMPICILLIN AND SULBACTAM 3 G: 2; 1 INJECTION, POWDER, FOR SOLUTION INTRAVENOUS at 12:51

## 2024-02-19 RX ADMIN — MAGNESIUM SULFATE HEPTAHYDRATE 2 G: 2 INJECTION, SOLUTION INTRAVENOUS at 10:41

## 2024-02-19 RX ADMIN — DEXTROSE AND SODIUM CHLORIDE 100 ML/HR: 5; 900 INJECTION, SOLUTION INTRAVENOUS at 23:08

## 2024-02-19 NOTE — CONSULTS
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  Hx of Gastric CA with mets to liver. Receiving Chemotherapy.     Pt cleared for [facility] soft diet per SLP. Tolerating CLLIQ diet at this time. RD will monitor PO intake for adequacy and will provide nutrition intervention prn.      Anthropometrics        Current Height, Weight Height: 167.6 cm (66\")  Weight: 89.1 kg (196 lb 6.9 oz)   Current BMI Body mass index is 31.7 kg/m².   BMI Classification Obese Class I   % %   Adjusted Body Weight (ABW) 75 kg (165 lbs)   Weight Hx  Wt Readings from Last 30 Encounters:   02/19/24 0600 89.1 kg (196 lb 6.9 oz)   02/18/24 0600 91.8 kg (202 lb 6.1 oz)   02/17/24 1152 89.5 kg (197 lb 5 oz)   02/17/24 0841 89.5 kg (197 lb 5 oz)   02/14/24 0848 88.9 kg (195 lb 15.8 oz)   02/07/24 0700 84 kg (185 lb 3.2 oz)   02/01/24 1006 86.6 kg (190 lb 14.7 oz)   01/31/24 0814 83.3 kg (183 lb 10.3 oz)   01/30/24 0908 85.2 kg (187 lb 13.3 oz)   01/17/24 0819 84.9 kg (187 lb 2.7 oz)   01/16/24 0858 86 kg (189 lb 9.5 oz)   01/03/24 0900 83.6 kg (184 lb 4.9 oz)   01/02/24 1013 83.8 kg (184 lb 11.9 oz)   12/20/23 0804 83.8 kg (184 lb 11.9 oz)   12/19/23 0957 84 kg (185 lb 3.2 oz)   12/09/23 1609 81.7 kg (180 lb 1.9 oz)   12/06/23 0807 84.1 kg (185 lb 6.5 oz)   11/27/23 1400 86.6 kg (190 lb 14.7 oz)   11/08/23 0800 87.7 kg (193 lb 5.5 oz)   11/07/23 1410 87 kg (191 lb 12.8 oz) " "  10/25/23 0806 85.1 kg (187 lb 9.8 oz)   10/24/23 0950 84.9 kg (187 lb 2.7 oz)   10/18/23 1342 80.6 kg (177 lb 11.1 oz)   10/16/23 1250 84.1 kg (185 lb 6.5 oz)   10/16/23 1248 84.1 kg (185 lb 6.5 oz)   10/11/23 0700 87.2 kg (192 lb 4.8 oz)   10/10/23 1343 86.6 kg (190 lb 14.7 oz)   09/27/23 0802 91 kg (200 lb 9.9 oz)   09/21/23 2255 88.2 kg (194 lb 7.1 oz)   09/21/23 1902 86.2 kg (190 lb 0.6 oz)   09/20/23 1051 85.5 kg (188 lb 7.9 oz)   09/15/23 1531 89 kg (196 lb 3.4 oz)   08/22/23 0934 95.7 kg (210 lb 15.7 oz)   07/31/23 0920 95.7 kg (210 lb 15.7 oz)   06/27/23 1032 99.3 kg (219 lb)          Wt Change Observation Wt increase 5.8 kg, (6.7%) in 1 month  Wt increase 4.8 kg, (5.2%) in 3 months  Wt decrease 3.9 kg, (4%) in 6 months     Estimated/Assessed Needs  Estimated Needs based on: Adjusted Body Weight       Energy Requirements 25-30 kcal/kg   EST Needs (kcal/day) 1234-3283       Protein Requirements 1.0-1.2 g/kg   EST Daily Needs (g/day) 75-90       Fluid Requirements 25 ml/kg    Estimated Needs (mL/day) 1875 (7-8 cups)     Labs/Medications         Pertinent Labs Reviewed.   Results from last 7 days   Lab Units 02/19/24  0522 02/18/24  0218 02/17/24  1956 02/17/24  0832 02/14/24  0900   SODIUM mmol/L 138 137 139 141 136   POTASSIUM mmol/L 3.7 3.7 2.9* 4.3 3.3*   CHLORIDE mmol/L 104 102 101 101 103   CO2 mmol/L 23.0 23.8 25.5 21.5* 24.5   BUN mg/dL 8 7* 8 10 14   CREATININE mg/dL 0.75* 0.67* 0.71* 0.79 0.93   CALCIUM mg/dL 8.2* 8.3* 8.4* 9.0 8.7   BILIRUBIN mg/dL 0.8  --   --  0.8 0.7   ALK PHOS U/L 262*  --   --  322* 279*   ALT (SGPT) U/L 19  --   --  25 18   AST (SGOT) U/L 35  --   --  67* 34   GLUCOSE mg/dL 190* 139* 146* 85 79     Results from last 7 days   Lab Units 02/19/24  0522 02/18/24  0218 02/17/24  1239   MAGNESIUM mg/dL 1.5* 1.7 1.2*   PHOSPHORUS mg/dL 2.6 1.8* 2.7   HEMOGLOBIN g/dL 9.9* 9.8*  --    HEMATOCRIT % 30.3* 29.7*  --      No results found for: \"COVID19\"  No results found for: \"HGBA1C\"   "    Pertinent Medications Reviewed.     Malnutrition Severity Assessment              Nutrition Diagnosis         Nutrition Dx Problem 1 Inadequate oral Intake related to decreased ability to consume sufficient energy as evidenced by SLP evaluation     Nutrition Intervention           Current Nutrition Orders & Evaluation of Intake       Current PO Diet Diet: Liquid Diets; Clear Liquid; Fluid Consistency: Thin (IDDSI 0)   Supplement Orders Placed This Encounter      DIET MESSAGE Please send guest tray           Nutrition Intervention/Prescription        RD to monitor PO intake. Recommend ONS if intake is consistently < 50%        Medical Nutrition Therapy/Nutrition Education          Learner     Readiness N/A  N/A     Method     Response N/A  N/A     Monitor/Evaluation        Monitor Per protocol, PO intake, Pertinent labs, Weight, GI status, POC/GOC     Nutrition Discharge Plan         To be determined     Electronically signed by:  Stacia Hill RD  02/19/24 13:07 EST

## 2024-02-19 NOTE — THERAPY EVALUATION
Acute Care - Speech Language Pathology   Swallow Initial Evaluation TOM Noel     Patient Name: Kevin Ordonez  : 1960  MRN: [id number]  Today's Date: 2024               Admit Date: 2024    Visit Dx:     ICD-10-CM ICD-9-CM   1. Altered mental status, unspecified altered mental status type  R41.82 780.97   2. Hypoglycemia  E16.2 251.2   3. Leukocytosis, unspecified type  D72.829 288.60   4. Hyperammonemia  E72.20 270.6   5. Dysphagia, unspecified type  R13.10 787.20     Patient Active Problem List   Diagnosis    Rotator cuff tendonitis, left    Tear of left acetabular labrum    Primary osteoarthritis of left shoulder    Left shoulder pain    Asthma    Closed nondisplaced fracture of shaft of fifth metacarpal bone of right hand    Diabetes mellitus    Hyperlipidemia    Hypertension    Neuropathy    Obstructive sleep apnea syndrome    Calcific tendinitis of left shoulder    Aftercare following surgery of left shoulder arthroscopic rotator cuff debridement, labral debridement, subacromial decompression, bicep tenodesis, 4/3/2023    Malignant neoplasm of cardia of stomach    PICC (peripherally inserted central catheter) in place    Iron deficiency anemia due to chronic blood loss    Malabsorption due to intolerance, not elsewhere classified    Generalized weakness    Symptomatic anemia    Dehydration    Fitting and adjustment of vascular catheter    Acute kidney failure, unspecified    Personal history of nicotine dependence    Presence of cardiac pacemaker    Type 2 diabetes mellitus with diabetic neuropathy, unspecified    Weakness    Vitamin D deficiency, unspecified    Unspecified asthma, uncomplicated    Type 2 diabetes mellitus with diabetic peripheral angiopathy without gangrene    Obstructive sleep apnea (adult) (pediatric)    Obesity, unspecified    Anemia, unspecified    Essential (primary) hypertension    Hyperlipidemia, unspecified    Chronic obstructive pulmonary disease, unspecified    Long  term (current) use of insulin    Long term (current) use of oral hypoglycemic drugs    Acute deep vein thrombosis (DVT) of right peroneal vein    Hypoglycemia     Past Medical History:   Diagnosis Date    Asthma     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    Diabetes mellitus     Hyperlipidemia     Hypertension     Liver cancer     Rotator cuff disorder     LEFT    Sleep apnea     Stomach cancer      Past Surgical History:   Procedure Laterality Date    CAROTID ENDARTERECTOMY Right     CHOLECYSTECTOMY      FOOT SURGERY Right     TOE DEBRIDMENT    HERNIA REPAIR      VENTRAL HERNIA    SHOULDER ARTHROSCOPY W/ ROTATOR CUFF REPAIR Left 4/3/2023    Procedure: LEFT SHOULDER ARTHROSCOPIC ROTATOR CUFF DEBRIDEMENT, LABRAL DEBRIDEMENT, BICEPS TENODESIS, SUBACROMIAL DECOMPRESSION;  Surgeon: Chas Patterson MD;  Location[place] [facility] [place] [facility] [place]  Service: Orthopedics;  Laterality: Left;    TUMOR REMOVAL Left     PAROTID GLAND    VASCULAR SURGERY Right     STENT R LEG    VENOUS ACCESS DEVICE (PORT) INSERTION Right 9/26/2023    Procedure: INSERTION VENOUS ACCESS DEVICE;  Surgeon: James Copeland MD;  Location: [facility] MAIN OR;  Service: General;  Laterality: Right;       Inpatient Speech Pathology Dysphagia Evaluation        PAIN SCALE: None indicated.    PRECAUTIONS/CONTRAINDICATIONS: Standard    SUSPECTED ABUSE/NEGLECT/EXPLOITATION: None indicated.    SOCIAL/PSYCHOLOGICAL NEEDS/BARRIERS: None indicated.    PAST SOCIAL HISTORY: 63-year-old male lives at home with family    PRIOR FUNCTION: Independent    PATIENT GOALS/EXPECTATIONS: Return home    HISTORY: 63-year-old male with the above diagnosis referred for speech therapy evaluation assess for swallowing.  No previous speech therapy services noted this incident.  Patient has been undergoing radiation and chemotherapy.  Patient has had history of dysphagia.    CURRENT DIET LEVEL: Clear liquid    OBJECTIVE:    TEST ADMINISTERED: Clinical dysphagia  evaluation    COGNITION/SAFETY AWARENESS: patient followed directions, wife assisted with responding to questions.    BEHAVIORAL OBSERVATIONS: Alert and cooperative    ORAL MOTOR EXAM:Within functional limits, some missing dentition.  Patient states does not wear partial to eat with.    VOICE QUALITY: Adequate    REFLEX EXAM: Deferred    POSTURE: Sitting upright in bed    FEEDING/SWALLOWING FUNCTION: Assessed with nectar liquid, thin liquids, puréed solids, crunchy solid.    CLINICAL OBSERVATIONS: Nectar liquid by cup appeared adequate with vocal quality remaining clear to cervical auscultation.  Thin liquid by cup and by straw appeared adequate with vocal quality remaining clear to cervical auscultation.  Purée solid with swallow completed with laryngeal elevation noted to palpation.  Crunchy solid with extended chewing followed by swallow completed clearing the oral cavity.    DYSPHAGIA CRITERIA: Swallow appears grossly functional for nutritional needs with soft diet.  No overt clinical signs or symptoms of aspiration were noted at the bedside.    FUNCTIONAL ASSESSMENT INSTRUMENT: Patient currently scored a level 7 of 7 on Functional Communication Measures for swallowing indicating a 0% limitation in function.    ASSESSMENT/ PLAN OF CARE:  No direct speech therapy is recommended at this time for dysphagia. Recommend rereferral should patient demonstrate change in status.    RECOMMENDATIONS:   1.   DIET: Mechanical soft solids, thin liquid.    2.  POSITION: Positioning fully upright for all p.o. intake and 30 minutes following.    3.  COMPENSATORY STRATEGIES: Alternate small bites and small sips of solids and liquids at a slow rate.    Pt/responsible party agrees with plan of care and has been informed of all alternatives, risks and benefits.                              Anticipated Discharge Disposition (SLP): home with assist (02/19/24 3225)                                                                EDUCATION  The patient has been educated in the following areas:   Modified Diet Instruction.              Time Calculation:    Time Calculation- SLP       Row Name 02/19/24 1032             Time Calculation- SLP    SLP Start Time 0830  -TB      SLP Stop Time 0930  -TB      SLP Time Calculation (min) 60 min  -TB      SLP Received On 02/19/24  -TB         Untimed Charges    SLP Eval/Re-eval  ST Eval Oral Pharyng Swallow - 24202  -TB      97092-XY Eval Oral Pharyng Swallow Minutes 60  -TB         Total Minutes    Untimed Charges Total Minutes 60  -TB       Total Minutes 60  -TB                User Key  (r) = Recorded By, (t) = Taken By, (c) = Cosigned By      Initials Name Provider Type    TB Adina Noriega SLP Speech and Language Pathologist                    Therapy Charges for Today       Code Description Service Date Service Provider Modifiers Qty    [id number] HC ST EVAL ORAL PHARYNG SWALLOW 4 2/19/2024 Adina Noriega SLP GN 1                 BLANCHE Ceron  2/19/2024

## 2024-02-19 NOTE — PLAN OF CARE
Goal Outcome Evaluation:              Outcome Evaluation: EOSS: Pt appears to be resting in bed, noted to be in BUE soft limb restraints. Pt remains oriented only to self, continuous reorientation provided throughout the shift. Pt constantly trying to get out of bed, TOM Faria MD notified and aware. Precedex gtt was ultimately added on however pt became hypotensive but responsive. Provider notified and aware, levo added briefly. D5NS gtt titrated per MAR. Please see all flowsheets, VS, and MAR for complete documentation.

## 2024-02-19 NOTE — PLAN OF CARE
Goal Outcome Evaluation:  Plan of Care Reviewed With: patient, spouse      DYSPHAGIA CRITERIA: Swallow appears grossly functional for nutritional needs with soft diet.  No overt clinical signs or symptoms of aspiration were noted at the bedside.    FUNCTIONAL ASSESSMENT INSTRUMENT: Patient currently scored a level 7 of 7 on Functional Communication Measures for swallowing indicating a 0% limitation in function.    ASSESSMENT/ PLAN OF CARE:  No direct speech therapy is recommended at this time for dysphagia. Recommend rereferral should patient demonstrate change in status.    RECOMMENDATIONS:   1.   DIET: Mechanical soft solids, thin liquid.    2.  POSITION: Positioning fully upright for all p.o. intake and 30 minutes following.    3.  COMPENSATORY STRATEGIES: Alternate small bites and small sips of solids and liquids at a slow rate.            Anticipated Discharge Disposition (SLP): home with assist

## 2024-02-19 NOTE — PROGRESS NOTES
[facility]     Progress Note    Patient Name: Kevin Ordonez  : 1960  MRN: [id number]  Primary Care Physician:  Ev Peck APRN  Date of admission: 2024    Subjective   Subjective     Chief Complaint:   Patient is critically ill in ICU with severe hypoglycemia, altered mental status, hepatic encephalopathy, mechanical fall, gastric cancer with metastasis to liver, DVT on Eliquis.    Patient awake, on room air this morning  Intermittently confused  On D5 NS at 100, no hypoglycemic episodes overnight  Patient reports poor appetite, cleared for diet  Magnesium low  Off vasopressors since 0300  Remains off Precedex    Review of Systems  No obvious complaints      Objective   Objective     Vitals:   Temp:  [97.7 °F (36.5 °C)-98.1 °F (36.7 °C)] 98.1 °F (36.7 °C)  Heart Rate:  [] 94  BP: ()/(42-91) 142/67  Physical Exam   Awake, male in no acute distress on room air  Port-A-Cath in place    Result Review    Result Review:  I have personally reviewed the results from the time of this admission to 2024 12:57 EST and agree with these findings:  [x]  Laboratory  [x]  Microbiology  [x]  Radiology  [x]  EKG/Telemetry   [x]  Cardiology/Vascular   []  Pathology  []  Old records  []  Other:  Most notable findings include:   .  Assessment & Plan   Assessment / Plan   Brief Patient Summary:  Kevin Ordonez is a 63 y.o. male   Severe hypoglycemia  Altered mental status  Hepatic encephalopathy  Mechanical fall  Gastric cancer with metastasis to liver  History of DVT on Eliquis  Hypomagnesia   Type 2 diabetes  Hypertension  Hyperlipidemia    Active Hospital Problems:  Active Hospital Problems    Diagnosis     **Hypoglycemia      Plan:   -Patient on room air  -Aspiration precautions  -Continue home Symbicort  -Hypoglycemia improved, continue D5 half-normal at 100 cc an hour  -Monitor blood glucose  -Replace IV magnesium  -Encourage p.o. diet  -Continue lactulose 30 3 times daily  -Check lactic  acid in a.m.  -Okay to discontinue Lehman catheter  -Continue Unasyn to complete for aspiration  -Cultures negative thus far  -CT abdomen and pelvis with possible diverticular disease with diverticulitis, liver mets with stomach cancer  -Resume p.o. Protonix  -Resume home Eliquis for DVT  DVT prophylaxis:  Medical and mechanical DVT prophylaxis orders are present.        CODE STATUS:   Level Of Support Discussed With: Patient  Code Status (Patient has no pulse and is not breathing): CPR (Attempt to Resuscitate)  Medical Interventions (Patient has pulse or is breathing): Full Support      IEleanor APRN,Electronically signed by CHIOMA Galeano, 02/19/24, 1:46 PM EST.      This visit was performed by BOTH a physician and an APC. I personally evaluated and examined the patient.  And spent more than 51% of time caring for this patient I performed all aspects of MDM as documented. , I have reviewed and confirmed the accuracy of the patient's history as documented in this note. and I have reexamined the patient and the results are consistent with the previously documented exam. I have updated the documentation as necessary    Electronically signed by Reji Milian DO, 02/19/24, 3:50 PM EST.

## 2024-02-19 NOTE — CONSULTS
"Purpose of the visit was to evaluate for: goals of care/advanced care planning. Spoke with RN and family and discussed palliative care.      Assessment:The patient is on CCU, soft bilateral wrist restraints were just removed, IV fluids running, on the monitor and shelley to bedside drain. No reports of pain, nausea or anxiety at this time.    Recommendations/Plan:  Continue current plan of care .    Tasks Completed: Emotional Support.    Other Comments: Palliative care nurse spoke with the patient and spouse Kirti at the bedside. She reports they are just getting set up with [facility] and are on their community care program. I discussed code status with the spouse and encouraged her to have conversations with the patient if able. I provided the booklet \"conversations that matter\" and will follow up this week.    -Kayla CAMPOS, RN, [facility]   Palliative Care    2/19/2024 14:14 EST    "

## 2024-02-20 LAB
ANION GAP SERPL CALCULATED.3IONS-SCNC: 12.7 MMOL/L (ref 5–15)
BASOPHILS # BLD AUTO: 0.04 10*3/MM3 (ref 0–0.2)
BASOPHILS NFR BLD AUTO: 0.5 % (ref 0–1.5)
BUN SERPL-MCNC: 7 MG/DL (ref 8–23)
BUN/CREAT SERPL: 10.3 (ref 7–25)
CALCIUM SPEC-SCNC: 8.7 MG/DL (ref 8.6–10.5)
CHLORIDE SERPL-SCNC: 106 MMOL/L (ref 98–107)
CO2 SERPL-SCNC: 20.3 MMOL/L (ref 22–29)
CREAT SERPL-MCNC: 0.68 MG/DL (ref 0.76–1.27)
D-LACTATE SERPL-SCNC: 4.2 MMOL/L (ref 0.5–2)
DEPRECATED RDW RBC AUTO: 52.8 FL (ref 37–54)
EGFRCR SERPLBLD CKD-EPI 2021: 103.8 ML/MIN/1.73
EOSINOPHIL # BLD AUTO: 0.09 10*3/MM3 (ref 0–0.4)
EOSINOPHIL NFR BLD AUTO: 1 % (ref 0.3–6.2)
ERYTHROCYTE [DISTWIDTH] IN BLOOD BY AUTOMATED COUNT: 16.1 % (ref 12.3–15.4)
GLUCOSE BLDC GLUCOMTR-MCNC: 173 MG/DL (ref 70–99)
GLUCOSE BLDC GLUCOMTR-MCNC: 179 MG/DL (ref 70–99)
GLUCOSE BLDC GLUCOMTR-MCNC: 203 MG/DL (ref 70–99)
GLUCOSE SERPL-MCNC: 213 MG/DL (ref 65–99)
HCT VFR BLD AUTO: 32.8 % (ref 37.5–51)
HGB BLD-MCNC: 10.7 G/DL (ref 13–17.7)
IMM GRANULOCYTES # BLD AUTO: 0.07 10*3/MM3 (ref 0–0.05)
IMM GRANULOCYTES NFR BLD AUTO: 0.8 % (ref 0–0.5)
LYMPHOCYTES # BLD AUTO: 0.76 10*3/MM3 (ref 0.7–3.1)
LYMPHOCYTES NFR BLD AUTO: 8.6 % (ref 19.6–45.3)
MAGNESIUM SERPL-MCNC: 1.6 MG/DL (ref 1.6–2.4)
MCH RBC QN AUTO: 29.2 PG (ref 26.6–33)
MCHC RBC AUTO-ENTMCNC: 32.6 G/DL (ref 31.5–35.7)
MCV RBC AUTO: 89.4 FL (ref 79–97)
MONOCYTES # BLD AUTO: 0.97 10*3/MM3 (ref 0.1–0.9)
MONOCYTES NFR BLD AUTO: 11 % (ref 5–12)
NEUTROPHILS NFR BLD AUTO: 6.88 10*3/MM3 (ref 1.7–7)
NEUTROPHILS NFR BLD AUTO: 78.1 % (ref 42.7–76)
NRBC BLD AUTO-RTO: 0 /100 WBC (ref 0–0.2)
PLATELET # BLD AUTO: 147 10*3/MM3 (ref 140–450)
PMV BLD AUTO: 11.2 FL (ref 6–12)
POTASSIUM SERPL-SCNC: 2.9 MMOL/L (ref 3.5–5.2)
RBC # BLD AUTO: 3.67 10*6/MM3 (ref 4.14–5.8)
SODIUM SERPL-SCNC: 139 MMOL/L (ref 136–145)
WBC NRBC COR # BLD AUTO: 8.81 10*3/MM3 (ref 3.4–10.8)

## 2024-02-20 PROCEDURE — 94799 UNLISTED PULMONARY SVC/PX: CPT

## 2024-02-20 PROCEDURE — 82948 REAGENT STRIP/BLOOD GLUCOSE: CPT

## 2024-02-20 PROCEDURE — 25010000002 AMPICILLIN-SULBACTAM PER 1.5 G: Performed by: NURSE PRACTITIONER

## 2024-02-20 PROCEDURE — 25010000002 MAGNESIUM SULFATE 4 GM/100ML SOLUTION: Performed by: NURSE PRACTITIONER

## 2024-02-20 PROCEDURE — 25010000002 HALOPERIDOL LACTATE PER 5 MG

## 2024-02-20 PROCEDURE — 85025 COMPLETE CBC W/AUTO DIFF WBC: CPT | Performed by: NURSE PRACTITIONER

## 2024-02-20 PROCEDURE — 80048 BASIC METABOLIC PNL TOTAL CA: CPT | Performed by: NURSE PRACTITIONER

## 2024-02-20 PROCEDURE — 25010000002 POTASSIUM CHLORIDE PER 2 MEQ: Performed by: NURSE PRACTITIONER

## 2024-02-20 PROCEDURE — 25010000002 POTASSIUM CHLORIDE PER 2 MEQ: Performed by: STUDENT IN AN ORGANIZED HEALTH CARE EDUCATION/TRAINING PROGRAM

## 2024-02-20 PROCEDURE — 83605 ASSAY OF LACTIC ACID: CPT | Performed by: NURSE PRACTITIONER

## 2024-02-20 PROCEDURE — 83735 ASSAY OF MAGNESIUM: CPT | Performed by: FAMILY MEDICINE

## 2024-02-20 PROCEDURE — 99233 SBSQ HOSP IP/OBS HIGH 50: CPT | Performed by: INTERNAL MEDICINE

## 2024-02-20 PROCEDURE — 25010000002 HYDRALAZINE PER 20 MG: Performed by: HOSPITALIST

## 2024-02-20 PROCEDURE — 99291 CRITICAL CARE FIRST HOUR: CPT | Performed by: INTERNAL MEDICINE

## 2024-02-20 PROCEDURE — 94640 AIRWAY INHALATION TREATMENT: CPT

## 2024-02-20 RX ORDER — CHOLECALCIFEROL (VITAMIN D3) 125 MCG
5 CAPSULE ORAL NIGHTLY
Status: DISCONTINUED | OUTPATIENT
Start: 2024-02-20 | End: 2024-02-23 | Stop reason: HOSPADM

## 2024-02-20 RX ORDER — POTASSIUM CHLORIDE 29.8 MG/ML
20 INJECTION INTRAVENOUS
Status: COMPLETED | OUTPATIENT
Start: 2024-02-20 | End: 2024-02-20

## 2024-02-20 RX ORDER — MAGNESIUM SULFATE HEPTAHYDRATE 40 MG/ML
4 INJECTION, SOLUTION INTRAVENOUS ONCE
Status: COMPLETED | OUTPATIENT
Start: 2024-02-20 | End: 2024-02-20

## 2024-02-20 RX ORDER — QUETIAPINE FUMARATE 25 MG/1
25 TABLET, FILM COATED ORAL NIGHTLY
Status: DISCONTINUED | OUTPATIENT
Start: 2024-02-20 | End: 2024-02-23 | Stop reason: HOSPADM

## 2024-02-20 RX ORDER — HALOPERIDOL 5 MG/ML
INJECTION INTRAMUSCULAR
Status: COMPLETED
Start: 2024-02-20 | End: 2024-02-20

## 2024-02-20 RX ORDER — HALOPERIDOL 5 MG/ML
4 INJECTION INTRAMUSCULAR EVERY 6 HOURS PRN
Status: DISCONTINUED | OUTPATIENT
Start: 2024-02-20 | End: 2024-02-20 | Stop reason: ALTCHOICE

## 2024-02-20 RX ADMIN — Medication 5 MG: at 21:15

## 2024-02-20 RX ADMIN — BUDESONIDE AND FORMOTEROL FUMARATE DIHYDRATE 1 PUFF: 160; 4.5 AEROSOL RESPIRATORY (INHALATION) at 18:36

## 2024-02-20 RX ADMIN — Medication 10 ML: at 21:15

## 2024-02-20 RX ADMIN — POTASSIUM CHLORIDE 20 MEQ: 20 INJECTION INTRAVENOUS at 08:53

## 2024-02-20 RX ADMIN — LACTULOSE 30 G: 20 SOLUTION ORAL at 08:14

## 2024-02-20 RX ADMIN — QUETIAPINE FUMARATE 25 MG: 25 TABLET ORAL at 21:14

## 2024-02-20 RX ADMIN — AMPICILLIN AND SULBACTAM 3 G: 2; 1 INJECTION, POWDER, FOR SOLUTION INTRAVENOUS at 11:02

## 2024-02-20 RX ADMIN — DOCUSATE SODIUM 50MG AND SENNOSIDES 8.6MG 2 TABLET: 8.6; 5 TABLET, FILM COATED ORAL at 21:15

## 2024-02-20 RX ADMIN — BUDESONIDE AND FORMOTEROL FUMARATE DIHYDRATE 1 PUFF: 160; 4.5 AEROSOL RESPIRATORY (INHALATION) at 09:50

## 2024-02-20 RX ADMIN — HYDRALAZINE HYDROCHLORIDE 10 MG: 20 INJECTION, SOLUTION INTRAMUSCULAR; INTRAVENOUS at 04:12

## 2024-02-20 RX ADMIN — LACTULOSE 30 G: 20 SOLUTION ORAL at 21:15

## 2024-02-20 RX ADMIN — APIXABAN 5 MG: 5 TABLET, FILM COATED ORAL at 08:14

## 2024-02-20 RX ADMIN — POTASSIUM CHLORIDE 20 MEQ: 20 INJECTION INTRAVENOUS at 04:12

## 2024-02-20 RX ADMIN — AMPICILLIN AND SULBACTAM 3 G: 2; 1 INJECTION, POWDER, FOR SOLUTION INTRAVENOUS at 21:59

## 2024-02-20 RX ADMIN — POTASSIUM CHLORIDE 20 MEQ: 20 INJECTION INTRAVENOUS at 11:02

## 2024-02-20 RX ADMIN — PANTOPRAZOLE SODIUM 40 MG: 40 TABLET, DELAYED RELEASE ORAL at 05:54

## 2024-02-20 RX ADMIN — AMPICILLIN AND SULBACTAM 3 G: 2; 1 INJECTION, POWDER, FOR SOLUTION INTRAVENOUS at 15:03

## 2024-02-20 RX ADMIN — APIXABAN 5 MG: 5 TABLET, FILM COATED ORAL at 21:14

## 2024-02-20 RX ADMIN — HYDRALAZINE HYDROCHLORIDE 10 MG: 20 INJECTION, SOLUTION INTRAMUSCULAR; INTRAVENOUS at 17:32

## 2024-02-20 RX ADMIN — AMPICILLIN AND SULBACTAM 3 G: 2; 1 INJECTION, POWDER, FOR SOLUTION INTRAVENOUS at 04:12

## 2024-02-20 RX ADMIN — POTASSIUM CHLORIDE 20 MEQ: 20 INJECTION INTRAVENOUS at 05:54

## 2024-02-20 RX ADMIN — MAGNESIUM SULFATE 4 G: 4 INJECTION INTRAVENOUS at 08:53

## 2024-02-20 RX ADMIN — HALOPERIDOL 4 MG: 5 INJECTION INTRAMUSCULAR at 15:54

## 2024-02-20 RX ADMIN — Medication 10 ML: at 08:14

## 2024-02-20 RX ADMIN — DOCUSATE SODIUM 50MG AND SENNOSIDES 8.6MG 2 TABLET: 8.6; 5 TABLET, FILM COATED ORAL at 08:14

## 2024-02-20 RX ADMIN — HALOPERIDOL LACTATE 4 MG: 5 INJECTION, SOLUTION INTRAMUSCULAR at 15:54

## 2024-02-20 NOTE — PLAN OF CARE
Problem: Adult Inpatient Plan of Care  Goal: Patient-Specific Goal (Individualized)  Outcome: Ongoing, Progressing  Goal: Absence of Hospital-Acquired Illness or Injury  Outcome: Ongoing, Progressing  Goal: Optimal Comfort and Wellbeing  Outcome: Ongoing, Progressing  Goal: Readiness for Transition of Care  Outcome: Ongoing, Progressing     Problem: Skin Injury Risk Increased  Goal: Skin Health and Integrity  Outcome: Ongoing, Progressing     Problem: Restraint, Nonviolent  Goal: Absence of Harm or Injury  Outcome: Ongoing, Progressing  Intervention: Implement Least Restrictive Safety Strategies     Problem: Asthma Comorbidity  Goal: Maintenance of Asthma Control  Outcome: Ongoing, Progressing     Problem: COPD (Chronic Obstructive Pulmonary Disease) Comorbidity  Goal: Maintenance of COPD Symptom Control  Outcome: Ongoing, Progressing     Problem: Diabetes Comorbidity  Goal: Blood Glucose Level Within Targeted Range  Outcome: Ongoing, Progressing     Problem: Heart Failure Comorbidity  Goal: Maintenance of Heart Failure Symptom Control  Outcome: Ongoing, Progressing     Problem: Hypertension Comorbidity  Goal: Blood Pressure in Desired Range  Outcome: Ongoing, Progressing     Problem: Pain Chronic (Persistent) (Comorbidity Management)  Goal: Acceptable Pain Control and Functional Ability  Outcome: Ongoing, Progressing  Intervention: Optimize Psychosocial Wellbeing     Problem: Fall Injury Risk  Goal: Absence of Fall and Fall-Related Injury  Outcome: Ongoing, Progressing     Problem: Palliative Care  Goal: Enhanced Quality of Life  Outcome: Ongoing, Progressing     Problem: Adjustment to Illness (Sepsis/Septic Shock)  Goal: Optimal Coping  Outcome: Ongoing, Progressing     Problem: Bleeding (Sepsis/Septic Shock)  Goal: Absence of Bleeding  Outcome: Ongoing, Progressing     Problem: Glycemic Control Impaired (Sepsis/Septic Shock)  Goal: Blood Glucose Level Within Desired Range  Outcome: Ongoing, Progressing      Problem: Infection Progression (Sepsis/Septic Shock)  Goal: Absence of Infection Signs and Symptoms  Outcome: Ongoing, Progressing     Problem: Nutrition Impaired (Sepsis/Septic Shock)  Goal: Optimal Nutrition Intake  Outcome: Ongoing, Progressing   Goal Outcome Evaluation:

## 2024-02-20 NOTE — PROGRESS NOTES
[facility]     Progress Note    Patient Name: Kevin Ordonez  : 1960  MRN: [id number]  Primary Care Physician:  Ev Peck APRN  Date of admission: 2024    Subjective   Subjective     Chief Complaint:   Patient is critically ill in ICU with severe hypoglycemia, altered mental status, hepatic encephalopathy, mechanical fall, gastric cancer with metastasis to liver, DVT on Eliquis.    Patient is awake, on room air, sitting up in chair  Dextrose fluids  Glucose adequate, no hypoglycemic protocol used  Did have delirium overnight was initiated on Precedex became hypotensive  Off IV drips this morning  Potassium and magnesium low    Review of Systems  No obvious complaints      Objective   Objective     Vitals:   Temp:  [97.6 °F (36.4 °C)-98.1 °F (36.7 °C)] 97.6 °F (36.4 °C)  Heart Rate:  [] 105  Resp:  [15-18] 16  BP: (125-210)/() 138/70  Physical Exam   Awake, male in no acute distress on room air  Port-A-Cath in place  Sitting in bedside chair  Still with confusion    Result Review    Result Review:  I have personally reviewed the results from the time of this admission to 2024 11:38 EST and agree with these findings:  [x]  Laboratory  [x]  Microbiology  [x]  Radiology  [x]  EKG/Telemetry   [x]  Cardiology/Vascular   []  Pathology  []  Old records  []  Other:  Most notable findings include:   .  Assessment & Plan   Assessment / Plan   Brief Patient Summary  Kevin Ordonez is a 63 y.o. male   Severe hypoglycemia  Altered mental status  Hepatic encephalopathy  Mechanical fall  Gastric cancer with metastasis to liver  History of DVT on Eliquis  Hypomagnesia   Type 2 diabetes  Hypertension  Hyperlipidemia    Active Hospital Problems:  Active Hospital Problems    Diagnosis     **Hypoglycemia      Plan:   -Patient on room air  -Aspiration precautions  -Continue home Symbicort  -Discontinue IV dextrose  -Place IV potassium and magnesium today  -Okay to use nightly  melatonin  -Delirium precautions  -Continue lactulose  -Will repeat lactate in the morning, likely elevated in the setting of hepatic metastases  -Discontinue Lehman catheter today  -Complete Unasyn to complete for aspiration  -Cultures negative thus far  -CT abdomen and pelvis with possible diverticular disease with diverticulitis, liver mets with stomach cancer  -Resume p.o. Protonix  -Resume home Eliquis for DVT      Can transfer out of ICU  DVT prophylaxis:  Medical and mechanical DVT prophylaxis orders are present.    CODE STATUS:   Level Of Support Discussed With: Patient  Code Status (Patient has no pulse and is not breathing): CPR (Attempt to Resuscitate)  Medical Interventions (Patient has pulse or is breathing): Full Support    Electronically signed by CHIOMA Galeano, 02/20/24, 11:42 AM EST.    This visit was performed by BOTH a physician and an APC. I personally evaluated and examined the patient.  And spent more than 51% of time caring for this patient I performed all aspects of MDM as documented. , I have reviewed and confirmed the accuracy of the patient's history as documented in this note. and I have reexamined the patient and the results are consistent with the previously documented exam. I have updated the documentation as necessary    Electronically signed by Reji Milian DO, 02/20/24, 3:27 PM EST.

## 2024-02-20 NOTE — PROGRESS NOTES
[facility]   Hospitalist Progress Note  Date: 2024  Patient Name: Kevin Ordonez  : 1960  MRN: [id number]  Date of admission: 2024      Subjective   Subjective     Chief Complaint: Follow-up altered mental status    Summary:Kevin Ordonez is a 63 y.o. 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 presents to the ER with weakness, fall, altered mental status.  His wife found him down in the kitchen on the date of presentation.  He has not been feeling well the day prior to presentation and has been experiencing more pain.  The patient was unresponsive when picked up by the EMS his blood sugar was 49. On arrival to the ED, patient temperature 95.5, pulse of 112, respiratory rate of 14, blood pressure 149/117?,  And saturating 95% on room air.  Patient is very lethargic but arousable falling back to sleep. On imaging, patient CT of his head without contrast shows no acute abnormality.  Chest x-ray shows: Minimal left basilar scarring or atelectasis. Patient CK is 202, CO2 was 21.5, anion gap is 18.5, blood glucose labile and trending lower despite hypoglycemia protocol.  Patient's ammonia level is 75.  White blood cell count is 21.12. According to his wife, patient had abdominal swelling, he started Lasix.  Hospitalist service contacted for admission for further evaluation and treatment.  Pulmonology critical care consulted.  Patient started on D5 drip as well as lactulose and empiric antibiotics.  Blood sugars stabilizing.  Mental status slowly improving.  Lehman catheter placed for urinary retention.    Interval Followup:   No acute events overnight, patient remains confused, patient is sitting up in chair today, family ember at bedside states that this is not patient's baseline mental status    Objective    Objective     Vitals:   Temp:  [97.6 °F (36.4 °C)-98.1 °F (36.7 °C)] 97.9 °F (36.6 °C)  Heart Rate:  [] 90  Resp:  [12-20] 20  BP: (125-210)/() 173/77  Physical Exam   GEN: No acute distress  HEENT: Moist mucous membranes  LUNGS: Equal chest rise bilaterally  CARDIAC: Regular rate and rhythm  NEURO: Moving all 4 extremities spontaneously  SKIN: No obvious breakdown    Result Review    Result Review:  I have personally reviewed these results and agree with these findings:  [x]  Laboratory  LAB RESULTS:      Lab 02/20/24  0332 02/19/24  0522 02/18/24  2335 02/18/24  1651 02/18/24  1337 02/18/24  0601 02/18/24  0218 02/17/24  1710 02/17/24  1239 02/17/24  0832 02/14/24  0900   WBC 8.81 10.29  --   --   --   --  18.22*  --   --  21.12* 4.66   HEMOGLOBIN 10.7* 9.9*  --   --   --   --  9.8*  --   --  12.4* 10.6*   HEMATOCRIT 32.8* 30.3*  --   --   --   --  29.7*  --   --  38.1 32.2*   PLATELETS 147 141  --   --   --   --  175  --   --  222 255   NEUTROS ABS 6.88 7.69*  --   --   --   --  16.40*  --   --  19.64* 2.25   IMMATURE GRANS (ABS) 0.07* 1.20*  --   --   --   --   --   --   --   --  0.02   LYMPHS ABS 0.76 0.78  --   --   --   --   --   --   --   --  1.12   MONOS ABS 0.97* 0.49  --   --   --   --   --   --   --   --  1.07*   EOS ABS 0.09 0.07  --   --   --   --  0.36  --   --   --  0.14   MCV 89.4 91.0  --   --   --   --  89.5  --   --  92.0 92.3   PROCALCITONIN  --   --   --   --   --   --   --   --  0.34*  --   --    LACTATE 4.2* 3.3* 3.0* 2.7* 2.7*   < > 2.6*   < >  --   --   --    PROTIME  --   --   --   --   --   --   --   --   --  15.2*  --     < > = values in this interval not displayed.         Lab 02/20/24  0332 02/19/24  0522 02/18/24  0218 02/17/24  1956 02/17/24  1239 02/17/24  0832   SODIUM 139 138 137 139  --  141   POTASSIUM 2.9* 3.7 3.7 2.9*  --  4.3   CHLORIDE 106 104 102 101  --  101   CO2 20.3* 23.0 23.8 25.5  --  21.5*   ANION GAP 12.7 11.0 11.2 12.5  --  18.5*   BUN 7* 8 7* 8  --   10   CREATININE 0.68* 0.75* 0.67* 0.71*  --  0.79   EGFR 103.8 100.8 104.3 102.5  --  99.2   GLUCOSE 213* 190* 139* 146*  --  85   CALCIUM 8.7 8.2* 8.3* 8.4*  --  9.0   MAGNESIUM 1.6 1.5* 1.7  --  1.2* 1.2*   PHOSPHORUS  --  2.6 1.8*  --  2.7  --    TSH  --   --   --   --   --  2.750         Lab 02/19/24  0522 02/17/24  0832 02/14/24  0900   TOTAL PROTEIN 5.4* 7.2 6.1   ALBUMIN 2.3* 2.7* 2.7*   GLOBULIN  --  4.5 3.4   ALT (SGPT) 19 25 18   AST (SGOT) 35 67* 34   BILIRUBIN 0.8 0.8 0.7   INDIRECT BILIRUBIN 0.6  --   --    BILIRUBIN DIRECT 0.2  --   --    ALK PHOS 262* 322* 279*         Lab 02/17/24  0832   PROTIME 15.2*   INR 1.17*                 Brief Urine Lab Results  (Last result in the past 365 days)        Color   Clarity   Blood   Leuk Est   Nitrite   Protein   CREAT   Urine HCG        02/17/24 0949 Yellow   Clear   Trace   Negative   Negative   100 mg/dL (2+)                 Microbiology Results (last 10 days)       Procedure Component Value - Date/Time    Blood Culture - Blood, Arm, Left [[id number]]  (Normal) Collected: 02/17/24 1716    Lab Status: Preliminary result Specimen: Blood from Arm, Left Updated: 02/19/24 1731     Blood Culture No growth at 2 days    Legionella Antigen, Urine - Urine, Urine, Clean Catch [[id number]]  (Normal) Collected: 02/17/24 1546    Lab Status: Final result Specimen: Urine, Clean Catch Updated: 02/17/24 1658     LEGIONELLA ANTIGEN, URINE Negative    S. Pneumo Ag Urine or CSF - Urine, Urine, Clean Catch [[id number]]  (Normal) Collected: 02/17/24 1546    Lab Status: Final result Specimen: Urine, Clean Catch Updated: 02/17/24 1658     Strep Pneumo Ag Negative            [x]  Microbiology  [x]  Radiology  CT Abdomen Pelvis Without Contrast    Result Date: 2/17/2024   1. Markedly technically degraded exam as above. 2. Diffuse colonic diverticulosis without focal diverticulitis.  However, there is some equivocal wall thickening in the transverse colon and could reflect mild colitis. 3.   Poorly defined low attenuation changes in the liver may reflect metastatic disease. 4. Trace amount of free fluid in the abdomen and pelvis.  There is also a small left effusion. 5. Enlarged gastrohepatic ligament lymph node compatible with metastatic disease.      IKER PATRICIA MD       Electronically Signed and Approved By: IKER PATRICIA MD on 2/17/2024 at 21:46             XR Abdomen KUB    Result Date: 2/17/2024    1. NG tube in proximal to mid gastric body     Kody Payton M.D.       Electronically Signed and Approved By: Kody Payton M.D. on 2/17/2024 at 16:30             CT Head Without Contrast    Result Date: 2/17/2024    1. No acute intracranial abnormality     Kody Payton M.D.       Electronically Signed and Approved By: Kody Payton M.D. on 2/17/2024 at 9:38             XR Chest 1 View    Result Date: 2/17/2024    1. Minimal left basilar scarring or atelectasis       Kody Payton M.D.       Electronically Signed and Approved By: Kody Payton M.D. on 2/17/2024 at 9:03              [x]  EKG/Telemetry   []  Cardiology/Vascular   []  Pathology  []  Old records  [x]  Other:  Scheduled Meds:ampicillin-sulbactam, 3 g, Intravenous, Q6H  apixaban, 5 mg, Oral, BID  budesonide-formoterol, 1 puff, Inhalation, BID - RT  [Held by provider] chlorthalidone, 12.5 mg, Oral, Daily  dextrose, 25 g, Intravenous, Once  [Held by provider] furosemide, 20 mg, Oral, Daily  [Held by provider] gabapentin, 600 mg, Oral, Q12H  lactulose, 30 g, Oral, TID  [Held by provider] losartan, 100 mg, Oral, Daily  melatonin, 5 mg, Oral, Nightly  [Held by provider] mirtazapine, 15 mg, Oral, Nightly  [Held by provider] OLANZapine, 5 mg, Oral, Nightly  pantoprazole, 40 mg, Oral, Q AM  senna-docusate sodium, 2 tablet, Oral, BID  sodium chloride, 10 mL, Intravenous, Q12H      Continuous Infusions:     PRN Meds:.  senna-docusate sodium **AND** polyethylene glycol **AND** [DISCONTINUED] bisacodyl **AND** bisacodyl    dextrose    dextrose     glucagon (human recombinant)    hydrALAZINE    ipratropium-albuterol    [Held by provider] LORazepam    nitroglycerin    ondansetron ODT **OR** ondansetron    [Held by provider] oxyCODONE-acetaminophen    sodium chloride    sodium chloride      Assessment & Plan   Assessment / Plan     Assessment/Plan:  Acute metabolic encephalopathy suspect due to hepatic encephalopathy versus polypharmacy versus hypoglycemia  Hepatic encephalopathy  Hypoglycemia  Leukocytosis  Concern for aspiration  Mild rhabdomyolysis  Hypomagnesemia  Hypokalemia  Hypophosphatemia  Elevated lactate  Acute normocytic anemia  Gastric cancer with mets to liver  Insulin-dependent diabetes mellitus hypoglycemia  History of hypertension  Obstructive sleep apnea  COPD not acute exacerbation  GERD  Acute urinary retention  Fall    Patient admitted for further evaluation and treatment  Pulmonology critical care consulted thank you for assistance  Continue regular reorientation  Advance diet per speech  Continue lactulose titrate to 3-4 soft bowel movements daily, ammonia level improved  Continue to hold sedating medications  Off IV fluids  Chest  As needed  Continue ampicillin-sulbactam  CK within normal limits  Continue to monitor electrolytes replace as needed  Suspect elevated lactate due to decreased liver function due to hepatic mets  Continue to monitor hemoglobin transfuse to keep hemoglobin greater than 7  Continue as needed hydralazine for hypertension  Continue supplemental oxygen as needed  Restart CPAP once patient more alert and cooperative  Continue Pepcid twice daily  Continue Lehman catheter until more alert and cooperative then will attempt voiding trial  PT/OT consult  CT scan of the abdomen pelvis reviewed, diverticulosis without diverticulitis, widespread liver metastatic disease noted  Discussed management plan with pulmonology  Further inpatient orders recommendations pending clinical course     Discussed plan with bedside RN as well  as pulmonology care team.    Disposition: Pending further stabilization and improvement in mental status    DVT prophylaxis:  Medical and mechanical DVT prophylaxis orders are present.        CODE STATUS:   Level Of Support Discussed With: Patient  Code Status (Patient has no pulse and is not breathing): CPR (Attempt to Resuscitate)  Medical Interventions (Patient has pulse or is breathing): Full Support    Pt is critically ill in ICU with hepatic encephalopathy, hypoglycemia.  I have spent 32 minutes of critical care time reviewing previous documentation, reviewing all pertinent telemetry, labs, and imaging studies, examining the patient, modifying the care plan and discussing the patient´s condition and care plan with the consultants, available family and during rounds. This does not include any procedures performed.      Electronically signed by Chance Razo MD, 02/20/24, 1:37 PM EST.

## 2024-02-20 NOTE — NURSING NOTE
"The patient is on CCU, restless in the bed, on the monitor and continues with confusion. The patient is having a hard time understanding the bedside commode is \"the bathroom\" and continues to request to go. Primary nurse reports he had transfer orders in and if he could get a room with a bathroom it may be more helpful. Emotional support provided to the spouse.    -Kayla CAMPOS, RN, [facility]   Palliative Care    2/20/2024 15:34 EST    "

## 2024-02-20 NOTE — SIGNIFICANT NOTE
02/19/24 1115   Coping/Psychosocial   Observed Emotional State anxious;agitated   Verbalized Emotional State anxiety   Trust Relationship/Rapport empathic listening provided   Family/Support Persons spouse   Involvement in Care interacting with patient   Additional Documentation Spiritual Care (Group)   Spiritual Care   Use of Spiritual Resources non-Shinto use of spiritual care   Spiritual Care Source  initiative   Spiritual Care Follow-Up follow-up, none required as presently assessed   Response to Spiritual Care receptive of support;thanks expressed   Spiritual Care Interventions supportive conversation provided   Spiritual Care Visit Type initial   Receptivity to Spiritual Care visit welcomed

## 2024-02-20 NOTE — PROGRESS NOTES
[facility]   Hospitalist Progress Note  Date: 2024  Patient Name: Kevin Ordonez  : 1960  MRN: [id number]  Date of admission: 2024      Subjective   Subjective     Chief Complaint: Follow-up altered mental status    Summary:Kevin Ordonez is a 63 y.o. 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 presents to the ER with weakness, fall, altered mental status.  His wife found him down in the kitchen on the date of presentation.  He has not been feeling well the day prior to presentation and has been experiencing more pain.  The patient was unresponsive when picked up by the EMS his blood sugar was 49. On arrival to the ED, patient temperature 95.5, pulse of 112, respiratory rate of 14, blood pressure 149/117?,  And saturating 95% on room air.  Patient is very lethargic but arousable falling back to sleep. On imaging, patient CT of his head without contrast shows no acute abnormality.  Chest x-ray shows: Minimal left basilar scarring or atelectasis. Patient CK is 202, CO2 was 21.5, anion gap is 18.5, blood glucose labile and trending lower despite hypoglycemia protocol.  Patient's ammonia level is 75.  White blood cell count is 21.12. According to his wife, patient had abdominal swelling, he started Lasix.  Hospitalist service contacted for admission for further evaluation and treatment.  Pulmonology critical care consulted.  Patient started on D5 drip as well as lactulose and empiric antibiotics.  Blood sugars stabilizing.  Mental status slowly improving.  Lehman catheter placed for urinary retention.    Interval Followup: Patient lying in bed remains very confused.  Still slurring his words repeating requests help and figure out how to change the channel on the TV.  Patient unable to do so despite assistance and  instruction.  Afebrile overnight.  Sinus rhythm 90-120s on telemetry review.  Patient required being placed on Precedex overnight due to worsening agitation confusion but blood pressure dropped briefly started on Levophed.  Weaned off of Precedex as well as Levophed.  Blood pressure within normal limits this morning.  Satting well on room air.  Blood sugars stable.  Lactate remaining elevated due to metastatic hepatic disease.  White blood cell count within normal limits.  Hemoglobin stable.  No signs of bleeding.  No other issues per nursing.    Review of Systems  Unable to obtain secondary to altered mental status    Objective   Objective     Vitals:   Temp:  [97.7 °F (36.5 °C)-98.1 °F (36.7 °C)] 98.1 °F (36.7 °C)  Heart Rate:  [] 110  Resp:  [18] 18  BP: ()/(42-98) 210/98  Physical Exam   General: well-developed appearing stated age in no acute distress  HEENT: Normocephalic atraumatic moist membranes pupils equal round reactive light, no scleral icterus no conjunctival injection  Cardiovascular: regular cardiac 2+ bilateral lower extremity edema appreciated  Pulmonary bibasilar crackles no wheezes or rhonchi symmetric chest expansion, unlabored, no conversational dyspnea appreciated  Gastrointestinal: Soft nontender lightly distended positive bowel sounds all 4 quadrants no rebound or guarding  Musculoskeletal: No clubbing cyanosis, warm and well-perfused, calves soft symmetric nontender bilaterally  Skin: Clean dry without rashes  Neuro: Patient is still slurring his words but this appears to have more to do with mental status than motor issues no sensorimotor deficits appreciated bilateral upper and lower extremities though patient not able to cooperate with exam due to impulsivity and lack of attention  Psych: Patient is awake but very confused not able to cooperate with exam due to impulsivity slurring his words perseverating not responding to internal stimuli  : Lehman catheter draining clear  urine no bladder distention no suprapubic tenderness    Result Review    Result Review:  I have personally reviewed these results and agree with these findings:  [x]  Laboratory  LAB RESULTS:      Lab 02/19/24 0522 02/18/24  2335 02/18/24  1651 02/18/24  1337 02/18/24  1145 02/18/24  0601 02/18/24  0218 02/17/24  1710 02/17/24  1239 02/17/24  0832 02/14/24  0900   WBC 10.29  --   --   --   --   --  18.22*  --   --  21.12* 4.66   HEMOGLOBIN 9.9*  --   --   --   --   --  9.8*  --   --  12.4* 10.6*   HEMATOCRIT 30.3*  --   --   --   --   --  29.7*  --   --  38.1 32.2*   PLATELETS 141  --   --   --   --   --  175  --   --  222 255   NEUTROS ABS 7.69*  --   --   --   --   --  16.40*  --   --  19.64* 2.25   IMMATURE GRANS (ABS) 1.20*  --   --   --   --   --   --   --   --   --  0.02   LYMPHS ABS 0.78  --   --   --   --   --   --   --   --   --  1.12   MONOS ABS 0.49  --   --   --   --   --   --   --   --   --  1.07*   EOS ABS 0.07  --   --   --   --   --  0.36  --   --   --  0.14   MCV 91.0  --   --   --   --   --  89.5  --   --  92.0 92.3   PROCALCITONIN  --   --   --   --   --   --   --   --  0.34*  --   --    LACTATE 3.3* 3.0* 2.7* 2.7* 2.9*   < > 2.6*   < >  --   --   --    PROTIME  --   --   --   --   --   --   --   --   --  15.2*  --     < > = values in this interval not displayed.         Lab 02/19/24 0522 02/18/24 0218 02/17/24  1956 02/17/24  1239 02/17/24  0832 02/14/24  0900   SODIUM 138 137 139  --  141 136   POTASSIUM 3.7 3.7 2.9*  --  4.3 3.3*   CHLORIDE 104 102 101  --  101 103   CO2 23.0 23.8 25.5  --  21.5* 24.5   ANION GAP 11.0 11.2 12.5  --  18.5* 8.5   BUN 8 7* 8  --  10 14   CREATININE 0.75* 0.67* 0.71*  --  0.79 0.93   EGFR 100.8 104.3 102.5  --  99.2 91.7   GLUCOSE 190* 139* 146*  --  85 79   CALCIUM 8.2* 8.3* 8.4*  --  9.0 8.7   MAGNESIUM 1.5* 1.7  --  1.2* 1.2*  --    PHOSPHORUS 2.6 1.8*  --  2.7  --   --    TSH  --   --   --   --  2.750  --          Lab 02/19/24  0522 02/17/24  0832  02/14/24  0900   TOTAL PROTEIN 5.4* 7.2 6.1   ALBUMIN 2.3* 2.7* 2.7*   GLOBULIN  --  4.5 3.4   ALT (SGPT) 19 25 18   AST (SGOT) 35 67* 34   BILIRUBIN 0.8 0.8 0.7   INDIRECT BILIRUBIN 0.6  --   --    BILIRUBIN DIRECT 0.2  --   --    ALK PHOS 262* 322* 279*         Lab 02/17/24  0832   PROTIME 15.2*   INR 1.17*                 Brief Urine Lab Results  (Last result in the past 365 days)        Color   Clarity   Blood   Leuk Est   Nitrite   Protein   CREAT   Urine HCG        02/17/24 0949 Yellow   Clear   Trace   Negative   Negative   100 mg/dL (2+)                 Microbiology Results (last 10 days)       Procedure Component Value - Date/Time    Blood Culture - Blood, Arm, Left [[id number]]  (Normal) Collected: 02/17/24 1716    Lab Status: Preliminary result Specimen: Blood from Arm, Left Updated: 02/19/24 1731     Blood Culture No growth at 2 days    Legionella Antigen, Urine - Urine, Urine, Clean Catch [[id number]]  (Normal) Collected: 02/17/24 1546    Lab Status: Final result Specimen: Urine, Clean Catch Updated: 02/17/24 1658     LEGIONELLA ANTIGEN, URINE Negative    S. Pneumo Ag Urine or CSF - Urine, Urine, Clean Catch [[id number]]  (Normal) Collected: 02/17/24 1546    Lab Status: Final result Specimen: Urine, Clean Catch Updated: 02/17/24 1658     Strep Pneumo Ag Negative            [x]  Microbiology  [x]  Radiology  CT Abdomen Pelvis Without Contrast    Result Date: 2/17/2024   1. Markedly technically degraded exam as above. 2. Diffuse colonic diverticulosis without focal diverticulitis.  However, there is some equivocal wall thickening in the transverse colon and could reflect mild colitis. 3.  Poorly defined low attenuation changes in the liver may reflect metastatic disease. 4. Trace amount of free fluid in the abdomen and pelvis.  There is also a small left effusion. 5. Enlarged gastrohepatic ligament lymph node compatible with metastatic disease.      IKER PATRICIA MD       Electronically Signed and Approved  By: IKER PATRICIA MD on 2/17/2024 at 21:46             XR Abdomen KUB    Result Date: 2/17/2024    1. NG tube in proximal to mid gastric body     Kody Payton M.D.       Electronically Signed and Approved By: Kody Payton M.D. on 2/17/2024 at 16:30             CT Head Without Contrast    Result Date: 2/17/2024    1. No acute intracranial abnormality     Kody Payton M.D.       Electronically Signed and Approved By: Kody Payton M.D. on 2/17/2024 at 9:38             XR Chest 1 View    Result Date: 2/17/2024    1. Minimal left basilar scarring or atelectasis       Kody Payton M.D.       Electronically Signed and Approved By: Kody Payton M.D. on 2/17/2024 at 9:03              [x]  EKG/Telemetry   []  Cardiology/Vascular   []  Pathology  []  Old records  [x]  Other:  Scheduled Meds:ampicillin-sulbactam, 3 g, Intravenous, Q6H  apixaban, 5 mg, Oral, BID  budesonide-formoterol, 1 puff, Inhalation, BID - RT  [Held by provider] chlorthalidone, 12.5 mg, Oral, Daily  dextrose, 25 g, Intravenous, Once  [Held by provider] furosemide, 20 mg, Oral, Daily  [Held by provider] gabapentin, 600 mg, Oral, Q12H  lactulose, 30 g, Oral, TID  [Held by provider] losartan, 100 mg, Oral, Daily  [Held by provider] mirtazapine, 15 mg, Oral, Nightly  [Held by provider] OLANZapine, 5 mg, Oral, Nightly  pantoprazole, 40 mg, Oral, Q AM  senna-docusate sodium, 2 tablet, Oral, BID  sodium chloride, 10 mL, Intravenous, Q12H      Continuous Infusions:dextrose 5 % and sodium chloride 0.9 %, 100 mL/hr, Last Rate: 100 mL/hr (02/19/24 1416)      PRN Meds:.  senna-docusate sodium **AND** polyethylene glycol **AND** [DISCONTINUED] bisacodyl **AND** bisacodyl    dextrose    dextrose    glucagon (human recombinant)    hydrALAZINE    ipratropium-albuterol    [Held by provider] LORazepam    nitroglycerin    ondansetron ODT **OR** ondansetron    [Held by provider] oxyCODONE-acetaminophen    sodium chloride    sodium chloride      Assessment & Plan    Assessment / Plan     Assessment/Plan:  Acute metabolic encephalopathy suspect due to hepatic encephalopathy versus polypharmacy versus hypoglycemia  Hepatic encephalopathy  Hypoglycemia  Leukocytosis  Concern for aspiration  Mild rhabdomyolysis  Hypomagnesemia  Hypokalemia  Hypophosphatemia  Elevated lactate  Acute normocytic anemia  Gastric cancer with mets to liver  Insulin-dependent diabetes mellitus hypoglycemia  History of hypertension  Obstructive sleep apnea  COPD not acute exacerbation  GERD  Acute urinary retention  Fall        Patient admitted for further evaluation and treatment  Pulmonology critical care consulted thank you for assistance  Continue regular reorientation  Advance diet per speech  Continue lactulose titrate to 3-4 soft bowel movements daily  Continue to hold sedating medications  Continue D5 drip and titrate per critical care  Continue to hold long-acting insulin  Acidosis resolved  Continue ampicillin-sulbactam  CK within normal limits  Continue to monitor electrolytes replace as needed  Suspect elevated lactate due to decreased liver function due to hepatic mets  Continue to monitor hemoglobin transfuse to keep hemoglobin greater than 7  Continue to hold long-acting insulin at this time  If blood sugars remain greater than 180 plan to discontinue D5 drip and monitor blood sugars closely  Continue as needed hydralazine  Continue supplemental oxygen as needed  Restart CPAP once patient more alert and cooperative  Continue Pepcid twice daily  Continue Lehman catheter until more alert and cooperative then will attempt voiding trial  Will consult physical therapy and Occupational Therapy once more alert and cooperative  Further inpatient orders recommendations pending clinical course     Discussed plan with bedside RN as well as pulmonology care team.    Disposition: Pending further stabilization and improvement in mental status    DVT prophylaxis:  Medical and mechanical DVT prophylaxis  orders are present.        CODE STATUS:   Level Of Support Discussed With: Patient  Code Status (Patient has no pulse and is not breathing): CPR (Attempt to Resuscitate)  Medical Interventions (Patient has pulse or is breathing): Full Support

## 2024-02-21 ENCOUNTER — APPOINTMENT (OUTPATIENT)
Dept: CT IMAGING | Facility: HOSPITAL | Age: 64
End: 2024-02-21
Payer: OTHER GOVERNMENT

## 2024-02-21 LAB
ALBUMIN SERPL-MCNC: 2.5 G/DL (ref 3.5–5.2)
ALBUMIN/GLOB SERPL: 0.7 G/DL
ALP SERPL-CCNC: 323 U/L (ref 39–117)
ALT SERPL W P-5'-P-CCNC: 25 U/L (ref 1–41)
ANION GAP SERPL CALCULATED.3IONS-SCNC: 12.7 MMOL/L (ref 5–15)
AST SERPL-CCNC: 54 U/L (ref 1–40)
BASOPHILS # BLD AUTO: 0.04 10*3/MM3 (ref 0–0.2)
BASOPHILS NFR BLD AUTO: 0.4 % (ref 0–1.5)
BILIRUB SERPL-MCNC: 0.9 MG/DL (ref 0–1.2)
BUN SERPL-MCNC: 9 MG/DL (ref 8–23)
BUN/CREAT SERPL: 12 (ref 7–25)
CALCIUM SPEC-SCNC: 8.6 MG/DL (ref 8.6–10.5)
CHLORIDE SERPL-SCNC: 110 MMOL/L (ref 98–107)
CO2 SERPL-SCNC: 18.3 MMOL/L (ref 22–29)
CREAT SERPL-MCNC: 0.75 MG/DL (ref 0.76–1.27)
D-LACTATE SERPL-SCNC: 2 MMOL/L (ref 0.5–2)
DEPRECATED RDW RBC AUTO: 54.4 FL (ref 37–54)
EGFRCR SERPLBLD CKD-EPI 2021: 100.8 ML/MIN/1.73
EOSINOPHIL # BLD AUTO: 0.21 10*3/MM3 (ref 0–0.4)
EOSINOPHIL # BLD MANUAL: 0.22 10*3/MM3 (ref 0–0.4)
EOSINOPHIL NFR BLD AUTO: 1.9 % (ref 0.3–6.2)
EOSINOPHIL NFR BLD MANUAL: 2 % (ref 0.3–6.2)
ERYTHROCYTE [DISTWIDTH] IN BLOOD BY AUTOMATED COUNT: 16.7 % (ref 12.3–15.4)
GLOBULIN UR ELPH-MCNC: 3.5 GM/DL
GLUCOSE BLDC GLUCOMTR-MCNC: 139 MG/DL (ref 70–99)
GLUCOSE BLDC GLUCOMTR-MCNC: 142 MG/DL (ref 70–99)
GLUCOSE BLDC GLUCOMTR-MCNC: 146 MG/DL (ref 70–99)
GLUCOSE BLDC GLUCOMTR-MCNC: 147 MG/DL (ref 70–99)
GLUCOSE BLDC GLUCOMTR-MCNC: 161 MG/DL (ref 70–99)
GLUCOSE BLDC GLUCOMTR-MCNC: 169 MG/DL (ref 70–99)
GLUCOSE BLDC GLUCOMTR-MCNC: 283 MG/DL (ref 70–99)
GLUCOSE SERPL-MCNC: 149 MG/DL (ref 65–99)
HCT VFR BLD AUTO: 30.1 % (ref 37.5–51)
HGB BLD-MCNC: 9.6 G/DL (ref 13–17.7)
IMM GRANULOCYTES # BLD AUTO: 0.89 10*3/MM3 (ref 0–0.05)
IMM GRANULOCYTES NFR BLD AUTO: 8 % (ref 0–0.5)
LYMPHOCYTES # BLD AUTO: 1.51 10*3/MM3 (ref 0.7–3.1)
LYMPHOCYTES # BLD MANUAL: 1.22 10*3/MM3 (ref 0.7–3.1)
LYMPHOCYTES NFR BLD AUTO: 13.6 % (ref 19.6–45.3)
LYMPHOCYTES NFR BLD MANUAL: 9 % (ref 5–12)
MAGNESIUM SERPL-MCNC: 2.1 MG/DL (ref 1.6–2.4)
MCH RBC QN AUTO: 28.9 PG (ref 26.6–33)
MCHC RBC AUTO-ENTMCNC: 31.9 G/DL (ref 31.5–35.7)
MCV RBC AUTO: 90.7 FL (ref 79–97)
MONOCYTES # BLD AUTO: 1.26 10*3/MM3 (ref 0.1–0.9)
MONOCYTES # BLD: 1 10*3/MM3 (ref 0.1–0.9)
MONOCYTES NFR BLD AUTO: 11.4 % (ref 5–12)
NEUTROPHILS # BLD AUTO: 8.66 10*3/MM3 (ref 1.7–7)
NEUTROPHILS NFR BLD AUTO: 64.7 % (ref 42.7–76)
NEUTROPHILS NFR BLD AUTO: 7.19 10*3/MM3 (ref 1.7–7)
NEUTROPHILS NFR BLD MANUAL: 66 % (ref 42.7–76)
NEUTS BAND NFR BLD MANUAL: 12 % (ref 0–5)
NRBC BLD AUTO-RTO: 1 /100 WBC (ref 0–0.2)
PHOSPHATE SERPL-MCNC: 2.2 MG/DL (ref 2.5–4.5)
PLATELET # BLD AUTO: 140 10*3/MM3 (ref 140–450)
PMV BLD AUTO: 11.7 FL (ref 6–12)
POTASSIUM SERPL-SCNC: 4.2 MMOL/L (ref 3.5–5.2)
PROT SERPL-MCNC: 6 G/DL (ref 6–8.5)
RBC # BLD AUTO: 3.32 10*6/MM3 (ref 4.14–5.8)
RBC MORPH BLD: NORMAL
SMALL PLATELETS BLD QL SMEAR: ADEQUATE
SODIUM SERPL-SCNC: 141 MMOL/L (ref 136–145)
VARIANT LYMPHS NFR BLD MANUAL: 11 % (ref 19.6–45.3)
WBC MORPH BLD: NORMAL
WBC NRBC COR # BLD AUTO: 11.1 10*3/MM3 (ref 3.4–10.8)

## 2024-02-21 PROCEDURE — 82948 REAGENT STRIP/BLOOD GLUCOSE: CPT | Performed by: HOSPITALIST

## 2024-02-21 PROCEDURE — 99233 SBSQ HOSP IP/OBS HIGH 50: CPT | Performed by: INTERNAL MEDICINE

## 2024-02-21 PROCEDURE — 94664 DEMO&/EVAL PT USE INHALER: CPT

## 2024-02-21 PROCEDURE — 85025 COMPLETE CBC W/AUTO DIFF WBC: CPT | Performed by: NURSE PRACTITIONER

## 2024-02-21 PROCEDURE — 94799 UNLISTED PULMONARY SVC/PX: CPT

## 2024-02-21 PROCEDURE — 83605 ASSAY OF LACTIC ACID: CPT | Performed by: NURSE PRACTITIONER

## 2024-02-21 PROCEDURE — 25010000002 HALOPERIDOL LACTATE PER 5 MG: Performed by: INTERNAL MEDICINE

## 2024-02-21 PROCEDURE — 85007 BL SMEAR W/DIFF WBC COUNT: CPT | Performed by: NURSE PRACTITIONER

## 2024-02-21 PROCEDURE — 83735 ASSAY OF MAGNESIUM: CPT | Performed by: INTERNAL MEDICINE

## 2024-02-21 PROCEDURE — 82948 REAGENT STRIP/BLOOD GLUCOSE: CPT

## 2024-02-21 PROCEDURE — 84100 ASSAY OF PHOSPHORUS: CPT | Performed by: INTERNAL MEDICINE

## 2024-02-21 PROCEDURE — 82948 REAGENT STRIP/BLOOD GLUCOSE: CPT | Performed by: NURSE PRACTITIONER

## 2024-02-21 PROCEDURE — 80053 COMPREHEN METABOLIC PANEL: CPT | Performed by: INTERNAL MEDICINE

## 2024-02-21 PROCEDURE — 25010000002 AMPICILLIN-SULBACTAM PER 1.5 G: Performed by: NURSE PRACTITIONER

## 2024-02-21 PROCEDURE — 70450 CT HEAD/BRAIN W/O DYE: CPT

## 2024-02-21 RX ORDER — HALOPERIDOL 5 MG/ML
5 INJECTION INTRAMUSCULAR ONCE AS NEEDED
Status: COMPLETED | OUTPATIENT
Start: 2024-02-21 | End: 2024-02-21

## 2024-02-21 RX ORDER — TRAMADOL HYDROCHLORIDE 50 MG/1
50 TABLET ORAL EVERY 6 HOURS PRN
Status: DISCONTINUED | OUTPATIENT
Start: 2024-02-21 | End: 2024-02-23 | Stop reason: HOSPADM

## 2024-02-21 RX ADMIN — Medication 5 MG: at 22:07

## 2024-02-21 RX ADMIN — TRAMADOL HYDROCHLORIDE 50 MG: 50 TABLET ORAL at 15:24

## 2024-02-21 RX ADMIN — QUETIAPINE FUMARATE 25 MG: 25 TABLET ORAL at 22:07

## 2024-02-21 RX ADMIN — LACTULOSE 30 G: 20 SOLUTION ORAL at 08:58

## 2024-02-21 RX ADMIN — PANTOPRAZOLE SODIUM 40 MG: 40 TABLET, DELAYED RELEASE ORAL at 05:06

## 2024-02-21 RX ADMIN — DOCUSATE SODIUM 50MG AND SENNOSIDES 8.6MG 2 TABLET: 8.6; 5 TABLET, FILM COATED ORAL at 08:58

## 2024-02-21 RX ADMIN — HALOPERIDOL LACTATE 5 MG: 5 INJECTION, SOLUTION INTRAMUSCULAR at 15:37

## 2024-02-21 RX ADMIN — AMPICILLIN AND SULBACTAM 3 G: 2; 1 INJECTION, POWDER, FOR SOLUTION INTRAVENOUS at 17:35

## 2024-02-21 RX ADMIN — AMPICILLIN AND SULBACTAM 3 G: 2; 1 INJECTION, POWDER, FOR SOLUTION INTRAVENOUS at 09:00

## 2024-02-21 RX ADMIN — APIXABAN 5 MG: 5 TABLET, FILM COATED ORAL at 08:58

## 2024-02-21 RX ADMIN — BUDESONIDE AND FORMOTEROL FUMARATE DIHYDRATE 1 PUFF: 160; 4.5 AEROSOL RESPIRATORY (INHALATION) at 06:40

## 2024-02-21 RX ADMIN — Medication 10 ML: at 21:24

## 2024-02-21 RX ADMIN — AMPICILLIN AND SULBACTAM 3 G: 2; 1 INJECTION, POWDER, FOR SOLUTION INTRAVENOUS at 04:55

## 2024-02-21 RX ADMIN — APIXABAN 5 MG: 5 TABLET, FILM COATED ORAL at 22:07

## 2024-02-21 RX ADMIN — Medication 10 ML: at 09:01

## 2024-02-21 RX ADMIN — AMPICILLIN AND SULBACTAM 3 G: 2; 1 INJECTION, POWDER, FOR SOLUTION INTRAVENOUS at 21:23

## 2024-02-21 NOTE — ACP (ADVANCE CARE PLANNING)
Palliative care returned to the room to talk with the spouse and children at the bedside. The patient was out of restraints and sitting on the side of the bed and refused to lay back down. I discussed code status with the family and they all agree the patient has stated that he would not want CPR or the vent. We have a Living Will on file and I educated that they will need to let staff know each and every time. Provider was updated to change code to NO CPR/DNI. I also educated on and completed an EMS DNR, copy sent to MR and original given to family to place on frig at home. I talked about [facility] services at home and spouse is open to a referral. I also dicussed that we were going to attempt a CT scan to see If the cancer has spread to the brain and if at anytime they wanted to transition to comfort measures at the hospital they could to help manage the patients agitation. Spouse stated she feels she could care for the patient at home. Emotional support provided and provider updated.    -Kayla CAMPOS, RN, [facility]   Palliative Care    2/21/2024 16:03 EST

## 2024-02-21 NOTE — PROGRESS NOTES
[facility]   Hospitalist Progress Note  Date: 2024  Patient Name: Kevin Ordonez  : 1960  MRN: [id number]  Date of admission: 2024      Subjective   Subjective     Chief Complaint: Follow-up altered mental status    Summary:Kevin Ordonez is a 63 y.o. 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 presents to the ER with weakness, fall, altered mental status.  His wife found him down in the kitchen on the date of presentation.  He has not been feeling well the day prior to presentation and has been experiencing more pain.  The patient was unresponsive when picked up by the EMS his blood sugar was 49. On arrival to the ED, patient temperature 95.5, pulse of 112, respiratory rate of 14, blood pressure 149/117?,  And saturating 95% on room air.  Patient is very lethargic but arousable falling back to sleep. On imaging, patient CT of his head without contrast shows no acute abnormality.  Chest x-ray shows: Minimal left basilar scarring or atelectasis. Patient CK is 202, CO2 was 21.5, anion gap is 18.5, blood glucose labile and trending lower despite hypoglycemia protocol.  Patient's ammonia level is 75.  White blood cell count is 21.12. According to his wife, patient had abdominal swelling, he started Lasix.  Hospitalist service contacted for admission for further evaluation and treatment.  Pulmonology critical care consulted.  Patient started on D5 drip as well as lactulose and empiric antibiotics.  Blood sugars stabilizing.  Mental status slowly improving.  Lehman catheter placed for urinary retention.    Interval Followup:   Over the last 24 hours patient transferred out of the ICU, patient's mental status remains abnormal, he is confused, pulling at lines requiring restraints.    Objective   Objective     Vitals:    Temp:  [97.6 °F (36.4 °C)-97.8 °F (36.6 °C)] 97.7 °F (36.5 °C)  Heart Rate:  [] 95  Resp:  [16-24] 16  BP: (151-197)/(68-84) 157/68  Physical Exam   GEN: No acute distress  HEENT: Moist mucous membranes  LUNGS: Equal chest rise bilaterally  CARDIAC: Regular rate and rhythm  NEURO: Moving all 4 extremities spontaneously  SKIN: No obvious breakdown    Result Review    Result Review:  I have personally reviewed these results and agree with these findings:  [x]  Laboratory  LAB RESULTS:      Lab 02/21/24  0505 02/20/24  0332 02/19/24  0522 02/18/24  2335 02/18/24  1651 02/18/24  0601 02/18/24  0218 02/17/24  1710 02/17/24  1239 02/17/24  0832   WBC 11.10* 8.81 10.29  --   --   --  18.22*  --   --  21.12*   HEMOGLOBIN 9.6* 10.7* 9.9*  --   --   --  9.8*  --   --  12.4*   HEMATOCRIT 30.1* 32.8* 30.3*  --   --   --  29.7*  --   --  38.1   PLATELETS 140 147 141  --   --   --  175  --   --  222   NEUTROS ABS 7.19*  8.66* 6.88 7.69*  --   --   --  16.40*  --   --  19.64*   IMMATURE GRANS (ABS) 0.89* 0.07* 1.20*  --   --   --   --   --   --   --    LYMPHS ABS 1.51 0.76 0.78  --   --   --   --   --   --   --    MONOS ABS 1.26* 0.97* 0.49  --   --   --   --   --   --   --    EOS ABS 0.21  0.22 0.09 0.07  --   --   --  0.36  --   --   --    MCV 90.7 89.4 91.0  --   --   --  89.5  --   --  92.0   PROCALCITONIN  --   --   --   --   --   --   --   --  0.34*  --    LACTATE 2.0 4.2* 3.3* 3.0* 2.7*   < > 2.6*   < >  --   --    PROTIME  --   --   --   --   --   --   --   --   --  15.2*    < > = values in this interval not displayed.         Lab 02/21/24  0505 02/20/24  0332 02/19/24  0522 02/18/24  0218 02/17/24  1956 02/17/24  1239 02/17/24  0832   SODIUM 141 139 138 137 139  --  141   POTASSIUM 4.2 2.9* 3.7 3.7 2.9*  --  4.3   CHLORIDE 110* 106 104 102 101  --  101   CO2 18.3* 20.3* 23.0 23.8 25.5  --  21.5*   ANION GAP 12.7 12.7 11.0 11.2 12.5  --  18.5*   BUN 9 7* 8 7* 8  --  10   CREATININE 0.75* 0.68* 0.75* 0.67* 0.71*   --  0.79   EGFR 100.8 103.8 100.8 104.3 102.5  --  99.2   GLUCOSE 149* 213* 190* 139* 146*  --  85   CALCIUM 8.6 8.7 8.2* 8.3* 8.4*  --  9.0   MAGNESIUM 2.1 1.6 1.5* 1.7  --  1.2* 1.2*   PHOSPHORUS 2.2*  --  2.6 1.8*  --  2.7  --    TSH  --   --   --   --   --   --  2.750         Lab 02/21/24  0505 02/19/24  0522 02/17/24  0832   TOTAL PROTEIN 6.0 5.4* 7.2   ALBUMIN 2.5* 2.3* 2.7*   GLOBULIN 3.5  --  4.5   ALT (SGPT) 25 19 25   AST (SGOT) 54* 35 67*   BILIRUBIN 0.9 0.8 0.8   INDIRECT BILIRUBIN  --  0.6  --    BILIRUBIN DIRECT  --  0.2  --    ALK PHOS 323* 262* 322*         Lab 02/17/24  0832   PROTIME 15.2*   INR 1.17*                 Brief Urine Lab Results  (Last result in the past 365 days)        Color   Clarity   Blood   Leuk Est   Nitrite   Protein   CREAT   Urine HCG        02/17/24 0949 Yellow   Clear   Trace   Negative   Negative   100 mg/dL (2+)                 Microbiology Results (last 10 days)       Procedure Component Value - Date/Time    Blood Culture - Blood, Arm, Left [[id number]]  (Normal) Collected: 02/17/24 1716    Lab Status: Preliminary result Specimen: Blood from Arm, Left Updated: 02/20/24 1732     Blood Culture No growth at 3 days    Legionella Antigen, Urine - Urine, Urine, Clean Catch [[id number]]  (Normal) Collected: 02/17/24 1546    Lab Status: Final result Specimen: Urine, Clean Catch Updated: 02/17/24 1658     LEGIONELLA ANTIGEN, URINE Negative    S. Pneumo Ag Urine or CSF - Urine, Urine, Clean Catch [[id number]]  (Normal) Collected: 02/17/24 1546    Lab Status: Final result Specimen: Urine, Clean Catch Updated: 02/17/24 1658     Strep Pneumo Ag Negative            [x]  Microbiology  [x]  Radiology  CT Abdomen Pelvis Without Contrast    Result Date: 2/17/2024   1. Markedly technically degraded exam as above. 2. Diffuse colonic diverticulosis without focal diverticulitis.  However, there is some equivocal wall thickening in the transverse colon and could reflect mild colitis. 3.  Poorly  defined low attenuation changes in the liver may reflect metastatic disease. 4. Trace amount of free fluid in the abdomen and pelvis.  There is also a small left effusion. 5. Enlarged gastrohepatic ligament lymph node compatible with metastatic disease.      IKER PATRICIA MD       Electronically Signed and Approved By: KIER PATRICIA MD on 2/17/2024 at 21:46             XR Abdomen KUB    Result Date: 2/17/2024    1. NG tube in proximal to mid gastric body     Kody Payton M.D.       Electronically Signed and Approved By: Kody Payton M.D. on 2/17/2024 at 16:30             CT Head Without Contrast    Result Date: 2/17/2024    1. No acute intracranial abnormality     Kody Payton M.D.       Electronically Signed and Approved By: Kody Payton M.D. on 2/17/2024 at 9:38             XR Chest 1 View    Result Date: 2/17/2024    1. Minimal left basilar scarring or atelectasis       Kody Payton M.D.       Electronically Signed and Approved By: Kody Payton M.D. on 2/17/2024 at 9:03              [x]  EKG/Telemetry   []  Cardiology/Vascular   []  Pathology  []  Old records  [x]  Other:  Scheduled Meds:ampicillin-sulbactam, 3 g, Intravenous, Q6H  apixaban, 5 mg, Oral, BID  budesonide-formoterol, 1 puff, Inhalation, BID - RT  [Held by provider] chlorthalidone, 12.5 mg, Oral, Daily  dextrose, 25 g, Intravenous, Once  [Held by provider] furosemide, 20 mg, Oral, Daily  [Held by provider] gabapentin, 600 mg, Oral, Q12H  lactulose, 30 g, Oral, TID  [Held by provider] losartan, 100 mg, Oral, Daily  melatonin, 5 mg, Oral, Nightly  [Held by provider] mirtazapine, 15 mg, Oral, Nightly  pantoprazole, 40 mg, Oral, Q AM  QUEtiapine, 25 mg, Oral, Nightly  senna-docusate sodium, 2 tablet, Oral, BID  sodium chloride, 10 mL, Intravenous, Q12H      Continuous Infusions:     PRN Meds:.  senna-docusate sodium **AND** polyethylene glycol **AND** [DISCONTINUED] bisacodyl **AND** bisacodyl    dextrose    dextrose    glucagon (human  recombinant)    hydrALAZINE    ipratropium-albuterol    [Held by provider] LORazepam    nitroglycerin    ondansetron ODT **OR** ondansetron    [Held by provider] oxyCODONE-acetaminophen    sodium chloride    sodium chloride      Assessment & Plan   Assessment / Plan     Assessment/Plan:  Acute metabolic encephalopathy suspect due to hepatic encephalopathy versus polypharmacy versus hypoglycemia  Hepatic encephalopathy  Hypoglycemia  Leukocytosis  Concern for aspiration  Mild rhabdomyolysis  Hypomagnesemia  Hypokalemia  Hypophosphatemia  Elevated lactate  Acute normocytic anemia  Gastric cancer with mets to liver  Insulin-dependent diabetes mellitus hypoglycemia  History of hypertension  Obstructive sleep apnea  COPD not acute exacerbation  GERD  Acute urinary retention  Fall    Patient admitted for further evaluation and treatment  Pulmonology critical care consulted thank you for assistance  Continue regular reorientation  Advance diet per speech  Continue lactulose titrate to 3-4 soft bowel movements daily, ammonia level improved  Continue to hold sedating medications  Off IV fluids  Continue ampicillin-sulbactam for possible aspiration  CK within normal limits  Continue to monitor electrolytes replace as needed  Suspect elevated lactate due to decreased liver function due to hepatic mets, mildly improved  Continue to monitor hemoglobin transfuse to keep hemoglobin greater than 7  Continue as needed hydralazine for hypertension  Continue supplemental oxygen as needed  Restart CPAP once patient more alert and cooperative  Continue Pepcid twice daily  Continue Lehman catheter until more alert and cooperative then will attempt voiding trial  PT/OT consult  CT scan of the abdomen pelvis reviewed, diverticulosis without diverticulitis, widespread liver metastatic disease noted  CT scan of the head today  Discussed management plan with pulmonology  Further inpatient orders recommendations pending clinical course      Discussed plan with bedside RN as well as pulmonology care team.    Disposition: Pending further stabilization and improvement in mental status    DVT prophylaxis:  Medical and mechanical DVT prophylaxis orders are present.        CODE STATUS:   Level Of Support Discussed With: Patient  Code Status (Patient has no pulse and is not breathing): CPR (Attempt to Resuscitate)  Medical Interventions (Patient has pulse or is breathing): Full Support    Time spent: Time spent involving patient care including face-to-face encounter 52 minutes      Electronically signed by Chance Razo MD, 02/21/24, 12:28 PM EST.

## 2024-02-21 NOTE — NURSING NOTE
The patient is on 3west, on room air, restless in the bed and expressed he did not like the bilateral wrist restraints on and continued to focus on asking for them off and to be allowed out of bed. The patient was adjusted in bed twice with HOB all the way up and knees bent to prevent sliding in the bed. Staff attempted to allow the patient out of restraints however he then turned his focus to getting out of bed so they have to be placed back on. Spouse was at bedside. The provider visited during my visit and plans to order a CT of the head to see if the cancer has spread to the brain. Emotional support provided and palliative care will touch base with the patient and family tomorrow.    -Kayla CAMPOS, RN, [facility]   Palliative Care    2/21/2024 13:09 EST

## 2024-02-21 NOTE — PROGRESS NOTES
[facility]     Progress Note    Patient Name: Kevin Ordonez  : 1960  MRN: [id number]  Primary Care Physician:  Ev Peck APRN  Date of admission: 2024    Subjective   Subjective     Chief Complaint:   Follow-up for severe hypoglycemia, altered mental status, hepatic encephalopathy, mechanical fall, gastric cancer with metastasis to liver, DVT on Eliquis.    On room air  Moved out of ICU  Still confused and pulling at lines  Remains restrained  Glucose control improved      Objective   Objective     Vitals:   Temp:  [97.3 °F (36.3 °C)-97.8 °F (36.6 °C)] 97.3 °F (36.3 °C)  Heart Rate:  [] 93  Resp:  [16-24] 16  BP: (151-197)/(68-98) 178/98  Physical Exam   Awake, male in no acute distress on room air  Port-A-Cath in place  Sitting in bedside chair  Remains confused      Result Review    Result Review:  I have personally reviewed the results from the time of this admission to 2024 15:11 EST and agree with these findings:  [x]  Laboratory  [x]  Microbiology  [x]  Radiology  [x]  EKG/Telemetry   [x]  Cardiology/Vascular   []  Pathology  []  Old records  []  Other:  Most notable findings include:       Lab 24  0505 24  0332 24  0522 24  0218 24  1956 24  1239 24  0832   WBC 11.10* 8.81 10.29 18.22*  --   --  21.12*   HEMOGLOBIN 9.6* 10.7* 9.9* 9.8*  --   --  12.4*   HEMATOCRIT 30.1* 32.8* 30.3* 29.7*  --   --  38.1   PLATELETS 140 147 141 175  --   --  222   SODIUM 141 139 138 137 139  --  141   POTASSIUM 4.2 2.9* 3.7 3.7 2.9*  --  4.3   CHLORIDE 110* 106 104 102 101  --  101   CO2 18.3* 20.3* 23.0 23.8 25.5  --  21.5*   BUN 9 7* 8 7* 8  --  10   CREATININE 0.75* 0.68* 0.75* 0.67* 0.71*  --  0.79   GLUCOSE 149* 213* 190* 139* 146*  --  85   CALCIUM 8.6 8.7 8.2* 8.3* 8.4*  --  9.0   PHOSPHORUS 2.2*  --  2.6 1.8*  --  2.7  --    TOTAL PROTEIN 6.0  --  5.4*  --   --   --  7.2   ALBUMIN 2.5*  --  2.3*  --   --   --  2.7*   GLOBULIN 3.5  --   --    --   --   --  4.5         Assessment & Plan   Assessment / Plan     Active Hospital Problems:  Active Hospital Problems    Diagnosis  POA    **Hypoglycemia [E16.2]  Yes      Resolved Hospital Problems   No resolved problems to display.   Severe hypoglycemia  Altered mental status  Hepatic encephalopathy  Mechanical fall  Gastric cancer with metastasis to liver  History of DVT on Eliquis  Hypomagnesia   Type 2 diabetes  Hypertension  Hyperlipidemia    Plan:   On room air  Continue aspiration precautions  Continue Symbicort  Trend renal panel and electrolytes  Complete 5 days of Unasyn  Lactic acid cleared  Continue delirium precautions  Palliative care to evaluate.  Overall prognosis is poor.  Appears to have diffusely metastatic disease throughout the liver  Continue home Eliquis  Limit all sedating medications  Hold nephrotoxic agents  Overall prognosis is poor    DVT prophylaxis:  Medical and mechanical DVT prophylaxis orders are present.    CODE STATUS:   Level Of Support Discussed With: Patient  Code Status (Patient has no pulse and is not breathing): CPR (Attempt to Resuscitate)  Medical Interventions (Patient has pulse or is breathing): Full Support      Labs, imaging, microbiology, notes and medications personally reviewed  Discussed with primary     Electronically signed by Nathaniel Whittaker MD, 02/21/24, 3:13 PM EST.

## 2024-02-22 LAB
ALBUMIN SERPL-MCNC: 2.6 G/DL (ref 3.5–5.2)
ALBUMIN/GLOB SERPL: 0.9 G/DL
ALP SERPL-CCNC: 324 U/L (ref 39–117)
ALT SERPL W P-5'-P-CCNC: 26 U/L (ref 1–41)
ANION GAP SERPL CALCULATED.3IONS-SCNC: 9.3 MMOL/L (ref 5–15)
AST SERPL-CCNC: 49 U/L (ref 1–40)
BACTERIA SPEC AEROBE CULT: NORMAL
BASOPHILS # BLD AUTO: 0.05 10*3/MM3 (ref 0–0.2)
BASOPHILS NFR BLD AUTO: 0.4 % (ref 0–1.5)
BILIRUB SERPL-MCNC: 0.7 MG/DL (ref 0–1.2)
BUN SERPL-MCNC: 8 MG/DL (ref 8–23)
BUN/CREAT SERPL: 10.5 (ref 7–25)
CALCIUM SPEC-SCNC: 8.8 MG/DL (ref 8.6–10.5)
CHLORIDE SERPL-SCNC: 109 MMOL/L (ref 98–107)
CO2 SERPL-SCNC: 22.7 MMOL/L (ref 22–29)
CREAT SERPL-MCNC: 0.76 MG/DL (ref 0.76–1.27)
DEPRECATED RDW RBC AUTO: 55.1 FL (ref 37–54)
EGFRCR SERPLBLD CKD-EPI 2021: 100.4 ML/MIN/1.73
EOSINOPHIL # BLD AUTO: 0.25 10*3/MM3 (ref 0–0.4)
EOSINOPHIL NFR BLD AUTO: 2 % (ref 0.3–6.2)
ERYTHROCYTE [DISTWIDTH] IN BLOOD BY AUTOMATED COUNT: 16.8 % (ref 12.3–15.4)
GLOBULIN UR ELPH-MCNC: 2.9 GM/DL
GLUCOSE BLDC GLUCOMTR-MCNC: 130 MG/DL (ref 70–99)
GLUCOSE BLDC GLUCOMTR-MCNC: 176 MG/DL (ref 70–99)
GLUCOSE BLDC GLUCOMTR-MCNC: 194 MG/DL (ref 70–99)
GLUCOSE BLDC GLUCOMTR-MCNC: 232 MG/DL (ref 70–99)
GLUCOSE BLDC GLUCOMTR-MCNC: 271 MG/DL (ref 70–99)
GLUCOSE SERPL-MCNC: 148 MG/DL (ref 65–99)
HCT VFR BLD AUTO: 29 % (ref 37.5–51)
HGB BLD-MCNC: 9.3 G/DL (ref 13–17.7)
IMM GRANULOCYTES # BLD AUTO: 1.57 10*3/MM3 (ref 0–0.05)
IMM GRANULOCYTES NFR BLD AUTO: 12.5 % (ref 0–0.5)
LYMPHOCYTES # BLD AUTO: 1.55 10*3/MM3 (ref 0.7–3.1)
LYMPHOCYTES NFR BLD AUTO: 12.4 % (ref 19.6–45.3)
MAGNESIUM SERPL-MCNC: 1.6 MG/DL (ref 1.6–2.4)
MCH RBC QN AUTO: 29.1 PG (ref 26.6–33)
MCHC RBC AUTO-ENTMCNC: 32.1 G/DL (ref 31.5–35.7)
MCV RBC AUTO: 90.6 FL (ref 79–97)
MONOCYTES # BLD AUTO: 1.46 10*3/MM3 (ref 0.1–0.9)
MONOCYTES NFR BLD AUTO: 11.6 % (ref 5–12)
NEUTROPHILS NFR BLD AUTO: 61.1 % (ref 42.7–76)
NEUTROPHILS NFR BLD AUTO: 7.67 10*3/MM3 (ref 1.7–7)
NRBC BLD AUTO-RTO: 2.5 /100 WBC (ref 0–0.2)
PHOSPHATE SERPL-MCNC: 2.5 MG/DL (ref 2.5–4.5)
PLATELET # BLD AUTO: 129 10*3/MM3 (ref 140–450)
PMV BLD AUTO: 11.1 FL (ref 6–12)
POTASSIUM SERPL-SCNC: 3 MMOL/L (ref 3.5–5.2)
PROT SERPL-MCNC: 5.5 G/DL (ref 6–8.5)
RBC # BLD AUTO: 3.2 10*6/MM3 (ref 4.14–5.8)
SODIUM SERPL-SCNC: 141 MMOL/L (ref 136–145)
WBC NRBC COR # BLD AUTO: 12.55 10*3/MM3 (ref 3.4–10.8)

## 2024-02-22 PROCEDURE — 94664 DEMO&/EVAL PT USE INHALER: CPT

## 2024-02-22 PROCEDURE — 82948 REAGENT STRIP/BLOOD GLUCOSE: CPT | Performed by: HOSPITALIST

## 2024-02-22 PROCEDURE — 25010000002 AMPICILLIN-SULBACTAM PER 1.5 G: Performed by: NURSE PRACTITIONER

## 2024-02-22 PROCEDURE — 99232 SBSQ HOSP IP/OBS MODERATE 35: CPT | Performed by: INTERNAL MEDICINE

## 2024-02-22 PROCEDURE — 85025 COMPLETE CBC W/AUTO DIFF WBC: CPT | Performed by: NURSE PRACTITIONER

## 2024-02-22 PROCEDURE — 84100 ASSAY OF PHOSPHORUS: CPT | Performed by: INTERNAL MEDICINE

## 2024-02-22 PROCEDURE — 99233 SBSQ HOSP IP/OBS HIGH 50: CPT | Performed by: INTERNAL MEDICINE

## 2024-02-22 PROCEDURE — 82948 REAGENT STRIP/BLOOD GLUCOSE: CPT | Performed by: NURSE PRACTITIONER

## 2024-02-22 PROCEDURE — 94799 UNLISTED PULMONARY SVC/PX: CPT

## 2024-02-22 PROCEDURE — 80053 COMPREHEN METABOLIC PANEL: CPT | Performed by: INTERNAL MEDICINE

## 2024-02-22 PROCEDURE — 82948 REAGENT STRIP/BLOOD GLUCOSE: CPT

## 2024-02-22 PROCEDURE — 83735 ASSAY OF MAGNESIUM: CPT | Performed by: INTERNAL MEDICINE

## 2024-02-22 RX ORDER — POTASSIUM CHLORIDE 1.5 G/1.58G
40 POWDER, FOR SOLUTION ORAL ONCE
Status: COMPLETED | OUTPATIENT
Start: 2024-02-22 | End: 2024-02-22

## 2024-02-22 RX ADMIN — AMPICILLIN AND SULBACTAM 3 G: 2; 1 INJECTION, POWDER, FOR SOLUTION INTRAVENOUS at 10:39

## 2024-02-22 RX ADMIN — Medication 10 ML: at 10:39

## 2024-02-22 RX ADMIN — APIXABAN 5 MG: 5 TABLET, FILM COATED ORAL at 08:28

## 2024-02-22 RX ADMIN — APIXABAN 5 MG: 5 TABLET, FILM COATED ORAL at 20:53

## 2024-02-22 RX ADMIN — POTASSIUM CHLORIDE 40 MEQ: 1.5 POWDER, FOR SOLUTION ORAL at 14:17

## 2024-02-22 RX ADMIN — QUETIAPINE FUMARATE 25 MG: 25 TABLET ORAL at 20:53

## 2024-02-22 RX ADMIN — LACTULOSE 30 G: 20 SOLUTION ORAL at 08:28

## 2024-02-22 RX ADMIN — AMPICILLIN AND SULBACTAM 3 G: 2; 1 INJECTION, POWDER, FOR SOLUTION INTRAVENOUS at 15:41

## 2024-02-22 RX ADMIN — DOCUSATE SODIUM 50MG AND SENNOSIDES 8.6MG 2 TABLET: 8.6; 5 TABLET, FILM COATED ORAL at 08:28

## 2024-02-22 RX ADMIN — Medication 5 MG: at 20:53

## 2024-02-22 RX ADMIN — BUDESONIDE AND FORMOTEROL FUMARATE DIHYDRATE 1 PUFF: 160; 4.5 AEROSOL RESPIRATORY (INHALATION) at 06:19

## 2024-02-22 RX ADMIN — Medication 10 ML: at 20:54

## 2024-02-22 RX ADMIN — AMPICILLIN AND SULBACTAM 3 G: 2; 1 INJECTION, POWDER, FOR SOLUTION INTRAVENOUS at 06:14

## 2024-02-22 RX ADMIN — BUDESONIDE AND FORMOTEROL FUMARATE DIHYDRATE 1 PUFF: 160; 4.5 AEROSOL RESPIRATORY (INHALATION) at 19:35

## 2024-02-22 RX ADMIN — AMPICILLIN AND SULBACTAM 3 G: 2; 1 INJECTION, POWDER, FOR SOLUTION INTRAVENOUS at 20:55

## 2024-02-22 RX ADMIN — LACTULOSE 30 G: 20 SOLUTION ORAL at 14:17

## 2024-02-22 NOTE — PLAN OF CARE
Goal Outcome Evaluation:                      Patient is alert and oriented x1; patient is alert to self only.  Patient removed from restraints this morning; patient has been calm and cooperative since removal.  BP has been elevated this shift.  No complaints of pain this shift.  IV antibiotics per MAR.  Patient has been up to the chair this afternoon.  Plan to discharge with hospice services when medically cleared.  Continuing plan of care.

## 2024-02-22 NOTE — NURSING NOTE
Attempted to remove restraints and within minutes patient tried pulling IV out. Around 1500 family removed restraints,  and provider notified. 1532 restraints and haldol given as patient became very agitated and attempted to stand. When asked to set down to avoid falling as patient was very unstable patient became verbally aggressive. At which point spouse agreed restraints needed to be put back in place. Educated entire family that restraints are in place to protect patient from harming himself and pulling out IV and port access as well as needing CT scan which patient was very upset about having to be done. Transported patient to CT with help of CT staff and another RN we were able to transfer patient to and from with no complications. No other issues noted or reported at this time.

## 2024-02-22 NOTE — PROGRESS NOTES
[facility]     Progress Note    Patient Name: Kevin Ordonez  : 1960  MRN: [id number]  Primary Care Physician:  Ev Peck APRN  Date of admission: 2024    Subjective   Subjective     Chief Complaint:   Follow-up for severe hypoglycemia, altered mental status, hepatic encephalopathy, mechanical fall, gastric cancer with metastasis to liver, DVT on Eliquis.    On room air  Still having issues with confusion but better  Out of restraints  Glucose improved  No fevers  Family meeting with hospice today         Objective   Objective     Vitals:   Temp:  [97.2 °F (36.2 °C)-98 °F (36.7 °C)] 98 °F (36.7 °C)  Heart Rate:  [68-98] 81  Resp:  [16-20] 18  BP: (133-191)/(66-82) 159/82  Physical Exam   Awake, male in no acute distress on room air  Port-A-Cath in place  Sitting in bedside chair  Less confused      Result Review    Result Review:  I have personally reviewed the results from the time of this admission to 2024 12:49 EST and agree with these findings:  [x]  Laboratory  [x]  Microbiology  [x]  Radiology  [x]  EKG/Telemetry   [x]  Cardiology/Vascular   []  Pathology  []  Old records  []  Other:  Most notable findings include:       Lab 24  0619 24  0505 24  0332 24  0522 24  0218 24  1956 24  1239 24  0832   WBC 12.55* 11.10* 8.81 10.29 18.22*  --   --  21.12*   HEMOGLOBIN 9.3* 9.6* 10.7* 9.9* 9.8*  --   --  12.4*   HEMATOCRIT 29.0* 30.1* 32.8* 30.3* 29.7*  --   --  38.1   PLATELETS 129* 140 147 141 175  --   --  222   SODIUM 141 141 139 138 137 139  --  141   POTASSIUM 3.0* 4.2 2.9* 3.7 3.7 2.9*  --  4.3   CHLORIDE 109* 110* 106 104 102 101  --  101   CO2 22.7 18.3* 20.3* 23.0 23.8 25.5  --  21.5*   BUN 8 9 7* 8 7* 8  --  10   CREATININE 0.76 0.75* 0.68* 0.75* 0.67* 0.71*  --  0.79   GLUCOSE 148* 149* 213* 190* 139* 146*  --  85   CALCIUM 8.8 8.6 8.7 8.2* 8.3* 8.4*  --  9.0   PHOSPHORUS 2.5 2.2*  --  2.6 1.8*  --  2.7  --    TOTAL PROTEIN  5.5* 6.0  --  5.4*  --   --   --  7.2   ALBUMIN 2.6* 2.5*  --  2.3*  --   --   --  2.7*   GLOBULIN 2.9 3.5  --   --   --   --   --  4.5         Assessment & Plan   Assessment / Plan     Active Hospital Problems:  Active Hospital Problems    Diagnosis  POA    **Hypoglycemia [E16.2]  Yes      Resolved Hospital Problems   No resolved problems to display.   Severe hypoglycemia  Altered mental status  Hepatic encephalopathy  Mechanical fall  Gastric cancer with metastasis to liver  History of DVT on Eliquis  Hypomagnesia   Type 2 diabetes  Hypertension  Hyperlipidemia  Hypokalemia  Hypomagnesemia    Plan:   On room air  Continue aspiration precautions  Continue Symbicort  Trend renal panel and electrolytes.  Replace magnesium IV and potassium orally  Complete 5 days of Unasyn  Lactic acid cleared  Continue delirium precautions  Palliative care to evaluate.  Overall prognosis is poor.  Appears to have diffusely metastatic disease throughout the liver   Prognosis poor  Limit all sedating medications    DVT prophylaxis:  Medical and mechanical DVT prophylaxis orders are present.    CODE STATUS:   Medical Intervention Limits: NO intubation (DNI)  Code Status (Patient has no pulse and is not breathing): No CPR (Do Not Attempt to Resuscitate)  Medical Interventions (Patient has pulse or is breathing): Limited Support      Labs, imaging, microbiology, notes and medications personally reviewed  Discussed with primary     Electronically signed by Nathaniel Whittaker MD, 02/22/24, 12:50 PM EST.

## 2024-02-22 NOTE — ACP (ADVANCE CARE PLANNING)
The patient and family met with [facility] services today and the patient qualifies and [facility]. The provider wants to give the patient a little more time to to clear mentally and also remain out of restraints. I reviewed the pharmacy with the family and they would [facility] unless the patient is needing medication for symptom control at time of DC. EMS DNR is on chart and [facility] has been updated as well as provider.    -Kayla CAMPOS, RN, [facility]   Palliative Care    2/22/2024 13:55 EST

## 2024-02-22 NOTE — PROGRESS NOTES
[facility]   Hospitalist Progress Note  Date: 2024  Patient Name: Kevin Ordonez  : 1960  MRN: [id number]  Date of admission: 2024      Subjective   Subjective     Chief Complaint: Follow-up altered mental status    Summary:Kevin Ordonez is a 63 y.o. 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 presents to the ER with weakness, fall, altered mental status.  His wife found him down in the kitchen on the date of presentation.  He has not been feeling well the day prior to presentation and has been experiencing more pain.  The patient was unresponsive when picked up by the EMS his blood sugar was 49. On arrival to the ED, patient temperature 95.5, pulse of 112, respiratory rate of 14, blood pressure 149/117?,  And saturating 95% on room air.  Patient is very lethargic but arousable falling back to sleep. On imaging, patient CT of his head without contrast shows no acute abnormality.  Chest x-ray shows: Minimal left basilar scarring or atelectasis. Patient CK is 202, CO2 was 21.5, anion gap is 18.5, blood glucose labile and trending lower despite hypoglycemia protocol.  Patient's ammonia level is 75.  White blood cell count is 21.12. According to his wife, patient had abdominal swelling, he started Lasix.  Hospitalist service contacted for admission for further evaluation and treatment.  Pulmonology critical care consulted.  Patient started on D5 drip as well as lactulose and empiric antibiotics.  Blood sugars stabilizing.  Mental status slowly improving.  Lehman catheter placed for urinary retention.    Interval Followup:   Over the last 24 hours patient's mental status mildly improved, patient has been able to be taken out of restraints, patient and family have a meeting set up with hospice today    Objective    Objective     Vitals:   Temp:  [97.2 °F (36.2 °C)-98 °F (36.7 °C)] 98 °F (36.7 °C)  Heart Rate:  [68-98] 81  Resp:  [16-20] 18  BP: (133-191)/(66-82) 159/82  Physical Exam   GEN: No acute distress  HEENT: Moist mucous membranes  LUNGS: Equal chest rise bilaterally  CARDIAC: Regular rate and rhythm  NEURO: Moving all 4 extremities spontaneously  SKIN: No obvious breakdown    Result Review    Result Review:  I have personally reviewed these results and agree with these findings:  [x]  Laboratory  LAB RESULTS:      Lab 02/22/24  0619 02/21/24  0505 02/20/24  0332 02/19/24  0522 02/18/24  2335 02/18/24  1651 02/18/24  0601 02/18/24  0218 02/17/24  1710 02/17/24  1239 02/17/24  0832   WBC 12.55* 11.10* 8.81 10.29  --   --   --  18.22*  --   --  21.12*   HEMOGLOBIN 9.3* 9.6* 10.7* 9.9*  --   --   --  9.8*  --   --  12.4*   HEMATOCRIT 29.0* 30.1* 32.8* 30.3*  --   --   --  29.7*  --   --  38.1   PLATELETS 129* 140 147 141  --   --   --  175  --   --  222   NEUTROS ABS 7.67* 7.19*  8.66* 6.88 7.69*  --   --   --  16.40*  --   --  19.64*   IMMATURE GRANS (ABS) 1.57* 0.89* 0.07* 1.20*  --   --   --   --   --   --   --    LYMPHS ABS 1.55 1.51 0.76 0.78  --   --   --   --   --   --   --    MONOS ABS 1.46* 1.26* 0.97* 0.49  --   --   --   --   --   --   --    EOS ABS 0.25 0.21  0.22 0.09 0.07  --   --   --  0.36  --   --   --    MCV 90.6 90.7 89.4 91.0  --   --   --  89.5  --   --  92.0   PROCALCITONIN  --   --   --   --   --   --   --   --   --  0.34*  --    LACTATE  --  2.0 4.2* 3.3* 3.0* 2.7*   < > 2.6*   < >  --   --    PROTIME  --   --   --   --   --   --   --   --   --   --  15.2*    < > = values in this interval not displayed.         Lab 02/22/24  0619 02/21/24  0505 02/20/24  0332 02/19/24  0522 02/18/24  0218 02/17/24  1956 02/17/24  1239 02/17/24  0832   SODIUM 141 141 139 138 137   < >  --  141   POTASSIUM 3.0* 4.2 2.9* 3.7 3.7   < >  --  4.3   CHLORIDE 109* 110* 106 104 102   < >  --  101   CO2 22.7 18.3* 20.3*  23.0 23.8   < >  --  21.5*   ANION GAP 9.3 12.7 12.7 11.0 11.2   < >  --  18.5*   BUN 8 9 7* 8 7*   < >  --  10   CREATININE 0.76 0.75* 0.68* 0.75* 0.67*   < >  --  0.79   EGFR 100.4 100.8 103.8 100.8 104.3   < >  --  99.2   GLUCOSE 148* 149* 213* 190* 139*   < >  --  85   CALCIUM 8.8 8.6 8.7 8.2* 8.3*   < >  --  9.0   MAGNESIUM 1.6 2.1 1.6 1.5* 1.7  --  1.2* 1.2*   PHOSPHORUS 2.5 2.2*  --  2.6 1.8*  --  2.7  --    TSH  --   --   --   --   --   --   --  2.750    < > = values in this interval not displayed.         Lab 02/22/24  0619 02/21/24  0505 02/19/24  0522 02/17/24  0832   TOTAL PROTEIN 5.5* 6.0 5.4* 7.2   ALBUMIN 2.6* 2.5* 2.3* 2.7*   GLOBULIN 2.9 3.5  --  4.5   ALT (SGPT) 26 25 19 25   AST (SGOT) 49* 54* 35 67*   BILIRUBIN 0.7 0.9 0.8 0.8   INDIRECT BILIRUBIN  --   --  0.6  --    BILIRUBIN DIRECT  --   --  0.2  --    ALK PHOS 324* 323* 262* 322*         Lab 02/17/24  0832   PROTIME 15.2*   INR 1.17*                 Brief Urine Lab Results  (Last result in the past 365 days)        Color   Clarity   Blood   Leuk Est   Nitrite   Protein   CREAT   Urine HCG        02/17/24 0949 Yellow   Clear   Trace   Negative   Negative   100 mg/dL (2+)                 Microbiology Results (last 10 days)       Procedure Component Value - Date/Time    Blood Culture - Blood, Arm, Left [[id number]]  (Normal) Collected: 02/17/24 1716    Lab Status: Preliminary result Specimen: Blood from Arm, Left Updated: 02/21/24 1731     Blood Culture No growth at 4 days    Legionella Antigen, Urine - Urine, Urine, Clean Catch [[id number]]  (Normal) Collected: 02/17/24 1546    Lab Status: Final result Specimen: Urine, Clean Catch Updated: 02/17/24 1658     LEGIONELLA ANTIGEN, URINE Negative    S. Pneumo Ag Urine or CSF - Urine, Urine, Clean Catch [[id number]]  (Normal) Collected: 02/17/24 1546    Lab Status: Final result Specimen: Urine, Clean Catch Updated: 02/17/24 1658     Strep Pneumo Ag Negative            [x]  Microbiology  [x]   Radiology  CT Head Without Contrast    Result Date: 2/21/2024    1. No acute intracranial abnormality.     MARJ BERNABE MD       Electronically Signed and Approved By: MARJ BERNABE MD on 2/21/2024 at 17:22             CT Abdomen Pelvis Without Contrast    Result Date: 2/17/2024   1. Markedly technically degraded exam as above. 2. Diffuse colonic diverticulosis without focal diverticulitis.  However, there is some equivocal wall thickening in the transverse colon and could reflect mild colitis. 3.  Poorly defined low attenuation changes in the liver may reflect metastatic disease. 4. Trace amount of free fluid in the abdomen and pelvis.  There is also a small left effusion. 5. Enlarged gastrohepatic ligament lymph node compatible with metastatic disease.      IKER PATRICIA MD       Electronically Signed and Approved By: IKER PATRICIA MD on 2/17/2024 at 21:46             XR Abdomen KUB    Result Date: 2/17/2024    1. NG tube in proximal to mid gastric body     Kody Payton M.D.       Electronically Signed and Approved By: Kody Paytno M.D. on 2/17/2024 at 16:30             CT Head Without Contrast    Result Date: 2/17/2024    1. No acute intracranial abnormality     Kody Payton M.D.       Electronically Signed and Approved By: Kody Payton M.D. on 2/17/2024 at 9:38             XR Chest 1 View    Result Date: 2/17/2024    1. Minimal left basilar scarring or atelectasis       Kody Payton M.D.       Electronically Signed and Approved By: Kody Payton M.D. on 2/17/2024 at 9:03              [x]  EKG/Telemetry   []  Cardiology/Vascular   []  Pathology  []  Old records  [x]  Other:  Scheduled Meds:ampicillin-sulbactam, 3 g, Intravenous, Q6H  apixaban, 5 mg, Oral, BID  budesonide-formoterol, 1 puff, Inhalation, BID - RT  [Held by provider] chlorthalidone, 12.5 mg, Oral, Daily  dextrose, 25 g, Intravenous, Once  [Held by provider] furosemide, 20 mg, Oral, Daily  [Held by provider] gabapentin, 600 mg, Oral,  Q12H  lactulose, 30 g, Oral, TID  [Held by provider] losartan, 100 mg, Oral, Daily  melatonin, 5 mg, Oral, Nightly  [Held by provider] mirtazapine, 15 mg, Oral, Nightly  pantoprazole, 40 mg, Oral, Q AM  potassium chloride, 40 mEq, Oral, Once  QUEtiapine, 25 mg, Oral, Nightly  senna-docusate sodium, 2 tablet, Oral, BID  sodium chloride, 10 mL, Intravenous, Q12H      Continuous Infusions:     PRN Meds:.  senna-docusate sodium **AND** polyethylene glycol **AND** [DISCONTINUED] bisacodyl **AND** bisacodyl    dextrose    dextrose    glucagon (human recombinant)    hydrALAZINE    ipratropium-albuterol    [Held by provider] LORazepam    nitroglycerin    ondansetron ODT **OR** ondansetron    [Held by provider] oxyCODONE-acetaminophen    sodium chloride    sodium chloride    traMADol      Assessment & Plan   Assessment / Plan     Assessment/Plan:  Acute metabolic encephalopathy suspect due to hepatic encephalopathy versus polypharmacy versus hypoglycemia  Hepatic encephalopathy  Hypoglycemia  Leukocytosis  Concern for aspiration  Mild rhabdomyolysis  Hypomagnesemia  Hypokalemia  Hypophosphatemia  Elevated lactate  Acute normocytic anemia  Gastric cancer with mets to liver  Insulin-dependent diabetes mellitus hypoglycemia  History of hypertension  Obstructive sleep apnea  COPD not acute exacerbation  GERD  Acute urinary retention  Fall    Patient admitted for further evaluation and treatment  Pulmonology critical care consulted thank you for assistance  Continue regular reorientation  Advance diet per speech  Continue lactulose titrate to 3-4 soft bowel movements daily, ammonia level improved  Continue to hold sedating medications  Off IV fluids  Continue ampicillin-sulbactam for possible aspiration to completion  CK within normal limits  Continue to monitor electrolytes replace as needed, replace potassium today  Suspect elevated lactate due to decreased liver function due to hepatic mets, mildly improved  Continue to  monitor hemoglobin transfuse to keep hemoglobin greater than 7  Continue as needed hydralazine for hypertension  Continue supplemental oxygen as needed  Restart CPAP once patient more alert and cooperative  Continue Pepcid twice daily  Continue Lehman catheter until more alert and cooperative then will attempt voiding trial  PT/OT consult  CT scan of the abdomen pelvis reviewed, diverticulosis without diverticulitis, widespread liver metastatic disease noted  CT scan of the head personally reviewed, no intracranial metastasis noted  Further inpatient orders recommendations pending clinical course     Discussed plan with bedside RN as well as pulmonology care team.    Disposition: Pending further stabilization and improvement in mental status    DVT prophylaxis:  Medical and mechanical DVT prophylaxis orders are present.        CODE STATUS:   Medical Intervention Limits: NO intubation (DNI)  Code Status (Patient has no pulse and is not breathing): No CPR (Do Not Attempt to Resuscitate)  Medical Interventions (Patient has pulse or is breathing): Limited Support    Time spent: Time spent involving patient care including face-to-face encounter 51 minutes      Electronically signed by Chance Razo MD, 02/22/24, 12:18 PM EST.

## 2024-02-23 VITALS
WEIGHT: 184.3 LBS | HEIGHT: 66 IN | TEMPERATURE: 97.7 F | RESPIRATION RATE: 17 BRPM | HEART RATE: 82 BPM | SYSTOLIC BLOOD PRESSURE: 163 MMHG | BODY MASS INDEX: 29.62 KG/M2 | OXYGEN SATURATION: 100 % | DIASTOLIC BLOOD PRESSURE: 68 MMHG

## 2024-02-23 LAB
ALBUMIN SERPL-MCNC: 2.7 G/DL (ref 3.5–5.2)
ALBUMIN/GLOB SERPL: 0.8 G/DL
ALP SERPL-CCNC: 365 U/L (ref 39–117)
ALT SERPL W P-5'-P-CCNC: 29 U/L (ref 1–41)
ANION GAP SERPL CALCULATED.3IONS-SCNC: 10.4 MMOL/L (ref 5–15)
ANISOCYTOSIS BLD QL: ABNORMAL
AST SERPL-CCNC: 48 U/L (ref 1–40)
BILIRUB SERPL-MCNC: 0.7 MG/DL (ref 0–1.2)
BUN SERPL-MCNC: 6 MG/DL (ref 8–23)
BUN/CREAT SERPL: 8.2 (ref 7–25)
CALCIUM SPEC-SCNC: 8.9 MG/DL (ref 8.6–10.5)
CHLORIDE SERPL-SCNC: 107 MMOL/L (ref 98–107)
CO2 SERPL-SCNC: 23.6 MMOL/L (ref 22–29)
CREAT SERPL-MCNC: 0.73 MG/DL (ref 0.76–1.27)
DEPRECATED RDW RBC AUTO: 53.6 FL (ref 37–54)
EGFRCR SERPLBLD CKD-EPI 2021: 101.6 ML/MIN/1.73
EOSINOPHIL # BLD MANUAL: 0.12 10*3/MM3 (ref 0–0.4)
EOSINOPHIL NFR BLD MANUAL: 1 % (ref 0.3–6.2)
ERYTHROCYTE [DISTWIDTH] IN BLOOD BY AUTOMATED COUNT: 16.6 % (ref 12.3–15.4)
GLOBULIN UR ELPH-MCNC: 3.3 GM/DL
GLUCOSE BLDC GLUCOMTR-MCNC: 159 MG/DL (ref 70–99)
GLUCOSE SERPL-MCNC: 169 MG/DL (ref 65–99)
HCT VFR BLD AUTO: 31.8 % (ref 37.5–51)
HGB BLD-MCNC: 10.2 G/DL (ref 13–17.7)
LYMPHOCYTES # BLD MANUAL: 1.73 10*3/MM3 (ref 0.7–3.1)
LYMPHOCYTES NFR BLD MANUAL: 4 % (ref 5–12)
MACROCYTES BLD QL SMEAR: ABNORMAL
MAGNESIUM SERPL-MCNC: 1.4 MG/DL (ref 1.6–2.4)
MCH RBC QN AUTO: 28.7 PG (ref 26.6–33)
MCHC RBC AUTO-ENTMCNC: 32.1 G/DL (ref 31.5–35.7)
MCV RBC AUTO: 89.6 FL (ref 79–97)
MONOCYTES # BLD: 0.49 10*3/MM3 (ref 0.1–0.9)
NEUTROPHILS # BLD AUTO: 10 10*3/MM3 (ref 1.7–7)
NEUTROPHILS NFR BLD MANUAL: 74 % (ref 42.7–76)
NEUTS BAND NFR BLD MANUAL: 7 % (ref 0–5)
NRBC SPEC MANUAL: 6 /100 WBC (ref 0–0.2)
PHOSPHATE SERPL-MCNC: 2.9 MG/DL (ref 2.5–4.5)
PLAT MORPH BLD: NORMAL
PLATELET # BLD AUTO: 122 10*3/MM3 (ref 140–450)
PMV BLD AUTO: 11.1 FL (ref 6–12)
POLYCHROMASIA BLD QL SMEAR: ABNORMAL
POTASSIUM SERPL-SCNC: 3.2 MMOL/L (ref 3.5–5.2)
PROT SERPL-MCNC: 6 G/DL (ref 6–8.5)
RBC # BLD AUTO: 3.55 10*6/MM3 (ref 4.14–5.8)
SCAN SLIDE: NORMAL
SODIUM SERPL-SCNC: 141 MMOL/L (ref 136–145)
VARIANT LYMPHS NFR BLD MANUAL: 14 % (ref 19.6–45.3)
WBC MORPH BLD: NORMAL
WBC NRBC COR # BLD AUTO: 12.35 10*3/MM3 (ref 3.4–10.8)

## 2024-02-23 PROCEDURE — 99239 HOSP IP/OBS DSCHRG MGMT >30: CPT | Performed by: INTERNAL MEDICINE

## 2024-02-23 PROCEDURE — 80053 COMPREHEN METABOLIC PANEL: CPT | Performed by: INTERNAL MEDICINE

## 2024-02-23 PROCEDURE — 25010000002 AMPICILLIN-SULBACTAM PER 1.5 G: Performed by: NURSE PRACTITIONER

## 2024-02-23 PROCEDURE — 97161 PT EVAL LOW COMPLEX 20 MIN: CPT

## 2024-02-23 PROCEDURE — 84100 ASSAY OF PHOSPHORUS: CPT | Performed by: INTERNAL MEDICINE

## 2024-02-23 PROCEDURE — 94664 DEMO&/EVAL PT USE INHALER: CPT

## 2024-02-23 PROCEDURE — 94799 UNLISTED PULMONARY SVC/PX: CPT

## 2024-02-23 PROCEDURE — 99232 SBSQ HOSP IP/OBS MODERATE 35: CPT | Performed by: INTERNAL MEDICINE

## 2024-02-23 PROCEDURE — 25010000002 HEPARIN LOCK FLUSH PER 10 UNITS: Performed by: INTERNAL MEDICINE

## 2024-02-23 PROCEDURE — 82948 REAGENT STRIP/BLOOD GLUCOSE: CPT | Performed by: HOSPITALIST

## 2024-02-23 PROCEDURE — 85025 COMPLETE CBC W/AUTO DIFF WBC: CPT | Performed by: INTERNAL MEDICINE

## 2024-02-23 PROCEDURE — 85007 BL SMEAR W/DIFF WBC COUNT: CPT | Performed by: INTERNAL MEDICINE

## 2024-02-23 PROCEDURE — 83735 ASSAY OF MAGNESIUM: CPT | Performed by: INTERNAL MEDICINE

## 2024-02-23 RX ORDER — DIAZEPAM 5 MG/ML
10 INJECTION, SOLUTION INTRAMUSCULAR; INTRAVENOUS
Status: DISCONTINUED | OUTPATIENT
Start: 2024-02-23 | End: 2024-02-23 | Stop reason: HOSPADM

## 2024-02-23 RX ORDER — LORAZEPAM 0.5 MG/1
1 TABLET ORAL
Status: DISCONTINUED | OUTPATIENT
Start: 2024-02-23 | End: 2024-02-23 | Stop reason: HOSPADM

## 2024-02-23 RX ORDER — MORPHINE SULFATE 20 MG/ML
10 SOLUTION ORAL
Status: DISCONTINUED | OUTPATIENT
Start: 2024-02-23 | End: 2024-02-23 | Stop reason: HOSPADM

## 2024-02-23 RX ORDER — HALOPERIDOL 5 MG/ML
0.5 INJECTION INTRAMUSCULAR
Status: DISCONTINUED | OUTPATIENT
Start: 2024-02-23 | End: 2024-02-23 | Stop reason: HOSPADM

## 2024-02-23 RX ORDER — LORAZEPAM 2 MG/ML
2 CONCENTRATE ORAL
Status: DISCONTINUED | OUTPATIENT
Start: 2024-02-23 | End: 2024-02-23 | Stop reason: HOSPADM

## 2024-02-23 RX ORDER — LORAZEPAM 2 MG/ML
1 CONCENTRATE ORAL
Status: DISCONTINUED | OUTPATIENT
Start: 2024-02-23 | End: 2024-02-23 | Stop reason: HOSPADM

## 2024-02-23 RX ORDER — LORAZEPAM 2 MG/ML
0.5 CONCENTRATE ORAL
Qty: 30 ML | Refills: 0 | Status: SHIPPED | OUTPATIENT
Start: 2024-02-23 | End: 2024-02-28

## 2024-02-23 RX ORDER — HALOPERIDOL 2 MG/ML
0.5 SOLUTION ORAL
Status: DISCONTINUED | OUTPATIENT
Start: 2024-02-23 | End: 2024-02-23 | Stop reason: HOSPADM

## 2024-02-23 RX ORDER — LORAZEPAM 2 MG/ML
0.5 CONCENTRATE ORAL
Status: DISCONTINUED | OUTPATIENT
Start: 2024-02-23 | End: 2024-02-23 | Stop reason: HOSPADM

## 2024-02-23 RX ORDER — DIAZEPAM 5 MG/ML
5 INJECTION, SOLUTION INTRAMUSCULAR; INTRAVENOUS
Status: DISCONTINUED | OUTPATIENT
Start: 2024-02-23 | End: 2024-02-23 | Stop reason: HOSPADM

## 2024-02-23 RX ORDER — LACTULOSE 10 G/15ML
30 SOLUTION ORAL 3 TIMES DAILY
Qty: 1350 ML | Refills: 0 | Status: SHIPPED | OUTPATIENT
Start: 2024-02-23 | End: 2024-03-04

## 2024-02-23 RX ORDER — HEPARIN SODIUM (PORCINE) LOCK FLUSH IV SOLN 100 UNIT/ML 100 UNIT/ML
5 SOLUTION INTRAVENOUS AS NEEDED
Status: DISCONTINUED | OUTPATIENT
Start: 2024-02-23 | End: 2024-02-23 | Stop reason: HOSPADM

## 2024-02-23 RX ORDER — LORAZEPAM 2 MG/1
2 TABLET ORAL
Status: DISCONTINUED | OUTPATIENT
Start: 2024-02-23 | End: 2024-02-23 | Stop reason: HOSPADM

## 2024-02-23 RX ORDER — HALOPERIDOL 2 MG/ML
0.5 SOLUTION ORAL
Qty: 30 ML | Refills: 0 | Status: SHIPPED | OUTPATIENT
Start: 2024-02-23

## 2024-02-23 RX ORDER — DIAZEPAM 5 MG/ML
2.5 INJECTION, SOLUTION INTRAMUSCULAR; INTRAVENOUS
Status: DISCONTINUED | OUTPATIENT
Start: 2024-02-23 | End: 2024-02-23 | Stop reason: HOSPADM

## 2024-02-23 RX ORDER — ATROPINE SULFATE 10 MG/ML
2 SOLUTION/ DROPS OPHTHALMIC
Status: DISCONTINUED | OUTPATIENT
Start: 2024-02-23 | End: 2024-02-23 | Stop reason: HOSPADM

## 2024-02-23 RX ORDER — LORAZEPAM 0.5 MG/1
0.5 TABLET ORAL
Status: DISCONTINUED | OUTPATIENT
Start: 2024-02-23 | End: 2024-02-23 | Stop reason: HOSPADM

## 2024-02-23 RX ORDER — HALOPERIDOL 0.5 MG/1
0.5 TABLET ORAL
Status: DISCONTINUED | OUTPATIENT
Start: 2024-02-23 | End: 2024-02-23 | Stop reason: HOSPADM

## 2024-02-23 RX ORDER — MORPHINE SULFATE 20 MG/ML
10 SOLUTION ORAL
Qty: 30 ML | Refills: 0 | Status: SHIPPED | OUTPATIENT
Start: 2024-02-23 | End: 2024-02-28

## 2024-02-23 RX ADMIN — Medication 10 ML: at 09:43

## 2024-02-23 RX ADMIN — AMPICILLIN AND SULBACTAM 3 G: 2; 1 INJECTION, POWDER, FOR SOLUTION INTRAVENOUS at 09:42

## 2024-02-23 RX ADMIN — APIXABAN 5 MG: 5 TABLET, FILM COATED ORAL at 09:43

## 2024-02-23 RX ADMIN — PANTOPRAZOLE SODIUM 40 MG: 40 TABLET, DELAYED RELEASE ORAL at 04:52

## 2024-02-23 RX ADMIN — AMPICILLIN AND SULBACTAM 3 G: 2; 1 INJECTION, POWDER, FOR SOLUTION INTRAVENOUS at 04:52

## 2024-02-23 RX ADMIN — LACTULOSE 30 G: 20 SOLUTION ORAL at 09:42

## 2024-02-23 RX ADMIN — BUDESONIDE AND FORMOTEROL FUMARATE DIHYDRATE 1 PUFF: 160; 4.5 AEROSOL RESPIRATORY (INHALATION) at 08:36

## 2024-02-23 RX ADMIN — HEPARIN 500 UNITS: 100 SYRINGE at 14:09

## 2024-02-23 NOTE — PLAN OF CARE
Goal Outcome Evaluation:  Plan of Care Reviewed With: (P) patient           Outcome Evaluation: (P) Patient presents with deficits in strength, balance, activity tolerance/endurance, and functional mobility. Skilled PT is required to address these areas and return to prior level of function.      Anticipated Discharge Disposition (PT): (P) sub acute care setting

## 2024-02-23 NOTE — NURSING NOTE
Pt and family refused to have catheter placed at this time. Transported via EMS to home with hospice care in place. Wife and daughter at bedside upon discharge. IV removed and intact, Port heparin flushed and de accessed. No other problems reported or noted at this time.

## 2024-02-23 NOTE — PROGRESS NOTES
[facility]     Progress Note    Patient Name: Kevin Ordonez  : 1960  MRN: [id number]  Primary Care Physician:  Ev Peck APRN  Date of admission: 2024    Subjective   Subjective     Chief Complaint:   Follow-up for severe hypoglycemia, altered mental status, hepatic encephalopathy, mechanical fall, gastric cancer with metastasis to liver, DVT on Eliquis.    On room air  Intermittently confused  No fevers noted  Going home with hospice today        Objective   Objective     Vitals:   Temp:  [97.5 °F (36.4 °C)-98.5 °F (36.9 °C)] 97.5 °F (36.4 °C)  Heart Rate:  [71-97] 80  Resp:  [18] 18  BP: (150-193)/(70-85) 150/80  Physical Exam   Awake, male in no acute distress on room air  Port-A-Cath in place  Sitting in bedside chair  Less confused      Result Review    Result Review:  I have personally reviewed the results from the time of this admission to 2024 08:49 EST and agree with these findings:  [x]  Laboratory  [x]  Microbiology  [x]  Radiology  [x]  EKG/Telemetry   [x]  Cardiology/Vascular   []  Pathology  []  Old records  []  Other:  Most notable findings include:       Lab 24  0455 24  0619 24  0505 24  0332 24  0522 24  0218 24  1956 24  1239 24  0832   WBC 12.35* 12.55* 11.10* 8.81 10.29 18.22*  --   --  21.12*   HEMOGLOBIN 10.2* 9.3* 9.6* 10.7* 9.9* 9.8*  --   --  12.4*   HEMATOCRIT 31.8* 29.0* 30.1* 32.8* 30.3* 29.7*  --   --  38.1   PLATELETS 122* 129* 140 147 141 175  --   --  222   SODIUM 141 141 141 139 138 137 139  --  141   POTASSIUM 3.2* 3.0* 4.2 2.9* 3.7 3.7 2.9*  --  4.3   CHLORIDE 107 109* 110* 106 104 102 101  --  101   CO2 23.6 22.7 18.3* 20.3* 23.0 23.8 25.5  --  21.5*   BUN 6* 8 9 7* 8 7* 8  --  10   CREATININE 0.73* 0.76 0.75* 0.68* 0.75* 0.67* 0.71*  --  0.79   GLUCOSE 169* 148* 149* 213* 190* 139* 146*  --  85   CALCIUM 8.9 8.8 8.6 8.7 8.2* 8.3* 8.4*  --  9.0   PHOSPHORUS 2.9 2.5 2.2*  --  2.6 1.8*  --  2.7   --    TOTAL PROTEIN 6.0 5.5* 6.0  --  5.4*  --   --   --  7.2   ALBUMIN 2.7* 2.6* 2.5*  --  2.3*  --   --   --  2.7*   GLOBULIN 3.3 2.9 3.5  --   --   --   --   --  4.5         Assessment & Plan   Assessment / Plan     Active Hospital Problems:  Active Hospital Problems    Diagnosis  POA    **Hypoglycemia [E16.2]  Yes      Resolved Hospital Problems   No resolved problems to display.   Severe hypoglycemia  Altered mental status  Hepatic encephalopathy  Mechanical fall  Gastric cancer with metastasis to liver  History of DVT on Eliquis  Hypomagnesia   Type 2 diabetes  Hypertension  Hyperlipidemia  Hypokalemia  Hypomagnesemia    Plan:   On room air  Continue aspiration precautions  Trend renal panel and electrolytes.   Completed antibiotics  Continue delirium precautions  Palliative care to evaluate.  Overall prognosis is poor.  Appears to have diffusely metastatic disease throughout the liver   Prognosis poor overall prognosis is poor.  Planning on going home with hospice    DVT prophylaxis:  Medical and mechanical DVT prophylaxis orders are present.    CODE STATUS:   Medical Intervention Limits: NO intubation (DNI)  Code Status (Patient has no pulse and is not breathing): No CPR (Do Not Attempt to Resuscitate)  Medical Interventions (Patient has pulse or is breathing): Limited Support      Labs, imaging, microbiology, notes and medications personally reviewed  Discussed with primary     Electronically signed by Nathaniel Whittaker MD, 02/23/24, 2:54 PM EST.

## 2024-02-23 NOTE — PLAN OF CARE
Goal Outcome Evaluation:      Patient A&O x3, disoriented to situation on shift. Patient ambulated to bathroom with assist x1/2 on shift. No complaints from patient. Plan of care ongoing.

## 2024-02-23 NOTE — ACP (ADVANCE CARE PLANNING)
The patient is on 3 west, currently NO CPR/DNI and sitting up in a chair, eyes closed, wearing a heart monitor. Staff and family reports the patient was up walking a little today and feels a lot better. The patient will DC home with [facility] services and have reported their only needs at this time would be a bed side commode. This has been relayed to [facility] and they were also notified of DC plans for today. EMS DNR on chart if needed. [facility] All questions answered. Primary nurse updated.    -Kayla CAMPOS, RN, CHPN   Palliative Care    2/23/2024 10:53 EST

## 2024-02-23 NOTE — THERAPY EVALUATION
Acute Care - Physical Therapy Initial Evaluation   Sadie     Patient Name: Kevin Ordonez  : 1960  MRN: [id number]  Today's Date: 2024      Visit Dx:     ICD-10-CM ICD-9-CM   1. Altered mental status, unspecified altered mental status type  R41.82 780.97   2. Hypoglycemia  E16.2 251.2   3. Leukocytosis, unspecified type  D72.829 288.60   4. Hyperammonemia  E72.20 270.6   5. Dysphagia, unspecified type  R13.10 787.20   6. Difficulty in walking  R26.2 719.7   7. Malignant neoplasm of cardia of stomach  C16.0 151.0     Patient Active Problem List   Diagnosis    Rotator cuff tendonitis, left    Tear of left acetabular labrum    Primary osteoarthritis of left shoulder    Left shoulder pain    Asthma    Closed nondisplaced fracture of shaft of fifth metacarpal bone of right hand    Diabetes mellitus    Hyperlipidemia    Hypertension    Neuropathy    Obstructive sleep apnea syndrome    Calcific tendinitis of left shoulder    Aftercare following surgery of left shoulder arthroscopic rotator cuff debridement, labral debridement, subacromial decompression, bicep tenodesis, 4/3/2023    Malignant neoplasm of cardia of stomach    PICC (peripherally inserted central catheter) in place    Iron deficiency anemia due to chronic blood loss    Malabsorption due to intolerance, not elsewhere classified    Generalized weakness    Symptomatic anemia    Dehydration    Fitting and adjustment of vascular catheter    Acute kidney failure, unspecified    Personal history of nicotine dependence    Presence of cardiac pacemaker    Type 2 diabetes mellitus with diabetic neuropathy, unspecified    Weakness    Vitamin D deficiency, unspecified    Unspecified asthma, uncomplicated    Type 2 diabetes mellitus with diabetic peripheral angiopathy without gangrene    Obstructive sleep apnea (adult) (pediatric)    Obesity, unspecified    Anemia, unspecified    Essential (primary) hypertension    Hyperlipidemia, unspecified    Chronic  obstructive pulmonary disease, unspecified    Long term (current) use of insulin    Long term (current) use of oral hypoglycemic drugs    Acute deep vein thrombosis (DVT) of right peroneal vein    Hypoglycemia     Past Medical History:   Diagnosis Date    Asthma     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    Diabetes mellitus     Hyperlipidemia     Hypertension     Liver cancer     Rotator cuff disorder     LEFT    Sleep apnea     Stomach cancer      Past Surgical History:   Procedure Laterality Date    CAROTID ENDARTERECTOMY Right     CHOLECYSTECTOMY      FOOT SURGERY Right     TOE DEBRIDMENT    HERNIA REPAIR      VENTRAL HERNIA    SHOULDER ARTHROSCOPY W/ ROTATOR CUFF REPAIR Left 4/3/2023    Procedure: LEFT SHOULDER ARTHROSCOPIC ROTATOR CUFF DEBRIDEMENT, LABRAL DEBRIDEMENT, BICEPS TENODESIS, SUBACROMIAL DECOMPRESSION;  Surgeon: Chas Patterson MD;  Location: [facility] OR [facility];  Service: Orthopedics;  Laterality: Left;    TUMOR REMOVAL Left     PAROTID GLAND    VASCULAR SURGERY Right     STENT R LEG    VENOUS ACCESS DEVICE (PORT) INSERTION Right 9/26/2023    Procedure: INSERTION VENOUS ACCESS DEVICE;  Surgeon: James Copeland MD;  Location: [facility] MAIN OR;  Service: General;  Laterality: Right;     PT Assessment (last 12 hours)       PT Evaluation and Treatment       Row Name 02/23/24 2767          Physical Therapy Time and Intention    Subjective Information complains of;fatigue (P)   -DG     Document Type evaluation (P)   -DG     Mode of Treatment individual therapy;physical therapy (P)   -DG     Patient Effort good (P)   -DG       Row Name 02/23/24 6722          General Information    Patient Profile Reviewed yes (P)   -DG     Patient Observations cooperative;agree to therapy;lethargic (P)   -DG     Prior Level of Function independent:;community mobility (P)   -DG     Equipment Currently Used at Home walker, rolling;cane, straight (P)   PRN  -DG     Existing Precautions/Restrictions fall (P)    -DG     Barriers to Rehab none identified (P)   -DG       Row Name 02/23/24 0830          Living Environment    Current Living Arrangements home (P)   -DG     Home Accessibility stairs to enter home (P)   -DG     People in Home spouse (P)   -DG       Row Name 02/23/24 0830          Home Main Entrance    Number of Stairs, Main Entrance one (P)   -DG       Row Name 02/23/24 0830          Cognition    Cognitive Function WFL (P)   -DG       Row Name 02/23/24 0830          Range of Motion (ROM)    Range of Motion bilateral lower extremities;ROM is WFL (P)   -DG       Row Name 02/23/24 0830          Strength (Manual Muscle Testing)    Strength (Manual Muscle Testing) bilateral lower extremities (P)   4/5 overall with the exception of b/l hip flexion 4-/5  -DG       Row Name 02/23/24 0830          Bed Mobility    Bed Mobility supine-sit-supine (P)   -DG     Supine-Sit-Supine [place] (Bed Mobility) modified independence (P)   -DG     Comment, (Bed Mobility) Increased time d/t lethargy (P)   -DG       Row Name 02/23/24 0830          Transfers    Transfers sit-stand transfer (P)   -DG       Row Name 02/23/24 0830          Sit-Stand Transfer    Sit-Stand [place] (Transfers) contact guard (P)   -DG     Assistive Device (Sit-Stand Transfers) walker, front-wheeled (P)   -DG       Row Name 02/23/24 0830          Gait/Stairs (Locomotion)    Gait/Stairs Locomotion gait/ambulation assistive device (P)   -DG     [place] Level (Gait) contact guard (P)   -DG     Assistive Device (Gait) walker, front-wheeled (P)   -DG     Patient was able to Ambulate yes (P)   -DG     Distance in Feet (Gait) 60 (P)   -DG     Pattern (Gait) step-to (P)   -DG     Comment, (Gait/Stairs) Verbal cues recuired to stay w/in RW throughout (P)   -DG       Row Name 02/23/24 0830          Safety Issues, Functional Mobility    Impairments Affecting Function (Mobility) balance;endurance/activity tolerance;strength (P)   -DG       Row Name 02/23/24 0830           Balance    Balance Assessment standing dynamic balance (P)   -DG     Dynamic Standing Balance contact guard (P)   -DG     Position/Device Used, Standing Balance walker, front-wheeled (P)   -DG       Row Name 02/23/24 0830          Plan of Care Review    Plan of Care Reviewed With patient (P)   -DG     Outcome Evaluation Patient presents with deficits in strength, balance, activity tolerance/endurance, and functional mobility. Skilled PT is required to address these areas and return to prior level of function. (P)   -DG       Row Name 02/23/24 0830          Positioning and Restraints    Pre-Treatment Position in bed (P)   -DG     Post Treatment Position bed (P)   -DG     In Bed sitting EOB;call light within reach;encouraged to call for assist;with nsg (P)   -DG       Row Name 02/23/24 0830          Therapy Assessment/Plan (PT)    Rehab Potential (PT) good, to achieve stated therapy goals (P)   -DG     Criteria for Skilled Interventions Met (PT) yes;skilled treatment is necessary (P)   -DG     Therapy Frequency (PT) daily (P)   -DG     Predicted Duration of Therapy Intervention (PT) 10 days (P)   -DG     Problem List (PT) problems related to;balance;mobility;strength (P)   -DG     Activity Limitations Related to Problem List (PT) unable to ambulate safely;unable to transfer safely (P)   -DG       Row Name 02/23/24 0830          PT Evaluation Complexity    History, PT Evaluation Complexity no personal factors and/or comorbidities (P)   -DG     Examination of Body Systems (PT Eval Complexity) total of 4 or more elements (P)   -DG     Clinical Presentation (PT Evaluation Complexity) stable (P)   -DG     Clinical Decision Making (PT Evaluation Complexity) low complexity (P)   -DG     Overall Complexity (PT Evaluation Complexity) low complexity (P)   -DG       Row Name 02/23/24 0830          Therapy Plan Review/Discharge Plan (PT)    Therapy Plan Review (PT) patient (P)   -DG       Row Name 02/23/24 0830           Physical Therapy Goals    Transfer Goal Selection (PT) transfer, PT goal 1 (P)   -DG     Gait Training Goal Selection (PT) gait training, PT goal 1 (P)   -DG       Row Name 02/23/24 0830          Transfer Goal 1 (PT)    Activity/Assistive Device (Transfer Goal 1, PT) sit-to-stand/stand-to-sit;walker, rolling (P)   -DG     [place] Level/Cues Needed (Transfer Goal 1, PT) modified independence (P)   -DG     Time Frame (Transfer Goal 1, PT) 10 days (P)   -DG       Row Name 02/23/24 0830          Gait Training Goal 1 (PT)    Activity/Assistive Device (Gait Training Goal 1, PT) gait (walking locomotion);increase endurance/gait distance;walker, rolling (P)   -DG     [place] Level (Gait Training Goal 1, PT) modified independence (P)   -DG     Distance (Gait Training Goal 1, PT) 200 (P)   -DG     Time Frame (Gait Training Goal 1, PT) 10 days (P)   -DG               User Key  (r) = Recorded By, (t) = Taken By, (c) = Cosigned By      Initials Name Provider Type    Rhiannon Cotto, PT Student PT Student                      PT Recommendation and Plan  Anticipated Discharge Disposition (PT): (P) home with home health  Planned Therapy Interventions (PT): (P) balance training, gait training, bed mobility training, patient/family education, strengthening, transfer training  Therapy Frequency (PT): (P) daily  Plan of Care Reviewed With: (P) patient  Outcome Evaluation: (P) Patient presents with deficits in strength, balance, activity tolerance/endurance, and functional mobility. Skilled PT is required to address these areas and return to prior level of function.   Outcome Measures       Row Name 02/23/24 1100             How much help from another person do you currently need...    Turning from your back to your side while in flat bed without using bedrails? 4 (P)   -DG      Moving from lying on back to sitting on the side of a flat bed without bedrails? 4 (P)   -DG      Moving to and from a bed to a chair (including a  wheelchair)? 3 (P)   -DG      Standing up from a chair using your arms (e.g., wheelchair, bedside chair)? 3 (P)   -DG      Climbing 3-5 steps with a railing? 3 (P)   -DG      To walk in hospital room? 3 (P)   -DG      AM-PAC 6 Clicks Score (PT) 20 (P)   -DG      Highest Level of Mobility Goal 6 --> Walk 10 steps or more (P)   -DG         Functional Assessment    Outcome Measure Options AM-PAC 6 Clicks Basic Mobility (PT) (P)   -DG                User Key  (r) = Recorded By, (t) = Taken By, (c) = Cosigned By      Initials Name Provider Type    DG Rhiannon Bacon, PT Student PT Student                     Time Calculation:    PT Charges       Row Name 02/23/24 1122             Time Calculation    PT Received On 02/23/24 (P)   -DG      PT Goal Re-Cert Due Date 03/03/24 (P)   -DG         Untimed Charges    PT Eval/Re-eval Minutes 25 (P)   -DG         Total Minutes    Untimed Charges Total Minutes 25 (P)   -DG       Total Minutes 25 (P)   -DG                User Key  (r) = Recorded By, (t) = Taken By, (c) = Cosigned By      Initials Name Provider Type    Rhiannon Cotto, PT Student PT Student                  Therapy Charges for Today       Code Description Service Date Service Provider Modifiers Qty    [id number] HC PT EVAL LOW COMPLEXITY 2 2/23/2024 Rhiannon Bacon PT Student GP 1            PT G-Codes  Outcome Measure Options: (P) AM-PAC 6 Clicks Basic Mobility (PT)  AM-PAC 6 Clicks Score (PT): (P) 20    RONA Oliveira  2/23/2024

## 2024-02-23 NOTE — DISCHARGE SUMMARY
[facility]         HOSPITALIST  DISCHARGE SUMMARY    Patient Name: Kevin Ordonez  : 1960  MRN: [id number]    Date of Admission: 2024  Date of Discharge:  2024  Primary Care Physician: Ev Peck APRN    Consults       Date and Time Order Name Status Description    2024 12:29 PM Inpatient Pulmonology Consult      2024 10:58 AM Hospitalist (on-call MD unless specified)              Active and Resolved Hospital Problems:  Acute metabolic encephalopathy suspect due to hepatic encephalopathy versus polypharmacy versus hypoglycemia  Hepatic encephalopathy  Hypoglycemia  Leukocytosis  Concern for aspiration  Mild rhabdomyolysis  Hypomagnesemia  Hypokalemia  Hypophosphatemia  Elevated lactate  Acute normocytic anemia  Gastric cancer with mets to liver  Insulin-dependent diabetes mellitus hypoglycemia  History of hypertension  Obstructive sleep apnea  COPD not acute exacerbation  GERD  Acute urinary retention  Fall    Hospital Course     Hospital Course:  Kevin Ordonez is a 63 y.o. 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 presents to the ER with weakness, fall, altered mental status.  His wife found him down in the kitchen on the date of presentation.  He has not been feeling well the day prior to presentation and has been experiencing more pain.  The patient was unresponsive when picked up by the EMS his blood sugar was 49. On arrival to the ED, patient temperature 95.5, pulse of 112, respiratory rate of 14, blood pressure 149/117?,  And saturating 95% on room air.  Patient is very lethargic but arousable falling back to sleep. On imaging, patient CT of his head without contrast shows no acute abnormality.  Chest x-ray shows: Minimal left basilar scarring or atelectasis. Patient  CK is 202, CO2 was 21.5, anion gap is 18.5, blood glucose labile and trending lower despite hypoglycemia protocol.  Patient's ammonia level is 75.  White blood cell count is 21.12. According to his wife, patient had abdominal swelling, he started Lasix.  Hospitalist service contacted for admission for further evaluation and treatment.  Pulmonology critical care consulted.  Patient started on D5 drip as well as lactulose and empiric antibiotics.  Blood sugars stabilizing.  Patient underwent imaging which was significant for worsening metastatic disease.  Patient met with palliative care and hospice, given his worsening progression, decision was made to transition to comfort care.  Patient is discharged home per his wishes with comfort medications.  Patient will follow-up with hospice once home.  Patient is discharged in stable but terminal condition  Day of Discharge     Vital Signs:  Temp:  [97.5 °F (36.4 °C)-98.5 °F (36.9 °C)] 97.7 °F (36.5 °C)  Heart Rate:  [71-97] 82  Resp:  [17-18] 17  BP: (150-193)/(68-85) 163/68    Physical Exam:   GEN: No acute distress  HEENT: Moist mucous membranes  LUNGS: Equal chest rise bilaterally  CARDIAC: Regular rate and rhythm  NEURO: Moving all 4 extremities spontaneously  SKIN: No obvious breakdown    Discharge Details        Discharge Medications        New Medications        Instructions Start Date   haloperidol 2 MG/ML solution  Commonly known as: HALDOL   0.5 mg, Oral, Every 1 Hour PRN      lactulose 10 GM/15ML solution  Commonly known as: CHRONULAC   30 g, Oral, 3 times daily      LORazepam 2 MG/ML concentrated solution  Commonly known as: ATIVAN  Replaces: LORazepam 1 MG tablet   0.5 mg, Oral, Every 1 Hour PRN      morphine solution concentrated solution   10 mg, Oral, Every 1 Hour PRN             Continue These Medications        Instructions Start Date   furosemide 20 MG tablet  Commonly known as: LASIX   20 mg, Oral, Daily      oxyCODONE-acetaminophen 5-325 MG per  tablet  Commonly known as: PERCOCET   1 tablet, Oral, Every 4 Hours PRN             Stop These Medications      albuterol sulfate  (90 Base) MCG/ACT inhaler  Commonly known as: PROVENTIL HFA;VENTOLIN HFA;PROAIR HFA     apixaban 5 MG tablet tablet  Commonly known as: ELIQUIS     chlorthalidone 25 MG tablet  Commonly known as: HYGROTON     cholecalciferol 25 MCG (1000 UT) tablet  Commonly known as: VITAMIN D3     Crestor 40 MG tablet  Generic drug: rosuvastatin     Dulaglutide 1.5 MG/0.5ML solution pen-injector     Fluticasone-Salmeterol 250-50 MCG/ACT DISKUS  Commonly known as: ADVAIR/WIXELA     gabapentin 600 MG tablet  Commonly known as: NEURONTIN     glipizide 10 MG tablet  Commonly known as: GLUCOTROL     insulin  UNIT/ML injection  Commonly known as: humuLIN N,novoLIN N     lidocaine-prilocaine 2.5-2.5 % cream  Commonly known as: EMLA     LORazepam 1 MG tablet  Commonly known as: ATIVAN  Replaced by: LORazepam 2 MG/ML concentrated solution     losartan 100 MG tablet  Commonly known as: COZAAR     magnesium oxide 400 MG tablet  Commonly known as: MAG-OX     meclizine 25 MG tablet  Commonly known as: ANTIVERT     mirtazapine 15 MG tablet  Commonly known as: REMERON     OLANZapine 5 MG tablet  Commonly known as: zyPREXA     omeprazole 40 MG capsule  Commonly known as: priLOSEC     potassium chloride 20 MEQ CR tablet  Commonly known as: K-DUR,KLOR-CON     Spiriva Respimat 2.5 MCG/ACT aerosol solution inhaler  Generic drug: tiotropium bromide monohydrate              No Known Allergies    Discharge Disposition:  Hospice/Home    Diet:  Hospital:  Diet Order   Procedures    Diet: Cardiac Diets; Low Sodium (2g); Texture: Mechanical Ground (NDD 2); Fluid Consistency: Thin (IDDSI 0)       Discharge Activity:       CODE STATUS:  Code Status and Medical Interventions:   Ordered at: 02/23/24 1120     Code Status (Patient has no pulse and is not breathing):    No CPR (Do Not Attempt to Resuscitate)     Medical  Interventions (Patient has pulse or is breathing):    Comfort Measures       Future Appointments   Date Time Provider Department Center   2/27/2024  8:30 AM INJ ROOM 01 [facility] OP INFU [facility] OP[facility]   2/27/2024  8:45 AM Ge Worthy MD [facility] [place] ONC [address] [facility]   2/28/2024  8:00 AM CHAIR 17 [facility] OP INFU [facility] OPIF [facility]   3/6/2024  8:45 AM CHAIR 11 [facility] OP INFU [facility] OP[facility]   3/13/2024  8:00 AM CHAIR 18 MEGAN OP INFU BH MEGAN OPIF [facility]   3/26/2024  8:45 AM INJ ROOM 01 MEGAN OP INFU BH MEGAN OPIF MEGAN   3/26/2024  9:30 AM Govind Pruitt MD [facility] [place] ONC [address] [facility]   3/27/2024  8:00 AM CHAIR 17 MEGAN OP INFU BH MEGAN OPIF MEGAN   4/3/2024  8:45 AM CHAIR 11 MEGAN OP INFU BH MEGAN OPIF MEGAN   4/10/2024  8:45 AM CHAIR 18 MEGAN OP INFU BH MEGAN OPIF MEGAN           Pertinent  and/or Most Recent Results     IMAGING:  CT Head Without Contrast    Result Date: 2/21/2024  PROCEDURE: CT HEAD WO CONTRAST  COMPARISON:  [facility], CT, CT HEAD WO CONTRAST, 2/17/2024, 8:57. INDICATIONS: Mental status change, hx liver CA  PROTOCOL:   Standard imaging protocol performed    RADIATION:   DLP: 886mGy*cm   MA and/or KV was adjusted to minimize radiation dose.     TECHNIQUE: After obtaining the patient's consent, CT images were obtained without non-ionic intravenous contrast material.  FINDINGS:  There is mild generalized parenchymal volume loss.  There is no evidence of acute infarct or hemorrhage.  No abnormal extra-axial fluid collections are seen.  There is no mass effect or hydrocephalus.  The visualized paranasal sinuses and mastoid air cells are clear.  No acute skull fracture.  Globes and orbits are within normal limits.        1. No acute intracranial abnormality.     MARJ BERNABE MD       Electronically Signed and Approved By: MARJ BERNABE MD on 2/21/2024 at 17:22             CT Abdomen Pelvis Without Contrast    Result Date: 2/17/2024  PROCEDURE: CT ABDOMEN PELVIS WO CONTRAST  COMPARISON: [facility], CT, CT ABDOMEN PELVIS W  CONTRAST, 1/29/2024, 10:46.   INDICATIONS: Gastric cancer.  Observation of malignant neoplasm.  TECHNIQUE: CT images were created without intravenous contrast.   PROTOCOL:   Standard imaging protocol performed    RADIATION:   DLP: 751 mGy*cm   Automated exposure control was utilized to minimize radiation dose.  FINDINGS:  Exam is degraded by motion, the lack of contrast, and streak artifact from the patient's arms.  There is a new small left pleural effusion.  There is some minimal associated atelectasis in the left lower lobe.  NG tube is present in the stomach.  There is atherosclerotic disease without an aortic aneurysm.  Gallbladder is surgically absent.  No renal or ureteral stones.  No hydronephrosis.  The adrenal glands, spleen, and pancreas are grossly normal.  The liver is poorly characterized in the absence of contrast.  However, there are multifocal areas of low attenuation change that could reflect masses.  For example, an area of abnormality in the dome of the liver measures at least 3.7 cm.  The urinary bladder is decompressed by a Lehman catheter.  Prostate unremarkable.  Along redundant sigmoid colon loop is present.  There is fairly diffuse colonic diverticulosis.  Definite focal acute diverticulitis is not identified.  There is some equivocal wall thickening in the transverse colon neck could reflect segmental colitis.  The appendix is normal.  No small bowel obstruction is seen.  The stomach is grossly normal within the limits of the study.  There is a small amount of free fluid in the abdomen and pelvis.  A large sarkis mass in the gastrohepatic ligament measures about 4.3 x 3.2 cm today, slightly larger than on the prior study.  Several other lymph nodes seen on the prior study are poorly characterized currently due to technical factors.  Patient is status post mesh repair of the ventral abdominal wall.  No clearly suspicious osseous lesions.       1. Markedly technically degraded exam as above.  2. Diffuse colonic diverticulosis without focal diverticulitis.  However, there is some equivocal wall thickening in the transverse colon and could reflect mild colitis. 3.  Poorly defined low attenuation changes in the liver may reflect metastatic disease. 4. Trace amount of free fluid in the abdomen and pelvis.  There is also a small left effusion. 5. Enlarged gastrohepatic ligament lymph node compatible with metastatic disease.      IKER PATRICIA MD       Electronically Signed and Approved By: IKER PATRICIA MD on 2/17/2024 at 21:46             XR Abdomen KUB    Result Date: 2/17/2024  PROCEDURE: XR ABDOMEN KUB  COMPARISON: [facility], CT, CT ABDOMEN PELVIS W CONTRAST, 1/29/2024, 10:46.  INDICATIONS: ng tube placement  FINDINGS:  An NG tube has been placed with the tip in the proximal to mid gastric body.  Visualized bowel gas pattern is normal.        1. NG tube in proximal to mid gastric body     Kody Payton M.D.       Electronically Signed and Approved By: Kody Payton M.D. on 2/17/2024 at 16:30             CT Head Without Contrast    Result Date: 2/17/2024  PROCEDURE: CT HEAD WO CONTRAST  COMPARISON:  [facility], CT, CT HEAD WO CONTRAST, 1/08/2024, 16:28. INDICATIONS: Altered mental status  PROTOCOL:   Standard imaging protocol performed    RADIATION:   DLP: 1145.2 mGy*cm   MA and/or KV was adjusted to minimize radiation dose.     TECHNIQUE: After obtaining the patient's consent, CT images were obtained without non-ionic intravenous contrast material.  FINDINGS:  No focal brain lesion, mass or intracranial hemorrhage is identified.  No focal brain lesion, mass or intracranial hemorrhage is identified.  There is mild to moderate diffuse cerebral and cerebellar atrophy.  The ventricles and cisterns are normal in size and configuration.  Visualized extracranial soft tissues appear normal.  No focal calvarial abnormality is seen.        1. No acute intracranial abnormality      Kody Payton M.D.       Electronically Signed and Approved By: Kody Payton M.D. on 2/17/2024 at 9:38             XR Chest 1 View    Result Date: 2/17/2024  PROCEDURE: XR CHEST 1 VW  COMPARISON: [facility], CR, XR CHEST 1 VW, 1/08/2024, 14:17.  [facility], CT, CT CHEST W CONTRAST DIAGNOSTIC, 1/29/2024, 10:46.  INDICATIONS: Altered mental status weakness  FINDINGS:  Minimal linear density in the left lung base is consistent with scarring or atelectasis.  An apparent focus of density in the mid right lung is favored to represent confluency artifact.  The cardiac and mediastinal silhouettes appear normal.        1. Minimal left basilar scarring or atelectasis       Kody Payton M.D.       Electronically Signed and Approved By: Kody Payton M.D. on 2/17/2024 at 9:03             CT Abdomen Pelvis With Contrast    Result Date: 1/30/2024  PROCEDURE: CT ABDOMEN PELVIS W CONTRAST  COMPARISON: [facility], CT, CT ABDOMEN PELVIS W CONTRAST, 11/27/2023, 9:02.  INDICATIONS: restaging scan/LIVER STAGE 4 STOMACH STAGE 4 CANCER/STILL ON CHEMO  TECHNIQUE: After obtaining the patient's consent, CT images were created with non-ionic intravenous contrast material.   PROTOCOL:   Standard imaging protocol performed    RADIATION:   DLP: 1892.8mGy*cm   Automated exposure control was utilized to minimize radiation dose. CONTRAST: 100cc  FINDINGS:  CT chest dictated and reported separately.  Unchanged size and contour of the liver.  Multiple new and increasing low-density lesions throughout the liver, for example measuring 4.2 x 2.8 centimeter within right hepatic lobe axial image 20 series 2. There appears to be a combination of both increasing solid hypoenhancing liver lesions as well as multiple more fluid density lesions throughout.  Low-density branching areas for example in the right hepatic lobe image 29 series 2 suggestive of small subsegmental areas of intrahepatic duct dilation.   Redemonstrated extensive heterogeneous intrahepatic portal thrombus which appears increasing within right hepatic lobe example image 49 series 2. Heterogeneity in probable enhancement suggest combination of bland and tumor thrombus.  Slight increasing prominence of the caudate lobe measuring up to 6.9 centimeter previously 6.3 centimeter.  Decreasing now eccentric nonocclusive thrombus within main portal vein similar central mesenteric and perigastric varices.   Gallbladder surgically absent.  No extrahepatic duct dilation.  Mildly atrophic pancreas without active inflammation.  Spleen is unremarkable.  No adrenal nodule.  Symmetric renal size, contour and enhancement.  No hydronephrosis.  Circumaortic left renal vein.  Urinary bladder and prostate grossly unremarkable.  Slightly more conspicuous enhancing 2.6 centimeter mass centered at GE junction image 31 series 2 previously measuring 1.6 centimeter.  Increasing gastrohepatic nodes example 1.3 centimeter short axis node image 41 and 2.8 x 3.8 centimeter lymph node image 50. Colonic diverticulosis without signs of bowel obstruction or active inflammation.  Normal appendix.  Diffuse aortic atherosclerotic disease with mild fusiform ectasia of infrarenal abdominal aorta measuring up to 2.9 centimeter.  Major visceral branches appear patent.  Trace perihepatic, pericolic gutter and pelvic ascites, overall slightly increasing.  No organized/drainable collection.  No free air.  Prior ventral hernia mesh repair.  Small fat containing periumbilical hernia.  Degenerative related changes in the lumbar spine without acute displaced fracture or aggressive lesion.         1. Disease progression since 11/27/2023.  This includes increasing conspicuity of the mass at GE junction, increasing gastrohepatic nodes , and new/increasing hepatic lesions.  Many of the hepatic lesions appear more fluid attenuation rather than solid and could represent superimposed collections (such as  bilomas) +/-infection. 2. Increasing intrahepatic tumor and bland portal thrombus.  Decreasing extrahepatic portal venous thrombus.  3. Few new peripheral subsegmental areas of intrahepatic duct dilation suggesting developing malignant obstruction. 4. Slight increasing intra-abdominal ascites.  Mild intra-abdominal varices appear similar. 5. Separately reported CT chest.      HERSON ESTRADA MD       Electronically Signed and Approved By: HERSON ESTRADA MD on 1/30/2024 at 8:41             CT Soft Tissue Neck With Contrast    Result Date: 1/30/2024  PROCEDURE: CT SOFT TISSUE NECK W CONTRAST  COMPARISON: Other, PET, NM PET/CT SKULL BASE TO MID THIGH, 9/12/2023, 13:08.  [facility], CT, CT CHEST W CONTRAST DIAGNOSTIC, 11/27/2023, 9:02.  INDICATIONS: Head/neck cancer, monitor/LIVER STAGE 4 STOMACH STAGE 4 CANCER  PROTOCOL:   Standard imaging protocol performed    RADIATION:   DLP: 269.4mGy*cm   Automated exposure control was utilized to minimize radiation dose. CONTRAST: 50cc Isovue 370 I.V.  TECHNIQUE: After obtaining the patient's consent, CT images were obtained with non-ionic intravenous contrast material.   FINDINGS:  Visualized intracranial contents and globes and orbits are within normal limits.  Nasopharynx is normal.  The oropharynx including base of tongue and epiglottis appear within normal limits.  There is some mucosal enhancement of the oropharynx compatible treatment related change.  Larynx and pyriform sinuses are within normal limits.  There are secretions within the trachea.  Visualized portions of the trachea esophagus are otherwise unremarkable.  Thyroid gland is within normal limits.  Bilateral submandibular glands appear normal.  There is a single surgical clip within the left parotid gland.  Left parotid gland is somewhat atrophic as compared to the right.  No discrete parotid mass identified.  There is no evidence of cervical lymphadenopathy.  There is a right internal  jugular Port-A-Cath in place.  The visualized portions of the lungs are clear.  There are no lytic or sclerotic bony lesions identified.        1. Postoperative changes of the left parotid gland.  No evidence of residual recurrent tumor.  2. 2. No evidence of cervical lymphadenopathy.     MARJ BERNABE MD       Electronically Signed and Approved By: MARJ BERNABE MD on 1/30/2024 at 8:29             CT Chest With Contrast Diagnostic    Result Date: 1/30/2024  PROCEDURE: CT CHEST W CONTRAST DIAGNOSTIC  COMPARISON:  [facility], CT, CT CHEST W CONTRAST DIAGNOSTIC, 11/27/2023, 9:02.  [facility], CT, CT ABDOMEN PELVIS W CONTRAST, 11/27/2023, 9:02. INDICATIONS: restaging scan/LIVER STAGE 4 STOMACH STAGE 4 CANCER  TECHNIQUE: After obtaining the patient's consent, CT images were obtained with non-ionic intravenous contrast material.   PROTOCOL:   Standard imaging protocol performed    RADIATION:   DLP: 339.8mGy*cm   Automated exposure control was utilized to minimize radiation dose. CONTRAST: 50cc Isovue 370 I.V.  FINDINGS:  Thyroid grossly unremarkable.  No supraclavicular adenopathy.  Right chest port catheter tip within lower SVC.  Heart size within normal limits.  Trace pericardial fluid similar to prior.  Coronary atherosclerotic disease.  Aortic atherosclerotic disease without aneurysm.  Normal caliber main pulmonary artery.  No large central filling defect although exam not optimized for pulmonary embolism evaluation.  Esophagus grossly unremarkable.   No overtly suspicious thoracic adenopathy.  Secretions/debris noted within trachea.  No infectious appearing airspace process, edema, effusion or pneumothorax.  Previously noted right lower lobe nodule appears resolved consistent with an infectious or inflammatory nodule.  No enlarging pulmonary nodules.  New noon chest wall soft tissues are grossly unremarkable.  Superior endplate irregularities T11 and T12 are stable.  No  acute displaced fracture or aggressive lesion.   CT abdomen/pelvis dictated and reported separately.        1. No signs of disease progression in the chest.  CT abdomen/pelvis dictated and reported separately. 2. No acute pulmonary findings.     HERSON ESTRADA MD       Electronically Signed and Approved By: HERSON ESTRADA MD on 1/30/2024 at 8:16               LAB RESULTS:      Lab 02/23/24  0455 02/22/24  0619 02/21/24  0505 02/20/24  0332 02/19/24  0522 02/18/24  2335 02/18/24  1651 02/18/24  0218 02/17/24  1710 02/17/24  1239 02/17/24  0832   WBC 12.35* 12.55* 11.10* 8.81 10.29  --   --   --    < >  --  21.12*   HEMOGLOBIN 10.2* 9.3* 9.6* 10.7* 9.9*  --   --   --    < >  --  12.4*   HEMATOCRIT 31.8* 29.0* 30.1* 32.8* 30.3*  --   --   --    < >  --  38.1   PLATELETS 122* 129* 140 147 141  --   --   --    < >  --  222   NEUTROS ABS 10.00* 7.67* 7.19*  8.66* 6.88 7.69*  --   --    < >   < >  --  19.64*   IMMATURE GRANS (ABS)  --  1.57* 0.89* 0.07* 1.20*  --   --   --   --   --   --    LYMPHS ABS  --  1.55 1.51 0.76 0.78  --   --   --   --   --   --    MONOS ABS  --  1.46* 1.26* 0.97* 0.49  --   --   --   --   --   --    EOS ABS 0.12 0.25 0.21  0.22 0.09 0.07  --   --    < >   < >  --   --    MCV 89.6 90.6 90.7 89.4 91.0  --   --   --    < >  --  92.0   PROCALCITONIN  --   --   --   --   --   --   --   --   --  0.34*  --    LACTATE  --   --  2.0 4.2* 3.3* 3.0* 2.7*  --    < >  --   --    PROTIME  --   --   --   --   --   --   --   --   --   --  15.2*    < > = values in this interval not displayed.         Lab 02/23/24  0455 02/22/24  0619 02/21/24  0505 02/20/24  0332 02/19/24  0522 02/18/24  0218 02/17/24  1239 02/17/24  0832   SODIUM 141 141 141 139 138 137   < > 141   POTASSIUM 3.2* 3.0* 4.2 2.9* 3.7 3.7   < > 4.3   CHLORIDE 107 109* 110* 106 104 102   < > 101   CO2 23.6 22.7 18.3* 20.3* 23.0 23.8   < > 21.5*   ANION GAP 10.4 9.3 12.7 12.7 11.0 11.2   < > 18.5*   BUN 6* 8 9 7* 8 7*   < > 10   CREATININE 0.73*  0.76 0.75* 0.68* 0.75* 0.67*   < > 0.79   EGFR 101.6 100.4 100.8 103.8 100.8 104.3   < > 99.2   GLUCOSE 169* 148* 149* 213* 190* 139*   < > 85   CALCIUM 8.9 8.8 8.6 8.7 8.2* 8.3*   < > 9.0   MAGNESIUM 1.4* 1.6 2.1 1.6 1.5* 1.7   < > 1.2*   PHOSPHORUS 2.9 2.5 2.2*  --  2.6 1.8*   < >  --    TSH  --   --   --   --   --   --   --  2.750    < > = values in this interval not displayed.         Lab 02/23/24  0455 02/22/24  0619 02/21/24  0505 02/19/24  0522 02/17/24  0832   TOTAL PROTEIN 6.0 5.5* 6.0 5.4* 7.2   ALBUMIN 2.7* 2.6* 2.5* 2.3* 2.7*   GLOBULIN 3.3 2.9 3.5  --  4.5   ALT (SGPT) 29 26 25 19 25   AST (SGOT) 48* 49* 54* 35 67*   BILIRUBIN 0.7 0.7 0.9 0.8 0.8   INDIRECT BILIRUBIN  --   --   --  0.6  --    BILIRUBIN DIRECT  --   --   --  0.2  --    ALK PHOS 365* 324* 323* 262* 322*         Lab 02/17/24  0832   PROTIME 15.2*   INR 1.17*                 Brief Urine Lab Results  (Last result in the past 365 days)        Color   Clarity   Blood   Leuk Est   Nitrite   Protein   CREAT   Urine HCG        02/17/24 0949 Yellow   Clear   Trace   Negative   Negative   100 mg/dL (2+)                 Microbiology Results (last 10 days)       Procedure Component Value - Date/Time    Blood Culture - Blood, Arm, Left [[id number]]  (Normal) Collected: 02/17/24 1716    Lab Status: Final result Specimen: Blood from Arm, Left Updated: 02/22/24 1732     Blood Culture No growth at 5 days    Legionella Antigen, Urine - Urine, Urine, Clean Catch [[id number]]  (Normal) Collected: 02/17/24 1546    Lab Status: Final result Specimen: Urine, Clean Catch Updated: 02/17/24 1658     LEGIONELLA ANTIGEN, URINE Negative    S. Pneumo Ag Urine or CSF - Urine, Urine, Clean Catch [[id number]]  (Normal) Collected: 02/17/24 1546    Lab Status: Final result Specimen: Urine, Clean Catch Updated: 02/17/24 1658     Strep Pneumo Ag Negative          Results for orders placed during the hospital encounter of 10/16/23    Duplex Venous Lower Extremity  Right    Interpretation Summary    Acute right lower extremity deep vein thrombosis noted in the posterior tibial and peroneal.    Chronic right lower extremity superficial thrombophlebitis noted in the small saphenous.    All other right sided veins appeared normal.    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    Results for orders placed during the hospital encounter of 10/16/23    Duplex Venous Lower Extremity Right    Interpretation Summary    Acute right lower extremity deep vein thrombosis noted in the posterior tibial and peroneal.    Chronic right lower extremity superficial thrombophlebitis noted in the small saphenous.    All other right sided veins appeared normal.    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        Time spent on Discharge including face to face service: Greater than 30 minutes      Electronically signed by Chance Razo MD, 02/23/24, 11:45 AM EST.

## 2024-02-23 NOTE — PROGRESS NOTES
Patient has transitioned to comfort measures only. RD will sign off at this time. If further clinical nutrition services are desired, please re-consult.

## 2024-02-27 ENCOUNTER — TELEPHONE (OUTPATIENT)
Dept: ONCOLOGY | Facility: HOSPITAL | Age: 64
End: 2024-02-27
Payer: OTHER GOVERNMENT

## 2024-02-27 NOTE — TELEPHONE ENCOUNTER
OSW contacted [facility] this morning and confirmed that Mr. Ordonez admitted with hospice care on 2/23/24. OSW notified the oncology team. OSW support remains available.    OSW received notification from clinical RN, Evelyn CROWDER, that Mr. Ordonez would like to come in for a visit with Dr. Pruitt to discuss his options. Mr. Ordonez reports the hospitalist and [facility] both told him he cannot have anymore chemo because he has cancer in his liver. He states Dr. Pruitt knows him better and he wants to discuss this with him.   OSW contacted [facility] and received approval from the pre-authorization department that they will agree to pay the professional charges for the physician visit, however, will not cover any labs, testing, imaging, treatment, etc. If Mr. Ordonez and the oncologist decide to proceed with treatment, he will need to discharge from hospice prior to having any of these things administered. [facility] also instructed once Mr. Ordonez's oncology appointment is scheduled, he needs to notify his team nurse of the date of the appointment to have this on file for their pre-authorization department. Mr. Ordonez is scheduled to be seen on Friday, 3/1/24 and v/u he needs to make his hospice nurse aware. OSW support remains available.

## 2024-03-01 NOTE — TELEPHONE ENCOUNTER
OSW contacted [facility] this morning and confirmed today's visit will be billed to [facility] and not [facility]. OSW notified the registrar. OSW support remains available.

## 2024-03-16 LAB
QT INTERVAL: 410 MS
QTC INTERVAL: 526 MS

## 2024-03-21 ENCOUNTER — TELEPHONE (OUTPATIENT)
Dept: ONCOLOGY | Facility: HOSPITAL | Age: 64
End: 2024-03-21

## 2024-03-21 NOTE — TELEPHONE ENCOUNTER
"  “Please be informed that patient has passed. Patient has been marked  in the system. The date of death is: 2024 \".    Caller: KAUSHAL    Relationship: [facility]    Best call back number: [phone]    Did the patient have surgery within 30 days of their passing (Y/N): N    "

## (undated) DEVICE — SHOULDER ARTHROSCOPY-LF: Brand: MEDLINE INDUSTRIES, INC.

## (undated) DEVICE — SUT MNCRYL 4/0 PS2 18 IN

## (undated) DEVICE — PENCL E/S SMOKEEVAC W/TELESCP CANN

## (undated) DEVICE — GLV SURG SENSICARE PI LF PF 8 GRN STRL

## (undated) DEVICE — GAUZE,SPONGE,4"X4",16PLY,STRL,LF,10/TRAY: Brand: MEDLINE

## (undated) DEVICE — DRSNG GZ PETROLTM XEROFORM CURAD 1X8IN STRL

## (undated) DEVICE — ANTIBACTERIAL VIOLET BRAIDED (POLYGLACTIN 910), SYNTHETIC ABSORBABLE SUTURE: Brand: COATED VICRYL

## (undated) DEVICE — MAT FLR ABS W/BLU/LINER 56X72IN WHT

## (undated) DEVICE — SOL IRR NACL 0.9PCT 3000ML

## (undated) DEVICE — COLD THERAPY BLANKET: Brand: DEROYAL

## (undated) DEVICE — PAD GRND REM POLYHESIVE A/ DISP

## (undated) DEVICE — INTEGRATED CASSETTE TUBING, DO NOT USE IF PACKAGE IS DAMAGED: Brand: CROSSFLOW

## (undated) DEVICE — GLV SURG BIOGEL LTX PF 7 1/2

## (undated) DEVICE — STERILE POLYISOPRENE POWDER-FREE SURGICAL GLOVES WITH EMOLLIENT COATING: Brand: PROTEXIS

## (undated) DEVICE — INTENDED FOR TISSUE SEPARATION, AND OTHER PROCEDURES THAT REQUIRE A SHARP SURGICAL BLADE TO PUNCTURE OR CUT.: Brand: BARD-PARKER ® CARBON RIB-BACK BLADES

## (undated) DEVICE — SUT PROLN 0 CT1 30IN 8424H

## (undated) DEVICE — SLV SCD KN/LEN ADJ EXPRSS BLENDED MD 1P/U

## (undated) DEVICE — SOL IRR NACL 0.9PCT BT 1000ML

## (undated) DEVICE — VASCULAR ACCESS-LF: Brand: MEDLINE INDUSTRIES, INC.

## (undated) DEVICE — CVR PROB ULTRASND CIVFLEX GEN/PURP TELESCP/FOLD 5.5X58IN LF

## (undated) DEVICE — ELECTRD BLD EDGE COAT 3IN

## (undated) DEVICE — GLV SURG SENSICARE SLT PF LF 7.5 STRL

## (undated) DEVICE — SLV DISTRACTION STAR VELCRO XL DISP

## (undated) DEVICE — BUR BRL FORMLA 12FLUT 4MM

## (undated) DEVICE — SUT MONOCRYL PLS ANTIB UND 3/0  PS1 27IN

## (undated) DEVICE — SUT ETHLN 3-0 FS118IN 663H

## (undated) DEVICE — ADHS SKIN SURG TISS VISC PREMIERPRO EXOFIN HI/VISC FAST/DRY

## (undated) DEVICE — GLV SURG SENSICARE PI ORTHO SZ8 LF STRL

## (undated) DEVICE — NDL HYPO ECLPS SFTY 22G 1 1/2IN

## (undated) DEVICE — BLD CUT FORMLA AGGR PLS 5.0MM

## (undated) DEVICE — 90-S CRUISE, SUCTION PROBE, NON-BENDABLE, MAX CUT LEVEL 1: Brand: SERFAS ENERGY

## (undated) DEVICE — CANN TWST IN TRPL DAM 7CM 8.25MM

## (undated) DEVICE — COLD THERAPY WRAP: Brand: DEROYAL

## (undated) DEVICE — SHOULDER P.A.D. III: Brand: DEROYAL